# Patient Record
Sex: FEMALE | Race: WHITE | HISPANIC OR LATINO | Employment: OTHER | URBAN - METROPOLITAN AREA
[De-identification: names, ages, dates, MRNs, and addresses within clinical notes are randomized per-mention and may not be internally consistent; named-entity substitution may affect disease eponyms.]

---

## 2017-01-26 ENCOUNTER — GENERIC CONVERSION - ENCOUNTER (OUTPATIENT)
Dept: OTHER | Facility: OTHER | Age: 77
End: 2017-01-26

## 2017-02-07 ENCOUNTER — GENERIC CONVERSION - ENCOUNTER (OUTPATIENT)
Dept: OTHER | Facility: OTHER | Age: 77
End: 2017-02-07

## 2017-02-10 ENCOUNTER — ALLSCRIPTS OFFICE VISIT (OUTPATIENT)
Dept: OTHER | Facility: OTHER | Age: 77
End: 2017-02-10

## 2017-05-22 ENCOUNTER — APPOINTMENT (OUTPATIENT)
Dept: LAB | Facility: CLINIC | Age: 77
End: 2017-05-22
Payer: COMMERCIAL

## 2017-05-22 DIAGNOSIS — E78.5 HYPERLIPIDEMIA: ICD-10-CM

## 2017-05-22 DIAGNOSIS — E11.65 TYPE 2 DIABETES MELLITUS WITH HYPERGLYCEMIA (HCC): ICD-10-CM

## 2017-05-22 LAB
ALBUMIN SERPL BCP-MCNC: 4.3 G/DL (ref 3.5–5)
ALP SERPL-CCNC: 54 U/L (ref 46–116)
ALT SERPL W P-5'-P-CCNC: 45 U/L (ref 12–78)
ANION GAP SERPL CALCULATED.3IONS-SCNC: 7 MMOL/L (ref 4–13)
AST SERPL W P-5'-P-CCNC: 31 U/L (ref 5–45)
BILIRUB SERPL-MCNC: 0.55 MG/DL (ref 0.2–1)
BUN SERPL-MCNC: 12 MG/DL (ref 5–25)
CALCIUM SERPL-MCNC: 9.4 MG/DL (ref 8.3–10.1)
CHLORIDE SERPL-SCNC: 101 MMOL/L (ref 100–108)
CHOLEST SERPL-MCNC: 185 MG/DL (ref 50–200)
CO2 SERPL-SCNC: 28 MMOL/L (ref 21–32)
CREAT SERPL-MCNC: 0.82 MG/DL (ref 0.6–1.3)
CREAT UR-MCNC: 46.5 MG/DL
EST. AVERAGE GLUCOSE BLD GHB EST-MCNC: 189 MG/DL
GFR SERPL CREATININE-BSD FRML MDRD: >60 ML/MIN/1.73SQ M
GLUCOSE P FAST SERPL-MCNC: 121 MG/DL (ref 65–99)
HBA1C MFR BLD: 8.2 % (ref 4.2–6.3)
HDLC SERPL-MCNC: 43 MG/DL (ref 40–60)
LDLC SERPL CALC-MCNC: 74 MG/DL (ref 0–100)
MICROALBUMIN UR-MCNC: 9.4 MG/L (ref 0–20)
MICROALBUMIN/CREAT 24H UR: 20 MG/G CREATININE (ref 0–30)
POTASSIUM SERPL-SCNC: 3.9 MMOL/L (ref 3.5–5.3)
PROT SERPL-MCNC: 8.1 G/DL (ref 6.4–8.2)
SODIUM SERPL-SCNC: 136 MMOL/L (ref 136–145)
TRIGL SERPL-MCNC: 338 MG/DL

## 2017-05-22 PROCEDURE — 83036 HEMOGLOBIN GLYCOSYLATED A1C: CPT

## 2017-05-22 PROCEDURE — 36415 COLL VENOUS BLD VENIPUNCTURE: CPT

## 2017-05-22 PROCEDURE — 80061 LIPID PANEL: CPT

## 2017-05-22 PROCEDURE — 82570 ASSAY OF URINE CREATININE: CPT

## 2017-05-22 PROCEDURE — 82043 UR ALBUMIN QUANTITATIVE: CPT

## 2017-05-22 PROCEDURE — 80053 COMPREHEN METABOLIC PANEL: CPT

## 2017-06-05 ENCOUNTER — ALLSCRIPTS OFFICE VISIT (OUTPATIENT)
Dept: OTHER | Facility: OTHER | Age: 77
End: 2017-06-05

## 2017-06-08 ENCOUNTER — GENERIC CONVERSION - ENCOUNTER (OUTPATIENT)
Dept: OTHER | Facility: OTHER | Age: 77
End: 2017-06-08

## 2017-06-23 ENCOUNTER — ALLSCRIPTS OFFICE VISIT (OUTPATIENT)
Dept: OTHER | Facility: OTHER | Age: 77
End: 2017-06-23

## 2017-07-18 ENCOUNTER — GENERIC CONVERSION - ENCOUNTER (OUTPATIENT)
Dept: OTHER | Facility: OTHER | Age: 77
End: 2017-07-18

## 2017-07-18 ENCOUNTER — APPOINTMENT (OUTPATIENT)
Dept: FAMILY MEDICINE CLINIC | Facility: CLINIC | Age: 77
End: 2017-07-18
Payer: COMMERCIAL

## 2017-07-18 PROCEDURE — G0109 DIAB MANAGE TRN IND/GROUP: HCPCS | Performed by: FAMILY MEDICINE

## 2017-07-25 ENCOUNTER — APPOINTMENT (OUTPATIENT)
Dept: FAMILY MEDICINE CLINIC | Facility: CLINIC | Age: 77
End: 2017-07-25
Payer: COMMERCIAL

## 2017-07-25 PROCEDURE — G0109 DIAB MANAGE TRN IND/GROUP: HCPCS | Performed by: FAMILY MEDICINE

## 2017-07-27 ENCOUNTER — GENERIC CONVERSION - ENCOUNTER (OUTPATIENT)
Dept: OTHER | Facility: OTHER | Age: 77
End: 2017-07-27

## 2017-08-15 ENCOUNTER — APPOINTMENT (OUTPATIENT)
Dept: FAMILY MEDICINE CLINIC | Facility: CLINIC | Age: 77
End: 2017-08-15
Payer: COMMERCIAL

## 2017-08-15 PROCEDURE — G0109 DIAB MANAGE TRN IND/GROUP: HCPCS | Performed by: FAMILY MEDICINE

## 2017-08-22 ENCOUNTER — GENERIC CONVERSION - ENCOUNTER (OUTPATIENT)
Dept: OTHER | Facility: OTHER | Age: 77
End: 2017-08-22

## 2017-08-22 ENCOUNTER — ALLSCRIPTS OFFICE VISIT (OUTPATIENT)
Dept: FAMILY MEDICINE CLINIC | Facility: CLINIC | Age: 77
End: 2017-08-22
Payer: COMMERCIAL

## 2017-08-22 PROCEDURE — G0109 DIAB MANAGE TRN IND/GROUP: HCPCS | Performed by: FAMILY MEDICINE

## 2017-08-24 ENCOUNTER — GENERIC CONVERSION - ENCOUNTER (OUTPATIENT)
Dept: OTHER | Facility: OTHER | Age: 77
End: 2017-08-24

## 2017-08-29 ENCOUNTER — GENERIC CONVERSION - ENCOUNTER (OUTPATIENT)
Dept: OTHER | Facility: OTHER | Age: 77
End: 2017-08-29

## 2017-10-02 DIAGNOSIS — Z12.31 ENCOUNTER FOR SCREENING MAMMOGRAM FOR MALIGNANT NEOPLASM OF BREAST: ICD-10-CM

## 2017-10-03 ENCOUNTER — HOSPITAL ENCOUNTER (OUTPATIENT)
Dept: RADIOLOGY | Age: 77
Discharge: HOME | End: 2017-10-03
Payer: COMMERCIAL

## 2017-10-03 DIAGNOSIS — Z12.31 ENCOUNTER FOR SCREENING MAMMOGRAM FOR MALIGNANT NEOPLASM OF BREAST: ICD-10-CM

## 2017-10-03 PROCEDURE — G0202 SCR MAMMO BI INCL CAD: HCPCS

## 2017-10-09 ENCOUNTER — APPOINTMENT (OUTPATIENT)
Dept: LAB | Facility: CLINIC | Age: 77
End: 2017-10-09
Payer: COMMERCIAL

## 2017-10-09 ENCOUNTER — GENERIC CONVERSION - ENCOUNTER (OUTPATIENT)
Dept: OTHER | Facility: OTHER | Age: 77
End: 2017-10-09

## 2017-10-09 DIAGNOSIS — E11.8 UNCONTROLLED TYPE 2 DIABETES MELLITUS WITH COMPLICATION, UNSPECIFIED LONG TERM INSULIN USE STATUS: Primary | ICD-10-CM

## 2017-10-09 DIAGNOSIS — E11.65 UNCONTROLLED TYPE 2 DIABETES MELLITUS WITH COMPLICATION, UNSPECIFIED LONG TERM INSULIN USE STATUS: Primary | ICD-10-CM

## 2017-10-09 LAB
EST. AVERAGE GLUCOSE BLD GHB EST-MCNC: 171 MG/DL
HBA1C MFR BLD: 7.6 % (ref 4.2–6.3)

## 2017-10-09 PROCEDURE — 36415 COLL VENOUS BLD VENIPUNCTURE: CPT

## 2017-10-09 PROCEDURE — 83036 HEMOGLOBIN GLYCOSYLATED A1C: CPT

## 2017-11-17 ENCOUNTER — GENERIC CONVERSION - ENCOUNTER (OUTPATIENT)
Dept: OTHER | Facility: OTHER | Age: 77
End: 2017-11-17

## 2018-01-09 NOTE — PROGRESS NOTES
Chief Complaint  Chief Complaint Free Text Note Form: Patient attended Diabetes Class 3 at [facility]  Surgical History    1  History of  Section   2  History of Cholecystectomy Laparoscopic   3  History of Complete Colonoscopy   4  History of Surgical Excision Of Abdominal Pregnancy    Family History  Mother    1  Family history of Heart Disease (V17 49)  Father    2  Family history unknown (V4 89) (Z78 9)  Sister    3  Family history of Bleeding  Grandparent    4  Family history unknown (V4 89) (Z78 9)    Social History    · Caffeine Use   · Denied: History of Currently sexually active   · Denied: History of Exercise habits   · Former smoker (W78 58) (R88 964)   ·    · Never Drank Alcohol   · No drug use   · Occupation:    Current Meds   1  Alendronate Sodium 70 MG Oral Tablet; take 1 tablet by mouth every week; Therapy: 38Qcm4459 to (Evaluate:49Tcs7742)  Requested for: 33TDZ7424; Last   Rx:2016 Ordered   2  BD Insulin Syr Ultrafine II 31G X 5/16" 1 ML Miscellaneous; INJECT 32 UNITS LEVEMIR   SUBCUTANEOUSLY AT BEDTIME; Therapy: 68FRY0698 to (Aldair Killer)  Requested for: 94PZU8764; Last   Rx:2017 Ordered   3  Fluticasone Propionate 50 MCG/ACT Nasal Suspension; USE 1 SPRAY IN EACH   NOSTRIL TWICE DAILY; Therapy: 01EAW2769 to [phone])  Requested for: 26YPN6156; Last   Rx:90Odt8948 Ordered   4  FreeStyle Lancets Miscellaneous; may check sugar up to 3 times daily; Therapy: 70OKS5168 to (Evaluate:2017)  Requested for: 09EOY5751; Last   Rx:2017 Ordered   5  FreeStyle Lite Device; make check blood sugars uo to 3 times daily; Therapy: 04RHI3750 to (Evaluate:16Gst6123)  Requested for: 80HGO8043; Last   Rx:19Cqd7753 Ordered   6  FreeStyle Lite Test In Vitro Strip; may test blood sugar up to 3 times daily; Therapy: 46VHQ8688 to (Evaluate:2017)  Requested for: 11TUZ6274; Last   Rx:2017 Ordered   7   Levemir 100 UNIT/ML Subcutaneous Solution; INJECT 32 UNITS UNDER THE SKIN   DAILY; Therapy: 31KBR4309 to (Evaluate:60Iwv6775)  Requested for: 77XFN7172; Last   Rx:14Jun2017 Ordered   8  Lisinopril 40 MG Oral Tablet; Take 1 tablet daily; Therapy: 21Mar2013 to (Sanjeev Gentry)  Requested for: 02VSB1049; Last   Rx:05Jun2017 Ordered   9  Lovastatin 40 MG Oral Tablet; TAKE 1 TABLET AT BEDTIME; Therapy: 33LDR9561 to (Evaluate:60Gfe2410)  Requested for: 73JKB7612; Last   Rx:38Vup2104 Ordered   10  MetFORMIN HCl - 1000 MG Oral Tablet; TAKE 1 TABLET TWICE DAILY; Therapy: 89Jno6607 to (Evaluate:01Nov2017)  Requested for: 33NNO7038; Last    HU:37OOT8315 Ordered   11  Metoprolol Tartrate 25 MG Oral Tablet; TAKE 1 TABLET TWICE DAILY; Therapy: 74ENJ8263 to (Evaluate:72Xuq6902)  Requested for: 77MIE3280; Last    Rx:19Jun2017 Ordered   12  OneTouch Ultra Blue In [employer]; may test blood sugar up to 3 times daily; Therapy: 66WIJ8509 to (Evaluate:26Jun2017)  Requested for: 65Ncm9328; Last    Rx:06Wyd0399 Ordered   13  Vitamin D3 1000 UNIT Oral Capsule; take 1 capsule daily; Therapy: 76EDP5273 to (Evaluate:11Tug6704)  Requested for: 85QLF1217; Last    Rx:22Lyh4373 Ordered   14  Zoster (Zostavax); INJECT 0 65 ML Once; Therapy: 32EDP4426 to (Last Rx:23Jun2017) Ordered    Allergies    1  No Known Drug Allergies    2  Pollen    Results/Data  DSMT/MNT Time Record [id number] 12:00AM [facility]     Test Name Result Flag Reference   Date of Service 8/29/2017     Start - Stop Time 0657-4315     Total MInutes 120     Group Or Individual Instruction Group         Plan    1  DSMT/MNT Time Record; Status:Complete - Retrospective By Protocol Authorization;     Done: 07XHT3936 12:00AM    Discussion/Summary  Patient Education Record:   PATIENT EDUCATION RECORD   Indication for Services: type 2 Diabetes Mellitus  She is ready to learn  She has no barriers to learning     Living Well with Diabetes Class 4 Education Content: Types of cholesterol, Dietary sources of cholesterol, Types of fat, Types of fiber, Reading food labels- in depth, Healthy choices when dining out     Education Plan/Path:  She needs the Living Well Class  Educational handouts reviewed and given in Nepalese        Signatures   Electronically signed by : Arabella Pretty RN; Aug 29 2017  3:59PM EST                       (Author)

## 2018-01-11 NOTE — MISCELLANEOUS
Provider Comments  Provider Comments:   September 14, 2016             Dear Brandt Reynoso,    We understand that many situations arise that occasionally prevents patients from keeping scheduled appointments  It is the policy of [facility] that patients notify us 24 hours in advance if unable to keep a scheduled appointment  Missed appointments jeopardize strong physician-patient relationships  The appointment you missed could have easily been made available to another patient if you had contacted us to cancel  We like to accommodate all of our patients, but when patients miss an appointment it prevents us from being able to help everyone  In the future, we request at least 24 hours notice of cancellation so we can make your appointment available to someone else in need  Sincerely,    The Physicians and Staff of [facility]          Signatures   Electronically signed by :  Traci Sprague, ; Sep 14 2016  8:06AM EST                       (Author)

## 2018-01-11 NOTE — PROGRESS NOTES
Chief Complaint  Chief Complaint Free Text Note Form: Pt  attended LW Class 2 @ the [facility] today  Family History  Mother    1  Family history of Heart Disease (V17 49)  Father    2  Family history unknown (V49 89) (Z78 9)  Sister    3  Family history of Bleeding  Grandparent    4  Family history unknown (V49 89) (Z78 9)    Social History    · Caffeine Use   · Denied: History of Currently sexually active   · Denied: History of Exercise habits   · Former smoker (Y84 05) (J24 058)   ·    · Never Drank Alcohol   · No drug use   · Occupation:    Current Meds   1  Alendronate Sodium 70 MG Oral Tablet; take 1 tablet by mouth every week; Therapy: 90Rnz8462 to (Evaluate:87Ohq6223)  Requested for: 64CCC0891; Last   Rx:22Sep2016 Ordered   2  BD Insulin Syr Ultrafine II 31G X 5/16" 1 ML Miscellaneous; INJECT 32 UNITS LEVEMIR   SUBCUTANEOUSLY AT BEDTIME; Therapy: 09RZP1429 to (Hobert Carrier)  Requested for: 84MYP8002; Last   Rx:05Jun2017 Ordered   3  Fluticasone Propionate 50 MCG/ACT Nasal Suspension; USE 1 SPRAY IN EACH   NOSTRIL TWICE DAILY; Therapy: 83JPP5357 to (Mabel Humbles)  Requested for: 74YZR6911; Last   Rx:05Feb2016 Ordered   4  FreeStyle Lancets Miscellaneous; may check sugar up to 3 times daily; Therapy: 22YSQ4153 to (Evaluate:10Jun2017)  Requested for: 18AHS9243; Last   Rx:10Feb2017 Ordered   5  FreeStyle Lite Device; make check blood sugars uo to 3 times daily; Therapy: 02WNF6090 to (Evaluate:21Fml8484)  Requested for: 63ZNK3530; Last   Rx:05Feb2016 Ordered   6  FreeStyle Lite Test In Vitro Strip; may test blood sugar up to 3 times daily; Therapy: 89EDA3929 to (Evaluate:11Nov2017)  Requested for: 89JZJ4476; Last   Rx:14Jun2017 Ordered   7  Levemir 100 UNIT/ML Subcutaneous Solution; INJECT 32 UNITS UNDER THE SKIN   DAILY; Therapy: 67XOQ3501 to (Evaluate:89Wgt7181)  Requested for: 83XNN9754; Last   Rx:14Jun2017 Ordered   8  Lisinopril 40 MG Oral Tablet;  Take 1 tablet daily; Therapy: 21Mar2013 to (Praveen Ann)  Requested for: 39LHL3739; Last   Rx:05Jun2017 Ordered   9  Lovastatin 40 MG Oral Tablet; TAKE 1 TABLET AT BEDTIME; Therapy: 98ODH6522 to (Evaluate:41Smq9753)  Requested for: 34CLK6383; Last   Rx:32Mzl3568 Ordered   10  MetFORMIN HCl - 1000 MG Oral Tablet; TAKE 1 TABLET TWICE DAILY; Therapy: 71Dax9160 to (Evaluate:01Nov2017)  Requested for: 98PBJ5566; Last    XB:87TAY6695 Ordered   11  Metoprolol Tartrate 25 MG Oral Tablet; TAKE 1 TABLET TWICE DAILY; Therapy: 22MLF5120 to (Evaluate:12Tzl1595)  Requested for: 74KHU7586; Last    Rx:19Jun2017 Ordered   12  OneTouch Ultra Blue In [employer]; may test blood sugar up to 3 times daily; Therapy: 59XLW0599 to (Evaluate:26Jun2017)  Requested for: 15Oui6215; Last    Rx:39Pdg5910 Ordered   13  Vitamin D3 1000 UNIT Oral Capsule; take 1 capsule daily; Therapy: 45WUV5346 to (Evaluate:17Sep2017)  Requested for: 98WIX5425; Last    Rx:47Ibh0683 Ordered   14  Zoster (Zostavax); INJECT 0 65 ML Once; Therapy: 85QCB7061 to (Last Rx:23Jun2017) Ordered    Allergies    1  No Known Drug Allergies    2  Pollen    Results/Data  DSMT/MNT Time Record [id number] 12:00AM [facility]     Test Name Result Flag Reference   Date of Service 07/18/17     Start - Stop Time 13:00 - 15:00     Total MInutes 120     Group Or Individual Instruction Group         Plan    1  DSMT/MNT Time Record; Status:Complete;   Done: 14APF5120 12:00AM    Discussion/Summary  Patient Education Record:   PATIENT EDUCATION RECORD   Indication for Services: type 2 Diabetes Mellitus  She is ready to learn  She has no barriers to learning     Living Well with Diabetes Class 2 Education Content: Macronutrients, Carbohydrate sources, What one serving of carbohydrate equals, Effects of diet on blood glucose levels, effects of carbohydrates on blood glucose levels, Basics of meal planning: balance, portions, and meal times, Measurements, Reading food labels to determine carbohydrates  She was given the following educational materials: The 15/15 Rule for Treating Low Blood Sugar, Diabetes Guidelines, Support Group schedule, portion booklet and LW Class 2 booklet in [employer] [place]   Patient Education: Educational resources provided: As above        Signatures   Electronically signed by : IAN Gross,HENRRY,TANNER; Jul 18 2017  3:47PM EST                       (Author)    Electronically signed by : TODD Grande MSWM NINA ,MSW; Aug 19 2017  9:06PM EST                       (Administrative)

## 2018-01-12 VITALS
SYSTOLIC BLOOD PRESSURE: 120 MMHG | BODY MASS INDEX: 26.76 KG/M2 | HEIGHT: 59 IN | DIASTOLIC BLOOD PRESSURE: 62 MMHG | WEIGHT: 132.72 LBS | TEMPERATURE: 98.4 F | HEART RATE: 96 BPM

## 2018-01-12 VITALS
SYSTOLIC BLOOD PRESSURE: 158 MMHG | DIASTOLIC BLOOD PRESSURE: 76 MMHG | TEMPERATURE: 98.2 F | HEIGHT: 59 IN | HEART RATE: 74 BPM

## 2018-01-12 NOTE — PROGRESS NOTES
Assessment    1  Need for vaccination for zoster (V04 89) (Z23)   2  Medicare annual wellness visit, initial (V70 0) (Z00 00)    Plan  Health Maintenance    · Always use a seat belt and shoulder strap when riding or driving a motor vehicle ;  Status:Complete;   Done: 70QBI5835   · Brush your teeth {freq1} and floss at least once a day ; Status:Complete;   Done:  44JRB8032   · Diets that are low in carbohydrates and high in protein are very popular for weight loss ;  Status:Complete;   Done: 74BCW7620   · Drink plenty of fluids ; Status:Complete;   Done: 80VBV2753   · Eat a low fat and low cholesterol diet ; Status:Complete;   Done: 47FOK9680   · Keep a diary of when and what you eat ; Status:Complete;   Done: 24QWF9162   · Stretch and warm up your muscles during the first 10 minutes , then cool down your  muscles for the last 10 minutes of exercise ; Status:Complete;   Done: 29QRF0960   · The plan of care for urinary incontinence is detailed in the plan and/or discussion section  of today's note ; Status:Complete;   Done: 53FOW5626   · There are many exercise options for seniors ; Status:Complete;   Done: 30QLV7592   · There are ways to decrease your stress and improve your sense of well-being  We  encourage you to keep active and exercise regularly  Make time to take care of yourself  and participate in activities that you enjoy  Stay connected to friends and family that can  support and comfort you  If at any time you have thoughts of harming yourself or  someone else, contact us immediately ; Status:Active; Requested KNK:[id number];    · These are things you can do to prevent falls in and around the home ; Status:Complete;    Done: 58SJS5914   · Use a sun block product with an SPF of 15 or more ; Status:Complete;   Done:  14CUQ5488   · We encourage all of our patients to exercise regularly    30 minutes of exercise or physical  activity five or more days a week is recommended for children and adults ;  Status:Complete;   Done: 93BFA3815   · We encourage you to begin to make lifestyle changes to help control your blood  pressure  These may include losing weight, increasing your activity level, limiting salt in  your diet, decreasing alcohol intake, and eating a diet low in fat and rich in fruits  and vegetables ; Status:Complete;   Done: 05WFI2000   · We have prescribed Kegel exercises to be done in a set of 15 repetitions, 3 times a day ;  Status:Complete;   Done: 10TGV0266   · We recommend routine visits to a dentist ; Status:Complete;   Done: 34EGX8944   · We recommend that you create an advance directive ; Status:Complete;   Done:  23GBI6221   · We recommend that you follow these steps to lower your risk of osteoporosis  ;  Status:Complete;   Done: 85GMO7745   · We recommend you modify your diet to achieve and maintain a healthy weight  Being  overweight may increase your risk for developing health problems such as diabetes,  heart disease, and cancer  Avoid high fat foods and eat a balanced diet rich  in fruits and vegetables  The combination of a reduced-calorie diet and increased  physical activity is recommended  Please let us know if you would like to  learn more about your nutrition and calorie needs, and additional options including  weight loss programs that can help you achieve your goals ; Status:Complete;   Done:  57KNC2099   · *1 -  DENTAL CLINIC Co-Management  *  Status: Active  Requested for: 67OBA6137  Care Summary provided  : Yes  Need for vaccination for zoster    · Zoster (Zostavax) (Zoster (Zostavax)); INJECT 0 65 ML Once  Type 2 diabetes mellitus with hyperglycemia    · [facility] (OPTHAMOLOGY ) Physician Referral  Consult Only: the expectation is  that the referring provider will communicate back to the patient on treatment options  Evaluation and Treatment: the expectation is that the referred to provider will  communicate back to the patient on treatment options  Status: Canceled - Scheduling  Care Summary provided  : Yes   · *VB - Eye Exam ; every 1 year; Last 99HFH8011; Next 50AQC9298; Status:Active   · *VB - Foot Exam ; every 1 year; Last 40CWE5479; Next 31CTI6666; Status:Active    Discussion/Summary    You are up to date with all your screening tests  We discussed the importance of completing the forms for your wishes for care should you not be able to make the decisions for yourself  You indicate would like  and daughter but agree to review the POLST and 5 Wishes and call with questions and complete so we can put into your record  A script for the shingles vaccine provided again to take to pharmacy  schedule your DM follow up with me next month to review your readings and report are recording then as we had discussed  Impression: Initial Annual Wellness Visit, with preventive exam as well as age and risk appropriate counseling completed  Cardiovascular screening and counseling: the risks and benefits of screening were discussed, screening is current, counseling was given on maintaining a healthy diet, counseling was given on maintaining a healthy weight, counseling was given on ways to improve blood pressure, counseling was given on ways to improve exercise tolerance, labs up to date and Dx - V81 2 Screen for CV Disorder  All up to date for age  Immunizations: influenza vaccine is up to date this year, the lifetime pneumococcal vaccine has been completed, Zostavax vaccine needed today, Script provided and Tdap vaccination up to date  Tobacco Cessation: non smoker  Advance Directive Planning: not complete, paperwork and instructions were given to the patient, she was encouraged to follow-up with me to discuss her questions and/or decisions  Advice and education were given regarding fall risk reduction, increasing physical activity, nutrition (non-diabetic), seat belt use, skin self-exam and sunscreen use  She was referred to dental services   Medical Equipment/Suppliers: no equiptment  Patient Discussion: plan discussed with the patient, follow-up visit needed in one year  Self Referrals: No      History of Present Illness  states up to date with vaccines but never got for shingles, will get  mammo and colonoscopy up to date for age, no longer requires screening  Completed questions and interpretation provided by in office  Abhi Zuleta      The patient is being seen for the initial annual wellness visit  Medicare Screening and Risk Factors   Hospitalizations: no previous hospitalizations  Medicare Screening Tests Risk Questions   Abdominal aortic aneurysm risk assessment: none indicated  Osteoporosis risk assessment: female gender and over 48years of age  Drug and Alcohol Use: The patient has never smoked cigarettes  The patient reports never drinking alcohol  She has never used illicit drugs  Diet and Physical Activity: Current diet includes well balanced meals, low fat food choices, low carbohydrate food choices, low salt food choices, 5 servings of fruit per day, 5 servings of vegetables per day, 2 cups of coffee per day and 3 servings of water per day  The patient does not exercise  Mood Disorder and Cognitive Impairment Screening: PHQ-9 Depression Scale   Over the past 2 weeks, how often have you been bothered by the following problems? 1 ) Little interest or pleasure in doing things? Several days  2 ) Feeling down, depressed or hopeless? Not at all    3 ) Trouble falling asleep or sleeping too much? Not at all    4 ) Feeling tired or having little energy? Several days  5 ) Poor appetite or overeating? Not at all    6 ) Feeling bad about yourself, or that you are a failure, or have let yourself or your family down? Not at all    7 ) Trouble concentrating on things, such as reading a newspaper or watching television? Several days     8 ) Moving or speaking so slowly that other people could have noticed, or the opposite, moving or speaking faster than usual? Several days  TOTAL SCORE: 4    How difficult have these problems made it for you to do your work, take care of things at home, or get along with people? Not at all  Anxiety screening was done using GAD7, score was 8 and mild anxiety  Depression screening score was 4     no significant symptoms  She denies feeling down, depressed, or hopeless over the past two weeks  She reports feeling little interest or pleasure in doing things over the past two weeks  Cognitive impairment screening: denies difficulty learning/retaining new information, difficulty handling complex tasks, difficulty with reasoning, denies difficulty with spatial ability and orientation, denies difficulty with language and denies difficulty with behavior  Functional Ability/Level of Safety: Hearing is normal bilaterally and a hearing aid is not used  The patient is currently able to do activities of daily living without limitations  Activities of daily living details: needs help doing housework, but does not need help using the phone, no transportation help needed, does not need help shopping, no meal preparation help needed, does not need help doing laundry, does not need help managing medications and does not need help managing money  Fall risk factors:  polypharmacy and antihypertensive use, but The patient fell 0 times in the past 12 months , no alcohol use, no mobility impairment, no antidepressant use, no deconditioning, no postural hypotension, no sedative use, no visual impairment, no urinary incontinence, no cognitive impairment, up and go test was normal and no previous fall  Home safety risk factors:  no unfamiliar surroundings, no loose rugs, no poor household lighting, no uneven floors, no household clutter, grab bars in the bathroom and handrails on the stairs  Advance Directives: Advance directives: Patient is refusing information at this time  Will discuss with provider  , but no living will, no durable power of  for health care directives and no advance directives  Co-Managers and Medical Equipment/Suppliers: See Patient Care Team   Falls Risk: The patient fell none times in the past 12 months  The patient is currently asymptomatic Symptoms Include: The patient currently has no urinary incontinence symptoms  Date of last glaucoma screen was 6/6/2017      Patient Care Team    Care Team Member Role Specialty Office Number   Hannah Stairs MD  Gastroenterology Adult [phone]   Florencio Jacob MD  Gastroenterology Adult [phone]   [place] [address] [facility] [phone]   Juanita Tenorio MD  Colon and Rectal Surgery [phone]   [facility]   [phone]     Review of Systems    Constitutional: negative  Head and Face: negative  Eyes: negative  ENT: negative  Cardiovascular: negative  Respiratory: negative  Genitourinary: negative  Musculoskeletal: negative  Integumentary and Breasts: negative  Neurological: negative  Psychiatric: negative  Active Problems    1  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)   2  Benign essential hypertension (401 1) (I10)   3  Colon polyp (211 3) (K63 5)   4  Dyslipidemia (272 4) (E78 5)   5  Mild nonproliferative diabetic retinopathy of both eyes without macular edema associated   with type 2 diabetes mellitus (250 50,362 04) (Z57 8595)   6  Need for influenza vaccination (V04 81) (Z23)   7  Non-healing skin lesion (709 9) (L98 9)   8  Postmenopausal osteoporosis (733 01) (M81 0)   9  Screening for breast cancer (V76 10) (Z12 39)   10  Type 2 diabetes mellitus with hyperglycemia (250 00) (E11 65)    Past Medical History    1  History of Acute upper respiratory infection (465 9) (J06 9)   2  History of Bright red rectal bleeding (569 3) (K62 5)   3  History of Chronic idiopathic constipation (564 00) (K59 04)   4   History of Encounter for routine gynecological examination (V72 31) (Z01 419) 5  History of Encounter for screening for malignant neoplasm of colon (V76 51) (Z12 11)   6  History of Encounter for screening mammogram for malignant neoplasm of breast   (V76 12) (Z12 31)   7  History of Eustachian tube dysfunction, right (381 81) (H69 81)   8  History of anemia (V12 3) (Z86 2)   9  History of chest pain (V13 89) (Z87 898)   10  History of diverticulitis of colon (V12 79) (Z87 19)   11  History of esophageal reflux (V12 79) (Z87 19)   12  History of gastritis (V12 79) (Z87 19)   13  History of gastrointestinal hemorrhage (V12 79) (Z87 19)   14  History of hypertension (V12 59) (Z86 79)   15  History of Need for Tdap vaccination (V06 1) (Z23)   16  History of Noncompliance of patient with other medical treatment and regimen (V15 81)    (Z91 19)   17  History of Palpitations (785 1) (R00 2)   18  History of Rectal bleeding (569 3) (K62 5)   19  History of Screening for diabetic retinopathy (V80 2) (Z13 5)   20  History of Skin lesion of chest wall (709 9) (L98 9)   21  History of Type 2 diabetes mellitus (250 00) (E11 9)    The active problems and past medical history were reviewed and updated today  Surgical History    1  History of  Section   2  History of Cholecystectomy Laparoscopic   3  History of Complete Colonoscopy   4  History of Surgical Excision Of Abdominal Pregnancy    The surgical history was reviewed and updated today  Family History  Mother    1  Family history of Heart Disease (V17 49)  Father    2  Family history unknown (V49 89) (Z78 9)  Sister    3  Family history of Bleeding  Grandparent    4  Family history unknown (V49 89) (Z78 9)    The family history was reviewed and updated today         Social History    · Caffeine Use   · Denied: History of Currently sexually active   · Denied: History of Exercise habits   · Former smoker (V15 82) (M41 161)   ·    · Never Drank Alcohol   · No drug use   · Occupation:  The social history was reviewed and updated today  The social history was reviewed and is unchanged  Current Meds   1  Alendronate Sodium 70 MG Oral Tablet; take 1 tablet by mouth every week; Therapy: 14Sep2015 to (Evaluate:13Ivu6614)  Requested for: 18MAE0060; Last   Rx:22Sep2016 Ordered   2  BD Insulin Syr Ultrafine II 31G X 5/16" 1 ML Miscellaneous; INJECT 32 UNITS LEVEMIR   SUBCUTANEOUSLY AT BEDTIME; Therapy: 34DMV8655 to (Juhi Warren)  Requested for: 46YNW4159; Last   Rx:05Jun2017 Ordered   3  Fluticasone Propionate 50 MCG/ACT Nasal Suspension; USE 1 SPRAY IN EACH   NOSTRIL TWICE DAILY; Therapy: 77NLQ7620 to (Sully Gilliam)  Requested for: 15YHI2136; Last   Rx:05Feb2016 Ordered   4  FreeStyle Lancets Miscellaneous; may check sugar up to 3 times daily; Therapy: 37JHY2393 to (Evaluate:10Jun2017)  Requested for: 85NKI0969; Last   Rx:10Feb2017 Ordered   5  FreeStyle Lite Device; make check blood sugars uo to 3 times daily; Therapy: 68QDQ7860 to (Evaluate:04Feb2017)  Requested for: 94HVS5402; Last   Rx:05Feb2016 Ordered   6  FreeStyle Lite Test In Vitro Strip; may test blood sugar up to 3 times daily; Therapy: 90RCI1830 to (Evaluate:11Nov2017)  Requested for: 17ZYI9480; Last   Rx:14Jun2017 Ordered   7  Levemir 100 UNIT/ML Subcutaneous Solution; INJECT 32 UNITS UNDER THE SKIN   DAILY; Therapy: 08TGR0248 to (Evaluate:12Sep2017)  Requested for: 81LRK6815; Last   Rx:14Jun2017 Ordered   8  Lisinopril 40 MG Oral Tablet; Take 1 tablet daily; Therapy: 21Mar2013 to (Juhi Warren)  Requested for: 85EQY9993; Last   Rx:05Jun2017 Ordered   9  Lovastatin 40 MG Oral Tablet; TAKE 1 TABLET AT BEDTIME; Therapy: 22BJI3534 to (Evaluate:63Lrg4857)  Requested for: 27ELZ6026; Last   Rx:21Feb2017 Ordered   10  MetFORMIN HCl - 1000 MG Oral Tablet; TAKE 1 TABLET TWICE DAILY; Therapy: 65Fgq7871 to (Evaluate:01Nov2017)  Requested for: 36XFM1418; Last    CD:44OVJ1756 Ordered   11   Metoprolol Tartrate 25 MG Oral Tablet; TAKE 1 TABLET TWICE DAILY; Therapy: 45RMN3809 to (Evaluate:2017)  Requested for: 98HAN6212; Last    Rx:2017 Ordered   12  OneTouch Ultra Blue In [employer]; may test blood sugar up to 3 times daily; Therapy: 61YIM8569 to (Evaluate:2017)  Requested for: 38Crv1853; Last    Rx:68Lhr0522 Ordered   13  Vitamin D3 1000 UNIT Oral Capsule; take 1 capsule daily; Therapy: 21TSP5127 to (Evaluate:2017)  Requested for: 29ZLQ1934; Last    Rx:57Wwf7434 Ordered    Allergies    1  No Known Drug Allergies    2  Pollen    Immunizations  Influenza --- Coleman Backers: [id number] Lye: 17-Sep-2012; Adelita Gross: 23-Oct-2013; Davis Zaman:  2015; Series5: 22-Sep-2016   PPSV --- Series1: 13-Dec-2011   Tdap --- Series1: 09-May-2016     Vitals  Signs   Recorded: 48XBM0617 54:40MI   Systolic: 672  Diastolic: 78  Height: 4 ft 11 in  Weight: 133 lb 2 51 oz  BMI Calculated: 26 89  BSA Calculated: 1 55    Results/Data  Diabetes Flow Sheet 34LSA4373 10:01AM Mian Armendariz   diabetic non-proliferative retinopathy completed on 17     Test Name Result Flag Reference   Eye Exam      non proliferative retinopathy       Health Management  Colon polyp   COLONOSCOPY (GI, SURG); every 2 years; Last 2016; Next Due: 20TXK9156; Active  History of Chronic idiopathic constipation   COLONOSCOPY; every 2 years; Last 2016; Next Due: 69Emw3517; Overdue  Type 2 diabetes mellitus with hyperglycemia   *VB - Eye Exam; every 1 year; Last 42GOI0254; Next Due: 98NME5502; Active  *VB - Foot Exam; every 1 year; Last 42TVG7346; Next Due: 64JVD6219; Active    Attending Note  Collaborating Physician Note: Collaborating Note: I supervised the Advanced Practitioner and I agree with the Advanced Practitioner note        Signatures   Electronically signed by : Cindy Grimm, [facility]; 2017 10:04AM EST                       (Author)    Electronically signed by : Zakiya Palumbo DO; 2017 10:28AM EST                       (Review)

## 2018-01-12 NOTE — MISCELLANEOUS
Date: 03/30/2016   Dear Baltazar Duverney:     We have attempted to reach you regarding your upcoming appointment on 06/09/2016 and with Mohti Rush     Unfortunately, due to a change in the provider's schedule we need to change your appointment  Please call our office as soon as possible so we can reschedule your appointment  We apologize for any inconvenience this may cause  Thank you in advance for your cooperation and assistance       Sincerely,   [facility]      Signatures   Electronically signed by : Christine Bautista, ; Mar 30 2016  9:08AM EST                       (Author)

## 2018-01-12 NOTE — PROGRESS NOTES
Chief Complaint  Chief Complaint Free Text Note Form: Pt  attended LW Class 4 class today @ [facility]  Social History    · Caffeine Use   · Denied: History of Currently sexually active   · Denied: History of Exercise habits   · Former smoker (R38 63) (F36 315)   ·    · Never Drank Alcohol   · No drug use   · Occupation:    Current Meds   1  Alendronate Sodium 70 MG Oral Tablet; take 1 tablet by mouth every week; Therapy: 89Qqp0233 to (Evaluate:77Kdg8173)  Requested for: 64IZD6474; Last   Rx:22Sep2016 Ordered   2  BD Insulin Syr Ultrafine II 31G X 5/16" 1 ML Miscellaneous; INJECT 32 UNITS LEVEMIR   SUBCUTANEOUSLY AT BEDTIME; Therapy: 75IDX4347 to (Stephanie Whitakerd)  Requested for: 55KYU3264; Last   Rx:05Jun2017 Ordered   3  Fluticasone Propionate 50 MCG/ACT Nasal Suspension; USE 1 SPRAY IN EACH   NOSTRIL TWICE DAILY; Therapy: 02JCI2330 to [phone]  Requested for: 29TUI7242; Last   Rx:05Feb2016 Ordered   4  FreeStyle Lancets Miscellaneous; may check sugar up to 3 times daily; Therapy: 22UND7577 to (Evaluate:10Jun2017)  Requested for: 47NRV9075; Last   Rx:10Feb2017 Ordered   5  FreeStyle Lite Device; make check blood sugars uo to 3 times daily; Therapy: 17ZOD7629 to (Evaluate:78Cxz5084)  Requested for: 53BGC2924; Last   Rx:67Egh0435 Ordered   6  FreeStyle Lite Test In Vitro Strip; may test blood sugar up to 3 times daily; Therapy: 40SAO9616 to (Evaluate:11Nov2017)  Requested for: 13VYY1713; Last   Rx:14Jun2017 Ordered   7  Levemir 100 UNIT/ML Subcutaneous Solution; INJECT 32 UNITS UNDER THE SKIN   DAILY; Therapy: 34BUH1088 to (Evaluate:00Gml0486)  Requested for: 11WHA8586; Last   Rx:14Jun2017 Ordered   8  Lisinopril 40 MG Oral Tablet; Take 1 tablet daily; Therapy: 21Mar2013 to (Stephanie Monteiro)  Requested for: 47MXG9788; Last   Rx:05Jun2017 Ordered   9  Lovastatin 40 MG Oral Tablet; TAKE 1 TABLET AT BEDTIME;    Therapy: 39MRZ0681 to (Evaluate:34Szi2434)  Requested for: 73DQF0625; Last   Rx:10Zal3427 Ordered   10  MetFORMIN HCl - 1000 MG Oral Tablet; TAKE 1 TABLET TWICE DAILY; Therapy: 33Xqv0653 to (Evaluate:01Nov2017)  Requested for: 32LUW8549; Last    KW:06TXX1086 Ordered   11  Metoprolol Tartrate 25 MG Oral Tablet; TAKE 1 TABLET TWICE DAILY; Therapy: 64ZLN8719 to (Evaluate:17Sep2017)  Requested for: 21BXN7912; Last    Rx:19Jun2017 Ordered   12  OneTouch Ultra Blue In [employer]; may test blood sugar up to 3 times daily; Therapy: 30IJT1703 to (Evaluate:26Jun2017)  Requested for: 68Tdp7596; Last    Rx:52Gdz8750 Ordered   13  Vitamin D3 1000 UNIT Oral Capsule; take 1 capsule daily; Therapy: 68UEC6600 to (Evaluate:17Sep2017)  Requested for: 46NPT6947; Last    Rx:48Kkq0287 Ordered   14  Zoster (Zostavax); INJECT 0 65 ML Once; Therapy: 26DKT6317 to (Last Rx:23Jun2017) Ordered    Allergies    1  No Known Drug Allergies    2  Pollen    Results/Data  DSMT/MNT Time Record 91Lse9120 12:00AM Stephanie Flock     Test Name Result Flag Reference   Date of Service 08/22/17     Start - Stop Time 13:00 - 15:00     Total MInutes 120     Group Or Individual Instruction Group         Plan    1  DSMT/MNT Time Record; Status:Complete;   Done: 22Aug2017 12:00AM    Discussion/Summary  Patient Education Record:   PATIENT EDUCATION RECORD   Indication for Services: type 2 Diabetes Mellitus  She is ready to learn  Living Well with Diabetes Class 4 Education Content: Types of cholesterol, Dietary sources of cholesterol, Types of fat, Types of fiber, Reading food labels- in depth, Healthy choices when dining out   She was given the following educational materials: LW Class 4 packet, in Frisian   Patient Education: Educational resources provided: As above        Future Appointments    Date/Time Provider Specialty Site   10/09/2017 10:30 AM An Blakely Cera 6 PCP     Signatures   Electronically signed by : Domo Palmer RDCDELDHARJEET,MILAGRO,GABRIELLE;  Aug 22 2017  4:04PM EST                       (Author)    Electronically signed by : TODD TimmonsMSW; Oct  5 2017  2:22PM EST                       (Administrative)

## 2018-01-12 NOTE — MISCELLANEOUS
Provider Comments  Provider Comments:   PT NO SHOW FOR TODAY APPT  needs to resched      Signatures   Electronically signed by : Debby Celeste, PAC;  Apr 13 2016  5:12PM EST                       (Author)

## 2018-01-13 VITALS
HEIGHT: 59 IN | BODY MASS INDEX: 26.84 KG/M2 | SYSTOLIC BLOOD PRESSURE: 166 MMHG | DIASTOLIC BLOOD PRESSURE: 78 MMHG | WEIGHT: 133.16 LBS

## 2018-01-13 NOTE — PROGRESS NOTES
Assessment    1  Eustachian tube dysfunction, right (381 81) (H69 81)   2  Acute upper respiratory infection (465 9) (J06 9)    Plan  Acute upper respiratory infection    · Coricidin HBP Congestion/Cough  MG Oral Capsule; TAKE 1 CAPSULE  EVERY 4 HOURS AS NEEDED  Eustachian tube dysfunction, right    · Fluticasone Propionate 50 MCG/ACT Nasal Suspension; USE 1 SPRAY IN EACH  NOSTRIL TWICE DAILY  Type 2 diabetes mellitus with hyperglycemia    · FreeStyle Lancets Miscellaneous; may check sugar up to 3 times daily   · FreeStyle Lite Device; make check blood sugars uo to 3 times daily   · FreeStyle Lite Test In Vitro Strip; may test blood sugar up to 3 times daily    Discussion/Summary    Start using the nasal spray daily  take the congestion/ cold medication is safe with your blood pressure medication  plenty of liquids  follow up after labs in March as discussed  I sent a new glucometer based on your new insurance  The patient, patient's family was counseled regarding instructions for management, risk factor reductions, prognosis, impressions  Possible side effects of new medications were reviewed with the patient/guardian today  The treatment plan was reviewed with the patient/guardian  The patient/guardian understands and agrees with the treatment plan      History of Present Illness  HPI: Pt here for [facility] R ear pain  was seen in ER for same 2/3/16, diagnosed virus  Did admit to Q-tip use    still has a low grade fever today, still has congestion but no sore throat, no cough but is clearing throat  slight help with ibuprofen  has a cold with nasal congestion   Hospital Based Practices Required Assessment:   Pain Assessment   the patient states they have pain  The pain is located in the pt state she has right ear pain for tow weeks  The patient describes the pain as aching, throbbing and constant  (on a scale of 0 to 10, the patient rates the pain at 10 )    Depression And Suicide Screen   No, the patient has not had thoughts of hurting themself  No, the patient has not felt depressed in the past 7 days  Prefered Language is  Pakistani  Primary Language is  Pakistani  Review of Systems    Constitutional: no fever and no chills  ENT: as noted in HPI  Cardiovascular: no complaints of slow or fast heart rate, no chest pain, no palpitations, no leg claudication or lower extremity edema  Respiratory: no complaints of shortness of breath, no wheezing, no dyspnea on exertion, no orthopnea or PND  Musculoskeletal: no complaints of arthralgia, no myalgia, no joint swelling or stiffness, no limb pain or swelling  Integumentary: no complaints of skin rash or lesion, no itching or dry skin, no skin wounds  Active Problems    1  Anemia (285 9) (D64 9)   2  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)   3  Benign essential hypertension (401 1) (I10)   4  Colon polyp (211 3) (K63 5)   5  Dyslipidemia (272 4) (E78 5)   6  Encounter for screening mammogram for malignant neoplasm of breast (V76 12)   (Z12 31)   7  Gastritis (535 50) (K29 70)   8  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,   macular edema presence unspecified (250 50,362 04) (E11 329)   9  Need for influenza vaccination (V04 81) (Z23)   10  Need for Tdap vaccination (V06 1) (Z23)   11  Noncompliance of patient with other medical treatment and regimen (V15 81) (Z91 19)   12  Postmenopausal osteoporosis (733 01) (M81 0)   13  Screening for diabetic retinopathy (V80 2) (Z13 5)   14  Skin lesion of chest wall (709 9) (L98 9)   15  Type 2 diabetes mellitus with hyperglycemia (250 00) (E11 65)    Social History    · Caffeine Use   · Denied: History of Currently sexually active   · Denied: History of Exercise habits   · Former smoker (V15 82) (J58 890)   ·    · Never Drank Alcohol   · No drug use   · Occupation:   · packaging  The social history was reviewed and updated today   The social history was reviewed and is unchanged  Current Meds   1  Alendronate Sodium 70 MG Oral Tablet; take 1 tablet by mouth every week; Therapy: 53Laq0307 to (Evaluate:40Ovm4412)  Requested for: 38NGU4833; Last   Rx:82Obf7150 Ordered   2  BD Insulin Syr Ultrafine II 31G X 16" 1 ML Miscellaneous; INJECT 25 UNITS  Levemir   SUBCUTANEOUSLY AT BEDTIME; Therapy: 01XZG5135 to (Ramon Braxton)  Requested for: 66ZSQ2289; Last   Rx:2015 Ordered   3  Docusate Sodium 100 MG Oral Capsule; TAKE 1 CAPSULE TWICE DAILY AS NEEDED; Therapy: 97DPI2331 to (Sarkis Lala)  Requested for: 98DRG9697; Last   Rx:2016 Ordered   4  Ferrous Sulfate 325 (65 Fe) MG Oral Tablet; TAKE 1 TABLET TWICE DAILY AS   DIRECTED; Therapy: 83VXB9116 to (Sarkis Lala)  Requested for: 47DMY0156; Last   Rx:2016 Ordered   5  Levemir 100 UNIT/ML Subcutaneous Solution; INJECT 30 UNIT Bedtime; Therapy: 22FPX5168 to (Ramon Braxton)  Requested for: 12TBS7615; Last   Rx:2016 Ordered   6  Lisinopril 40 MG Oral Tablet; take 1 tablet by mouth twice a day; Therapy: 2013 to (Evaluate:45Rht4399)  Requested for: 81PDK5646; Last   Rx:11Hnc0273 Ordered   7  Lovastatin 10 MG Oral Tablet; take 1 tablet at bedtime; Therapy: 46MSZ9278 to (Evaluate:75Szm4136)  Requested for: 0676 543 19 15; Last   Rx:52Wer7839 Ordered   8  MetFORMIN HCl - 1000 MG Oral Tablet; TAKE 1 TABLET TWICE DAILY; Therapy: 00Sql0171 to (Evaluate:95Byg5336)  Requested for: 12ETU0276; Last   Rx:2016 Ordered   9  Metoprolol Tartrate 25 MG Oral Tablet; take 1 tablet every 12 hours; Therapy: 93UNK0828 to (Saniya Mass)  Requested for: 04BRL8658; Last   J00EAF9342 Ordered   10  Omeprazole 20 MG Oral Capsule Delayed Release; TAKE 1 CAPSULE DAILY; Therapy: 30GAS2269 to (Cherene Estrin)  Requested for: 39MLX4510; Last    Rx:2015 Ordered   11  OneTouch Ultra Blue In [employer]; may test blood sugar up to 3 times daily;     Therapy: 98MPL9496 to (Evaluate:78Cfu8705)  Requested for: 80DLY0791; Last    Rx:05Jan2016; Status: ACTIVE - Renewal Denied Ordered   12  TriLyte 420 GM Oral Solution Reconstituted; TAKE AS DIRECTED; Therapy: 35NJU8676 to (formerly [facility])  Requested for: 52WYL9721; Last    Rx:05Nov2015 Ordered   13  Vitamin D 1000 UNIT CAPS; take 1 capsule daily; Therapy: 21KTU7817 to (Zaldivar Pro)  Requested for: 38QXI6466; Last    Rx:06Nov2015 Ordered   14  Zoster (Zostavax); INJECT 0 5  ML Intramuscular; Therapy: 12QYB9763 to (Last Rx:08Oct2015) Ordered    Allergies    1  No Known Drug Allergies    2  Pollen    Vitals   Recorded: 46AAP0335 10:19AM   Temperature 99 3 F   Heart Rate 80   Systolic 965   Diastolic 60   Height 4 ft 10 in   Weight 136 lb 10 94 oz   BMI Calculated 28 57   BSA Calculated 1 55     Physical Exam    Constitutional   General appearance: No acute distress, well appearing and well nourished  Eyes   Conjunctiva and lids: No swelling, erythema or discharge  Ears, Nose, Mouth, and Throat   External inspection of ears and nose: Normal     Otoscopic examination: Abnormal   The right tympanic membrane was bulging, but had an intact light reflex and was not obscured  The left tympanic membrane was not bulging, had an intact light reflex and was not obscured  The right external canal was normal  The left external canal was normal  Exam of the right middle ear showed a middle ear effusion that was serous  Exam of the left middle ear showed no middle ear effusion  Oropharynx: Abnormal   post nasal drip  Pulmonary   Respiratory effort: No increased work of breathing or signs of respiratory distress  Auscultation of lungs: Clear to auscultation  Cardiovascular   Auscultation of heart: Normal rate and rhythm, normal S1 and S2, without murmurs  Carotid pulses: Normal     Lymphatic   Palpation of lymph nodes in neck: No lymphadenopathy      Skin   Skin and subcutaneous tissue: Normal without rashes or lesions  Psychiatric   Mood and affect: Normal          Attending Note  Collaborating Physician Note: Collaborating Physician: I supervised the Advanced Practitioner and I agree with the Advanced Practitioner note        Future Appointments    Date/Time Provider Specialty Site   04/25/2016 08:00 AM Clary Wagner MD Gastroenterology Adult [address] SPECIAL   06/09/2016 10:00 AM Jaelyn Connelly, [facility] Gastroenterology Adult [address] SPECIAL   08/12/2016 10:00 AM CLAUDIO Sorenson Obstetrics/Gynecology [facility]   12/23/2016 01:20 PM Specialty Clinic, Podiatry  [place] [address]     Signatures   Electronically signed by : Abhilash Reis, [facility]; Feb 5 2016 10:54AM EST                       (Author)    Electronically signed by : Soni Lopez DO; Feb 5 2016 12:46PM EST                       (Review)

## 2018-01-14 NOTE — RESULT NOTES
Message   please enter reminder  Verified Results  (1) TISSUE EXAM 25Apr2016 10:19AM Stan Huertas     Test Name Result Flag Reference   LAB AP CASE REPORT (Report)     Surgical Pathology Report             Case: [id number]                   Authorizing Provider: Brittney Foster MD       Collected:      04/25/2016 1019        Ordering Location:   [address] [place] [address]   Received:      04/25/2016   [facility]                               Pathologist:      Mack Gar MD                              Specimens:  A) - Large Intestine, Transverse Colon, Prior polypectomy site, cold bx                B) - Polyp, Colorectal, Transverse colon polyp, cold bx   LAB AP FINAL DIAGNOSIS (Report)     A  Transverse colon, prior polypectomy site biopsy:  - Benign colonic mucosa with nonspecific reactive changes   - No active or chronic colitis, no epithelial dysplasia and no evidence of   malignancy  B  Transverse colon polyp:  - Benign polypoid colonic mucosa without histopathologic abnormality   - No epithelial dysplasia and no evidence of malignancy  Interpretation performed at [facility]   LAB AP SURGICAL ADDITIONAL INFORMATION (Report)     These tests were developed and their performance characteristics   determined by [address] [place] [address] or [facility]  They may not be cleared or approved by the [place]  Food and   Drug Administration  The FDA has determined that such clearance or   approval is not necessary  These tests are used for clinical purposes  They should not be regarded as investigational or for research  This   laboratory has been approved by CLIA 88, designated as a high-complexity   laboratory and is qualified to perform these tests  LAB AP GROSS DESCRIPTION (Report)     A   The specimen is received in formalin, labeled with the patient's name   and hospital number, and is designated prior polypectomy site,   transverse colon biopsy  The specimen consists of several, rubbery,   tan-brown tissue fragments measuring 1 0 x 10 8 x 0 2 cm in aggregate   dimension  Entirely submitted  One cassette  B  The specimen is received in formalin, labeled with the patient's name   and hospital number, and is designated transverse colon polyp biopsy  The specimen consists of 2 rubbery and friable, tan-brown tissue fragments   measuring from 0 1 up to 0 2 cm in greatest dimension  Entirely submitted  One cassette  Note: The estimated total formalin fixation time based upon information   provided by the submitting clinician and the standard processing schedule   is 18 0 hours  SRS  Discussion/Summary   negative biopsies  Will repeat colonoscopy in 3 years

## 2018-01-15 NOTE — PROGRESS NOTES
Chief Complaint  Chief Complaint Free Text Note Form: Patient attended Diabetes Class 3 on 17 at [facility]  Surgical History    1  History of  Section   2  History of Cholecystectomy Laparoscopic   3  History of Complete Colonoscopy   4  History of Surgical Excision Of Abdominal Pregnancy    Family History  Mother    1  Family history of Heart Disease (V17 49)  Father    2  Family history unknown (V49 89) (Z78 9)  Sister    3  Family history of Bleeding  Grandparent    4  Family history unknown (V49 89) (Z78 9)    Social History    · Caffeine Use   · Denied: History of Currently sexually active   · Denied: History of Exercise habits   · Former smoker (X04 92) (Z12 626)   ·    · Never Drank Alcohol   · No drug use   · Occupation:    Current Meds   1  Alendronate Sodium 70 MG Oral Tablet; take 1 tablet by mouth every week; Therapy: 30Ict8440 to (Evaluate:62Jsd3433)  Requested for: 67LYT2420; Last   Rx:2016 Ordered   2  BD Insulin Syr Ultrafine II 31G X 5/16" 1 ML Miscellaneous; INJECT 32 UNITS LEVEMIR   SUBCUTANEOUSLY AT BEDTIME; Therapy: 04KSR0070 to (Lois Chance)  Requested for: 40ONX0463; Last   Rx:2017 Ordered   3  Fluticasone Propionate 50 MCG/ACT Nasal Suspension; USE 1 SPRAY IN EACH   NOSTRIL TWICE DAILY; Therapy: 25KRF6891 to ()  Requested for: 69ZBN8949; Last   Rx:84Ffa3232 Ordered   4  FreeStyle Lancets Miscellaneous; may check sugar up to 3 times daily; Therapy: 81SXC9511 to (Evaluate:2017)  Requested for: 90BBG3739; Last   Rx:2017 Ordered   5  FreeStyle Lite Device; make check blood sugars uo to 3 times daily; Therapy: 56JAU1558 to (Evaluate:56Bws5064)  Requested for: 20TEH5690; Last   Rx:15Hgt9604 Ordered   6  FreeStyle Lite Test In Vitro Strip; may test blood sugar up to 3 times daily; Therapy: 81KIV8569 to (Evaluate:2017)  Requested for: 18BZV5827; Last   Rx:2017 Ordered   7   Levemir 100 UNIT/ML Subcutaneous Solution; INJECT 32 UNITS UNDER THE SKIN   DAILY; Therapy: 23HLW7290 to (Evaluate:38Uqd8477)  Requested for: 18UBA9609; Last   Rx:14Jun2017 Ordered   8  Lisinopril 40 MG Oral Tablet; Take 1 tablet daily; Therapy: 80Gvc5362 to (Selsaria Sicks)  Requested for: 55QAK4594; Last   Rx:14Sxk0493 Ordered   9  Lovastatin 40 MG Oral Tablet; TAKE 1 TABLET AT BEDTIME; Therapy: 45PAO6866 to (Evaluate:80Ipl0401)  Requested for: 60RGE6022; Last   Rx:26Fqh6855 Ordered   10  MetFORMIN HCl - 1000 MG Oral Tablet; TAKE 1 TABLET TWICE DAILY; Therapy: 58Eop9017 to (Evaluate:01Nov2017)  Requested for: 38YUU9746; Last    YP:35NJY9161 Ordered   11  Metoprolol Tartrate 25 MG Oral Tablet; TAKE 1 TABLET TWICE DAILY; Therapy: 52CRT7255 to (Evaluate:94Eht5511)  Requested for: 19OFA5607; Last    Rx:26Ufp0025 Ordered   12  OneTouch Ultra Blue In [employer]; may test blood sugar up to 3 times daily; Therapy: 58XUB2251 to (Evaluate:26Jun2017)  Requested for: 23Vdj0596; Last    Rx:04Otq6292 Ordered   13  Vitamin D3 1000 UNIT Oral Capsule; take 1 capsule daily; Therapy: 41DTG3477 to (Evaluate:79Iwi6144)  Requested for: 87SYK2396; Last    Rx:15Rjv4587 Ordered   14  Zoster (Zostavax); INJECT 0 65 ML Once; Therapy: 07NQD4124 to (Last Rx:23Jun2017) Ordered    Allergies    1  No Known Drug Allergies    2  Pollen    Results/Data  DSMT/MNT Time Record 52FSI4351 12:00AM Tnoy Young     Test Name Result Flag Reference   Date of Service 7/25/17     Start - Stop Time 7054-9569     Total MInutes 120     Group Or Individual Instruction Group         Plan    1  DSMT/MNT Time Record; Status:Complete - Retrospective By Protocol Authorization;     Done: 65EIA8368 12:00AM    Discussion/Summary  Patient Education Record:   PATIENT EDUCATION RECORD   Indication for Services: type 2 Diabetes Mellitus  She is ready to learn  She has no barriers to learning     Living Well with Diabetes Class 4 Education Content: Types of cholesterol, Dietary sources of cholesterol, Types of fat, Types of fiber, Reading food labels- in depth, Healthy choices when dining out     Education Plan/Path:  She needs the Living Well Class   Educational handouts given in Uzbek      Signatures   Electronically signed by : Laura Sanabria RN; Jul 27 2017  4:40PM EST                       (Author)

## 2018-01-18 NOTE — PROGRESS NOTES
Chief Complaint  Chief Complaint Free Text Note Form: Patient attended Diabetes Class 1 at the [facility] on 8/15/2017      Surgical History    1  History of  Section   2  History of Cholecystectomy Laparoscopic   3  History of Complete Colonoscopy   4  History of Surgical Excision Of Abdominal Pregnancy    Family History  Mother    1  Family history of Heart Disease (V17 49)  Father    2  Family history unknown (V49 89) (Z78 9)  Sister    3  Family history of Bleeding  Grandparent    4  Family history unknown (V49 89) (Z78 9)    Social History    · Caffeine Use   · Denied: History of Currently sexually active   · Denied: History of Exercise habits   · Former smoker (K32 08) (X03 416)   ·    · Never Drank Alcohol   · No drug use   · Occupation:    Current Meds   1  Alendronate Sodium 70 MG Oral Tablet; take 1 tablet by mouth every week; Therapy: 62Nhv3130 to (Evaluate:00Nyu4709)  Requested for: 69VAG1664; Last   Rx:29Amx9722 Ordered   2  BD Insulin Syr Ultrafine II 31G X 5/16" 1 ML Miscellaneous; INJECT 32 UNITS LEVEMIR   SUBCUTANEOUSLY AT BEDTIME; Therapy: 13PRX5120 to (Damion Siemens)  Requested for: 41ZLF4113; Last   Rx:2017 Ordered   3  Fluticasone Propionate 50 MCG/ACT Nasal Suspension; USE 1 SPRAY IN EACH   NOSTRIL TWICE DAILY; Therapy: 82UUS2550 to (Adryan Osborn)  Requested for: 14WUP4427; Last   Rx:74Cwf5441 Ordered   4  FreeStyle Lancets Miscellaneous; may check sugar up to 3 times daily; Therapy: 46BKZ2430 to (Evaluate:2017)  Requested for: 73NUY5012; Last   Rx:2017 Ordered   5  FreeStyle Lite Device; make check blood sugars uo to 3 times daily; Therapy: 80TAS6369 to (Evaluate:2017)  Requested for: 43NLL4692; Last   Rx:30Cce8606 Ordered   6  FreeStyle Lite Test In Vitro Strip; may test blood sugar up to 3 times daily; Therapy: 97TUX9268 to (Evaluate:2017)  Requested for: 41UAC8661; Last   Rx:2017 Ordered   7   Levemir 100 UNIT/ML Subcutaneous Solution; INJECT 32 UNITS UNDER THE SKIN   DAILY; Therapy: 13PQK5303 to (Evaluate:59Vni9704)  Requested for: 83HCT9055; Last   Rx:14Jun2017 Ordered   8  Lisinopril 40 MG Oral Tablet; Take 1 tablet daily; Therapy: 21Mar2013 to (Fredy Christianomar)  Requested for: 25AGW9884; Last   Rx:05Jun2017 Ordered   9  Lovastatin 40 MG Oral Tablet; TAKE 1 TABLET AT BEDTIME; Therapy: 70YTA0672 to (Evaluate:87Dpi0225)  Requested for: 66UKE6490; Last   Rx:76Mwe4807 Ordered   10  MetFORMIN HCl - 1000 MG Oral Tablet; TAKE 1 TABLET TWICE DAILY; Therapy: 82Bpp1891 to (Evaluate:01Nov2017)  Requested for: 76CNW5534; Last    WK:13ENF2820 Ordered   11  Metoprolol Tartrate 25 MG Oral Tablet; TAKE 1 TABLET TWICE DAILY; Therapy: 94ZWP8263 to (Evaluate:37Wbd7246)  Requested for: 48UHO5300; Last    Rx:19Jun2017 Ordered   12  OneTouch Ultra Blue In [employer]; may test blood sugar up to 3 times daily; Therapy: 05EJG6710 to (Evaluate:26Jun2017)  Requested for: 84Jea1296; Last    Rx:54Szj9214 Ordered   13  Vitamin D3 1000 UNIT Oral Capsule; take 1 capsule daily; Therapy: 37RCH8775 to (Evaluate:89Lpx1446)  Requested for: 19OXQ5316; Last    Rx:52Acb6048 Ordered   14  Zoster (Zostavax); INJECT 0 65 ML Once; Therapy: 27ECT1962 to (Last Rx:23Jun2017) Ordered    Allergies    1  No Known Drug Allergies    2  Pollen    Results/Data  DSMT/MNT Time Record 91Ihb9934 12:00AM Frankey Marus     Test Name Result Flag Reference   Date of Service 8/15/2017     Start - Stop Time 2511-2522     Total MInutes 120     Group Or Individual Instruction Group         Plan    1  DSMT/MNT Time Record; Status:Complete - Retrospective By Protocol Authorization;     Done: 70BEV0151 12:00AM    Discussion/Summary  Patient Education Record:   PATIENT EDUCATION RECORD   Indication for Services: type 2 Diabetes Mellitus  She is ready to learn  She has no barriers to learning     Living Well with Diabetes Class 1 Education Content: What is diabetes, Types of diabetes, How is diabetes diagnosed, Management skills, Role of exercise, Glucose monitoring, Target range goals     Education Plan/Path:  She needs the Living Well Class  Educational handouts in Kyrgyz given        Signatures   Electronically signed by : Hubert Layne RN; Aug 24 2017 10:21AM EST                       (Author)

## 2018-01-22 VITALS
TEMPERATURE: 97.9 F | HEART RATE: 70 BPM | SYSTOLIC BLOOD PRESSURE: 150 MMHG | DIASTOLIC BLOOD PRESSURE: 80 MMHG | HEIGHT: 59 IN | WEIGHT: 134.04 LBS | BODY MASS INDEX: 27.02 KG/M2

## 2018-01-22 VITALS
WEIGHT: 134.92 LBS | BODY MASS INDEX: 27.2 KG/M2 | HEART RATE: 66 BPM | HEIGHT: 59 IN | TEMPERATURE: 97.7 F | DIASTOLIC BLOOD PRESSURE: 70 MMHG | SYSTOLIC BLOOD PRESSURE: 138 MMHG

## 2018-01-26 RX ORDER — BLOOD SUGAR DIAGNOSTIC
STRIP MISCELLANEOUS
COMMUNITY
Start: 2013-06-17 | End: 2019-03-07 | Stop reason: SDUPTHER

## 2018-01-26 RX ORDER — LANCETS 28 GAUGE
EACH MISCELLANEOUS
COMMUNITY
Start: 2016-02-05 | End: 2018-05-18 | Stop reason: SDUPTHER

## 2018-01-26 RX ORDER — BIOTIN 1 MG
1 TABLET ORAL DAILY
COMMUNITY
Start: 2015-11-06 | End: 2018-01-29

## 2018-01-26 RX ORDER — FLUTICASONE PROPIONATE 50 MCG
1 SPRAY, SUSPENSION (ML) NASAL 2 TIMES DAILY
COMMUNITY
Start: 2016-02-05 | End: 2019-09-30 | Stop reason: ALTCHOICE

## 2018-01-26 RX ORDER — MELATONIN
1000 DAILY
Refills: 3 | COMMUNITY
Start: 2017-11-20 | End: 2018-02-18 | Stop reason: SDUPTHER

## 2018-01-26 RX ORDER — ALENDRONATE SODIUM 70 MG/1
1 TABLET ORAL WEEKLY
COMMUNITY
Start: 2015-09-14 | End: 2018-01-29

## 2018-01-26 RX ORDER — LISINOPRIL 40 MG/1
1 TABLET ORAL DAILY
COMMUNITY
Start: 2013-03-21 | End: 2018-03-12 | Stop reason: SDUPTHER

## 2018-01-26 RX ORDER — LOVASTATIN 40 MG/1
TABLET ORAL
Refills: 1 | COMMUNITY
Start: 2017-11-08 | End: 2018-01-29 | Stop reason: SDUPTHER

## 2018-01-26 RX ORDER — LOVASTATIN 40 MG/1
1 TABLET ORAL
COMMUNITY
Start: 2012-01-27 | End: 2018-01-29

## 2018-01-26 RX ORDER — INSULIN DETEMIR 100 [IU]/ML
INJECTION, SOLUTION SUBCUTANEOUS
Refills: 0 | COMMUNITY
Start: 2017-11-17 | End: 2018-01-29 | Stop reason: SDUPTHER

## 2018-01-26 RX ORDER — AMLODIPINE BESYLATE 5 MG/1
1 TABLET ORAL DAILY
COMMUNITY
Start: 2017-10-09 | End: 2018-01-29 | Stop reason: ALTCHOICE

## 2018-01-29 ENCOUNTER — OFFICE VISIT (OUTPATIENT)
Dept: INTERNAL MEDICINE CLINIC | Facility: CLINIC | Age: 78
End: 2018-01-29
Payer: COMMERCIAL

## 2018-01-29 VITALS
WEIGHT: 132.28 LBS | BODY MASS INDEX: 26.67 KG/M2 | DIASTOLIC BLOOD PRESSURE: 84 MMHG | TEMPERATURE: 97.7 F | SYSTOLIC BLOOD PRESSURE: 148 MMHG | HEART RATE: 80 BPM | HEIGHT: 59 IN

## 2018-01-29 DIAGNOSIS — I10 BENIGN ESSENTIAL HYPERTENSION: ICD-10-CM

## 2018-01-29 DIAGNOSIS — E78.5 DYSLIPIDEMIA: ICD-10-CM

## 2018-01-29 DIAGNOSIS — E11.65 TYPE 2 DIABETES MELLITUS WITH HYPERGLYCEMIA, UNSPECIFIED LONG TERM INSULIN USE STATUS: Primary | ICD-10-CM

## 2018-01-29 LAB
LEFT EYE DIABETIC RETINOPATHY: NORMAL
LEFT EYE IMAGE QUALITY: NORMAL
RIGHT EYE DIABETIC RETINOPATHY: NORMAL
RIGHT EYE IMAGE QUALITY: NORMAL
SEVERITY (EYE EXAM): NORMAL

## 2018-01-29 PROCEDURE — 3725F SCREEN DEPRESSION PERFORMED: CPT | Performed by: PHYSICIAN ASSISTANT

## 2018-01-29 PROCEDURE — 92250 FUNDUS PHOTOGRAPHY W/I&R: CPT | Performed by: PHYSICIAN ASSISTANT

## 2018-01-29 PROCEDURE — 99213 OFFICE O/P EST LOW 20 MIN: CPT | Performed by: PHYSICIAN ASSISTANT

## 2018-01-29 RX ORDER — PEN NEEDLE, DIABETIC 31 GX5/16"
NEEDLE, DISPOSABLE MISCELLANEOUS
COMMUNITY
Start: 2017-11-08 | End: 2018-03-12 | Stop reason: SDUPTHER

## 2018-01-29 RX ORDER — LOVASTATIN 40 MG/1
TABLET ORAL
Qty: 180 TABLET | Refills: 1 | Status: SHIPPED | OUTPATIENT
Start: 2018-01-29 | End: 2018-07-20 | Stop reason: SDUPTHER

## 2018-01-29 RX ORDER — AMLODIPINE BESYLATE 5 MG/1
5 TABLET ORAL DAILY
Qty: 90 TABLET | Refills: 1 | Status: SHIPPED | OUTPATIENT
Start: 2018-01-29 | End: 2018-02-12 | Stop reason: SDUPTHER

## 2018-01-29 RX ORDER — INSULIN DETEMIR 100 [IU]/ML
34 INJECTION, SOLUTION SUBCUTANEOUS
Qty: 5 PEN | Refills: 1 | Status: SHIPPED | OUTPATIENT
Start: 2018-01-29 | End: 2018-03-07 | Stop reason: SDUPTHER

## 2018-01-29 NOTE — PROGRESS NOTES
Assessment/Plan:  I have refilled all your medications  I have ordered your labs  Your numbers are much better but please also check some readings 2 hours after dinner as well  Return after complete your labs  DM retina eye exam today, we will sched with Eye DR if any abnormalities on your eye exam  Mammogram due 10/2018  Colonoscopy due 2019    No problem-specific Assessment & Plan notes found for this encounter  Diagnoses and all orders for this visit:    Type 2 diabetes mellitus with hyperglycemia, unspecified long term insulin use status (HCC)  -     POCT diabetic eye exam  -     LEVEMIR FLEXTOUCH subcutaneous injection pen 100 units/mL; Inject 34 Units under the skin daily at bedtime for 30 days  -     metFORMIN (GLUCOPHAGE) 1000 MG tablet; Take 1 tablet (1,000 mg total) by mouth 2 (two) times a day  -     Cancel: Hemoglobin A1c  -     Cancel: Comprehensive metabolic panel; Future  -     Cancel: Microalbumin / creatinine urine ratio  -     Cancel: Comprehensive metabolic panel  -     Comprehensive metabolic panel  -     Hemoglobin A1c  -     Cancel: Microalbumin / creatinine urine ratio  -     Microalbumin / creatinine urine ratio    Dyslipidemia  -     lovastatin (MEVACOR) 40 MG tablet; Take two pills together once daily at bedtime  -     Lipid Panel with Direct LDL reflex    Benign essential hypertension  -     metoprolol tartrate (LOPRESSOR) 25 mg tablet; Take 1 tablet (25 mg total) by mouth every 12 (twelve) hours  -     amLODIPine (NORVASC) 5 mg tablet; Take 1 tablet (5 mg total) by mouth daily    Other orders  -     B-D UF III MINI PEN NEEDLES 31G X 5 MM MISC;           Subjective:      Patient ID: Roly Oh is a 69 y o  female  Patient here for interim DM check up   Forgot to bring her numbers but stats in am getting 89 to 138 range  Has not been checking evening numbers    On Levimir 34 units before bedtime  States feeling much better with improvement  Did not take BP med yet this am   Is due for complete labs, will order today  Mammo due 10/2018  Colonoscopy due 2019            The following portions of the patient's history were reviewed and updated as appropriate: allergies, current medications, past family history, past social history, past surgical history and problem list     Review of Systems   Constitutional: Negative  Respiratory: Negative  Cardiovascular: Negative  Gastrointestinal: Negative  Endocrine: Negative  Neurological: Negative  Psychiatric/Behavioral: Negative  Objective:  Vitals:    01/29/18 1037   BP: 148/84   BP Location: Left arm   Patient Position: Sitting   Cuff Size: Standard   Pulse: 80   Temp: 97 7 °F (36 5 °C)   TempSrc: Oral   Weight: 60 kg (132 lb 4 4 oz)   Height: 4' 11" (1 499 m)        Physical Exam   Constitutional: She is oriented to person, place, and time  She appears well-developed and well-nourished  HENT:   Head: Normocephalic  Eyes: Pupils are equal, round, and reactive to light  Cardiovascular: Normal rate, regular rhythm and normal heart sounds  Exam reveals no gallop and no friction rub  No murmur heard  Pulmonary/Chest: Effort normal and breath sounds normal  She has no wheezes  Abdominal: Soft  Bowel sounds are normal    Musculoskeletal: Normal range of motion  Neurological: She is alert and oriented to person, place, and time  Skin: Skin is warm  Psychiatric: She has a normal mood and affect   Her behavior is normal

## 2018-01-29 NOTE — PATIENT INSTRUCTIONS
10% - bad control"> 10% - bad control,Hemoglobin A1c (HbA1c) greater than 10% indicating poor diabetic control,Haemoglobin A1c greater than 10% indicating poor diabetic control">   Diabetes mellitus tipo 2 en adultos, cuidados ambulatorios   INFORMACIÓN GENERAL:   La diabetes mellitus tipo 2 en adultos  es salvatore enfermedad que afecta la forma en que el cuerpo utiliza la glucosa (azúcar)  La insulina ayuda a extraer el azúcar de la micaela para que pueda usarse en la producción de energía  Generalmente, cuando el nivel de azúcar [place], el páncreas produce más insulina  La diabetes tipo 2 se desarrolla ya sea porque el cuerpo no puede producir suficiente insulina, o no la puede usar correctamente  Después de [employer], asher páncreas podría dejar de producir insulina  Síntomas comunes incluyen los siguientes:   · Más hambre o sed de la usual    · Necesidad frecuente de orinar     · Pérdida de peso sin tratar     · Visión borrosa  Busque cuidados inmediatos para los siguientes síntomas:   · Dolor abdominal severo o dolor que se propaga hacia asher espalda  Es probable que usted también vomite  · Dificultad para permanecer despierto o concentrado    · Temblores o sudoración    · Visión borrosa o doble    · Aliento con olor a frutas o aleks    · Respiración profunda y dificultosa o rápida y superficial    · Ritmo cardíaco rápido y débil  El tratamiento para la diabetes mellitus tipo 2  incluye mantener asher nivel de azúcar en el micaela a un rango normal  Usted debe comer los alimentos correctos y ejercitarse con regularidad  Además es probable que necesite medicamentos si no puede controlar asher nivel de azúcar en la micaela con nutrición y ejercicio  Maneje la diabetes mellitus tipo 2:   · Revise asher nivel de azúcar en la micaela  A usted le enseñarán cómo revisar salvatore pequeña gota de [place] rico en un medidor de glucosa  Pregúntele a asher proveedor de lv cuándo y con cuánta frecuencia es necesario revisar paresh el día   Bertha Model pregúntele a asher proveedor de lv cuáles deberían ser lyubov niveles de azúcar en la micaela cuando usted se los revisa  · Mantenga un registro de los carbohidratos (azúcares y almidones)  Asher nivel de azúcar en la micaela puede elevarse demasiado si usted come demasiados carbohidratos  Asher dietista le ayudará a planear comidas y meriendas que tengan la cantidad correcta de carbohidratos  · Consuma alimentos bajos en grasas  Ran [place] son el kary sin piel y la leche descremada  · Consuma menos sodio (sal)  Algunos ejemplos de alimentos altos en sodio que hay que limitar son la salsa de soya, las payam tostadas y la sopa  No le agregue sal a la comida que usted cocina  Limite asher uso de sal de hubbard  · Coma alimentos altos en fibra  Alimentos que son buena karoline de fibra incluyen los vegetales, el pan integral y los frijoles  · Limite el alcohol  El alcohol afecta asher nivel de azúcar en la micaela y puede dificultar el [place] de asher diabetes  Las mujeres deben limitar el consumo de alcohol a 1 bebida al día  Los hombres deben limitarlo a 2 debidas al día  Salvatore bebida de alcohol equivale a 12 onzas de cerveza, 5 onzas de vino o 1½ onzas de licor  · Ejercítese regularmente  El ejercicio puede ayudar a mantener estable asher nivel de azúcar en la micaela, al mismo tiempo que disminuye asher riesgo de enfermedad cardíaca y le ayuda a perder peso  Ejercítese por al menos 30 minutos, 5 días a la semana  Lucendia Moles de fortalecimiento muscular 2 días a la semana  Colabore con asher proveedor de lv para crear un plan de ejercicios  · Revise lyubov pies a diario  para becka si tienen heridas o llagas abiertas  Pregúntele a asher proveedor de [employer] que usted puede hacer si tiene salvatore llaga abierta  · Deje de fumar  Si usted fuma, nunca es tarde para dejar de hacerlo  El fumar puede [employer] problemas que puedan ocurrir con la diabetes   Pregúntele a asher proveedor de [employer] información para aprender a dejar de fumar si usted tiene dificultad para hacerlo  · Pregunte sobre espino peso:  Pregúntele a lyubov proveedores de lv si usted necesita perder peso y cuánto debe perder  Pídales que le ayuden con un programa de perdida de [place]  Incluso perder unas 10 a 15 libras puede ayudarle a manejar espino nivel de azúcar en la micaela  · Tenga a mano espino identificación de [employer]  Use un brazalete de alerta médica o lleve consigo salvatore tarjeta que diga que usted tiene diabetes  Pregúntele a espino proveedor de lv dónde conseguir estos artículos  · Pregunte sobre vacunas  La diabetes lo pone a usted en riesgo de enfermedad seria si usted se resfría, tiene neumonía o hepatitis  Pregúntele a espino proveedor de lv si usted debe ponerse las vacunas contra la gripe, neumonía o hepatitis B, y cuándo ponérselas  Programe salvatore elder con espino proveedor de [employer] se le haya indicado: Anote lyubov preguntas para que se acuerde de hacerlas paresh lyubov visitas  ACUERDOS SOBRE ESPINO CUIDADO:   Usted tiene el derecho de participar en la planificación de espino cuidado  Aprenda todo lo que pueda sobre espino condición y maureen darle tratamiento  Discuta con lyubov médicos lyubov opciones de tratamiento para juntos decidir el cuidado que usted quiere recibir  Usted siempre tiene el derecho a rechazar espino tratamiento  Esta información es sólo para uso en educación  Espino intención no es darle un consejo médico sobre enfermedades o tratamientos  Colsulte con espino Classie Torrez farmacéutico antes de seguir cualquier régimen médico para saber si es seguro y efectivo para usted  © 2014 [address] is for End User's use only and may not be sold, redistributed or otherwise used for commercial purposes  All illustrations and images included in CareNotes® are the copyrighted property of A D A M , Inc  or Edwin Martinez

## 2018-01-30 ENCOUNTER — APPOINTMENT (OUTPATIENT)
Dept: LAB | Facility: CLINIC | Age: 78
End: 2018-01-30
Payer: COMMERCIAL

## 2018-01-30 LAB
ALBUMIN SERPL BCP-MCNC: 3.8 G/DL (ref 3.5–5)
ALP SERPL-CCNC: 64 U/L (ref 46–116)
ALT SERPL W P-5'-P-CCNC: 37 U/L (ref 12–78)
ANION GAP SERPL CALCULATED.3IONS-SCNC: 8 MMOL/L (ref 4–13)
AST SERPL W P-5'-P-CCNC: 26 U/L (ref 5–45)
BILIRUB SERPL-MCNC: 0.45 MG/DL (ref 0.2–1)
BUN SERPL-MCNC: 10 MG/DL (ref 5–25)
CALCIUM SERPL-MCNC: 9.2 MG/DL (ref 8.3–10.1)
CHLORIDE SERPL-SCNC: 100 MMOL/L (ref 100–108)
CHOLEST SERPL-MCNC: 157 MG/DL (ref 50–200)
CO2 SERPL-SCNC: 28 MMOL/L (ref 21–32)
CREAT SERPL-MCNC: 0.76 MG/DL (ref 0.6–1.3)
CREAT UR-MCNC: 79.6 MG/DL
EST. AVERAGE GLUCOSE BLD GHB EST-MCNC: 177 MG/DL
GFR SERPL CREATININE-BSD FRML MDRD: 76 ML/MIN/1.73SQ M
GLUCOSE P FAST SERPL-MCNC: 144 MG/DL (ref 65–99)
HBA1C MFR BLD: 7.8 % (ref 4.2–6.3)
HDLC SERPL-MCNC: 43 MG/DL (ref 40–60)
LDLC SERPL CALC-MCNC: 53 MG/DL (ref 0–100)
MICROALBUMIN UR-MCNC: 7.9 MG/L (ref 0–20)
MICROALBUMIN/CREAT 24H UR: 10 MG/G CREATININE (ref 0–30)
POTASSIUM SERPL-SCNC: 4 MMOL/L (ref 3.5–5.3)
PROT SERPL-MCNC: 8 G/DL (ref 6.4–8.2)
SODIUM SERPL-SCNC: 136 MMOL/L (ref 136–145)
TRIGL SERPL-MCNC: 305 MG/DL

## 2018-01-30 PROCEDURE — 82043 UR ALBUMIN QUANTITATIVE: CPT | Performed by: PHYSICIAN ASSISTANT

## 2018-01-30 PROCEDURE — 83036 HEMOGLOBIN GLYCOSYLATED A1C: CPT | Performed by: PHYSICIAN ASSISTANT

## 2018-01-30 PROCEDURE — 36415 COLL VENOUS BLD VENIPUNCTURE: CPT | Performed by: PHYSICIAN ASSISTANT

## 2018-01-30 PROCEDURE — 80053 COMPREHEN METABOLIC PANEL: CPT | Performed by: PHYSICIAN ASSISTANT

## 2018-01-30 PROCEDURE — 82570 ASSAY OF URINE CREATININE: CPT | Performed by: PHYSICIAN ASSISTANT

## 2018-01-30 PROCEDURE — 80061 LIPID PANEL: CPT | Performed by: PHYSICIAN ASSISTANT

## 2018-02-08 DIAGNOSIS — E11.65 TYPE 2 DIABETES MELLITUS WITH HYPERGLYCEMIA, UNSPECIFIED LONG TERM INSULIN USE STATUS: ICD-10-CM

## 2018-02-08 RX ORDER — PEN NEEDLE, DIABETIC 31 GX5/16"
NEEDLE, DISPOSABLE MISCELLANEOUS
Qty: 100 EACH | Refills: 1 | Status: SHIPPED | OUTPATIENT
Start: 2018-02-08 | End: 2018-06-19 | Stop reason: SDUPTHER

## 2018-02-11 NOTE — PROGRESS NOTES
Assessment/Plan:  Get the follow up labs as dated in August 2018  Appt here after labs completed  Increase some physical activity/ walking   You need to restart the amlodipine and blood pressure not controlled  Also need to get some of the evening sugar readings as well so I can properly adjust your insulin  AM number are good may need to change time of day for insulin to am from Pm  Sched appt here in 1 months to recheck BP on all meds and review the sugars but nned to bring the paper in for me to review    No problem-specific Assessment & Plan notes found for this encounter  Diagnoses and all orders for this visit:    Type 2 diabetes mellitus with hyperglycemia, with long-term current use of insulin ([address] )  -     Comprehensive metabolic panel; Future  -     Hemoglobin A1c; Future  -     Lipid Panel with Direct LDL reflex; Future  -     glucose blood (FREESTYLE LITE) test strip; 1 each by Other route 2 (two) times a day    Benign essential hypertension  -     amLODIPine (NORVASC) 5 mg tablet; Take 1 tablet (5 mg total) by mouth daily    Dyslipidemia  -     Lipid Panel with Direct LDL reflex; Future          Subjective:      Patient ID: Aleksandar Ellis is a 69 y o  female  Patient here for lab review  Labs show glucose increased from A1C of 7 6% to now 7 8%  Lipids improved and controlled but TG remain elevated now 305  At last visit 2 weeks ago was asked to also check some evening numbers as was only checking am readings  Patient reports continues with am readings but has not been checking any evening numbers  Forgot AM readings yesterday 117 this am 129  Taking insulin taking at 11pm  Confirmed meds but has not been taking the amlodipine and BP is elevated today  Will need to restart        Diabetes   She has type 2 diabetes mellitus  Her disease course has been fluctuating  There are no hypoglycemic associated symptoms   Pertinent negatives for diabetes include no blurred vision, no chest pain, no polydipsia, no polyphagia and no polyuria  Symptoms are stable  Diabetic complications include retinopathy  Pertinent negatives for diabetic complications include no nephropathy  Risk factors for coronary artery disease include diabetes mellitus, dyslipidemia, post-menopausal and sedentary lifestyle  Current diabetic treatment includes insulin injections and oral agent (monotherapy)  She is compliant with treatment all of the time  Her weight is stable  She is following a generally healthy diet  Meal planning includes avoidance of concentrated sweets  She rarely participates in exercise  Her home blood glucose trend is fluctuating minimally  Her breakfast blood glucose is taken between 7-8 am  Her breakfast blood glucose range is generally 110-130 mg/dl  An ACE inhibitor/angiotensin II receptor blocker is being taken  She sees a podiatrist Eye exam is current  Hypertension   This is a chronic problem  The current episode started more than 1 year ago  The problem has been gradually improving since onset  The problem is uncontrolled  Pertinent negatives include no blurred vision or chest pain  There are no associated agents to hypertension  Risk factors for coronary artery disease include diabetes mellitus, dyslipidemia, sedentary lifestyle and post-menopausal state  Past treatments include ACE inhibitors, beta blockers and calcium channel blockers  The current treatment provides mild improvement  Compliance problems: has not been taking the amlodipine  Hypertensive end-organ damage includes retinopathy  There is no history of renovascular disease  There is no history of chronic renal disease  The following portions of the patient's history were reviewed and updated as appropriate: allergies, current medications, past family history, past medical history, past social history, past surgical history and problem list     Review of Systems   Eyes: Negative for blurred vision     Cardiovascular: Negative for chest pain    Endocrine: Negative for polydipsia, polyphagia and polyuria  Objective:    Vitals:    02/12/18 1039   BP: 150/74   Pulse: 92   Temp: 97 9 °F (36 6 °C)        Physical Exam   Constitutional: She is oriented to person, place, and time  She appears well-developed and well-nourished  HENT:   Head: Normocephalic  Eyes: Pupils are equal, round, and reactive to light  Cardiovascular: Normal rate, regular rhythm and normal heart sounds  Exam reveals no gallop and no friction rub  No murmur heard  Pulmonary/Chest: Effort normal and breath sounds normal    Abdominal: Soft  Bowel sounds are normal    Musculoskeletal: Normal range of motion  Neurological: She is alert and oriented to person, place, and time  Skin: Skin is warm and dry  Psychiatric: She has a normal mood and affect   Her behavior is normal  Judgment and thought content normal

## 2018-02-12 ENCOUNTER — OFFICE VISIT (OUTPATIENT)
Dept: INTERNAL MEDICINE CLINIC | Facility: CLINIC | Age: 78
End: 2018-02-12
Payer: COMMERCIAL

## 2018-02-12 VITALS
TEMPERATURE: 97.9 F | HEART RATE: 92 BPM | SYSTOLIC BLOOD PRESSURE: 150 MMHG | WEIGHT: 133.6 LBS | HEIGHT: 59 IN | DIASTOLIC BLOOD PRESSURE: 74 MMHG | BODY MASS INDEX: 26.93 KG/M2

## 2018-02-12 DIAGNOSIS — E11.65 TYPE 2 DIABETES MELLITUS WITH HYPERGLYCEMIA, UNSPECIFIED LONG TERM INSULIN USE STATUS: ICD-10-CM

## 2018-02-12 DIAGNOSIS — I10 BENIGN ESSENTIAL HYPERTENSION: ICD-10-CM

## 2018-02-12 DIAGNOSIS — E78.5 DYSLIPIDEMIA: ICD-10-CM

## 2018-02-12 DIAGNOSIS — E11.65 TYPE 2 DIABETES MELLITUS WITH HYPERGLYCEMIA, WITH LONG-TERM CURRENT USE OF INSULIN (HCC): Primary | ICD-10-CM

## 2018-02-12 DIAGNOSIS — Z79.4 TYPE 2 DIABETES MELLITUS WITH HYPERGLYCEMIA, WITH LONG-TERM CURRENT USE OF INSULIN (HCC): Primary | ICD-10-CM

## 2018-02-12 PROCEDURE — 99213 OFFICE O/P EST LOW 20 MIN: CPT | Performed by: PHYSICIAN ASSISTANT

## 2018-02-12 RX ORDER — AMLODIPINE BESYLATE 5 MG/1
5 TABLET ORAL DAILY
Qty: 90 TABLET | Refills: 1 | Status: SHIPPED | OUTPATIENT
Start: 2018-02-12 | End: 2018-03-12 | Stop reason: SDUPTHER

## 2018-02-12 NOTE — PATIENT INSTRUCTIONS
Hipertensión crónica   CUIDADO AMBULATORIO:   Hipertensión  es la presión arterial kostas  La presión arterial es la fuerza que ejerce la micaela contra las verdugo de las arterias  La presión arterial normal debería estar a menos de 120/80  La pre-hipertensión estaría entre 120/80 y 139/ 80  La presión arterial kostas estaría a 140/90 o más kostas  La hipertensión causa que asher presión arterial se eleve tanto que asher corazón se ve forzado a trabajar [employer] de lo normal  [place] puede dañar asher corazón  La hipertensión crónica es salvatore condición de giacomo plazo que usted puede controlar con un estilo de kellen aashish o con medicamentos  La presión [place] a proteger lyubov órganos maureen asher corazón, pulmones, cerebro, y riñones  Los síntomas más comunes incluyen los siguientes:   · Dolor de nancie     · Visión borrosa    · Dolor de pecho     · Mareos o debilidad     · Dificultad para respirar     · Hemorragias nasales (sangrado de la Ankit Inches)  Llame al 911 en gustavo de presentar lo siguiente:   · Usted tiene malestar en el pecho que se siente maureen estrujamiento, presión, Tyrell Cole o dolor  · Usted se siente confundido o tiene dificultad para hablar  · Repentinamente se siente aturdido o con dificultad para respirar  · Usted tiene dolor o [employer] espalda, [place], [place], abdomen o [place]  Busque atención médica de inmediato si:   · Usted tiene un beverley dolor de nancie o pérdida de la visión  · Usted tiene debilidad en un brazo o en salvatore pierna  Pregúntele a asher Aleksandar Banker vitaminas y minerales son adecuados para usted  · Usted se siente mareado, confundido, somnoliento o maureen si se fuera a desmayar  · Usted se ha tomado asher medicamento para la presión arterial katie asher presión arterial todavía está más kostas de lo que le indicó asher médico     · Usted tiene preguntas o inquietudes acerca de asher condición o cuidado    El tratamiento para la hipertensión crónica  puede incluir medicamentos para bajar la presión arterial y reducir el nivel de colesterol  Un nivel bajo de colesterol ayuda a prevenir enfermedades cardíacas y facilita el control de la presión arterial  La enfermedad cardíaca puede dificultar el control de la presión arterial  Es probable que usted también necesite hacer algunos cambios en asher estilo de kellen  Tómese lyubov medicamentos exactamente maureen se lo indicaron  Controle la hipertensión crónica:  Hable con asher médico sobre las siguientes recomendaciones y otras formas de controlar la hipertensión:  · Tómese la presión arterial en asher casa  Siéntese y descanse por 5 minutos antes de tomarse la presión arterial  Extienda asher brazo y apóyelo en salvatore superficie plana  Asher brazo debe estar a la misma altura que asher corazón  Siga las instrucciones que vienen con el monitor para la presión arterial o tensiómetro  Si es posible tome por lo menos 2 lecturas de la presión cada vez  Tómese la presión arterial por lo [employer] al día a la misma hora todos los días, salvatore en la mañana y la otra en la noche  Mantenga un registro de las lecturas de asher presión arterial y llévelo consigo a lyubov consultas  Pregúntele a asher médico cuál debería ser asher presión arterial            · Limite el sodio (la sal) maureen se le haya indicado  Demasiado sodio puede afectar el equilibrio de líquidos  Revise las etiquetas para buscar alimentos bajos en sodio o sin sal agregada  Algunos alimentos bajos en sodio utilizan sales de potasio para añadir sabor  Demasiado potasio también puede causar problemas de [place]  Asher médico le dirá qué cantidad de sodio y potasio es sales para el consumo en un día  Él puede recomendarle que limite el sodio a 2,300 mg al día  · Siga el plan de comidas recomendado por asher médico   Un dietista o médico puede darle más información sobre planes de bajo contenido de sodio o el plan de alimentación DASH (enfoques dietéticos para detener la hipertensión)   El plan DASH es bajo en sodio, grasas saturadas y grasa total  Es alto en potasio, calcio y [place]  · Ejercítese para mantener un peso saludable  Realice actividad física por lo menos 30 minutos al día, la mayoría de los días de la [place]  [place] ayudará a bajar tapia presión arterial  Pida más información acerca de un plan de ejercicio adecuado para usted  · [address] [facility] estr[address]  [place] podría ayudarlo a bajar tapia presión arterial  Aprenda sobre formas de relajarse, maureen respiración profunda o escuchar música  · Limite el consumo de alcohol  Las mujeres deberían limitar el consumo de alcohol a 1 bebida por día  Los hombres deberían limitar el consumo de alcohol a 2 tragos al día  Un trago equivale a 12 onzas de cerveza, 5 onzas de vino o 1 onza y ½ de licor  · No fume  La nicotina y otros químicos en los cigarrillos y cigarros pueden aumentar tapia presión arterial y también pueden provocar daño al pulmón  Pida información a tapia médico si usted actualmente fuma y necesita ayuda para dejar de fumar  Los cigarrillos electrónicos o tabaco sin humo todavía contienen nicotina  Consulte con tapia médico antes de [employer]  Acuda a lyubov consultas de control con tapia médico según le indicaron  Usted tendrá que regresar para que le revisen la presión arterial y para que le gabriela otras pruebas de laboratorio  Anote lyubov preguntas para que se acuerde de hacerlas paresh lyubov visitas  © 2017 [address] is for End User's use only and may not be sold, redistributed or otherwise used for commercial purposes  All illustrations and images included in CareNotes® are the copyrighted property of A D A M , Inc  or Edwin Martinez  Esta información es sólo para uso en educación  Tapia intención no es darle un consejo médico sobre enfermedades o tratamientos  Colsulte con tapia Toño Cason farmacéutico antes de seguir cualquier régimen médico para saber si es seguro y efectivo para usted    10% - bad control"> 10% - bad control,Hemoglobin A1c (HbA1c) greater than 10% indicating poor diabetic control,Haemoglobin A1c greater than 10% indicating poor diabetic control">   Diabetes mellitus tipo 2 en adultos, cuidados ambulatorios   INFORMACIÓN GENERAL:   La diabetes mellitus tipo 2 en adultos  es salvatore enfermedad que afecta la forma en que el cuerpo utiliza la glucosa (azúcar)  La insulina ayuda a extraer el azúcar de la micaela para que pueda usarse en la producción de energía  Generalmente, cuando el nivel de azúcar [place], el páncreas produce más insulina  La diabetes tipo 2 se desarrolla ya sea porque el cuerpo no puede producir suficiente insulina, o no la puede usar correctamente  Después de [employer], asher páncreas podría dejar de producir insulina  Síntomas comunes incluyen los siguientes:   · Más hambre o sed de la usual    · Necesidad frecuente de orinar     · Pérdida de peso sin tratar     · Visión borrosa  Busque cuidados inmediatos para los siguientes síntomas:   · Dolor abdominal severo o dolor que se propaga hacia asher espalda  Es probable que usted también vomite  · Dificultad para permanecer despierto o concentrado    · Temblores o sudoración    · Visión borrosa o doble    · Aliento con olor a frutas o aleks    · Respiración profunda y dificultosa o rápida y superficial    · Ritmo cardíaco rápido y débil  El tratamiento para la diabetes mellitus tipo 2  incluye mantener asher nivel de azúcar en el micaela a un rango normal  Usted debe comer los alimentos correctos y ejercitarse con regularidad  Además es probable que necesite medicamentos si no puede controlar asher nivel de azúcar en la micaela con nutrición y ejercicio  Maneje la diabetes mellitus tipo 2:   · Revise asher nivel de azúcar en la micaela  A usted le enseñarán cómo revisar salvatore pequeña gota de [place] rico en un medidor de glucosa  Pregúntele a asher proveedor de lv cuándo y con cuánta frecuencia es necesario revisar paresh el día   También pregúntele a asher proveedor de lv cuáles deberían ser lyubov niveles de azúcar en la micaela cuando usted se los revisa  · Mantenga un registro de los carbohidratos (azúcares y almidones)  Asher nivel de azúcar en la micaela puede elevarse demasiado si usted come demasiados carbohidratos  Asher dietista le ayudará a planear comidas y meriendas que tengan la cantidad correcta de carbohidratos  · Consuma alimentos bajos en grasas  [place] son el kary sin piel y la leche descremada  · Consuma menos sodio (sal)  Algunos ejemplos de alimentos altos en sodio que hay que limitar son la salsa de soya, las payam tostadas y la sopa  No le agregue sal a la comida que usted cocina  Limite asher uso de sal de hubbard  · Coma alimentos altos en fibra  Alimentos que son buena karoline de fibra incluyen los vegetales, el pan integral y los frijoles  · Limite el alcohol  El alcohol afecta asher nivel de azúcar en la micaela y puede dificultar el [place] de asher diabetes  Las mujeres deben limitar el consumo de alcohol a 1 bebida al día  Los hombres deben limitarlo a 2 debidas al día  Salvatore bebida de alcohol equivale a 12 onzas de cerveza, 5 onzas de vino o 1½ onzas de licor  · Ejercítese regularmente  El ejercicio puede ayudar a mantener estable ahser nivel de azúcar en la micaela, al mismo tiempo que disminuye asher riesgo de enfermedad cardíaca y le ayuda a perder peso  Ejercítese por al menos 30 minutos, 5 días a la semana  [place] de fortalecimiento muscular 2 días a la semana  Colabore con asher proveedor de lv para crear un plan de ejercicios  · Revise lyubov pies a diario  para becka si tienen heridas o llagas abiertas  Pregúntele a asher proveedor de [employer] que usted puede hacer si tiene salvatore llaga abierta  · Deje de fumar  Si usted fuma, nunca es tarde para dejar de hacerlo  El fumar puede [employer] problemas que puedan ocurrir con la diabetes   Pregúntele a asher proveedor de [employer] para aprender a dejar de fumar si usted tiene dificultad para hacerlo  · Pregunte sobre espino peso:  Pregúntele a lyubov proveedores de lv si usted necesita perder peso y cuánto debe perder  Pídales que le ayuden con un programa de perdida de [place]  Incluso perder unas 10 a 15 libras puede ayudarle a manejar espino nivel de azúcar en la micaela  · Tenga a mano espino identificación de [employer]  Use un brazalete de alerta médica o lleve consigo salvatore tarjeta que diga que usted tiene diabetes  Pregúntele a espino proveedor de lv dónde conseguir estos artículos  · Pregunte sobre vacunas  La diabetes lo pone a usted en riesgo de enfermedad seria si usted se resfría, tiene neumonía o hepatitis  Pregúntele a espino proveedor de lv si usted debe ponerse las vacunas contra la gripe, neumonía o hepatitis B, y cuándo ponérselas  Programe salvatore elder con espino proveedor de [employer] se le haya indicado: Anote lyubov preguntas para que se acuerde de hacerlas paresh lyubov visitas  ACUERDOS SOBRE ESPINO CUIDADO:   Usted tiene el derecho de participar en la planificación de espino cuidado  Aprenda todo lo que pueda sobre espino condición y maureen darle tratamiento  Discuta con lyubov médicos lyubov opciones de tratamiento para juntos decidir el cuidado que usted quiere recibir  Usted siempre tiene el derecho a rechazar espino tratamiento  Esta información es sólo para uso en educación  Espino intención no es darle un consejo médico sobre enfermedades o tratamientos  Colsulte con espino Christal August farmacéutico antes de seguir cualquier régimen médico para saber si es seguro y efectivo para usted  © 2014 [address] is for End User's use only and may not be sold, redistributed or otherwise used for commercial purposes  All illustrations and images included in CareNotes® are the copyrighted property of A D A M , Inc  or Edwin Martinez

## 2018-02-18 DIAGNOSIS — E55.9 VITAMIN D DEFICIENCY: Primary | ICD-10-CM

## 2018-02-18 RX ORDER — MELATONIN
1000 DAILY
Qty: 90 TABLET | Refills: 1 | Status: SHIPPED | OUTPATIENT
Start: 2018-02-18 | End: 2018-08-13 | Stop reason: SDUPTHER

## 2018-03-07 DIAGNOSIS — E11.65 TYPE 2 DIABETES MELLITUS WITH HYPERGLYCEMIA, UNSPECIFIED LONG TERM INSULIN USE STATUS: ICD-10-CM

## 2018-03-07 RX ORDER — INSULIN DETEMIR 100 [IU]/ML
INJECTION, SOLUTION SUBCUTANEOUS
Qty: 5 PEN | Refills: 1 | Status: SHIPPED | OUTPATIENT
Start: 2018-03-07 | End: 2018-05-17 | Stop reason: SDUPTHER

## 2018-03-11 NOTE — PROGRESS NOTES
Assessment/Plan:  Your sugars are excellent no change to insulin  I have resent the Amlodipine as still not sure taking  Please remember to take all the BP meds every morning  I have resent your med refills  Will need a BP check with the nurse in 1 month  Please bring your pill bottles to that visit to confirm  Appt with me after new labs in August    Type 2 diabetes mellitus with hyperglycemia (HCC)  Readings improving, Continue 34 units Levemir every night    Benign essential hypertension  Continue   Lisinopril 40mg daily, Metoprolol tartrate 25 mg twice a day and the Amlodipine 5mg daily    Dyslipidemia  Well controlled, continue Lovastatin 40mg daily       Diagnoses and all orders for this visit:    Type 2 diabetes mellitus with hyperglycemia, with long-term current use of insulin (HCC)    Benign essential hypertension  -     amLODIPine (NORVASC) 5 mg tablet; Take 1 tablet (5 mg total) by mouth daily  -     lisinopril (ZESTRIL) 40 mg tablet; Take 1 tablet (40 mg total) by mouth daily for 180 days    Dyslipidemia    Type 2 diabetes mellitus with hyperglycemia, unspecified long term insulin use status (HCC)          Subjective:      Patient ID: Khoa Ross is a 69 y o  female  Patient here to review sugar readings as forgot to bring at last visit  Also recheck BP as was not taking the amlodipine at last visit  Self reports am readings we OK and taking insulin at night need the readings to properly adjust   Complete labs already ordered for August   Foot exam 11/2018  Eye exam due 6/2018  Review of sugar log shows am readings are  with most in the 120 range  Readings 2 hours after dinner are really good 125 to 145 range  Will not adjust any dosing today and patient will get her labs as dated in August  Patient admits did not take any BP meds this morning again, forgot to take  Also still does not think is taking the amlodipine  This si same discussion as last visit    Confirms taking her insulin 10 to 11 pm every night            The following portions of the patient's history were reviewed and updated as appropriate: allergies, current medications, past family history, past medical history, past social history, past surgical history and problem list     Review of Systems   Constitutional: Negative  Eyes: Thinks may need new glasses, will call to sched  With optomotrist   Respiratory: Negative  Cardiovascular: Negative  Endocrine: Negative for polydipsia, polyphagia and polyuria  Genitourinary: Negative  Musculoskeletal: Negative  Skin: Negative  Neurological: Negative  Negative for weakness, numbness and headaches  Psychiatric/Behavioral: Negative  Objective:      /72 (BP Location: Right arm, Patient Position: Sitting, Cuff Size: Adult)   Pulse 80   Temp (!) 97 4 °F (36 3 °C) (Oral)   Ht 4' 11" (1 499 m)   Wt 61 4 kg (135 lb 5 8 oz)   BMI 27 34 kg/m²          Physical Exam   Constitutional: She appears well-developed and well-nourished  HENT:   Head: Normocephalic  Neck: Normal range of motion  Cardiovascular: Normal rate, regular rhythm and normal heart sounds  Exam reveals no gallop and no friction rub  No murmur heard  Pulmonary/Chest: Effort normal and breath sounds normal    Abdominal: Soft  Bowel sounds are normal    Musculoskeletal: Normal range of motion  Neurological: She is alert  Skin: Skin is warm  Psychiatric: She has a normal mood and affect   Her behavior is normal  Judgment and thought content normal

## 2018-03-12 ENCOUNTER — OFFICE VISIT (OUTPATIENT)
Dept: INTERNAL MEDICINE CLINIC | Facility: CLINIC | Age: 78
End: 2018-03-12
Payer: COMMERCIAL

## 2018-03-12 ENCOUNTER — DOCUMENTATION (OUTPATIENT)
Dept: INTERNAL MEDICINE CLINIC | Facility: OTHER | Age: 78
End: 2018-03-12

## 2018-03-12 VITALS
HEART RATE: 80 BPM | BODY MASS INDEX: 27.29 KG/M2 | WEIGHT: 135.36 LBS | DIASTOLIC BLOOD PRESSURE: 72 MMHG | TEMPERATURE: 97.4 F | HEIGHT: 59 IN | SYSTOLIC BLOOD PRESSURE: 150 MMHG

## 2018-03-12 DIAGNOSIS — E78.5 DYSLIPIDEMIA: ICD-10-CM

## 2018-03-12 DIAGNOSIS — Z79.4 TYPE 2 DIABETES MELLITUS WITH HYPERGLYCEMIA, WITH LONG-TERM CURRENT USE OF INSULIN (HCC): Primary | ICD-10-CM

## 2018-03-12 DIAGNOSIS — I10 BENIGN ESSENTIAL HYPERTENSION: ICD-10-CM

## 2018-03-12 DIAGNOSIS — E11.65 TYPE 2 DIABETES MELLITUS WITH HYPERGLYCEMIA, WITH LONG-TERM CURRENT USE OF INSULIN (HCC): Primary | ICD-10-CM

## 2018-03-12 DIAGNOSIS — E11.65 TYPE 2 DIABETES MELLITUS WITH HYPERGLYCEMIA, UNSPECIFIED LONG TERM INSULIN USE STATUS: ICD-10-CM

## 2018-03-12 PROCEDURE — 99213 OFFICE O/P EST LOW 20 MIN: CPT | Performed by: PHYSICIAN ASSISTANT

## 2018-03-12 RX ORDER — AMLODIPINE BESYLATE 5 MG/1
5 TABLET ORAL DAILY
Qty: 90 TABLET | Refills: 0 | Status: SHIPPED | OUTPATIENT
Start: 2018-03-12 | End: 2018-08-13 | Stop reason: SDUPTHER

## 2018-03-12 RX ORDER — LISINOPRIL 40 MG/1
40 TABLET ORAL DAILY
Qty: 90 TABLET | Refills: 1 | Status: SHIPPED | OUTPATIENT
Start: 2018-03-12 | End: 2018-08-13 | Stop reason: SDUPTHER

## 2018-03-12 NOTE — PROGRESS NOTES
Seen pt today as a follow-up for her Diabetes  She has attended DSMT and she states it has helped her out very much  She's learned a lot regarding her Diabetes  States her readings are okay but her A1C has gone up she's working hard to maintain her Diabetes under control  Pt mentioned she needed help with completing a Foodstamps/Welfare application  She advised me that she previously was receiving $16 00 for foodstamps and $23 00 for cash assistance but it was only once she received it about 2 or more years ago and after that she stopped receiving but she does not know why  Her  has retired so she's hoping to reapply to help them out now with food  I let the pt know we could arrange for a home visit to get this application complete and she was okay with this   I will look over my schedule to fit her in for a homevisit

## 2018-03-12 NOTE — ASSESSMENT & PLAN NOTE
Continue   Lisinopril 40mg daily, Metoprolol tartrate 25 mg twice a day and the Amlodipine 5mg daily

## 2018-03-16 ENCOUNTER — TELEPHONE (OUTPATIENT)
Dept: FAMILY MEDICINE CLINIC | Facility: CLINIC | Age: 78
End: 2018-03-16

## 2018-03-16 NOTE — TELEPHONE ENCOUNTER
I called the pt today to schedule a day and time to do a home visit with her to complete an application for SNAP  Scheduled with pt on 03/21/18 at 1:00PM  Will call the morning of to remind her

## 2018-03-20 ENCOUNTER — TELEPHONE (OUTPATIENT)
Dept: INTERNAL MEDICINE CLINIC | Facility: OTHER | Age: 78
End: 2018-03-20

## 2018-03-20 NOTE — TELEPHONE ENCOUNTER
Called pt to reschedule the home visit we had scheduled for tomorrow 03/21 due to the weather   Pt is now scheduled for 03/28 at 1:00PM

## 2018-03-27 ENCOUNTER — TELEPHONE (OUTPATIENT)
Dept: INTERNAL MEDICINE CLINIC | Facility: CLINIC | Age: 78
End: 2018-03-27

## 2018-03-28 ENCOUNTER — DOCUMENTATION (OUTPATIENT)
Dept: INTERNAL MEDICINE CLINIC | Facility: OTHER | Age: 78
End: 2018-03-28

## 2018-03-28 NOTE — PROGRESS NOTES
Completed home-visit with pt today  Home-visit was to complete SNAP application  Application has been complete and will be sending today to the [facility] office  Pt needs to bring me verification of income so I can make copies and send that to the assistance office  She has an appt on 04/09 at [facility], at that point I will make copies to send  Will follow up with pt to see if she receives letter within 7-10 days

## 2018-04-03 ENCOUNTER — TELEPHONE (OUTPATIENT)
Dept: FAMILY MEDICINE CLINIC | Facility: CLINIC | Age: 78
End: 2018-04-03

## 2018-04-03 NOTE — TELEPHONE ENCOUNTER
Called pt to advise her that I have submitted her application for MA for her and her , however, I was not able to leave a voicemail  Will try again tomorrow

## 2018-04-09 ENCOUNTER — CLINICAL SUPPORT (OUTPATIENT)
Dept: INTERNAL MEDICINE CLINIC | Facility: CLINIC | Age: 78
End: 2018-04-09
Payer: COMMERCIAL

## 2018-04-09 ENCOUNTER — TELEPHONE (OUTPATIENT)
Dept: INTERNAL MEDICINE CLINIC | Facility: CLINIC | Age: 78
End: 2018-04-09

## 2018-04-09 VITALS — DIASTOLIC BLOOD PRESSURE: 70 MMHG | HEART RATE: 108 BPM | SYSTOLIC BLOOD PRESSURE: 140 MMHG

## 2018-04-09 DIAGNOSIS — I10 BENIGN ESSENTIAL HYPERTENSION: ICD-10-CM

## 2018-04-09 PROCEDURE — 99211 OFF/OP EST MAY X REQ PHY/QHP: CPT

## 2018-04-11 ENCOUNTER — TELEPHONE (OUTPATIENT)
Dept: FAMILY MEDICINE CLINIC | Facility: CLINIC | Age: 78
End: 2018-04-11

## 2018-04-11 NOTE — TELEPHONE ENCOUNTER
Called pt to advise her that I called [facility] to get an update for her application  They have received the application as was processed on 04/10  Mary Butler I need to make a copy of her and her 's Social Security Income statement letters/summary  John Hester would have to go to the [employer] [facility] office to get her's, however, she has her husbands  Would also need her Mortgage statements, and taxes they pay for the house as well  Pt advised me she will get all the information together and call me to advise me when she will be coming to see me so I can make copies and submit verification for her

## 2018-04-11 NOTE — TELEPHONE ENCOUNTER
[id number] to get an update on patient's application I helped to complete for SNAP for her and her   Employee asvised me that her application was processed today and that we just need to submit verification of mortgage, taxes for home, and SSI income for her and her   I will be relaying this message to Heidi Chavez tomorrow so she is aware to bring me these documents so I can make copies and submit them for her

## 2018-04-18 ENCOUNTER — TELEPHONE (OUTPATIENT)
Dept: INTERNAL MEDICINE CLINIC | Facility: OTHER | Age: 78
End: 2018-04-18

## 2018-04-18 NOTE — TELEPHONE ENCOUNTER
Pt called me to advise that she has received all of her information pertinent to her and her 's application for SNAP  Scheduled to see pt tomorrow at 12:00pm to make copies of her paperwork, that I can submit  Pt agreed

## 2018-05-17 DIAGNOSIS — E11.65 TYPE 2 DIABETES MELLITUS WITH HYPERGLYCEMIA (HCC): ICD-10-CM

## 2018-05-17 RX ORDER — INSULIN DETEMIR 100 [IU]/ML
INJECTION, SOLUTION SUBCUTANEOUS
Qty: 5 PEN | Refills: 1 | Status: SHIPPED | OUTPATIENT
Start: 2018-05-17 | End: 2018-07-20 | Stop reason: SDUPTHER

## 2018-05-18 DIAGNOSIS — E11.9 CONTROLLED TYPE 2 DIABETES MELLITUS WITHOUT COMPLICATION, WITH LONG-TERM CURRENT USE OF INSULIN (HCC): Primary | ICD-10-CM

## 2018-05-18 DIAGNOSIS — Z79.4 CONTROLLED TYPE 2 DIABETES MELLITUS WITHOUT COMPLICATION, WITH LONG-TERM CURRENT USE OF INSULIN (HCC): Primary | ICD-10-CM

## 2018-05-18 RX ORDER — LANCETS 28 GAUGE
EACH MISCELLANEOUS
Qty: 300 EACH | Refills: 1 | Status: SHIPPED | OUTPATIENT
Start: 2018-05-18 | End: 2019-03-07 | Stop reason: SDUPTHER

## 2018-06-19 DIAGNOSIS — E11.65 TYPE 2 DIABETES MELLITUS WITH HYPERGLYCEMIA (HCC): ICD-10-CM

## 2018-06-20 RX ORDER — PEN NEEDLE, DIABETIC 31 GX5/16"
NEEDLE, DISPOSABLE MISCELLANEOUS
Qty: 100 EACH | Refills: 1 | Status: SHIPPED | OUTPATIENT
Start: 2018-06-20 | End: 2019-03-07 | Stop reason: SDUPTHER

## 2018-07-20 DIAGNOSIS — E78.5 DYSLIPIDEMIA: ICD-10-CM

## 2018-07-20 DIAGNOSIS — E11.65 TYPE 2 DIABETES MELLITUS WITH HYPERGLYCEMIA (HCC): ICD-10-CM

## 2018-07-20 RX ORDER — LOVASTATIN 40 MG/1
TABLET ORAL
Qty: 180 TABLET | Refills: 1 | Status: SHIPPED | OUTPATIENT
Start: 2018-07-20 | End: 2019-03-07 | Stop reason: SDUPTHER

## 2018-07-20 RX ORDER — INSULIN DETEMIR 100 [IU]/ML
INJECTION, SOLUTION SUBCUTANEOUS
Qty: 15 ML | Refills: 1 | Status: SHIPPED | OUTPATIENT
Start: 2018-07-20 | End: 2018-08-13 | Stop reason: SDUPTHER

## 2018-07-22 DIAGNOSIS — E11.65 TYPE 2 DIABETES MELLITUS WITH HYPERGLYCEMIA (HCC): ICD-10-CM

## 2018-07-22 RX ORDER — PEN NEEDLE, DIABETIC 31 GX5/16"
NEEDLE, DISPOSABLE MISCELLANEOUS
Qty: 100 EACH | Refills: 5 | Status: SHIPPED | OUTPATIENT
Start: 2018-07-22 | End: 2019-03-07 | Stop reason: SDUPTHER

## 2018-08-09 ENCOUNTER — TELEPHONE (OUTPATIENT)
Dept: INTERNAL MEDICINE CLINIC | Facility: CLINIC | Age: 78
End: 2018-08-09

## 2018-08-10 ENCOUNTER — APPOINTMENT (OUTPATIENT)
Dept: LAB | Facility: CLINIC | Age: 78
End: 2018-08-10
Payer: COMMERCIAL

## 2018-08-10 DIAGNOSIS — Z79.4 TYPE 2 DIABETES MELLITUS WITH HYPERGLYCEMIA, WITH LONG-TERM CURRENT USE OF INSULIN (HCC): ICD-10-CM

## 2018-08-10 DIAGNOSIS — E11.65 TYPE 2 DIABETES MELLITUS WITH HYPERGLYCEMIA, WITH LONG-TERM CURRENT USE OF INSULIN (HCC): ICD-10-CM

## 2018-08-10 DIAGNOSIS — E78.5 DYSLIPIDEMIA: ICD-10-CM

## 2018-08-10 LAB
ALBUMIN SERPL BCP-MCNC: 4.1 G/DL (ref 3.5–5)
ALP SERPL-CCNC: 54 U/L (ref 46–116)
ALT SERPL W P-5'-P-CCNC: 53 U/L (ref 12–78)
ANION GAP SERPL CALCULATED.3IONS-SCNC: 9 MMOL/L (ref 4–13)
AST SERPL W P-5'-P-CCNC: 40 U/L (ref 5–45)
BILIRUB SERPL-MCNC: 0.55 MG/DL (ref 0.2–1)
BUN SERPL-MCNC: 15 MG/DL (ref 5–25)
CALCIUM SERPL-MCNC: 9.5 MG/DL (ref 8.3–10.1)
CHLORIDE SERPL-SCNC: 100 MMOL/L (ref 100–108)
CHOLEST SERPL-MCNC: 187 MG/DL (ref 50–200)
CO2 SERPL-SCNC: 26 MMOL/L (ref 21–32)
CREAT SERPL-MCNC: 0.94 MG/DL (ref 0.6–1.3)
EST. AVERAGE GLUCOSE BLD GHB EST-MCNC: 186 MG/DL
GFR SERPL CREATININE-BSD FRML MDRD: 59 ML/MIN/1.73SQ M
GLUCOSE P FAST SERPL-MCNC: 151 MG/DL (ref 65–99)
HBA1C MFR BLD: 8.1 % (ref 4.2–6.3)
HDLC SERPL-MCNC: 36 MG/DL (ref 40–60)
LDLC SERPL DIRECT ASSAY-MCNC: 101 MG/DL (ref 0–100)
POTASSIUM SERPL-SCNC: 4.2 MMOL/L (ref 3.5–5.3)
PROT SERPL-MCNC: 8.1 G/DL (ref 6.4–8.2)
SODIUM SERPL-SCNC: 135 MMOL/L (ref 136–145)
TRIGL SERPL-MCNC: 442 MG/DL

## 2018-08-10 PROCEDURE — 80053 COMPREHEN METABOLIC PANEL: CPT

## 2018-08-10 PROCEDURE — 83721 ASSAY OF BLOOD LIPOPROTEIN: CPT

## 2018-08-10 PROCEDURE — 36415 COLL VENOUS BLD VENIPUNCTURE: CPT

## 2018-08-10 PROCEDURE — 83036 HEMOGLOBIN GLYCOSYLATED A1C: CPT

## 2018-08-10 PROCEDURE — 80061 LIPID PANEL: CPT

## 2018-08-13 ENCOUNTER — OFFICE VISIT (OUTPATIENT)
Dept: INTERNAL MEDICINE CLINIC | Facility: CLINIC | Age: 78
End: 2018-08-13
Payer: COMMERCIAL

## 2018-08-13 VITALS
TEMPERATURE: 98.9 F | HEIGHT: 59 IN | HEART RATE: 80 BPM | DIASTOLIC BLOOD PRESSURE: 74 MMHG | SYSTOLIC BLOOD PRESSURE: 136 MMHG | BODY MASS INDEX: 27.47 KG/M2 | WEIGHT: 136.24 LBS

## 2018-08-13 DIAGNOSIS — I10 BENIGN ESSENTIAL HYPERTENSION: ICD-10-CM

## 2018-08-13 DIAGNOSIS — E78.5 DYSLIPIDEMIA: ICD-10-CM

## 2018-08-13 DIAGNOSIS — K04.7 DENTAL ABSCESS: ICD-10-CM

## 2018-08-13 DIAGNOSIS — E11.65 TYPE 2 DIABETES MELLITUS WITH HYPERGLYCEMIA, WITH LONG-TERM CURRENT USE OF INSULIN (HCC): Primary | ICD-10-CM

## 2018-08-13 DIAGNOSIS — E55.9 VITAMIN D DEFICIENCY: ICD-10-CM

## 2018-08-13 DIAGNOSIS — Z79.4 TYPE 2 DIABETES MELLITUS WITH HYPERGLYCEMIA, WITH LONG-TERM CURRENT USE OF INSULIN (HCC): Primary | ICD-10-CM

## 2018-08-13 DIAGNOSIS — E11.3293 MILD NONPROLIFERATIVE DIABETIC RETINOPATHY OF BOTH EYES WITHOUT MACULAR EDEMA ASSOCIATED WITH TYPE 2 DIABETES MELLITUS (HCC): ICD-10-CM

## 2018-08-13 PROCEDURE — 3075F SYST BP GE 130 - 139MM HG: CPT | Performed by: PHYSICIAN ASSISTANT

## 2018-08-13 PROCEDURE — 3008F BODY MASS INDEX DOCD: CPT | Performed by: PHYSICIAN ASSISTANT

## 2018-08-13 PROCEDURE — 1101F PT FALLS ASSESS-DOCD LE1/YR: CPT | Performed by: PHYSICIAN ASSISTANT

## 2018-08-13 PROCEDURE — 3078F DIAST BP <80 MM HG: CPT | Performed by: PHYSICIAN ASSISTANT

## 2018-08-13 PROCEDURE — 1036F TOBACCO NON-USER: CPT | Performed by: PHYSICIAN ASSISTANT

## 2018-08-13 PROCEDURE — 99214 OFFICE O/P EST MOD 30 MIN: CPT | Performed by: PHYSICIAN ASSISTANT

## 2018-08-13 RX ORDER — MELATONIN
1000 DAILY
Qty: 90 TABLET | Refills: 1 | Status: SHIPPED | OUTPATIENT
Start: 2018-08-13

## 2018-08-13 RX ORDER — AMLODIPINE BESYLATE 5 MG/1
5 TABLET ORAL DAILY
Qty: 90 TABLET | Refills: 1 | Status: SHIPPED | OUTPATIENT
Start: 2018-08-13 | End: 2019-01-28 | Stop reason: SDUPTHER

## 2018-08-13 RX ORDER — INSULIN DETEMIR 100 [IU]/ML
34 INJECTION, SOLUTION SUBCUTANEOUS
Qty: 10 PEN | Refills: 1 | Status: SHIPPED | OUTPATIENT
Start: 2018-08-13 | End: 2018-11-20 | Stop reason: SDUPTHER

## 2018-08-13 RX ORDER — AMOXICILLIN AND CLAVULANATE POTASSIUM 875; 125 MG/1; MG/1
1 TABLET, FILM COATED ORAL EVERY 12 HOURS SCHEDULED
Qty: 14 TABLET | Refills: 0 | Status: SHIPPED | OUTPATIENT
Start: 2018-08-13 | End: 2018-08-20

## 2018-08-13 RX ORDER — LISINOPRIL 40 MG/1
40 TABLET ORAL DAILY
Qty: 90 TABLET | Refills: 1 | Status: SHIPPED | OUTPATIENT
Start: 2018-08-13 | End: 2019-02-25 | Stop reason: SDUPTHER

## 2018-08-13 NOTE — ASSESSMENT & PLAN NOTE
Hypertension Assessment  See encounter diagnosis  Discussion: normal blood pressure  Cardiovascular risk factors: advanced age (older than 54 for men, 72 for women)    Hypertension Plan  Continue current treatment regimen  Patient Education: Reviewed risks of hypertension and principles of   treatment  Counseling time: not applicable

## 2018-08-13 NOTE — PROGRESS NOTES
Assessment/Plan:    Benign essential hypertension  Hypertension Assessment  See encounter diagnosis  Discussion: normal blood pressure  Cardiovascular risk factors: advanced age (older than 54 for men, 72 for women)    Hypertension Plan  Continue current treatment regimen  Patient Education: Reviewed risks of hypertension and principles of   treatment  Counseling time: not applicable  Type 2 diabetes mellitus with hyperglycemia (HCC)  Lab Results   Component Value Date    HGBA1C 8 1 (H) 08/10/2018       Matheus Morris is an 69 y o  female who presents for follow up of diabetes  Current symptoms include: hyperglycemia  Patient denies None  Evaluation to date has included: fasting blood sugar, fasting lipid panel and hemoglobin A1C  Home sugars: symptomatic hypoglycemia does not occur  Current treatments: more intensive attention to diet which has been somewhat effective           Diagnoses and all orders for this visit:    Type 2 diabetes mellitus with hyperglycemia, with long-term current use of insulin (HCC)  -     Hemoglobin A1C; Future  -     metFORMIN (GLUCOPHAGE) 1000 MG tablet; Take 1 tablet (1,000 mg total) by mouth 2 (two) times a day with meals for 180 days  -     LEVEMIR FLEXTOUCH 100 units/mL injection pen; Inject 34 Units under the skin daily at bedtime for 180 days    Dental abscess  -     amoxicillin-clavulanate (AUGMENTIN) 875-125 mg per tablet; Take 1 tablet by mouth every 12 (twelve) hours for 7 days    Benign essential hypertension  -     lisinopril (ZESTRIL) 40 mg tablet; Take 1 tablet (40 mg total) by mouth daily for 180 days  -     metoprolol tartrate (LOPRESSOR) 25 mg tablet; Take 1 tablet (25 mg total) by mouth every 12 (twelve) hours for 180 days  -     amLODIPine (NORVASC) 5 mg tablet;  Take 1 tablet (5 mg total) by mouth daily for 180 days    Dyslipidemia    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)    Vitamin D deficiency  -     cholecalciferol (VITAMIN D3) 1,000 units tablet; Take 1 tablet (1,000 Units total) by mouth daily for 180 days          Subjective:      Patient ID: Jose Enrique Lucero is a 69 y o  female  Pt here for 6 month review    Here with c/o pressure in both ears but is not pain  Mostly to L ear  Also feels like getting some pressure and swelling under L eye  States no change to vision, not blurry no pain or redness of her eye  Does feel like some dryness in her eyes  Has eye Dr zheng later this month  Pt followed by Dr Albina Salazar  Patient reports she knows that she has a bad molar on the left side  Patient states her current Medicare with supplemental does not have any dental   Patient states she checked was [facility] dental clinic up stairs and was advised that they do not accept her insurance  Pt reports does have pain with chewing on L side    Patient encouraged to call her number to see if she has dental coverage with her insurance and then get the name of providers that accepted  Reviewed labs and discussed cholesterol increased but still below 200 and TG increased as well  Also A1C now 8 1%    Admits has been eating more sweets and buffet  Was visiting son and grandchild in [place] and has not been following her diet      Patient does have history of colon polyps  Last colonoscopy was April of 2016  Patient reports she was told by Dr Cyrilla Schilder to repeat in 3 years  Diabetes   She presents for her follow-up diabetic visit  She has type 2 diabetes mellitus  Her disease course has been fluctuating  Hypoglycemia symptoms include dizziness and hunger  Pertinent negatives for hypoglycemia include no confusion, headaches, pallor, seizures, speech difficulty, sweats or tremors   (Pt states many am readings 100 to 120 but states feels better when around 140) Pertinent negatives for diabetes include no blurred vision, no chest pain, no fatigue, no foot paresthesias, no foot ulcerations, no polydipsia, no polyphagia, no polyuria and no weight loss  There are no hypoglycemic complications  Symptoms are stable  Diabetic complications include retinopathy  Risk factors for coronary artery disease include diabetes mellitus, dyslipidemia, family history, hypertension and post-menopausal  Current diabetic treatment includes insulin injections and oral agent (monotherapy)  She is compliant with treatment all of the time  Her weight is stable  Diabetic current diet: angela has gone off her DM diet  She participates in exercise intermittently  Her home blood glucose trend is fluctuating minimally  Her breakfast blood glucose is taken between 6-7 am  Her breakfast blood glucose range is generally 130-140 mg/dl  Her dinner blood glucose is taken after 8 pm  Her dinner blood glucose range is generally 180-200 mg/dl  An ACE inhibitor/angiotensin II receptor blocker is being taken  She does not see a podiatrist Eye exam is current  Hypertension   This is a chronic problem  The current episode started more than 1 year ago  The problem has been rapidly improving since onset  The problem is controlled  Pertinent negatives include no blurred vision, chest pain, headaches, palpitations, shortness of breath or sweats  There are no associated agents to hypertension  Risk factors for coronary artery disease include diabetes mellitus, dyslipidemia, family history and post-menopausal state  Past treatments include ACE inhibitors, calcium channel blockers and beta blockers  The current treatment provides significant improvement  There are no compliance problems  Hypertensive end-organ damage includes retinopathy  Hyperlipidemia   This is a chronic problem  The current episode started more than 1 year ago  The problem is controlled  Exacerbating diseases include diabetes  There are no known factors aggravating her hyperlipidemia   Pertinent negatives include no chest pain, myalgias or shortness of breath  Current antihyperlipidemic treatment includes statins  The current treatment provides moderate improvement of lipids  Compliance problems include adherence to diet  Risk factors for coronary artery disease include diabetes mellitus, dyslipidemia, family history, hypertension and post-menopausal        The following portions of the patient's history were reviewed and updated as appropriate: allergies, current medications, past family history, past medical history, past social history, past surgical history and problem list     Review of Systems   Constitutional: Negative  Negative for chills, fatigue, fever and weight loss  HENT: Positive for dental problem, ear pain and facial swelling  Negative for rhinorrhea, sore throat and trouble swallowing  Eyes: Negative for blurred vision and visual disturbance  Respiratory: Negative  Negative for chest tightness and shortness of breath  Cardiovascular: Negative  Negative for chest pain, palpitations and leg swelling  Gastrointestinal: Negative  Negative for abdominal pain, constipation and diarrhea  Endocrine: Negative for polydipsia, polyphagia and polyuria  Genitourinary: Negative  Negative for dysuria and frequency  Musculoskeletal: Negative  Negative for myalgias  Skin: Negative for pallor  Neurological: Positive for dizziness  Negative for tremors, seizures, speech difficulty and headaches  Psychiatric/Behavioral: Negative for confusion  Objective:      /74   Pulse 80   Temp 98 9 °F (37 2 °C) (Oral)   Ht 4' 11" (1 499 m)   Wt 61 8 kg (136 lb 3 9 oz)   BMI 27 52 kg/m²          Physical Exam   Constitutional: She is oriented to person, place, and time  She appears well-developed and well-nourished  HENT:   Head: Normocephalic and atraumatic  Right Ear: External ear normal  No tenderness  Tympanic membrane is not erythematous and not bulging  No middle ear effusion  No decreased hearing is noted     Left Ear: External ear normal  No tenderness  Tympanic membrane is not erythematous and not bulging  No middle ear effusion  No decreased hearing is noted  Nose: Mucosal edema present  Mouth/Throat: Mucous membranes are normal  Abnormal dentition  Dental abscesses and dental caries present  No oropharyngeal exudate, posterior oropharyngeal edema or tonsillar abscesses  Bilateral TM significant sclerosis but no erythema  Mild visible swelling to L face and level of upper mandible  Able to open and close jaw without pain   Eyes: Conjunctivae and EOM are normal  Pupils are equal, round, and reactive to light  Neck: Normal range of motion  Neck supple  Carotid bruit is not present  No thyromegaly present  Cardiovascular: Normal rate, regular rhythm and normal heart sounds  Pulses are no weak pulses  No murmur heard  Pulses:       Dorsalis pedis pulses are 2+ on the right side, and 2+ on the left side  Pulmonary/Chest: Effort normal    Abdominal: Soft  Bowel sounds are normal  There is no tenderness  Musculoskeletal: Normal range of motion  She exhibits no edema or tenderness  Feet:   Right Foot:   Skin Integrity: Negative for ulcer, skin breakdown, erythema, warmth, callus or dry skin  Left Foot:   Skin Integrity: Negative for ulcer, skin breakdown, erythema, warmth, callus or dry skin  Lymphadenopathy:        Head (right side): No submandibular, no preauricular, no posterior auricular and no occipital adenopathy present  Head (left side): Submandibular adenopathy present  No posterior auricular and no occipital adenopathy present  She has cervical adenopathy  Right cervical: No superficial cervical adenopathy present  Left cervical: Superficial cervical adenopathy present  Right: No supraclavicular adenopathy present  Left: No supraclavicular adenopathy present  Neurological: She is alert and oriented to person, place, and time  No cranial nerve deficit  Skin: Skin is warm and dry  No rash noted  No erythema  Psychiatric: She has a normal mood and affect  Her behavior is normal        Patient's shoes and socks removed  Right Foot/Ankle   Right Foot Inspection  Skin Exam: skin normal and skin intact no dry skin, no warmth, no callus, no erythema, no maceration, no abnormal color, no pre-ulcer, no ulcer and no callus                            Sensory   Vibration: intact  Proprioception: intact   Monofilament testing: intact  Vascular    The right DP pulse is 2+  Left Foot/Ankle  Left Foot Inspection  Skin Exam: skin normal and skin intactno dry skin, no warmth, no erythema, no maceration, normal color, no pre-ulcer, no ulcer and no callus                                         Sensory   Vibration: intact  Proprioception: intact  Monofilament: intact  Vascular    The left DP pulse is 2+  Assign Risk Category:  No deformity present; No loss of protective sensation;  No weak pulses       Risk: 0

## 2018-08-13 NOTE — ASSESSMENT & PLAN NOTE
Lab Results   Component Value Date    HGBA1C 8 1 (H) 08/10/2018       Matheus Tam is an 69 y o  female who presents for follow up of diabetes  Current symptoms include: hyperglycemia  Patient denies None  Evaluation to date has included: fasting blood sugar, fasting lipid panel and hemoglobin A1C  Home sugars: symptomatic hypoglycemia does not occur   Current treatments: more intensive attention to diet which has been somewhat effective

## 2018-08-13 NOTE — PATIENT INSTRUCTIONS
No change to your diabetic, blood pressure or cholesterol medications today  You are to continue your 34 units of insulin daily  As discussed you need to resume your healthier diabetic diet  This includes decreasing the sweets and not eating at [facility]  We are going to recheck your sugar in 3 months to make sure that it has improved once again  Please take the full course of the antibiotics  You will call your insurance to be able to find a dentist     Jesús Hernandez are aware that if the antibiotics do not help, if you get any additional pain, swelling or fever you are to return here or the emergency room  Your diabetic foot exam was completed today and it was normal     You report you have your appointment with your eye doctor later this month for your diabetic and glaucoma screenings  As per the gastroenterologist you will need your colonoscopy in 2019  This is due to history of polyps

## 2018-08-18 DIAGNOSIS — E11.65 TYPE 2 DIABETES MELLITUS WITH HYPERGLYCEMIA (HCC): ICD-10-CM

## 2018-08-18 DIAGNOSIS — E55.9 VITAMIN D DEFICIENCY: ICD-10-CM

## 2018-08-20 RX ORDER — PEN NEEDLE, DIABETIC 31 GX5/16"
NEEDLE, DISPOSABLE MISCELLANEOUS
Qty: 100 EACH | Refills: 1 | Status: SHIPPED | OUTPATIENT
Start: 2018-08-20 | End: 2019-03-07 | Stop reason: SDUPTHER

## 2018-08-20 RX ORDER — MELATONIN
Qty: 90 TABLET | Refills: 1 | Status: SHIPPED | OUTPATIENT
Start: 2018-08-20 | End: 2019-02-08 | Stop reason: HOSPADM

## 2018-09-18 DIAGNOSIS — E11.65 TYPE 2 DIABETES MELLITUS WITH HYPERGLYCEMIA (HCC): ICD-10-CM

## 2018-09-19 RX ORDER — INSULIN DETEMIR 100 [IU]/ML
INJECTION, SOLUTION SUBCUTANEOUS
Qty: 15 ML | OUTPATIENT
Start: 2018-09-19

## 2018-09-19 NOTE — TELEPHONE ENCOUNTER
Please call patient's pharmacy  I have denied these refill request as they were both refilled in the past month and she should not require refills until February of next year  Please verify with patient's pharmacy that they have the refills

## 2018-09-19 NOTE — TELEPHONE ENCOUNTER
SPOKE TO PHARMACIST AT [facility] AND HE SAID HER LAST REFILL IS THERE TO BE PICKED UP SO THEY PROACTIVELY SEND OUT REFILL REQUESTS FOR THE NEXT MONTH SO THEY HAVE IT ON HAND

## 2018-10-13 DIAGNOSIS — M81.8 AGE-RELATED OSTEOPOROSIS WITHOUT FRACTURE: Primary | ICD-10-CM

## 2018-10-15 RX ORDER — ALENDRONATE SODIUM 70 MG/1
TABLET ORAL
Qty: 12 TABLET | Refills: 1 | Status: SHIPPED | OUTPATIENT
Start: 2018-10-15 | End: 2019-03-07 | Stop reason: SINTOL

## 2018-10-21 DIAGNOSIS — E11.65 TYPE 2 DIABETES MELLITUS WITH HYPERGLYCEMIA (HCC): ICD-10-CM

## 2018-10-22 RX ORDER — PEN NEEDLE, DIABETIC 31 GX5/16"
NEEDLE, DISPOSABLE MISCELLANEOUS
Qty: 100 EACH | Refills: 1 | Status: SHIPPED | OUTPATIENT
Start: 2018-10-22 | End: 2019-03-07 | Stop reason: SDUPTHER

## 2018-11-16 DIAGNOSIS — E11.65 TYPE 2 DIABETES MELLITUS WITH HYPERGLYCEMIA, WITH LONG-TERM CURRENT USE OF INSULIN (HCC): ICD-10-CM

## 2018-11-16 DIAGNOSIS — Z79.4 TYPE 2 DIABETES MELLITUS WITH HYPERGLYCEMIA, WITH LONG-TERM CURRENT USE OF INSULIN (HCC): ICD-10-CM

## 2018-11-16 RX ORDER — BLOOD-GLUCOSE METER
KIT MISCELLANEOUS
Qty: 100 EACH | Refills: 1 | Status: SHIPPED | OUTPATIENT
Start: 2018-11-16 | End: 2019-03-07 | Stop reason: SDUPTHER

## 2018-11-19 ENCOUNTER — APPOINTMENT (OUTPATIENT)
Dept: LAB | Facility: CLINIC | Age: 78
End: 2018-11-19
Payer: COMMERCIAL

## 2018-11-19 DIAGNOSIS — Z79.4 TYPE 2 DIABETES MELLITUS WITH HYPERGLYCEMIA, WITH LONG-TERM CURRENT USE OF INSULIN (HCC): ICD-10-CM

## 2018-11-19 DIAGNOSIS — E11.65 TYPE 2 DIABETES MELLITUS WITH HYPERGLYCEMIA, WITH LONG-TERM CURRENT USE OF INSULIN (HCC): ICD-10-CM

## 2018-11-19 PROCEDURE — 36415 COLL VENOUS BLD VENIPUNCTURE: CPT

## 2018-11-19 PROCEDURE — 83036 HEMOGLOBIN GLYCOSYLATED A1C: CPT

## 2018-11-19 NOTE — PROGRESS NOTES
RCVD OVERDUE RESULTS REMINDER FOR HGB A1C  PTS  APPT  WITH PRERNA IS TOMORROW 11/20/18, ADVISED TO HAVE LAB DONE TODAY SO SHE CAN REVIEW AT APPT

## 2018-11-20 ENCOUNTER — OFFICE VISIT (OUTPATIENT)
Dept: INTERNAL MEDICINE CLINIC | Facility: CLINIC | Age: 78
End: 2018-11-20
Payer: COMMERCIAL

## 2018-11-20 VITALS
BODY MASS INDEX: 27.02 KG/M2 | DIASTOLIC BLOOD PRESSURE: 70 MMHG | SYSTOLIC BLOOD PRESSURE: 138 MMHG | HEART RATE: 72 BPM | WEIGHT: 134.04 LBS | TEMPERATURE: 98.9 F | HEIGHT: 59 IN

## 2018-11-20 DIAGNOSIS — I10 BENIGN ESSENTIAL HYPERTENSION: ICD-10-CM

## 2018-11-20 DIAGNOSIS — Z23 NEED FOR VACCINATION AGAINST STREPTOCOCCUS PNEUMONIAE USING PNEUMOCOCCAL CONJUGATE VACCINE 13: ICD-10-CM

## 2018-11-20 DIAGNOSIS — E11.65 TYPE 2 DIABETES MELLITUS WITH HYPERGLYCEMIA, WITH LONG-TERM CURRENT USE OF INSULIN (HCC): Primary | ICD-10-CM

## 2018-11-20 DIAGNOSIS — Z79.4 TYPE 2 DIABETES MELLITUS WITH HYPERGLYCEMIA, WITH LONG-TERM CURRENT USE OF INSULIN (HCC): Primary | ICD-10-CM

## 2018-11-20 DIAGNOSIS — Z23 NEED FOR INFLUENZA VACCINATION: ICD-10-CM

## 2018-11-20 LAB
EST. AVERAGE GLUCOSE BLD GHB EST-MCNC: 212 MG/DL
HBA1C MFR BLD: 9 % (ref 4.2–6.3)
LEFT EYE DIABETIC RETINOPATHY: NORMAL
LEFT EYE IMAGE QUALITY: NORMAL
RIGHT EYE DIABETIC RETINOPATHY: NORMAL
RIGHT EYE IMAGE QUALITY: NORMAL
SEVERITY (EYE EXAM): NORMAL

## 2018-11-20 PROCEDURE — 1160F RVW MEDS BY RX/DR IN RCRD: CPT | Performed by: PHYSICIAN ASSISTANT

## 2018-11-20 PROCEDURE — 90670 PCV13 VACCINE IM: CPT | Performed by: PHYSICIAN ASSISTANT

## 2018-11-20 PROCEDURE — G0009 ADMIN PNEUMOCOCCAL VACCINE: HCPCS | Performed by: PHYSICIAN ASSISTANT

## 2018-11-20 PROCEDURE — 3078F DIAST BP <80 MM HG: CPT | Performed by: PHYSICIAN ASSISTANT

## 2018-11-20 PROCEDURE — 3075F SYST BP GE 130 - 139MM HG: CPT | Performed by: PHYSICIAN ASSISTANT

## 2018-11-20 PROCEDURE — 92250 FUNDUS PHOTOGRAPHY W/I&R: CPT | Performed by: PHYSICIAN ASSISTANT

## 2018-11-20 PROCEDURE — 4040F PNEUMOC VAC/ADMIN/RCVD: CPT | Performed by: PHYSICIAN ASSISTANT

## 2018-11-20 PROCEDURE — G0008 ADMIN INFLUENZA VIRUS VAC: HCPCS | Performed by: PHYSICIAN ASSISTANT

## 2018-11-20 PROCEDURE — 90662 IIV NO PRSV INCREASED AG IM: CPT | Performed by: PHYSICIAN ASSISTANT

## 2018-11-20 PROCEDURE — 99213 OFFICE O/P EST LOW 20 MIN: CPT | Performed by: PHYSICIAN ASSISTANT

## 2018-11-20 PROCEDURE — 3008F BODY MASS INDEX DOCD: CPT | Performed by: PHYSICIAN ASSISTANT

## 2018-11-20 RX ORDER — INSULIN DETEMIR 100 [IU]/ML
40 INJECTION, SOLUTION SUBCUTANEOUS
Qty: 10 PEN | Refills: 0 | Status: SHIPPED | OUTPATIENT
Start: 2018-11-20 | End: 2019-02-25 | Stop reason: SDUPTHER

## 2018-11-20 NOTE — PATIENT INSTRUCTIONS
Please increase your insulin to 40 units daily  This is because your A1c significantly increased as discussed today to 9%  No changes to any other medications  As discussed you are aware the dietary changes in foods that you been eating that have caused this significant increase  Please continue to check your sugars every morning and at least 3 times for week please check your sugars 2 hours after your evening meal   Please drop-off your sugar log here in 1 month and new labs as dated in 3 months    DEXA scan will be due in 2019 and this will be ordered at your follow-up visit      Flu and pneumonia vaccines updated today

## 2018-11-20 NOTE — PROGRESS NOTES
Assessment/Plan:    No problem-specific Assessment & Plan notes found for this encounter  Diagnoses and all orders for this visit:    Type 2 diabetes mellitus with hyperglycemia, with long-term current use of insulin ([address] )  -     POCT diabetic eye exam  -     LEVEMIR FLEXTOUCH 100 units/mL injection pen; Inject 40 Units under the skin daily at bedtime for 180 days  -     Comprehensive metabolic panel; Future  -     Hemoglobin A1C; Future  -     Lipid Panel with Direct LDL reflex; Future  -     Microalbumin / creatinine urine ratio; Future    Benign essential hypertension    Need for influenza vaccination  -     influenza vaccine, [id number], high-dose, PF 0 5 mL, for patients 65 yr+ (FLUZONE HIGH-DOSE)    Need for vaccination against Streptococcus pneumoniae using pneumococcal conjugate vaccine 13  -     PNEUMOCOCCAL CONJUGATE VACCINE 13-VALENT GREATER THAN 6 MONTHS          Subjective:      Patient ID: Tammy Lala is a 69 y o  female  Pt here for follow up of labs, Hgb A1C has increased to 9 0  Confirms taking metformin and levemir 34 units at bedtime  Reports taking all BP meds as directed  Reports have been taking blood sugars in the AM before eating; lowest reading 89, denies readings in 60s or below; reports highest reading sometimes above 200s  Does not check check blood sugar post-prandially in evening  Denies episodes of shakiness, lightheadedness or dizziness  Reports eating sweets and eating out frequently  When patient was last seen in August and had an increase to her A1c she reports this was the same reason that her sugar had increased from the sevens to 8% was that she was visiting family and was eating out more  As per discussion today patient continues to be eating more foods out and has not been following her usual diet  We discussed as this is the 2nd time her A1c has increased and this was a significant increase that we will increase her insulin dose today      As per discussion with patient we will increase her Levemir to 40 units once a day  Patient will continue to check her a m  Fasting every morning and will check a 2 hr postprandial at least 3-4 times per week  Patient aware to bring in her sugar log in 1 month so I may review and we will contact her with any additional changes at that time  Patient did void air that she will get all of her labs as dated in 3 months and will schedule an appointment at that time  Patient once again encouraged to avoid eating out so frequently as this is the 2nd time in 6 months that her A1c has significantly increased  We discussed while I can continue to increase her insulin it is far more beneficial for her to improve her diet once again  Flu and PCV13 updated today  DXA scan due in 2019        The following portions of the patient's history were reviewed and updated as appropriate: allergies, current medications, past family history, past medical history, past social history and past surgical history  Review of Systems   Constitutional: Negative  Negative for fatigue and unexpected weight change  HENT: Negative for hearing loss  Eyes: Negative for visual disturbance  Respiratory: Negative  Negative for cough and shortness of breath  Cardiovascular: Negative  Negative for chest pain  Gastrointestinal: Negative  Negative for abdominal pain, diarrhea, nausea and vomiting  Endocrine: Negative  Negative for polydipsia, polyphagia (not eating a lot but too  much of starches and sweets) and polyuria  Genitourinary: Negative  Negative for dysuria and urgency  Musculoskeletal: Negative  Negative for arthralgias  Skin: Negative for rash  Neurological: Negative  Negative for dizziness, light-headedness and numbness  Psychiatric/Behavioral: Negative            Objective:      /70 (BP Location: Left arm, Patient Position: Sitting, Cuff Size: Standard)   Pulse 72   Temp 98 9 °F (37 2 °C) (Oral)   Ht 4' 11" (1 499 m)   Wt 60 8 kg (134 lb 0 6 oz)   BMI 27 07 kg/m²          Physical Exam   Constitutional: She is oriented to person, place, and time  She appears well-developed and well-nourished  No distress  HENT:   Head: Normocephalic  Cardiovascular: Normal rate, regular rhythm, normal heart sounds and intact distal pulses  Exam reveals no gallop and no friction rub  No murmur heard  Pulmonary/Chest: Effort normal and breath sounds normal  No respiratory distress  She has no wheezes  She has no rales  Abdominal: Soft  Bowel sounds are normal    Musculoskeletal: She exhibits no edema  Neurological: She is alert and oriented to person, place, and time  Skin: Skin is warm and dry  She is not diaphoretic  Psychiatric: She has a normal mood and affect   Her behavior is normal

## 2018-11-23 DIAGNOSIS — I10 BENIGN ESSENTIAL HYPERTENSION: ICD-10-CM

## 2018-11-25 RX ORDER — LISINOPRIL 40 MG/1
40 TABLET ORAL DAILY
Qty: 90 TABLET | OUTPATIENT
Start: 2018-11-25 | End: 2019-05-24

## 2019-01-08 ENCOUNTER — TELEPHONE (OUTPATIENT)
Dept: INTERNAL MEDICINE CLINIC | Facility: CLINIC | Age: 79
End: 2019-01-08

## 2019-01-08 NOTE — TELEPHONE ENCOUNTER
Patient dropped off Blood pressure logs for your review  She has a follow up appointment on 2/20/19  Placed logs in your bin and scanned them into her chart

## 2019-01-09 NOTE — TELEPHONE ENCOUNTER
Spoke with patient and made her aware of the information below  Patient verbalized understanding and is aware to continue the 40 units

## 2019-01-28 DIAGNOSIS — I10 BENIGN ESSENTIAL HYPERTENSION: ICD-10-CM

## 2019-01-30 RX ORDER — AMLODIPINE BESYLATE 5 MG/1
TABLET ORAL
Qty: 90 TABLET | Refills: 0 | Status: SHIPPED | OUTPATIENT
Start: 2019-01-30 | End: 2019-03-07 | Stop reason: SDUPTHER

## 2019-02-07 ENCOUNTER — HOSPITAL ENCOUNTER (OUTPATIENT)
Facility: HOSPITAL | Age: 79
Setting detail: OBSERVATION
Discharge: HOME | End: 2019-02-08
Attending: EMERGENCY MEDICINE | Admitting: INTERNAL MEDICINE
Payer: COMMERCIAL

## 2019-02-07 ENCOUNTER — APPOINTMENT (EMERGENCY)
Dept: RADIOLOGY | Facility: HOSPITAL | Age: 79
End: 2019-02-07
Payer: COMMERCIAL

## 2019-02-07 DIAGNOSIS — R07.89 CHEST PRESSURE: ICD-10-CM

## 2019-02-07 DIAGNOSIS — I47.1 AVNRT (AV NODAL RE-ENTRY TACHYCARDIA) (HCC): Primary | ICD-10-CM

## 2019-02-07 DIAGNOSIS — I47.1 SVT (SUPRAVENTRICULAR TACHYCARDIA) (HCC): ICD-10-CM

## 2019-02-07 DIAGNOSIS — I10 BENIGN ESSENTIAL HYPERTENSION: ICD-10-CM

## 2019-02-07 PROBLEM — I47.10 SVT (SUPRAVENTRICULAR TACHYCARDIA): Status: ACTIVE | Noted: 2019-02-07

## 2019-02-07 LAB
ALBUMIN SERPL BCP-MCNC: 4.2 G/DL (ref 3.5–5)
ALP SERPL-CCNC: 77 U/L (ref 46–116)
ALT SERPL W P-5'-P-CCNC: 48 U/L (ref 12–78)
ANION GAP SERPL CALCULATED.3IONS-SCNC: 12 MMOL/L (ref 4–13)
AST SERPL W P-5'-P-CCNC: 43 U/L (ref 5–45)
ATRIAL RATE: 250 BPM
ATRIAL RATE: 87 BPM
BASOPHILS # BLD AUTO: 0.1 THOUSANDS/ΜL (ref 0–0.1)
BASOPHILS NFR BLD AUTO: 1 % (ref 0–1)
BILIRUB SERPL-MCNC: 0.61 MG/DL (ref 0.2–1)
BUN SERPL-MCNC: 14 MG/DL (ref 5–25)
CALCIUM SERPL-MCNC: 8.9 MG/DL (ref 8.3–10.1)
CHLORIDE SERPL-SCNC: 100 MMOL/L (ref 100–108)
CO2 SERPL-SCNC: 23 MMOL/L (ref 21–32)
CREAT SERPL-MCNC: 0.95 MG/DL (ref 0.6–1.3)
EOSINOPHIL # BLD AUTO: 0.11 THOUSAND/ΜL (ref 0–0.61)
EOSINOPHIL NFR BLD AUTO: 1 % (ref 0–6)
ERYTHROCYTE [DISTWIDTH] IN BLOOD BY AUTOMATED COUNT: 13.4 % (ref 11.6–15.1)
GFR SERPL CREATININE-BSD FRML MDRD: 58 ML/MIN/1.73SQ M
GLUCOSE SERPL-MCNC: 127 MG/DL (ref 65–140)
GLUCOSE SERPL-MCNC: 133 MG/DL (ref 65–140)
GLUCOSE SERPL-MCNC: 276 MG/DL (ref 65–140)
HCT VFR BLD AUTO: 44.4 % (ref 34.8–46.1)
HGB BLD-MCNC: 14.2 G/DL (ref 11.5–15.4)
IMM GRANULOCYTES # BLD AUTO: 0.06 THOUSAND/UL (ref 0–0.2)
IMM GRANULOCYTES NFR BLD AUTO: 1 % (ref 0–2)
LYMPHOCYTES # BLD AUTO: 4.66 THOUSANDS/ΜL (ref 0.6–4.47)
LYMPHOCYTES NFR BLD AUTO: 43 % (ref 14–44)
MCH RBC QN AUTO: 27.8 PG (ref 26.8–34.3)
MCHC RBC AUTO-ENTMCNC: 32 G/DL (ref 31.4–37.4)
MCV RBC AUTO: 87 FL (ref 82–98)
MONOCYTES # BLD AUTO: 0.81 THOUSAND/ΜL (ref 0.17–1.22)
MONOCYTES NFR BLD AUTO: 8 % (ref 4–12)
NEUTROPHILS # BLD AUTO: 5.13 THOUSANDS/ΜL (ref 1.85–7.62)
NEUTS SEG NFR BLD AUTO: 46 % (ref 43–75)
NRBC BLD AUTO-RTO: 0 /100 WBCS
NT-PROBNP SERPL-MCNC: 62 PG/ML
P AXIS: 51 DEGREES
PLATELET # BLD AUTO: 283 THOUSANDS/UL (ref 149–390)
PMV BLD AUTO: 11.1 FL (ref 8.9–12.7)
POTASSIUM SERPL-SCNC: 5 MMOL/L (ref 3.5–5.3)
PR INTERVAL: 150 MS
PROT SERPL-MCNC: 8.5 G/DL (ref 6.4–8.2)
QRS AXIS: -29 DEGREES
QRS AXIS: -54 DEGREES
QRSD INTERVAL: 76 MS
QRSD INTERVAL: 84 MS
QT INTERVAL: 250 MS
QT INTERVAL: 346 MS
QTC INTERVAL: 416 MS
QTC INTERVAL: 426 MS
RBC # BLD AUTO: 5.1 MILLION/UL (ref 3.81–5.12)
SODIUM SERPL-SCNC: 135 MMOL/L (ref 136–145)
T WAVE AXIS: 54 DEGREES
T WAVE AXIS: 90 DEGREES
TROPONIN I SERPL-MCNC: <0.02 NG/ML
TSH SERPL DL<=0.05 MIU/L-ACNC: 1.51 UIU/ML (ref 0.36–3.74)
VENTRICULAR RATE: 175 BPM
VENTRICULAR RATE: 87 BPM
WBC # BLD AUTO: 10.87 THOUSAND/UL (ref 4.31–10.16)

## 2019-02-07 PROCEDURE — 71046 X-RAY EXAM CHEST 2 VIEWS: CPT

## 2019-02-07 PROCEDURE — 80053 COMPREHEN METABOLIC PANEL: CPT | Performed by: EMERGENCY MEDICINE

## 2019-02-07 PROCEDURE — 93005 ELECTROCARDIOGRAM TRACING: CPT

## 2019-02-07 PROCEDURE — 36415 COLL VENOUS BLD VENIPUNCTURE: CPT | Performed by: EMERGENCY MEDICINE

## 2019-02-07 PROCEDURE — 82948 REAGENT STRIP/BLOOD GLUCOSE: CPT

## 2019-02-07 PROCEDURE — 85025 COMPLETE CBC W/AUTO DIFF WBC: CPT | Performed by: EMERGENCY MEDICINE

## 2019-02-07 PROCEDURE — 84484 ASSAY OF TROPONIN QUANT: CPT | Performed by: INTERNAL MEDICINE

## 2019-02-07 PROCEDURE — 84484 ASSAY OF TROPONIN QUANT: CPT | Performed by: EMERGENCY MEDICINE

## 2019-02-07 PROCEDURE — 84443 ASSAY THYROID STIM HORMONE: CPT | Performed by: INTERNAL MEDICINE

## 2019-02-07 PROCEDURE — 83880 ASSAY OF NATRIURETIC PEPTIDE: CPT | Performed by: INTERNAL MEDICINE

## 2019-02-07 PROCEDURE — 99285 EMERGENCY DEPT VISIT HI MDM: CPT

## 2019-02-07 PROCEDURE — 93010 ELECTROCARDIOGRAM REPORT: CPT | Performed by: INTERNAL MEDICINE

## 2019-02-07 RX ORDER — ACETAMINOPHEN 325 MG/1
650 TABLET ORAL EVERY 4 HOURS PRN
Status: DISCONTINUED | OUTPATIENT
Start: 2019-02-07 | End: 2019-02-08 | Stop reason: HOSPADM

## 2019-02-07 RX ORDER — FLUTICASONE PROPIONATE 50 MCG
1 SPRAY, SUSPENSION (ML) NASAL 2 TIMES DAILY
Status: DISCONTINUED | OUTPATIENT
Start: 2019-02-08 | End: 2019-02-08 | Stop reason: HOSPADM

## 2019-02-07 RX ORDER — NITROGLYCERIN 0.4 MG/1
0.4 TABLET SUBLINGUAL
Status: DISCONTINUED | OUTPATIENT
Start: 2019-02-07 | End: 2019-02-08 | Stop reason: HOSPADM

## 2019-02-07 RX ORDER — MELATONIN
1000 DAILY
Status: DISCONTINUED | OUTPATIENT
Start: 2019-02-08 | End: 2019-02-08 | Stop reason: HOSPADM

## 2019-02-07 RX ORDER — ADENOSINE 3 MG/ML
INJECTION, SOLUTION INTRAVENOUS
Status: COMPLETED
Start: 2019-02-07 | End: 2019-02-07

## 2019-02-07 RX ORDER — ONDANSETRON 2 MG/ML
4 INJECTION INTRAMUSCULAR; INTRAVENOUS EVERY 6 HOURS PRN
Status: DISCONTINUED | OUTPATIENT
Start: 2019-02-07 | End: 2019-02-08 | Stop reason: HOSPADM

## 2019-02-07 RX ORDER — AMLODIPINE BESYLATE 5 MG/1
5 TABLET ORAL DAILY
Status: DISCONTINUED | OUTPATIENT
Start: 2019-02-08 | End: 2019-02-08 | Stop reason: HOSPADM

## 2019-02-07 RX ORDER — ADENOSINE 3 MG/ML
INJECTION, SOLUTION INTRAVENOUS
Status: DISPENSED
Start: 2019-02-07 | End: 2019-02-07

## 2019-02-07 RX ORDER — LISINOPRIL 20 MG/1
40 TABLET ORAL DAILY
Status: DISCONTINUED | OUTPATIENT
Start: 2019-02-08 | End: 2019-02-08 | Stop reason: HOSPADM

## 2019-02-07 RX ORDER — PRAVASTATIN SODIUM 40 MG
40 TABLET ORAL
Status: DISCONTINUED | OUTPATIENT
Start: 2019-02-07 | End: 2019-02-08 | Stop reason: HOSPADM

## 2019-02-07 RX ADMIN — INSULIN DETEMIR 12 UNITS: 100 INJECTION, SOLUTION SUBCUTANEOUS at 23:11

## 2019-02-07 RX ADMIN — METOPROLOL TARTRATE 50 MG: 25 TABLET, FILM COATED ORAL at 20:23

## 2019-02-07 RX ADMIN — ADENOSINE 6 MG: 3 INJECTION, SOLUTION INTRAVENOUS at 11:49

## 2019-02-07 RX ADMIN — PRAVASTATIN SODIUM 40 MG: 40 TABLET ORAL at 20:23

## 2019-02-07 RX ADMIN — ENOXAPARIN SODIUM 40 MG: 40 INJECTION SUBCUTANEOUS at 20:23

## 2019-02-07 NOTE — ED PROVIDER NOTES
History  Chief Complaint   Patient presents with    Chest Pain     Pt reports mid-sternal chest pain  Pt reports feeling "shakes"  Pt also reports L sided jaw pain and nausea  HPI this is a 80-year-old woman who presents to the emergency department with chest pressure, palpitations, and dyspnea  The patient states that about 1 hr ago, she began having palpitations  The patient states that she takes a beta-blocker when this happens, and took that, but did not resolve  She states that her last episode was about 1 month ago, and resolved after taking her medications  The patient states that this has happened in the past, and she has been seen in the emergency department for it, but there are no records in our system or [facility] as of this event in the past   The patient does not see a cardiologist   She denies fevers  She denies weight loss  She denies lateralizing limb swelling  She does not have pleuritic pain, but does complain of tightness in her chest since her symptoms started  The patient denies nausea, vomiting, fevers, or chills  Prior to Admission Medications   Prescriptions Last Dose Informant Patient Reported? Taking?    B-D UF III MINI PEN NEEDLES 31G X 5 MM MISC 2/7/2019  No Yes   Sig: INJECT 32 UNITS OF LEVEMIR SUBCUTANEOUSLY ONCE DAILY AT BEDTIME   B-D UF III MINI PEN NEEDLES 31G X 5 MM MISC 2/7/2019  No Yes   Sig: INJECT 32 UNITS OF LEVEMIR SUBCUTANEOUSLY ONCE DAILY AT BEDTIME   B-D UF III MINI PEN NEEDLES 31G X 5 MM MISC 2/7/2019  No Yes   Sig: INJECT 32 UNITS OF LEVEMIR SUBCUTANEOUSLY ONCE DAILY AT BEDTIME   B-D UF III MINI PEN NEEDLES 31G X 5 MM MISC 2/7/2019  No Yes   Sig: INJECT 32 UNITS OF LEVEMIR SUBCUTANEOUSLY ONCE DAILY AT BEDTIME   FREESTYLE LITE test strip 2/7/2019  No Yes   Sig: USE TWICE DAILY   Insulin Pen Needle (B-D UF III MINI PEN NEEDLES) 31G X 5 MM MISC 2/7/2019 Self No Yes   Sig: Inject 34 units SC daily in evening   Insulin Syringe-Needle U-100 (B-D INS SYR ULTRAFINE  3CC/31G) 31G X 5/16" 0 3 ML MISC 2019 Self Yes Yes   Sig: by Does not apply route   LEVEMIR FLEXTOUCH 100 units/mL injection pen 2019  No Yes   Sig: Inject 40 Units under the skin daily at bedtime for 180 days   Lancets (FREESTYLE) lancets 2019  No Yes   Sig: may check sugar up to 3 times daily   alendronate (FOSAMAX) 70 mg tablet Not Taking at Unknown time  No No   Sig: TAKE ONE TABLET BY MOUTH ONCE A WEEK   Patient not taking: Reported on 2019   amLODIPine (NORVASC) 5 mg tablet 2019  No Yes   Sig: TOME DIANE TABLETA POR LA BOCA CADA BRENDAN   cholecalciferol (VITAMIN D3) 1,000 units tablet 2019  No Yes   Sig: Take 1 tablet (1,000 Units total) by mouth daily for 180 days   cholecalciferol (VITAMIN D3) 1,000 units tablet 2019  No Yes   Sig: Take 1 tablet (1,000 Units total) by mouth daily   fluticasone (FLONASE) 50 mcg/act nasal spray Not Taking at Unknown time Self Yes No   Si spray into each nostril 2 (two) times a day   lisinopril (ZESTRIL) 40 mg tablet 2019  No Yes   Sig: Take 1 tablet (40 mg total) by mouth daily for 180 days   lovastatin (MEVACOR) 40 MG tablet   No No   Sig: TAKE TWO TABLETS BY MOUTH TOGETHER ONCE DAILY AT BEDTIME   metFORMIN (GLUCOPHAGE) 1000 MG tablet 2019  No Yes   Sig: Take 1 tablet (1,000 mg total) by mouth 2 (two) times a day with meals for 180 days   metoprolol tartrate (LOPRESSOR) 25 mg tablet 2019  No Yes   Sig: Take 1 tablet (25 mg total) by mouth every 12 (twelve) hours for 180 days      Facility-Administered Medications: None       Past Medical History:   Diagnosis Date    Anemia     Chronic idiopathic constipation     Diabetes mellitus (HCC)     Diverticulitis, colon     Esophageal reflux     Eustachian tube dysfunction     Gastrointestinal hemorrhage     Hypertension     Non-healing skin lesion     Palpitations     Rectal bleeding     Skin lesion of chest wall        Past Surgical History:   Procedure Laterality Date   SECTION      CHOLECYSTECTOMY      ECTOPIC PREGNANCY SURGERY      MA COLONOSCOPY FLX DX W/COLLJ SPEC WHEN PFRMD N/A 2016    Procedure: COLONOSCOPY;  Surgeon: Domo Wei MD;  Location: [facility]; Service: Gastroenterology       Family History   Problem Relation Age of Onset    Heart disease Mother     Bleeding Disorder Sister      I have reviewed and agree with the history as documented  Social History     Tobacco Use    Smoking status: Never Smoker    Smokeless tobacco: Never Used   Substance Use Topics    Alcohol use: No    Drug use: No        Review of Systems  The patient's review of systems otherwise negative in 12 systems were reviewed  Physical Exam  Physical Exam    Vital Signs  ED Triage Vitals   Temperature Pulse Respirations Blood Pressure SpO2   19 1130 19 1130 19 1130 19 1130 19 1130   98 2 °F (36 8 °C) (!) 185 20 150/91 99 %      Temp Source Heart Rate Source Patient Position - Orthostatic VS BP Location FiO2 (%)   19 1130 19 1130 19 1130 19 1130 --   Tympanic Monitor Sitting Right arm       Pain Score       19 1152       No Pain           Vitals:    19 2305 19 0300 19 0649 19 0718   BP: 149/72 142/68 (!) 173/78 (!) 173/78   Pulse: 70 75 68 68   Patient Position - Orthostatic VS: Lying Lying  Lying     On exam the patient is awake, alert, and interactive  She has a heart rate of 185  The patient does not appear to be in distress  She has adequate blood pressure  She is afebrile  The patient's pupils are round reactive to light  Her extraocular movements are intact  She has JVD  Her heart is tachycardic with a rate of 185  Her lungs are clear bilaterally with good air movement  Her abdomen is soft and nontender with no masses, rebound, guarding  She is neurologically intact  Her limbs are warm and well perfused with good pulses and no lateralizing edema    She has normal back in skin exam     Visual Acuity      ED Medications  Medications   adenosine (ADENOCARD) 6 mg/2 mL injection **ADS Override Pull** (  Not Given 2/7/19 1155)   adenosine (ADENOCARD) 6 mg/2 mL injection **ADS Override Pull** (6 mg  Given 2/7/19 1149)   insulin detemir (LEVEMIR) subcutaneous injection 12 Units (12 Units Subcutaneous Given 2/7/19 2311)       Diagnostic Studies  Results Reviewed     Procedure Component Value Units Date/Time    Magnesium [[id number]]  (Normal) Collected:  02/08/19 0441    Lab Status:  Final result Specimen:  Blood from Arm, Left Updated:  02/08/19 0541     Magnesium 1 8 mg/dL     Comprehensive metabolic panel [[id number]] Collected:  02/08/19 0441    Lab Status:  Final result Specimen:  Blood from Arm, Left Updated:  02/08/19 0541     Sodium 136 mmol/L      Potassium 3 6 mmol/L      Chloride 105 mmol/L      CO2 23 mmol/L      ANION GAP 8 mmol/L      BUN 14 mg/dL      Creatinine 0 74 mg/dL      Glucose 138 mg/dL      Calcium 8 8 mg/dL      AST 35 U/L      ALT 41 U/L      Alkaline Phosphatase 48 U/L      Total Protein 7 3 g/dL      Albumin 3 6 g/dL      Total Bilirubin 0 63 mg/dL      eGFR 78 ml/min/1 73sq m     Narrative:         National Kidney Disease Education Program recommendations are as follows:  GFR calculation is accurate only with a steady state creatinine  Chronic Kidney disease less than 60 ml/min/1 73 sq  meters  Kidney failure less than 15 ml/min/1 73 sq  meters      Phosphorus [[id number]]  (Normal) Collected:  02/08/19 0441    Lab Status:  Final result Specimen:  Blood from Arm, Left Updated:  02/08/19 0541     Phosphorus 3 4 mg/dL     CBC and differential [[id number]] Collected:  02/08/19 0441    Lab Status:  Final result Specimen:  Blood from Arm, Left Updated:  02/08/19 0530     WBC 6 64 Thousand/uL      RBC 4 85 Million/uL      Hemoglobin 13 5 g/dL      Hematocrit 42 3 %      MCV 87 fL      MCH 27 8 pg      MCHC 31 9 g/dL      RDW 13 7 %      MPV 11 5 fL      Platelets 842 Thousands/uL      nRBC 0 /100 WBCs      Neutrophils Relative 49 %      Immat GRANS % 0 %      Lymphocytes Relative 41 %      Monocytes Relative 8 %      Eosinophils Relative 1 %      Basophils Relative 1 %      Neutrophils Absolute 3 18 Thousands/µL      Immature Grans Absolute 0 01 Thousand/uL      Lymphocytes Absolute 2 71 Thousands/µL      Monocytes Absolute 0 56 Thousand/µL      Eosinophils Absolute 0 09 Thousand/µL      Basophils Absolute 0 09 Thousands/µL     Troponin I [[id number]]  (Normal) Collected:  02/08/19 0024    Lab Status:  Final result Specimen:  Blood from Hand, Left Updated:  02/08/19 0103     Troponin I <0 02 ng/mL     Protime-INR [[id number]]  (Normal) Collected:  02/08/19 0024    Lab Status:  Final result Specimen:  Blood from Hand, Left Updated:  02/08/19 0047     Protime 13 0 seconds      INR 0 97    Troponin I [[id number]]  (Normal) Collected:  02/07/19 2049    Lab Status:  Final result Specimen:  Blood Updated:  02/07/19 2113     Troponin I <0 02 ng/mL     TSH, 3rd generation with Free T4 reflex [[id number]]  (Normal) Collected:  02/07/19 1503    Lab Status:  Final result Specimen:  Blood from Arm, Left Updated:  02/07/19 1751     TSH 3RD GENERATON 1 510 uIU/mL     Narrative:         Patients undergoing fluorescein dye angiography may retain small amounts of fluorescein in the body for 48-72 hours post procedure  Samples containing fluorescein can produce falsely depressed TSH values  If the patient had this procedure,a specimen should be resubmitted post fluorescein clearance            The recommended reference ranges for TSH during pregnancy are as follows:  First trimester 0 1 to 2 5 uIU/mL  Second trimester  0 2 to 3 0 uIU/mL  Third trimester 0 3 to 3 0 uIU/m      NT-BNP PRO [[id number]]  (Normal) Collected:  02/07/19 1503    Lab Status:  Final result Specimen:  Blood from Arm, Left Updated:  02/07/19 1544     NT-proBNP 62 pg/mL     Troponin I [[id number]]  (Normal) Collected:  02/07/19 1503 Lab Status:  Final result Specimen:  Blood from Arm, Left Updated:  02/07/19 1537     Troponin I <0 02 ng/mL     Fingerstick Glucose (POCT) [[id number]]  (Normal) Collected:  02/07/19 1506    Lab Status:  Final result Updated:  02/07/19 1507     POC Glucose 127 mg/dl     Comprehensive metabolic panel [[id number]]  (Abnormal) Collected:  02/07/19 1151    Lab Status:  Final result Specimen:  Blood from Arm, Left Updated:  02/07/19 1250     Sodium 135 mmol/L      Potassium 5 0 mmol/L      Chloride 100 mmol/L      CO2 23 mmol/L      ANION GAP 12 mmol/L      BUN 14 mg/dL      Creatinine 0 95 mg/dL      Glucose 276 mg/dL      Calcium 8 9 mg/dL      AST 43 U/L      ALT 48 U/L      Alkaline Phosphatase 77 U/L      Total Protein 8 5 g/dL      Albumin 4 2 g/dL      Total Bilirubin 0 61 mg/dL      eGFR 58 ml/min/1 73sq m     Narrative:         National Kidney Disease Education Program recommendations are as follows:  GFR calculation is accurate only with a steady state creatinine  Chronic Kidney disease less than 60 ml/min/1 73 sq  meters  Kidney failure less than 15 ml/min/1 73 sq  meters      Troponin I [[id number]]  (Normal) Collected:  02/07/19 1151    Lab Status:  Final result Specimen:  Blood from Arm, Left Updated:  02/07/19 1218     Troponin I <0 02 ng/mL     CBC and differential [[id number]]  (Abnormal) Collected:  02/07/19 1151    Lab Status:  Final result Specimen:  Blood from Arm, Left Updated:  02/07/19 1200     WBC 10 87 Thousand/uL      RBC 5 10 Million/uL      Hemoglobin 14 2 g/dL      Hematocrit 44 4 %      MCV 87 fL      MCH 27 8 pg      MCHC 32 0 g/dL      RDW 13 4 %      MPV 11 1 fL      Platelets 605 Thousands/uL      nRBC 0 /100 WBCs      Neutrophils Relative 46 %      Immat GRANS % 1 %      Lymphocytes Relative 43 %      Monocytes Relative 8 %      Eosinophils Relative 1 %      Basophils Relative 1 %      Neutrophils Absolute 5 13 Thousands/µL      Immature Grans Absolute 0 06 Thousand/uL      Lymphocytes Absolute 4 66 Thousands/µL      Monocytes Absolute 0 81 Thousand/µL      Eosinophils Absolute 0 11 Thousand/µL      Basophils Absolute 0 10 Thousands/µL                  X-ray chest 2 views   ED Interpretation by Nasima Underwood MD (02/07 4171)   No acute disease      Final Result by María Elena John MD (02/07 8540)      No acute cardiopulmonary disease  Workstation performed: [id number]                    Procedures  ECG 12 Lead Documentation  Date/Time: 2/7/2019 1:03 PM  Performed by: Lillie Warren  Authorized by: Lillie Warren     Indications / Diagnosis:  Tachy arrhythmia  ECG reviewed by me, the ED Provider: yes    Patient location:  ED  Previous ECG:     Previous ECG:  Compared to current    Similarity:  Changes noted  Comments:      Patient in SVT, narrow complex,, T-wave inversion in aVL, flat and and 1 compared to prior EKG from 2016, likely rate related    Rate of 180    CriticalCare Time  Performed by: Lillie Warren  Authorized by: Lillie Warren     Critical care provider statement:     Critical care time (minutes):  35    Critical care time was exclusive of:  Separately billable procedures and treating other patients    Critical care was necessary to treat or prevent imminent or life-threatening deterioration of the following conditions:  Circulatory failure    Critical care was time spent personally by me on the following activities:  Blood draw for specimens, obtaining history from patient or surrogate, development of treatment plan with patient or surrogate, evaluation of patient's response to treatment, examination of patient, ordering and performing treatments and interventions, ordering and review of laboratory studies, ordering and review of radiographic studies, re-evaluation of patient's condition and review of old charts           Phone Contacts  ED Phone Contact    ED Course  ED Course as of Feb 11 0736   Thu Feb 07, 2019   0591 Discussed with DRISS VAUGHN Medicine, will admit observation            HEART Risk Score      Most Recent Value   History  0 Filed at: 02/07/2019 1407   ECG  1 Filed at: 02/07/2019 1407   Age  2 Filed at: 02/07/2019 1407   Risk Factors  2 Filed at: 02/07/2019 1407   Troponin  0 Filed at: 02/07/2019 1407   Heart Score Risk Calculator   History  0 Filed at: 02/07/2019 1407   ECG  1 Filed at: 02/07/2019 1407   Age  2 Filed at: 02/07/2019 1407   Risk Factors  2 Filed at: 02/07/2019 1407   Troponin  0 Filed at: 02/07/2019 1407   HEART Score  5 Filed at: 02/07/2019 1407   HEART Score  5 Filed at: 02/07/2019 1407                            MDM ED course:  IV access established, vagal maneuvers attempted with no success  Patient given 6 of adenosine with conversion to sinus rhythm on the monitor  Patient states that her symptoms are somewhat better, and her chest discomfort has resolved  Given chest pressure, diabetes, hypertension, hyperlipidemia, no cardiology follow-up, and last stress test 2014 will plan to admit for cardiac upset  Disposition  Final diagnoses:   SVT (supraventricular tachycardia) ([facility] [address] )   Chest pressure     Time reflects when diagnosis was documented in both MDM as applicable and the Disposition within this note     Time User Action Codes Description Comment    2/7/2019  2:06 PM Melly BLOCK Add [I47 1] SVT (supraventricular tachycardia) ([facility] [address] )     2/7/2019  2:07 PM Malaika Feldman Add [R07 89] Chest pressure     2/8/2019  1:55 PM Effie Branch [I47 1] SVT (supraventricular tachycardia) ([facility] [address] )     2/8/2019  1:55 PM Jewell Bryan Add [I47 1] AVNRT (AV dao re-entry tachycardia) [facility])       ED Disposition     ED Disposition Condition Date/Time Comment    Admit  Thu Feb 7, 2019  2:07 PM Case was discussed with sod and the patient's admission status was agreed to be Admission Status: observation status to the service of Dr Sam Ramirez             Follow-up Information     Follow up With Specialties Details Why Daniel Gutiérrez MD Cardiology Follow up on 3/21/2019 3:20pm appointment scheduled Josiah [address]   [phone]            Discharge Medication List as of 2/8/2019  2:23 PM      CONTINUE these medications which have CHANGED    Details   metoprolol tartrate (LOPRESSOR) 50 mg tablet Take 1 tablet (50 mg total) by mouth every 12 (twelve) hours for 60 days, Starting Fri 2/8/2019, Until Tue 4/9/2019, Print         CONTINUE these medications which have NOT CHANGED    Details   amLODIPine (NORVASC) 5 mg tablet TOME DIANE TABLETA POR LA BOCA CADA BRENDAN, Normal      !! B-D UF III MINI PEN NEEDLES 31G X 5 MM MISC INJECT 32 UNITS OF LEVEMIR SUBCUTANEOUSLY ONCE DAILY AT BEDTIME, Normal      !! B-D UF III MINI PEN NEEDLES 31G X 5 MM MISC INJECT 32 UNITS OF LEVEMIR SUBCUTANEOUSLY ONCE DAILY AT BEDTIME, Normal      !! B-D UF III MINI PEN NEEDLES 31G X 5 MM MISC INJECT 32 UNITS OF LEVEMIR SUBCUTANEOUSLY ONCE DAILY AT BEDTIME, Normal      !! B-D UF III MINI PEN NEEDLES 31G X 5 MM MISC INJECT 32 UNITS OF LEVEMIR SUBCUTANEOUSLY ONCE DAILY AT BEDTIME, Normal      cholecalciferol (VITAMIN D3) 1,000 units tablet Take 1 tablet (1,000 Units total) by mouth daily for 180 days, Starting Mon 8/13/2018, Until Sat 2/9/2019, Normal      FREESTYLE LITE test strip USE TWICE DAILY, Normal      !!  Insulin Pen Needle (B-D UF III MINI PEN NEEDLES) 31G X 5 MM MISC Inject 34 units SC daily in evening, Normal      Insulin Syringe-Needle U-100 (B-D INS SYR ULTRAFINE  3CC/31G) 31G X 5/16" 0 3 ML MISC by Does not apply route, Starting Mon 6/17/2013, Historical Med      Lancets (FREESTYLE) lancets may check sugar up to 3 times daily, Normal      LEVEMIR FLEXTOUCH 100 units/mL injection pen Inject 40 Units under the skin daily at bedtime for 180 days, Starting Tue 11/20/2018, Until Sun 5/19/2019, Normal      lisinopril (ZESTRIL) 40 mg tablet Take 1 tablet (40 mg total) by mouth daily for 180 days, Starting Mon 8/13/2018, Until Sat 2/9/2019, Normal      metFORMIN (GLUCOPHAGE) 1000 MG tablet Take 1 tablet (1,000 mg total) by mouth 2 (two) times a day with meals for 180 days, Starting Mon 8/13/2018, Until Sat 2/9/2019, Normal      alendronate (FOSAMAX) 70 mg tablet TAKE ONE TABLET BY MOUTH ONCE A WEEK, Normal      fluticasone (FLONASE) 50 mcg/act nasal spray 1 spray into each nostril 2 (two) times a day, Starting Fri 2/5/2016, Historical Med      lovastatin (MEVACOR) 40 MG tablet TAKE TWO TABLETS BY MOUTH TOGETHER ONCE DAILY AT BEDTIME, Normal       !! - Potential duplicate medications found  Please discuss with provider  Outpatient Discharge Orders   Ambulatory referral to Cardiology   Standing Status: Future Standing Exp   Date: 08/08/19      Activity as tolerated       ED Provider  Electronically Signed by           Daxa Jansen MD  02/07/19 676 Rehan Bhat MD  02/11/19 6057

## 2019-02-07 NOTE — H&P
History & Physical - SOD      PATIENT INFORMATION      Leonel Schlatter 67 y o  female MRN: [id number]  Unit/Bed#: ED 29 Encounter: [id number]  Admitting Physician: Duayne Sandifer, MD  PCP: Yuliya Horn PA-C  Date of Admission:  02/07/19      ASSESSMENTS & PLAN   Leonel Schlatter is a 67 y o  female with a past medical history of diabetes mellitus type 2, hypertension, hyperlipidemia, questionable history of arrhythmia who presents with chest pain  1   Chest pain likely secondary to SVT  Largely suspect chest pressure/pain was related to SVT, the patient does endorse very typical signs of ACS including substernal chest pain, worsened with exertion, relieved with rest, occurring at rest, radiating to left jaw  Currently patient is asymptomatic after that is administered  Patient is being admitted for observation at this moment  Patient states she had abnormal heart rhythm at least 2-3 years ago but cannot recall the diagnosis  She does not fall cardiologist   Last stress test done in 2014 showed no signs of ischemia and showed EF of 62%, though test appeared to be possibly terminated early due to the fatigue  · S/p IV Adenosine 6mg with conversion to NSR  · ACS rule out:  Serial EKG/troponin  · Monitor telemetry  · Increase home metoprolol tartrate to 50 mg b i d   · Follow-up echocardiogram  · Nitroglycerin p r n   · F/u TSH  · Recommend giving additional IV adenosine 6 mg p r n  If Patient goes back into SVT  · Cardiology consulted    2  Diabetes mellitus type 2  Last A1c 9 0% on 11/19/2018  Patient on Lantus 40 units q h s  And metformin 1000 mg b i d   · Continue Lantus 40 q h s  However will only give 12 units today as patient states she took an extra 26 units this morning because she thought it improved her symptoms  · Sliding scale insulin    3  Hypertension  Currently normotensive  · Continue home amlodipine, lisinopril  · Metoprolol as above    4    Hyperlipidemia  · Continue statin          VTE Pharmacologic Prophylaxis: Enoxaparin (Lovenox)   VTE Mechanical Prophylaxis: SCD's      CHIEF COMPLAINT      Chest pain      HISTORY OF PRESENT ILLNESS      Lavern Foy is a 67 y o  female with a past medical history of diabetes mellitus type 2, hypertension, hyperlipidemia, questionable history of arrhythmia who presents with chest pain  Patient states at approximately 9:00 a m  This morning she started experiencing substernal chest pain, 8/10 intensity, pressure-like in quality, constant nature, radiates to the left jaw, worse with exertion, improved with rest, occurring at rest   Associated symptoms include dizziness, diaphoresis, chills, nausea  Denies all other symptoms  She arrived to the ED, her heart rate was 180 5 lb weight SVT  She was given 1 dose of IV adenosine 6 mg which converted back to normal sinus rhythm  All her symptoms resolved very shortly after  Currently she is asymptomatic  EKG showed SVT with possible T-wave inversion/T-wave flattening in 1 and aVL  Troponin initially was negative  CBC showed WBC of 10 8  CMP relatively unremarkable shows sodium 135  REVIEW OF SYSTEMS      ROS: A complete 12 point ROS is negative other than that noted in the HPI      PAST MEDICAL & SURGICAL HISTORY      Past Medical History:   Diagnosis Date    Anemia     Chronic idiopathic constipation     [facility]     Diverticulitis, colon     Esophageal reflux     Eustachian tube dysfunction     Gastrointestinal hemorrhage     Hypertension     Non-healing skin lesion     Palpitations     Rectal bleeding     Skin lesion of chest wall        Past Surgical History:   Procedure Laterality Date     SECTION      CHOLECYSTECTOMY      ECTOPIC PREGNANCY SURGERY      MN COLONOSCOPY FLX DX W/COLLJ SPEC WHEN PFRMD N/A 2016    Procedure: COLONOSCOPY;  Surgeon: Genevieve Maldonado MD;  Location: [facility];   Service: Gastroenterology         MEDICATIONS & ALLERGIES     Prior to Admission medications    Medication Sig Start Date End Date Taking?  Authorizing Provider   alendronate (FOSAMAX) 70 mg tablet TAKE ONE TABLET BY MOUTH ONCE A WEEK 10/15/18 4/13/19 Yes Maximo Yeh PA-C   amLODIPine (NORVASC) 5 mg tablet TOME DIANE TABLETA POR LA BOCA CADA BRENDAN 1/30/19  Yes MD ADELINE Robert-KELLE UF III MINI PEN NEEDLES 31G X 5 MM MISC INJECT 32 UNITS OF LEVEMIR SUBCUTANEOUSLY ONCE DAILY AT BEDTIME 6/20/18  Yes Maximo Yeh PA-C   B-D UF III MINI PEN NEEDLES 31G X 5 MM MISC INJECT 32 UNITS OF LEVEMIR SUBCUTANEOUSLY ONCE DAILY AT BEDTIME 7/22/18  Yes DEB Mitchell-D UF III MINI PEN NEEDLES 31G X 5 MM MISC INJECT 32 UNITS OF LEVEMIR SUBCUTANEOUSLY ONCE DAILY AT BEDTIME 8/20/18  Yes CLAUDIO Sam-D UF III MINI PEN NEEDLES 31G X 5 MM MISC INJECT 32 UNITS OF LEVEMIR SUBCUTANEOUSLY ONCE DAILY AT BEDTIME 10/22/18  Yes Maximo Yeh PA-C   cholecalciferol (VITAMIN D3) 1,000 units tablet Take 1 tablet (1,000 Units total) by mouth daily for 180 days 8/13/18 2/9/19 Yes Maximo Yeh PA-C   cholecalciferol (VITAMIN D3) 1,000 units tablet Take 1 tablet (1,000 Units total) by mouth daily 8/20/18  Yes CLAUDIO Sam   fluticasone (FLONASE) 50 mcg/act nasal spray 1 spray into each nostril 2 (two) times a day 2/5/16  Yes Historical Provider, MD   FREESTYLE LITE test strip USE TWICE DAILY 11/16/18  Yes Maximo Yeh PA-C   Insulin Pen Needle (B-D UF III MINI PEN NEEDLES) 31G X 5 MM MISC Inject 34 units SC daily in evening 2/13/18  Yes Maximo Yeh PA-C   Insulin Syringe-Needle U-100 (B-D INS SYR ULTRAFINE  3CC/31G) 31G X 5/16" 0 3 ML MISC by Does not apply route 6/17/13  Yes Historical Provider, MD   Lancets (FREESTYLE) lancets may check sugar up to 3 times daily 5/18/18  Yes Maximo Yeh PA-C   LEVEMIR FLEXTOUCH 100 units/mL injection pen Inject 40 Units under the skin daily at bedtime for 180 days 11/20/18 5/19/19 Yes Maximo Yeh PA-C   lisinopril (ZESTRIL) 40 mg tablet Take 1 tablet (40 mg total) by mouth daily for 180 days 8/13/18 2/9/19 Yes Charlotta Lips, PA-C   metFORMIN (GLUCOPHAGE) 1000 MG tablet Take 1 tablet (1,000 mg total) by mouth 2 (two) times a day with meals for 180 days 8/13/18 2/9/19 Yes Charlotta Lips, PA-C   metoprolol tartrate (LOPRESSOR) 25 mg tablet Take 1 tablet (25 mg total) by mouth every 12 (twelve) hours for 180 days 8/13/18 2/9/19 Yes Charlotta Lips, PA-C   lovastatin (MEVACOR) 40 MG tablet TAKE TWO TABLETS BY MOUTH TOGETHER ONCE DAILY AT BEDTIME 7/20/18 1/16/19  Charlotta Lips, PA-C         Allergies: No Known Allergies      SOCIAL HISTORY      Substance Use History:   History   Alcohol Use No     History   Smoking Status    Never Smoker   Smokeless Tobacco    Never Used     History   Drug Use No         FAMILY HISTORY      Family History   Problem Relation Age of Onset    Heart disease Mother     Bleeding Disorder Sister        PHYSICAL EXAM      Vitals:   Blood Pressure: 141/73 (02/07/19 1230)  Pulse: 86 (02/07/19 1230)  Temperature: 98 2 °F (36 8 °C) (02/07/19 1130)  Temp Source: Tympanic (02/07/19 1130)  Respirations: (!) 28 (02/07/19 1230)  Weight - Scale: 61 2 kg (135 lb) (02/07/19 1141)  SpO2: 100 % (02/07/19 1230)      GENERAL: NAD  HEENT:  NC/AT  CARDIAC:  RRR, +S1/S2, no m/g/r, no S3/S4 heard  PULMONARY:  CTA B/L, no wheezing/rales/rhonci, non-labored breathing  ABDOMEN:  Soft, NT/ND, +BS, no rebound/guarding/rigidity  Extremities:  2+ Pulses in DP/PT  No edema, cyanosis, or clubbing  NEUROLOGIC:  Alert/oriented x3      ADDITIONAL DATA     Lab Results:       Results from last 7 days  Lab Units 02/07/19  1151   WBC Thousand/uL 10 87*   HEMOGLOBIN g/dL 14 2   HEMATOCRIT % 44 4   PLATELETS Thousands/uL 283   NEUTROS PCT % 46   LYMPHS PCT % 43   MONOS PCT % 8   EOS PCT % 1       Results from last 7 days  Lab Units 02/07/19  1151   POTASSIUM mmol/L 5 0   CHLORIDE mmol/L 100   CO2 mmol/L 23   BUN mg/dL 14   CREATININE mg/dL 0 95   CALCIUM mg/dL 8 9   ALK PHOS U/L 77   ALT U/L 48   AST U/L 43           Imaging:     X-ray Chest 2 Views    Result Date: 2/7/2019  Narrative: CHEST INDICATION:   chest pain  Palpitations  COMPARISON:  10/26/2016 EXAM PERFORMED/VIEWS:  XR CHEST PA & LATERAL FINDINGS: Cardiomediastinal silhouette appears unremarkable  The lungs are clear  No pneumothorax or pleural effusion  Stable slight elevation of the right hemidiaphragm  Osseous structures appear within normal limits for patient age  Impression: No acute cardiopulmonary disease  Workstation performed: [id number]       EKG, Pathology, and Other Studies Reviewed on Admission:   · EKG: Reviewed      Code Status: Level 1 - Full Code  Admission Status: TODD Mcguire  Internal Medicine PGY-2  2/7/2019 2:33 PM      Total Time for Visit, including Counseling / Coordination of Care: 1 hour total time spent admitting patient  Greater than 50% of this total time spent on direct patient counseling and coordination of care     ** Please Note: This note is constructed using a voice recognition dictation system   **

## 2019-02-08 ENCOUNTER — APPOINTMENT (OUTPATIENT)
Dept: NON INVASIVE DIAGNOSTICS | Facility: HOSPITAL | Age: 79
End: 2019-02-08
Payer: COMMERCIAL

## 2019-02-08 VITALS
BODY MASS INDEX: 27.42 KG/M2 | SYSTOLIC BLOOD PRESSURE: 173 MMHG | RESPIRATION RATE: 18 BRPM | HEIGHT: 59 IN | DIASTOLIC BLOOD PRESSURE: 78 MMHG | OXYGEN SATURATION: 96 % | WEIGHT: 136 LBS | HEART RATE: 68 BPM | TEMPERATURE: 97.8 F

## 2019-02-08 PROBLEM — I47.10 SVT (SUPRAVENTRICULAR TACHYCARDIA): Status: RESOLVED | Noted: 2019-02-07 | Resolved: 2019-02-08

## 2019-02-08 PROBLEM — I47.1 SVT (SUPRAVENTRICULAR TACHYCARDIA) (HCC): Status: RESOLVED | Noted: 2019-02-07 | Resolved: 2019-02-08

## 2019-02-08 LAB
ALBUMIN SERPL BCP-MCNC: 3.6 G/DL (ref 3.5–5)
ALP SERPL-CCNC: 48 U/L (ref 46–116)
ALT SERPL W P-5'-P-CCNC: 41 U/L (ref 12–78)
ANION GAP SERPL CALCULATED.3IONS-SCNC: 8 MMOL/L (ref 4–13)
AST SERPL W P-5'-P-CCNC: 35 U/L (ref 5–45)
ATRIAL RATE: 77 BPM
BASOPHILS # BLD AUTO: 0.09 THOUSANDS/ΜL (ref 0–0.1)
BASOPHILS NFR BLD AUTO: 1 % (ref 0–1)
BILIRUB SERPL-MCNC: 0.63 MG/DL (ref 0.2–1)
BUN SERPL-MCNC: 14 MG/DL (ref 5–25)
CALCIUM SERPL-MCNC: 8.8 MG/DL (ref 8.3–10.1)
CHLORIDE SERPL-SCNC: 105 MMOL/L (ref 100–108)
CO2 SERPL-SCNC: 23 MMOL/L (ref 21–32)
CREAT SERPL-MCNC: 0.74 MG/DL (ref 0.6–1.3)
EOSINOPHIL # BLD AUTO: 0.09 THOUSAND/ΜL (ref 0–0.61)
EOSINOPHIL NFR BLD AUTO: 1 % (ref 0–6)
ERYTHROCYTE [DISTWIDTH] IN BLOOD BY AUTOMATED COUNT: 13.7 % (ref 11.6–15.1)
GFR SERPL CREATININE-BSD FRML MDRD: 78 ML/MIN/1.73SQ M
GLUCOSE SERPL-MCNC: 138 MG/DL (ref 65–140)
GLUCOSE SERPL-MCNC: 143 MG/DL (ref 65–140)
GLUCOSE SERPL-MCNC: 278 MG/DL (ref 65–140)
HCT VFR BLD AUTO: 42.3 % (ref 34.8–46.1)
HGB BLD-MCNC: 13.5 G/DL (ref 11.5–15.4)
IMM GRANULOCYTES # BLD AUTO: 0.01 THOUSAND/UL (ref 0–0.2)
IMM GRANULOCYTES NFR BLD AUTO: 0 % (ref 0–2)
INR PPP: 0.97 (ref 0.86–1.17)
LYMPHOCYTES # BLD AUTO: 2.71 THOUSANDS/ΜL (ref 0.6–4.47)
LYMPHOCYTES NFR BLD AUTO: 41 % (ref 14–44)
MAGNESIUM SERPL-MCNC: 1.8 MG/DL (ref 1.6–2.6)
MCH RBC QN AUTO: 27.8 PG (ref 26.8–34.3)
MCHC RBC AUTO-ENTMCNC: 31.9 G/DL (ref 31.4–37.4)
MCV RBC AUTO: 87 FL (ref 82–98)
MONOCYTES # BLD AUTO: 0.56 THOUSAND/ΜL (ref 0.17–1.22)
MONOCYTES NFR BLD AUTO: 8 % (ref 4–12)
NEUTROPHILS # BLD AUTO: 3.18 THOUSANDS/ΜL (ref 1.85–7.62)
NEUTS SEG NFR BLD AUTO: 49 % (ref 43–75)
NRBC BLD AUTO-RTO: 0 /100 WBCS
P AXIS: 28 DEGREES
PHOSPHATE SERPL-MCNC: 3.4 MG/DL (ref 2.3–4.1)
PLATELET # BLD AUTO: 264 THOUSANDS/UL (ref 149–390)
PMV BLD AUTO: 11.5 FL (ref 8.9–12.7)
POTASSIUM SERPL-SCNC: 3.6 MMOL/L (ref 3.5–5.3)
PR INTERVAL: 140 MS
PROT SERPL-MCNC: 7.3 G/DL (ref 6.4–8.2)
PROTHROMBIN TIME: 13 SECONDS (ref 11.8–14.2)
QRS AXIS: -36 DEGREES
QRSD INTERVAL: 76 MS
QT INTERVAL: 398 MS
QTC INTERVAL: 450 MS
RBC # BLD AUTO: 4.85 MILLION/UL (ref 3.81–5.12)
SODIUM SERPL-SCNC: 136 MMOL/L (ref 136–145)
T WAVE AXIS: 12 DEGREES
TROPONIN I SERPL-MCNC: <0.02 NG/ML
VENTRICULAR RATE: 77 BPM
WBC # BLD AUTO: 6.64 THOUSAND/UL (ref 4.31–10.16)

## 2019-02-08 PROCEDURE — 99219 PR INITIAL OBSERVATION CARE/DAY 50 MINUTES: CPT | Performed by: INTERNAL MEDICINE

## 2019-02-08 PROCEDURE — 84484 ASSAY OF TROPONIN QUANT: CPT | Performed by: INTERNAL MEDICINE

## 2019-02-08 PROCEDURE — G8988 SELF CARE GOAL STATUS: HCPCS

## 2019-02-08 PROCEDURE — 84100 ASSAY OF PHOSPHORUS: CPT | Performed by: INTERNAL MEDICINE

## 2019-02-08 PROCEDURE — 80053 COMPREHEN METABOLIC PANEL: CPT | Performed by: INTERNAL MEDICINE

## 2019-02-08 PROCEDURE — 93306 TTE W/DOPPLER COMPLETE: CPT

## 2019-02-08 PROCEDURE — 97163 PT EVAL HIGH COMPLEX 45 MIN: CPT

## 2019-02-08 PROCEDURE — G8978 MOBILITY CURRENT STATUS: HCPCS

## 2019-02-08 PROCEDURE — 85025 COMPLETE CBC W/AUTO DIFF WBC: CPT | Performed by: INTERNAL MEDICINE

## 2019-02-08 PROCEDURE — 93306 TTE W/DOPPLER COMPLETE: CPT | Performed by: INTERNAL MEDICINE

## 2019-02-08 PROCEDURE — 83735 ASSAY OF MAGNESIUM: CPT | Performed by: INTERNAL MEDICINE

## 2019-02-08 PROCEDURE — 99204 OFFICE O/P NEW MOD 45 MIN: CPT | Performed by: INTERNAL MEDICINE

## 2019-02-08 PROCEDURE — 93010 ELECTROCARDIOGRAM REPORT: CPT | Performed by: INTERNAL MEDICINE

## 2019-02-08 PROCEDURE — G8980 MOBILITY D/C STATUS: HCPCS

## 2019-02-08 PROCEDURE — 97116 GAIT TRAINING THERAPY: CPT

## 2019-02-08 PROCEDURE — G8979 MOBILITY GOAL STATUS: HCPCS

## 2019-02-08 PROCEDURE — G8989 SELF CARE D/C STATUS: HCPCS

## 2019-02-08 PROCEDURE — 82948 REAGENT STRIP/BLOOD GLUCOSE: CPT

## 2019-02-08 PROCEDURE — 97165 OT EVAL LOW COMPLEX 30 MIN: CPT

## 2019-02-08 PROCEDURE — 85610 PROTHROMBIN TIME: CPT | Performed by: INTERNAL MEDICINE

## 2019-02-08 PROCEDURE — G8987 SELF CARE CURRENT STATUS: HCPCS

## 2019-02-08 RX ORDER — METOPROLOL TARTRATE 50 MG/1
50 TABLET, FILM COATED ORAL EVERY 12 HOURS
Qty: 120 TABLET | Refills: 0 | Status: SHIPPED | OUTPATIENT
Start: 2019-02-08 | End: 2019-04-09 | Stop reason: SDUPTHER

## 2019-02-08 RX ORDER — LABETALOL 20 MG/4 ML (5 MG/ML) INTRAVENOUS SYRINGE
10 EVERY 6 HOURS PRN
Status: DISCONTINUED | OUTPATIENT
Start: 2019-02-08 | End: 2019-02-08 | Stop reason: HOSPADM

## 2019-02-08 RX ADMIN — VITAMIN D, TAB 1000IU (100/BT) 1000 UNITS: 25 TAB at 08:56

## 2019-02-08 RX ADMIN — AMLODIPINE BESYLATE 5 MG: 5 TABLET ORAL at 08:56

## 2019-02-08 RX ADMIN — METOPROLOL TARTRATE 50 MG: 25 TABLET, FILM COATED ORAL at 06:49

## 2019-02-08 RX ADMIN — INSULIN LISPRO 4 UNITS: 100 INJECTION, SOLUTION INTRAVENOUS; SUBCUTANEOUS at 09:03

## 2019-02-08 RX ADMIN — LISINOPRIL 40 MG: 20 TABLET ORAL at 08:56

## 2019-02-08 NOTE — OCCUPATIONAL THERAPY NOTE
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    Postmenopausal osteoporosis    Type 2 diabetes mellitus with hyperglycemia ([facility] )    Dental abscess    Need for vaccination against Streptococcus pneumoniae using pneumococcal conjugate vaccine 13    SVT (supraventricular tachycardia) ([facility] )     Past Medical History  Past Medical History:   Diagnosis Date    Anemia     Chronic idiopathic constipation     Diabetes mellitus ([facility] )     Diverticulitis, colon     Esophageal reflux     Eustachian tube dysfunction     Gastrointestinal hemorrhage     Hypertension     Non-healing skin lesion     Palpitations     Rectal bleeding     Skin lesion of chest wall      Past Surgical History  Past Surgical History:   Procedure Laterality Date     SECTION      CHOLECYSTECTOMY      ECTOPIC PREGNANCY SURGERY      MA COLONOSCOPY FLX DX W/COLLJ SPEC WHEN PFRMD N/A 2016    Procedure: COLONOSCOPY;  Surgeon: Koki Chauhan MD;  Location: [facility]; Service: Gastroenterology           19 1126   Note Type   Note type Eval only   Restrictions/Precautions   Weight Bearing Precautions Per Order No   Other Precautions Telemetry;Pain   Pain Assessment   Pain Assessment 0-10   Pain Score 4   Pain Type Chronic pain   Pain Location Knee   Pain Orientation Left   Patient's Stated Pain Goal No pain   Hospital Pain Intervention(s) Repositioned; Emotional support   Response to Interventions tolerated    Home Living   Type of [address] [place] [address] Two level;Bed/bath upstairs   Bathroom Shower/Tub Tub/shower unit   Bathroom Toilet Standard   Bathroom Accessibility Accessible Additional Comments Pt denies use of DME/AD PTA   Prior Function   Level of [place] Independent with ADLs and functional mobility   Lives With Spouse  (& granddaughter)   Receives Help From Family   ADL Assistance Independent   IADLs Independent   Falls in the last 6 months 0   Vocational Retired   Comments Pt's  is home during day to assist as needed  Lifestyle   Autonomy Pt IND all ADLs and IADLs; ambulates IND w/o an AD at baseline   (-)drive, retired   Reciprocal Relationships supportive family   Service to Others retired   Intrinsic Gratification pt enjoys walking in the park   Psychosocial   Psychosocial (WDL) [address] "I don't have any concerns"   ADL   Eating Assistance 7  Independent   Grooming Assistance 7  Independent   UB Bathing Assistance 6  Modified Independent   LB Bathing Assistance 6  [facility] 6  Modified independent   [address] 6  Modified independent   LB Dressing Deficit Increased time to complete  (Pt able to zuleika/doff socks w/ Good sitting balance)   Toileting Assistance  6  Modified independent   Toileting Deficit Increased time to complete  (Standard toilet)   Bed Mobility   Additional Comments Pt OOB in chair upon arrival   Transfers   Sit to Stand 6  Modified independent   Additional items Armrests   Stand to Sit 6  Modified independent   Additional items Armrests   Functional Mobility   Functional Mobility 6  Modified independent   Additional Comments To bathroom & w/in room   Additional items (No AD)   Balance   Static Sitting Good   Dynamic Sitting Good   Static Standing Fair +   Dynamic Standing Fair   Activity Tolerance   Activity Tolerance Patient limited by pain   Nurse Made Aware Spoke to RN - pt appropriate to be seen   RUE Assessment   RUE Assessment WNL   LUE Assessment   LUE Assessment WNL   Hand Function   Gross Motor Coordination Functional   Fine Motor Coordination Functional   Sensation Light Touch No apparent deficits   Vision-Basic Assessment   Current Vision Wears glasses all the time   Cognition   Overall Cognitive Status [facility]   Arousal/Participation Alert; Cooperative   Attention Within functional limits   Orientation Level Oriented X4   Memory Within functional limits   Following Commands Follows one step commands without difficulty   Assessment   Prognosis Good   Assessment Pt is a 67 y o  female who was [facility] with SVT (supraventricular tachycardia) (HCC) & chest pain  Pt's problem list also includes PMH of HTN, DM type II, colon polyp, dyslipidemia, diabetic retinopathy, osteoporosis  At baseline pt was completing all ADLs and IADLs IND, (-) driving  Pt lives with her  and granddaughter in a 2 SH with no BASHIR  Currently pt requires MOD IND for overall ADLS and for functional mobility/transfers  Pt demo ability to perform LB dressing, toileting, and toilet transfers safely at MOD IND level  Pt reports her  is retired and able to assist with IADLs as needed  From OT standpoint, recommend HOME WITH FAMILY SUPPORT upon D/C  No further acute OT services recommended at this time     Goals   Patient Goals to walk in the park   Recommendation   OT Discharge Recommendation Home with family support   OT - OK to Discharge Yes  (when medically appropriate)   Barthel Index   Feeding 10   Bathing 5   Grooming Score 5   Dressing Score 10   Bladder Score 10   Bowels Score 10   Toilet Use Score 10   Transfers (Bed/Chair) Score 15   Mobility (Level Surface) Score 10   Stairs Score 0   Barthel Index Score 85       Annalee Fylnn, OT

## 2019-02-08 NOTE — PHYSICAL THERAPY NOTE
Physical Therapy Tx Session:       02/08/19 0955   Pain Assessment   Pain Assessment 0-10   Pain Score 4   Pain Type Chronic pain   Pain Location Knee   Pain Orientation Left   Hospital Pain Intervention(s) Repositioned; Emotional support; Rest   Restrictions/Precautions   Other Precautions Impulsive;Telemetry;Pain   General   Chart Reviewed Yes   Family/Caregiver Present No   Cognition   Overall Cognitive Status WFL   Arousal/Participation Cooperative; Alert   Attention Within functional limits   Orientation Level Oriented X4   Following Commands Follows one step commands without difficulty   Subjective   Subjective pt willing and agreeable to ambulate further distance and performance of stair training;"I want to go home, so let's go"   Transfers   Sit to Stand 6  Modified independent   Additional items Impulsive; Bedrails   Stand to Sit 6  Modified independent   Additional items Bedrails; Impulsive   Ambulation/Elevation   Gait pattern Antalgic;Narrow DANIELLE; Decreased L stance   Gait Assistance 5  Supervision   Additional items Assist x 1;Verbal cues   Assistive Device None  (no LOB noted and/or observed during mobility)   Distance additional 150 feet without use of DME on tile surface;no LOB noted and/or observed during mobility   Stair Management Assistance 5  Supervision   Additional items Assist x 1;Verbal cues   Stair Management Technique One rail L;Step to pattern; Foreward;Nonreciprocal   Number of Stairs 13   Balance   Static Sitting Good   Dynamic Sitting Fair +   Static Standing Fair   Dynamic Standing [id number]   Activity Tolerance   Activity Tolerance Patient limited by fatigue;Patient limited by pain  (good)   Nurse Made Aware yes Maxwell Sparks)   Assessment   Prognosis Good   Assessment pt able to ambulate additional distance of 150 feet without use of DME on tile surface needing S level of A, no LOB noted and/or observed during upright mobility  Pt able to perform sit to stand transfers mod (I)   Pt able to go up and down 13 steps with use of 1 rail needing S level of A nonreciprical gait sequence  Pt able to return home with cont A and support of family and   No further skilled inpt PT services needed at this time, D/C from PT  Recommend daily mobility and gait via non PT staff and restorative therapy aide, pt agreed     Goals   Patient Goals to go home   Treatment Day 1   Plan   Progress Discontinue PT   Recommendation   Recommendation D/C PT;Home with family support

## 2019-02-08 NOTE — PROGRESS NOTES
IM Residency Progress Note   Unit/Bed#: CW2 211-01 Encounter: [id number]  SOD Team A      Luke Hartley 67 y o  female [id number]    Hospital Stay Days: 0      Assessment/Plan:    Principal Problem:    SVT (supraventricular tachycardia) ([facility] )  Active Problems:    AVNRT (AV dao re-entry tachycardia) ([facility] )    Benign essential hypertension    Type 2 diabetes mellitus with hyperglycemia (HCC)    Supraventricular tachycardia  -patient had 5 min of chest pain on  at 9:00 a m   -troponins have been negative  -continue telemetry monitoring however no further episodes of SVT on telemetry  up until now  -cardiology consult  -echo ordered  -TSH normal  -patient is status post 6 mg of IV adenosine  -previous stress test  was normal  -previous echo in  showed an ejection fraction of 43% with no diastolic dysfunction    Type 2 diabetes mellitus   -complicated by mild proliferative diabetic retinopathy  -last A1c was 9%  -continue Lantus 40 units at bedtime  -sliding scale insulin    Hypertension  -continue amlodipine, lisinopril, metoprolol    Hyperlipidemia  -continue statin    Disposition: Patient will get ECHO today and cardiology consult pending      Subjective:   Patient is comfortable and eating breakfast   No further episodes of chest pain  No shortness of breath, nausea, vomiting       Vitals: Temp (24hrs), Av 9 °F (36 6 °C), Min:97 5 °F (36 4 °C), Max:98 4 °F (36 9 °C)  Current: Temperature: 97 8 °F (36 6 °C)  Vitals:    19 2305 19 0300 19 0649 19 0718   BP: 149/72 142/68 (!) 173/78 (!) 173/78   BP Location: Right arm Right arm  Right arm   Pulse: 70 75 68 68   Resp: 18 15  18   Temp: 98 4 °F (36 9 °C) 98 4 °F (36 9 °C)  97 8 °F (36 6 °C)   TempSrc: Oral Oral  Oral   SpO2: 95% 96%  96%   Weight:       Height:        Body mass index is 27 47 kg/m²  No intake/output data recorded        Physical Exam: BP (!) 173/78 (BP Location: Right arm)   Pulse 68   Temp 97 8 °F (36 6 °C) (Oral)   Resp 18   Ht 4' 11" (1 499 m)   Wt 61 7 kg (136 lb)   SpO2 96%   BMI 27 47 kg/m²     General Appearance:    Alert, cooperative, no distress, appears stated age   Head:    Normocephalic, without obvious abnormality, atraumatic   Eyes:    PERRL, conjunctiva/corneas clear, EOM's intact, fundi     benign, both eyes   Ears:    Normal TM's and external ear canals, both ears   Nose:   Nares normal, septum midline, mucosa normal, no drainage    or sinus tenderness   Throat:   Lips, mucosa, and tongue normal; teeth and gums normal   Neck:   Supple, symmetrical, trachea midline, no adenopathy;     thyroid:  no enlargement/tenderness/nodules; no carotid    bruit or JVD   Back:     Symmetric, no curvature, ROM normal, no CVA tenderness   Lungs:     Clear to auscultation bilaterally, respirations unlabored   Chest Wall:    No tenderness or deformity    Heart:    Regular rate and rhythm, S1 and S2 normal, no murmur, rub   or gallop   Breast Exam:    No tenderness, masses, or nipple abnormality   Abdomen:     Soft, non-tender, bowel sounds active all four quadrants,     no masses, no organomegaly   Genitalia:    Normal female without lesion, discharge or tenderness   Rectal:    Normal tone, no masses or tenderness; guaiac negative stool   Extremities:   Extremities normal, atraumatic, no cyanosis or edema   Pulses:   2+ and symmetric all extremities   Skin:   Skin color, texture, turgor normal, no rashes or lesions   Lymph nodes:   Cervical, supraclavicular, and axillary nodes normal   Neurologic:   CNII-XII intact, normal strength, sensation and reflexes     throughout        Invasive Devices     Peripheral Intravenous Line            Peripheral IV 02/07/19 Left Antecubital less than 1 day                          Labs:   Recent Results (from the past 24 hour(s))   ECG 12 lead    Collection Time: 02/07/19 11:40 AM   Result Value Ref Range    Ventricular Rate 175 BPM    Atrial Rate 250 BPM    AL Interval  ms QRSD Interval 84 ms    QT Interval 250 ms    QTC Interval 426 ms    P Axis  degrees    QRS Axis -54 degrees    T Wave Axis 90 degrees   Comprehensive metabolic panel    Collection Time: 02/07/19 11:51 AM   Result Value Ref Range    Sodium 135 (L) 136 - 145 mmol/L    Potassium 5 0 3 5 - 5 3 mmol/L    Chloride 100 100 - 108 mmol/L    CO2 23 21 - 32 mmol/L    ANION GAP 12 4 - 13 mmol/L    BUN 14 5 - 25 mg/dL    Creatinine 0 95 0 60 - 1 30 mg/dL    Glucose 276 (H) 65 - 140 mg/dL    Calcium 8 9 8 3 - 10 1 mg/dL    AST 43 5 - 45 U/L    ALT 48 12 - 78 U/L    Alkaline Phosphatase 77 46 - 116 U/L    Total Protein 8 5 (H) 6 4 - 8 2 g/dL    Albumin 4 2 3 5 - 5 0 g/dL    Total Bilirubin 0 61 0 20 - 1 00 mg/dL    eGFR 58 ml/min/1 73sq m   CBC and differential    Collection Time: 02/07/19 11:51 AM   Result Value Ref Range    WBC 10 87 (H) 4 31 - 10 16 Thousand/uL    RBC 5 10 3 81 - 5 12 Million/uL    Hemoglobin 14 2 11 5 - 15 4 g/dL    Hematocrit 44 4 34 8 - 46 1 %    MCV 87 82 - 98 fL    MCH 27 8 26 8 - 34 3 pg    MCHC 32 0 31 4 - 37 4 g/dL    RDW 13 4 11 6 - 15 1 %    MPV 11 1 8 9 - 12 7 fL    Platelets 932 523 - 406 Thousands/uL    nRBC 0 /100 WBCs    Neutrophils Relative 46 43 - 75 %    Immat GRANS % 1 0 - 2 %    Lymphocytes Relative 43 14 - 44 %    Monocytes Relative 8 4 - 12 %    Eosinophils Relative 1 0 - 6 %    Basophils Relative 1 0 - 1 %    Neutrophils Absolute 5 13 1 85 - 7 62 Thousands/µL    Immature Grans Absolute 0 06 0 00 - 0 20 Thousand/uL    Lymphocytes Absolute 4 66 (H) 0 60 - 4 47 Thousands/µL    Monocytes Absolute 0 81 0 17 - 1 22 Thousand/µL    Eosinophils Absolute 0 11 0 00 - 0 61 Thousand/µL    Basophils Absolute 0 10 0 00 - 0 10 Thousands/µL   Troponin I    Collection Time: 02/07/19 11:51 AM   Result Value Ref Range    Troponin I <0 02 <=0 04 ng/mL   ECG 12 lead    Collection Time: 02/07/19  1:09 PM   Result Value Ref Range    Ventricular Rate 87 BPM    Atrial Rate 87 BPM    MN Interval 150 ms    QRSD Interval 76 ms    QT Interval 346 ms    QTC Interval 416 ms    P [place] 51 degrees    QRS Axis -29 degrees    T Wave Axis 54 degrees   Troponin I    Collection Time: 02/07/19  3:03 PM   Result Value Ref Range    Troponin I <0 02 <=0 04 ng/mL   NT-BNP PRO    Collection Time: 02/07/19  3:03 PM   Result Value Ref Range    NT-proBNP 62 <450 pg/mL   TSH, 3rd generation with Free T4 reflex    Collection Time: 02/07/19  3:03 PM   Result Value Ref Range    TSH 3RD GENERATON 1 510 0 358 - 3 740 uIU/mL   Fingerstick Glucose (POCT)    Collection Time: 02/07/19  3:06 PM   Result Value Ref Range    POC Glucose 127 65 - 140 mg/dl   Fingerstick Glucose (POCT)    Collection Time: 02/07/19  8:37 PM   Result Value Ref Range    POC Glucose 133 65 - 140 mg/dl   Troponin I    Collection Time: 02/07/19  8:49 PM   Result Value Ref Range    Troponin I <0 02 <=0 04 ng/mL   Troponin I    Collection Time: 02/08/19 12:24 AM   Result Value Ref Range    Troponin I <0 02 <=0 04 ng/mL   Protime-INR    Collection Time: 02/08/19 12:24 AM   Result Value Ref Range    Protime 13 0 11 8 - 14 2 seconds    INR 0 97 0 86 - 1 17   Magnesium    Collection Time: 02/08/19  4:41 AM   Result Value Ref Range    Magnesium 1 8 1 6 - 2 6 mg/dL   Comprehensive metabolic panel    Collection Time: 02/08/19  4:41 AM   Result Value Ref Range    Sodium 136 136 - 145 mmol/L    Potassium 3 6 3 5 - 5 3 mmol/L    Chloride 105 100 - 108 mmol/L    CO2 23 21 - 32 mmol/L    ANION GAP 8 4 - 13 mmol/L    BUN 14 5 - 25 mg/dL    Creatinine 0 74 0 60 - 1 30 mg/dL    Glucose 138 65 - 140 mg/dL    Calcium 8 8 8 3 - 10 1 mg/dL    AST 35 5 - 45 U/L    ALT 41 12 - 78 U/L    Alkaline Phosphatase 48 46 - 116 U/L    Total Protein 7 3 6 4 - 8 2 g/dL    Albumin 3 6 3 5 - 5 0 g/dL    Total Bilirubin 0 63 0 20 - 1 00 mg/dL    eGFR 78 ml/min/1 73sq m   Phosphorus    Collection Time: 02/08/19  4:41 AM   Result Value Ref Range    Phosphorus 3 4 2 3 - 4 1 mg/dL   CBC and differential    Collection Time: 02/08/19  4:41 AM   Result Value Ref Range    WBC 6 64 4 31 - 10 16 Thousand/uL    RBC 4 85 3 81 - 5 12 Million/uL    Hemoglobin 13 5 11 5 - 15 4 g/dL    Hematocrit 42 3 34 8 - 46 1 %    MCV 87 82 - 98 fL    MCH 27 8 26 8 - 34 3 pg    MCHC 31 9 31 4 - 37 4 g/dL    RDW 13 7 11 6 - 15 1 %    MPV 11 5 8 9 - 12 7 fL    Platelets 948 911 - 047 Thousands/uL    nRBC 0 /100 WBCs    Neutrophils Relative 49 43 - 75 %    Immat GRANS % 0 0 - 2 %    Lymphocytes Relative 41 14 - 44 %    Monocytes Relative 8 4 - 12 %    Eosinophils Relative 1 0 - 6 %    Basophils Relative 1 0 - 1 %    Neutrophils Absolute 3 18 1 85 - 7 62 Thousands/µL    Immature Grans Absolute 0 01 0 00 - 0 20 Thousand/uL    Lymphocytes Absolute 2 71 0 60 - 4 47 Thousands/µL    Monocytes Absolute 0 56 0 17 - 1 22 Thousand/µL    Eosinophils Absolute 0 09 0 00 - 0 61 Thousand/µL    Basophils Absolute 0 09 0 00 - 0 10 Thousands/µL   Fingerstick Glucose (POCT)    Collection Time: 02/08/19  6:23 AM   Result Value Ref Range    POC Glucose 143 (H) 65 - 140 mg/dl       Radiology Results: I have personally reviewed pertinent films in PACS      Other Diagnostic Testing:   I have personally reviewed pertinent reports          Active Meds:   Current Facility-Administered Medications   Medication Dose Route Frequency    acetaminophen (TYLENOL) tablet 650 mg  650 mg Oral Q4H PRN    amLODIPine (NORVASC) tablet 5 mg  5 mg Oral Daily    cholecalciferol (VITAMIN D3) tablet 1,000 Units  1,000 Units Oral Daily    enoxaparin (LOVENOX) subcutaneous injection 40 mg  40 mg Subcutaneous Daily    fluticasone (FLONASE) 50 mcg/act nasal spray 1 spray  1 spray Nasal BID    insulin detemir (LEVEMIR) subcutaneous injection 40 Units  40 Units Subcutaneous HS    insulin lispro (HumaLOG) 100 units/mL subcutaneous injection 1-5 Units  1-5 Units Subcutaneous HS    insulin lispro (HumaLOG) 100 units/mL subcutaneous injection 1-6 Units  1-6 Units Subcutaneous 4 times day    Labetalol HCl (NORMODYNE) injection 10 mg  10 mg Intravenous Q6H PRN    lisinopril (ZESTRIL) tablet 40 mg  40 mg Oral Daily    metoprolol tartrate (LOPRESSOR) tablet 50 mg  50 mg Oral Q12H    nitroglycerin (NITROSTAT) SL tablet 0 4 mg  0 4 mg Sublingual Q5 Min PRN    ondansetron (ZOFRAN) injection 4 mg  4 mg Intravenous Q6H PRN    pravastatin (PRAVACHOL) tablet 40 mg  40 mg Oral Daily With Dinner         VTE Pharmacologic Prophylaxis: Enoxaparin (Lovenox)  VTE Mechanical Prophylaxis: sequential compression device    Carlos Giron MD

## 2019-02-08 NOTE — PHYSICAL THERAPY NOTE
Physical Therapy Evaluation:    2 forms of pt ID verified:name,birthdate and pt ID aaron    Patient's Name: Mignon Michel    Admitting Diagnosis  SVT (supraventricular tachycardia) ([place] [address] ) [I47 1]  Chest pain [R07 9]  Chest pressure [R07 89]    Problem List  Patient Active Problem List   Diagnosis    AVNRT (AV dao re-entry tachycardia) ([facility] )    Benign essential hypertension    Colon polyp    Dyslipidemia    Mild nonproliferative diabetic retinopathy of both eyes without macular edema associated with type 2 diabetes mellitus ([facility] )    Postmenopausal osteoporosis    Type 2 diabetes mellitus with hyperglycemia ([place] [address] )    Dental abscess    Need for vaccination against Streptococcus pneumoniae using pneumococcal conjugate vaccine 13    SVT (supraventricular tachycardia) ([place] [address] )       Past Medical History  Past Medical History:   Diagnosis Date    Anemia     Chronic idiopathic constipation     Diabetes mellitus ([place] [address] )     Diverticulitis, colon     Esophageal reflux     Eustachian tube dysfunction     Gastrointestinal hemorrhage     Hypertension     Non-healing skin lesion     Palpitations     Rectal bleeding     Skin lesion of chest wall        Past Surgical History  Past Surgical History:   Procedure Laterality Date     SECTION      CHOLECYSTECTOMY      ECTOPIC PREGNANCY SURGERY      MD COLONOSCOPY FLX DX W/COLLJ SPEC WHEN PFRMD N/A 2016    Procedure: COLONOSCOPY;  Surgeon: Estee York MD;  Location: [facility];   Service: Gastroenterology      19 0914   Note Type   Note type Eval/Treat   Pain Assessment   Pain Assessment 0-10   Pain Score 4   Pain Type Chronic pain   Pain Location Knee   Pain Orientation Left  (during stair training,mobility and WB->pt reports chronic )   Home Living   Type of Home House   Home Layout Two level  (no BASHIR,10 steps to [address] [place] [address])   Home Equipment Other (Comment)  (pt reports no use of DME PTA)   Additional Comments pt reports being completely (I) PTA,lives with granddaughter and ,no use of personal DME PTA and reports no recent falls   Prior Function   Level of [place] Independent with ADLs and functional mobility  (per pt PTA)   Lives With Spouse; Family   Receives Help From Family  (as needed per pt PTA)   ADL Assistance Independent   IADLs Independent   Falls in the last 6 months 0   Restrictions/Precautions   Other Precautions Impulsive;Telemetry;Pain; Fall Risk   General   Additional Pertinent History SVT,reports of chest pain and chest pressure   Family/Caregiver Present No   Cognition   Overall Cognitive Status WFL   Arousal/Participation Cooperative   Orientation Level Oriented X4   Following Commands Follows one step commands with increased time or repetition  (2* inc chronic pain L knee during mobility,impulsive at time)   RLE Assessment   RLE Assessment WFL   LLE Assessment   LLE Assessment (4+/5 grossly throughout)   Coordination   Sensation WFL   Light Touch   RLE Light Touch Grossly intact   LLE Light Touch Grossly intact   Bed Mobility   Supine to Sit 6  Modified independent   Additional items Bedrails; Impulsive   Sit to Supine 7  Independent   Additional items Impulsive   Transfers   Sit to Stand 6  Modified independent   Additional items Bedrails; Impulsive   Stand to Sit 6  Modified independent   Additional items Bedrails; Impulsive   Ambulation/Elevation   Gait pattern Antalgic;Narrow DANIELLE; Decreased R stance; Inconsistent raghavendra; Short stride   Gait Assistance 5  Supervision   Additional items Assist x 1;Verbal cues   Assistive Device None   Distance 100 feet without use of DME on tile surface;no LOB noted and/or observed during all modes of mobility   Balance   Static Sitting Good  (at EOB pre mobility)   Dynamic Sitting Fair +   Static Standing Fair +   Dynamic Standing Fair   Ambulatory Fair   Activity Tolerance   Activity Tolerance Patient limited by fatigue  (good)   Nurse Made Aware yes Casey Mc)   Assessment   Prognosis Good Problem List Decreased strength;Decreased mobility; Decreased skin integrity;Pain  (dec strength RLE)   Assessment pt is a 68 y/o female admitted to [facility] 2* SVT and reports of chest pressure and CP  Pt lives with  and granddaughter in [facility] without BASHIR,no use of DME PTA and reports no recent falls  Pt reports being completely (I) PTA  Pt currently is not at functional mobility baseline,needs Ax1 for mobility,reports inc chronic L knee pain during mobility and WB,cont telemetry monitoring and IV medicine management  Pt demonstrates BM with mod (I), transfers mod (I),able to ambulate 100 feet without use of DME on tile surface S level of A without LOB,dec strength LLE,dec endurance and dec balance  Pt would cont to benefit from skilled inpt PT services to maximize functional independence and to perform stair training prior to D/C  Barriers to Discharge Inaccessible home environment  (BASHIR)   Goals   Patient Goals to go home soon   STG Expiration Date 02/13/19   Short Term Goal #1 in 3-5 days:pt will be able to ambulate >200 feet without  use of DME on various surfaces without LOB and no rest breaks S->mod (I) level of A,activity tolerance:45mins/45mins,up and down 10-12 steps with use of rail S level of A to navigate 2  upon D/C   Treatment Day 0  (additional PT tx session following PT eval (1))   Plan   Treatment/Interventions Elevations; Endurance training;Patient/family training;Gait training;Spoke to nursing   PT Frequency Other (Comment)  (3-5x/week;restorative therapy aide)   Recommendation   Recommendation Home with family support   Barthel Index   Feeding 10   Bathing 5   Grooming Score 5   Dressing Score 10   Bladder Score 10   Bowels Score 10   Toilet Use Score 10   Transfers (Bed/Chair) Score 15   Mobility (Level Surface) Score 10   Stairs Score 0   Barthel Index Score 85           @Vicki Farias, PT, DPT@

## 2019-02-08 NOTE — DISCHARGE SUMMARY
[facility] Discharge Summary - Medical Marzena Jmail 67 y o  female MRN: [id number]    [address] [facility] Room / Bed: [place] [address] 211-01 Encounter: [id number]    BRIEF OVERVIEW    Admitting Provider: Oli Huff MD  Discharge Provider: Oli Huff MD  Primary Care Physician at Discharge:    Discharge To: Home  Facility / Family Member Name:   Phone Number:      Admission Date: 2/7/2019     Discharge Date: No discharge date for patient encounter  Primary Discharge Diagnosis  Principal Problem (Resolved):    AVNRT (AV dao re-entry tachycardia) ([facility] )  Active Problems:    Benign essential hypertension    Type 2 diabetes mellitus with hyperglycemia (HCC)  Resolved Problems:    SVT (supraventricular tachycardia) ([facility])      Other Problems Addressed: None    Consulting Providers   Cardiology         Therapeutic Operative Procedures Performed      Diagnostic Procedures Performed  ECHO    Discharge Disposition: Home/Self Care  Discharged With Lines: no    Test Results Pending at Discharge: None    Outpatient Follow-Up  yes  Cardiology  Follow up with consulting providers  yes  Cardiology   Active Issues Requiring Follow-up   yes  SVT    Code Status: Level 1 - Full Code  Advance Directive and Living Will: <no information>  Power of :    POLST:      Medications   See after visit summary for reconciled discharge medications provided to patient and family  Allergies  No Known Allergies  Discharge Diet: regular diet  Activity restrictions: none    [address] is a 67 y o  female with a past medical history of diabetes mellitus type 2, hypertension, hyperlipidemia, questionable history of arrhythmia who presents with chest pain  Patient states at approximately 9:00 a m   This morning she started experiencing substernal chest pain, 8/10 intensity, pressure-like in quality, constant nature, radiates to the left jaw, worse with exertion, improved with rest, occurring at rest   Associated symptoms include dizziness, diaphoresis, chills, nausea  Denies all other symptoms  She arrived to the ED, her heart rate was 180 5 lb weight SVT  She was given 1 dose of IV adenosine 6 mg which converted back to normal sinus rhythm  All her symptoms resolved very shortly after  Currently she is asymptomatic  EKG showed SVT with possible T-wave inversion/T-wave flattening in 1 and aVL  Troponins were negative  CBC showed WBC of 10 8  Cardiology saw the patient and recommended outpatient follow-up with electrophysiology  She received an echo which showed an ejection fraction of 55% with grade 1 diastolic dysfunction  The patient will now be discharged with outpatient follow up         Presenting Problem/History of Present Illness  Principal Problem (Resolved):    AVNRT (AV dao re-entry tachycardia) ([facility] )  Active Problems:    Benign essential hypertension    Type 2 diabetes mellitus with hyperglycemia ([facility] )  Resolved Problems:    SVT (supraventricular tachycardia) ([facility] )        Other Pertinent Test Results       Discharge Condition: fair      Discharge  Statement   I spent 20 minutes minutes discharging the patient  This time was spent on the day of discharge  I had direct contact with the patient on the day of discharge  Additional documentation is required if more than 30 minutes were spent on discharge

## 2019-02-08 NOTE — PLAN OF CARE
Problem: PHYSICAL THERAPY ADULT  Goal: Performs mobility at highest level of function for planned discharge setting  See evaluation for individualized goals  Treatment/Interventions: Elevations, Endurance training, Patient/family training, Gait training, Spoke to nursing          See flowsheet documentation for full assessment, interventions and recommendations  Prognosis: Good  Problem List: Decreased strength, Decreased mobility, Decreased skin integrity, Pain (dec strength RLE)  Assessment: pt is a 68 y/o female admitted to [facility] 2* SVT and reports of chest pressure and CP  Pt lives with  and granddaughter in [address] [place] [address] without BASHIR,no use of DME PTA and reports no recent falls  Pt reports being completely (I) PTA  Pt currently is not at functional mobility baseline,needs Ax1 for mobility,reports inc chronic L knee pain during mobility and WB,cont telemetry monitoring and IV medicine management  Pt demonstrates BM with mod (I), transfers mod (I),able to ambulate 100 feet without use of DME on tile surface S level of A without LOB,dec strength LLE,dec endurance and dec balance  Pt would cont to benefit from skilled inpt PT services to maximize functional independence and to perform stair training prior to D/C  Barriers to Discharge: Inaccessible home environment (BASHIR)     Recommendation: Home with family support          See flowsheet documentation for full assessment

## 2019-02-08 NOTE — UTILIZATION REVIEW
Network Utilization Review Department  Phone: [phone]; Fax [phone]  [email or web address]  ATTENTION: Please call with any questions or concerns to [phone]  and carefully listen to the prompts so that you are directed to the right person  Send all requests for admission clinical reviews, approved or denied determinations and any other requests to fax [phone]  All voicemails are confidential   ======================================================================  Initial Clinical Review  Admission: Date/Time/Statement: 02/07/2019 @ 1408  Orders Placed This Encounter   Procedures    Place in Observation (expected length of stay for this patient is less than two midnights)     Standing Status:   Standing     Number of Occurrences:   1     Order Specific Question:   Admitting Physician     Answer:   Derrick Foster [[id number]]     Order Specific Question:   Level of Care     Answer:   Med Surg [16]     ED: Date/Time/Mode of Arrival:   ED Arrival Information     Expected Arrival Acuity Means of Arrival Escorted By Service Admission Type    - 2/7/2019 11:28 Emergent Walk-In Self General Medicine Emergency    Arrival Complaint    chest pain        Chief Complaint:   Chief Complaint   Patient presents with    Chest Pain     Pt reports mid-sternal chest pain  Pt reports feeling "shakes"  Pt also reports L sided jaw pain and nausea  History of Illness: Aleksandar Ellis is a 67 y o  female with a past medical history of diabetes mellitus type 2, hypertension, hyperlipidemia, questionable history of arrhythmia who presents with chest pain  Patient states at approximately 9:00 a m  This morning she started experiencing substernal chest pain, 8/10 intensity, pressure-like in quality, constant nature, radiates to the left jaw, worse with exertion, improved with rest, occurring at rest   Associated symptoms include dizziness, diaphoresis, chills, nausea     ED Vital Signs:   ED Triage Vitals   Temperature Pulse Respirations Blood Pressure SpO2   19 1130 19 1130 19 1130 19 1130 19 1130   98 2 °F (36 8 °C) (!) 185 20 150/91 99 %      Temp Source Heart Rate Source Patient Position - Orthostatic VS BP Location FiO2 (%)   19 1130 19 1130 19 1130 19 1130 --   Tympanic Monitor Sitting Right arm       Pain Score       19 1152       No Pain        Wt Readings from Last 1 Encounters:   19 61 7 kg (136 lb)   Pertinent Labs/Diagnostic Test Results:   WBC Thousand/uL 10 87*   HEMOGLOBIN g/dL 14 2   HEMATOCRIT % 44 4   PLATELETS Thousands/uL 283     POTASSIUM mmol/L 5 0   CHLORIDE mmol/L 100   CO2 mmol/L 23   BUN mg/dL 14   CREATININE mg/dL 0 95   CALCIUM mg/dL 8 9   ALK PHOS U/L 77   ALT U/L 48   AST U/L 43   Chest X:  No acute cardiopulmonary disease  EC, SVT, narrow complex,, T-wave inversion in aVL, flat and and 1   TROP <0 02  Post Adenosine EC, NSR  NT-proBNP 62 <450 pg/mL     ED Treatment:   Medication Administration from 2019 1127 to 2019 8966       Date/Time Order Dose Route Action Action by Comments     2019 1155 adenosine (ADENOCARD) 6 mg/2 mL injection **ADS Override Pull**    Not Given Matthew Lo RN      2019 1149 adenosine (ADENOCARD) 6 mg/2 mL injection **ADS Override Pull** 6 mg  Given Matthew Lo RN      2019 1500 insulin lispro (HumaLOG) 100 units/mL subcutaneous injection 1-6 Units 1 Units Subcutaneous Not Given Jacque Graves RN in ED        Past Medical/Surgical History:    Active Ambulatory Problems     Diagnosis Date Noted    AVNRT (AV dao re-entry tachycardia) ([facility] ) 2015    Benign essential hypertension 2012    Colon polyp 2015    Dyslipidemia 2012    Mild nonproliferative diabetic retinopathy of both eyes without macular edema associated with type 2 diabetes mellitus ([facility] ) 10/20/2015    Postmenopausal osteoporosis 2013  Type 2 diabetes mellitus with hyperglycemia ([facility] ) 09/17/2012    Dental abscess 08/13/2018    Need for vaccination against Streptococcus pneumoniae using pneumococcal conjugate vaccine 13 11/20/2018     Past Medical History:   Diagnosis Date    Anemia     Chronic idiopathic constipation     Diabetes mellitus ([facility] )     Diverticulitis, colon     Esophageal reflux     Eustachian tube dysfunction     Gastrointestinal hemorrhage     Hypertension     Non-healing skin lesion     Palpitations     Rectal bleeding     Skin lesion of chest wall      Admitting Diagnosis: SVT (supraventricular tachycardia) (HCC) [I47 1]  Chest pain [R07 9]  Chest pressure [R07 89]  Age/Sex: 67 y o  female  Assessment/Plan:   1  Chest pain likely secondary to SVT  Largely suspect chest pressure/pain was related to SVT, the patient does endorse very typical signs of ACS including substernal chest pain, worsened with exertion, relieved with rest, occurring at rest, radiating to left jaw  Currently patient is asymptomatic after that is administered  Patient is being admitted for observation at this moment  Patient states she had abnormal heart rhythm at least 2-3 years ago but cannot recall the diagnosis  She does not fall cardiologist   Last stress test done in 2014 showed no signs of ischemia and showed EF of 62%, though test appeared to be possibly terminated early due to the fatigue  · S/p IV Adenosine 6mg with conversion to NSR  · ACS rule out:  Serial EKG/troponin  · Monitor telemetry  · Increase home metoprolol tartrate to 50 mg b i d   · Follow-up echocardiogram  · Nitroglycerin p r n   · F/u TSH  · Recommend giving additional IV adenosine 6 mg p r n  If Patient goes back into SVT  · Cardiology consulted     2  Diabetes mellitus type 2  Last A1c 9 0% on 11/19/2018  Patient on Lantus 40 units q h s  And metformin 1000 mg b i d   · Continue Lantus 40 q h s   However will only give 12 units today as patient states she took an extra 26 units this morning because she thought it improved her symptoms  · Sliding scale insulin     3  Hypertension  Currently normotensive  · Continue home amlodipine, lisinopril  · Metoprolol as above     4    Hyperlipidemia  · Continue statin      VTE Pharmacologic Prophylaxis: Enoxaparin (Lovenox)   VTE Mechanical Prophylaxis: SCD's  Admission Orders:  Scheduled Meds:   Current Facility-Administered Medications:  acetaminophen 650 mg Oral Q4H PRN Hi Moeller MD   amLODIPine 5 mg Oral Daily Hi Moeller MD   cholecalciferol 1,000 Units Oral Daily Hi Moeller MD   enoxaparin 40 mg Subcutaneous Daily Hi Moeller MD   fluticasone 1 spray Nasal BID Hi Moeller MD   insulin detemir 40 Units Subcutaneous HS Hi Moeller MD   insulin lispro 1-5 Units Subcutaneous HS Hi Moeller MD   insulin lispro 1-6 Units Subcutaneous 4 times day Hi Moeller MD   Labetalol HCl 10 mg Intravenous Q6H PRN Hi Moeller MD   lisinopril 40 mg Oral Daily Hi Moeller MD   metoprolol tartrate 50 mg Oral Q12H Hi Moeller MD   nitroglycerin 0 4 mg Sublingual Q5 Min PRN Hi Moeller MD   ondansetron 4 mg Intravenous Q6H PRN Hi Moeller MD   pravastatin 40 mg Oral Daily With Jaylin Rg MD     CV diet level 3  ECG with trop #2,3  ECHO: pending  Consult PT/OT  Consult Cardio  TELM  Olaf SCDs

## 2019-02-08 NOTE — CONSULTS
Cardiology Consult  Kelvin Corral 67 y o  female MRN: [id number]  Unit/Bed#: 2 211- Encounter: [id number]      Reason for Consult / Principal Problem: SVT    Physician Requesting Consult: Cher Mcgregor MD    OP Cardiologist: n/a    Assessment:  1  SVT, likely AVNRT  -19 TTE: EF 55%, grade 1 DD, nl RV, no significant valvulopathy  2  Atypical CP 2/2 above, resolved  -normal silverio MPI   3  Hx of MAT  4  HTN  5  DM  6  HLD    Plan:  1  Agree with increasing metoprolol to 50mg for further SVT suppression  2  Patient will follow up with EP as OP  Appointment made with Dr Dee Plan 3/21/19  3  Patient is stable to discharge home from a cardiac standpoint  HPI: Kelvin Corral 67y o  year old female with a history of SVT, MAT, HTN, DM, HLD who presented 2/ with chest pain, palpitations, dyspnea, diaphoresis, nausea  Chest pain was substernal, 8-9/10, felt like pressure/sharp, radiated to neck/L jaw, occurred at rest  Patient was also lightheaded/dizzy  On arrival to ED her HR was 170  EKG showed SVT and she was given adenosine 6mg with conversion to NSR  Her symptoms resolved  Lab work including Hgb, TSH, lytes were normal      She denies any recent illness  Denies significant EtOH, caffeine use  Denies drug use  She does have a hx of SVT, MAT several years ago  Per EMR she was supposed to have an ablation but did not get it, does not recall why  She does not follow up with cardiology        Family History: reviewed  Historical Information   Past Medical History:   Diagnosis Date    Anemia     Chronic idiopathic constipation     [facility] )     Diverticulitis, colon     Esophageal reflux     Eustachian tube dysfunction     Gastrointestinal hemorrhage     Hypertension     Non-healing skin lesion     Palpitations     Rectal bleeding     Skin lesion of chest wall      Past Surgical History:   Procedure Laterality Date     SECTION      CHOLECYSTECTOMY      ECTOPIC PREGNANCY SURGERY      NE COLONOSCOPY FLX DX W/COLLJ SPEC WHEN PFRMD N/A 4/25/2016    Procedure: COLONOSCOPY;  Surgeon: Nasrin Dickerson MD;  Location: [facility]; Service: Gastroenterology     Social History   History   Alcohol Use No     History   Drug Use No     History   Smoking Status    Never Smoker   Smokeless Tobacco    Never Used     Family History:   Family History   Problem Relation Age of Onset    Heart disease Mother     Bleeding Disorder Sister        Review of Systems:  Review of Systems  Except as noted in the HPI and above, a comprehensive 14 point review of systems was negative  Scheduled Meds:  Current Facility-Administered Medications:  acetaminophen 650 mg Oral Q4H PRN Hi Moeller MD   amLODIPine 5 mg Oral Daily Hi Moeller MD   cholecalciferol 1,000 Units Oral Daily Hi Moeller MD   enoxaparin 40 mg Subcutaneous Daily Hi Moeller MD   fluticasone 1 spray Nasal BID Hi Moeller MD   insulin detemir 40 Units Subcutaneous HS Hi Moeller MD   insulin lispro 1-5 Units Subcutaneous HS Hi Moeller MD   insulin lispro 1-6 Units Subcutaneous 4 times day Hi Moeller MD   Labetalol HCl 10 mg Intravenous Q6H PRN Hi Moeller MD   lisinopril 40 mg Oral Daily Hi Moeller MD   metoprolol tartrate 50 mg Oral Q12H Hi Moeller MD   nitroglycerin 0 4 mg Sublingual Q5 Min PRN Hi Moeller MD   ondansetron 4 mg Intravenous Q6H PRN Hi Moeller MD   pravastatin 40 mg Oral Daily With Soumya Amaya MD     Continuous Infusions:   PRN Meds:   acetaminophen    Labetalol HCl    nitroglycerin    ondansetron  all current active meds have been reviewed    No Known Allergies    Objective   Vitals: Blood pressure (!) 173/78, pulse 68, temperature 97 8 °F (36 6 °C), temperature source Oral, resp  rate 18, height 4' 11" (1 499 m), weight 61 7 kg (136 lb), SpO2 96 %  , Body mass index is 27 47 kg/m² , Orthostatic Blood Pressures      Most Recent Value   Blood Pressure   173/78 filed at 02/08/2019 4298   Patient Position - Orthostatic VS  Lying filed at 02/08/2019 8269            Intake/Output Summary (Last 24 hours) at 02/08/19 1307  Last data filed at 02/08/19 0846   Gross per 24 hour   Intake              180 ml   Output                0 ml   Net              180 ml       Invasive Devices     Peripheral Intravenous Line            Peripheral IV 02/07/19 Left Antecubital 1 day                Physical Exam:  Physical Exam   Constitutional: She is oriented to person, place, and time  She appears well-developed and well-nourished  No distress  HENT:   Head: Normocephalic and atraumatic  Eyes: Pupils are equal, round, and reactive to light  Conjunctivae and EOM are normal    Neck: Normal range of motion  No JVD present  No tracheal deviation present  Cardiovascular: Normal rate, regular rhythm, normal heart sounds and intact distal pulses  Exam reveals no gallop and no friction rub  No murmur heard  Pulmonary/Chest: Effort normal  No respiratory distress  She has no wheezes  She has no rales  She exhibits no tenderness  Abdominal: Soft  She exhibits no distension  There is no tenderness  There is no rebound  Musculoskeletal: Normal range of motion  She exhibits no edema  Neurological: She is alert and oriented to person, place, and time  Skin: Skin is warm and dry  She is not diaphoretic  Psychiatric: She has a normal mood and affect  Lab Results: I have personally reviewed pertinent lab results  Imaging: I have personally reviewed pertinent reports  EKG: Personally reviewed    ECHO: reviewed  Previous Cath/PCI: n/a  Code Status: Level 1 - Full Code  Advance Directive and Living Will:      Power of :    POLST:        Turner Ramos MD  Cardiology Fellow

## 2019-02-08 NOTE — MEDICAL STUDENT
Consultation - Cardiology   Kelvin Corral 67 y o  female MRN: [id number]  Unit/Bed#: CW2 211-01 Encounter: [id number]    Assessment/Plan     Assessment:  Ms Mohan Lyles is a 67 y o  Female with history of diabetes mellitus, hypertension, hyperlipidemia and previous history of arrythmia who presented to ED yesterday for sharp substernal chest pain that was non-radiating, present at rest, and associated with diaphoresis, palpitations and dyspnea  Negative serial troponin were noted with an EKG confirming SVT  Plan:  Supraventricular tachycardia:   EKG confirmed SVT with heart rate in 180s and T wave flattening/inversion in leads I and AVL, which likely caused her chest pain  Returned to NSR after IV adenosine 6 mg with no new episodes of SVT since arrival  With negative serial troponin and chest xray showing no acute cardiopulmonary disease, ACS is unlikely  No pathological Q waves present on EKG suggesting no prior silent ACS  1  Echocardiogram ordered to determine if structural heart abnormality  2  Likely discharged post-echo results  3  Follow-up with cardiologist      Hypertension:  1  Continue home regimen of amlodipine, lisinopril, metorpolol  Hyperlipidemia:   1  Continue lovastatin  History of Present Illness   Physician Requesting Consult: Cher Mcgregor MD  Reason for Consult / Principal Problem: chest pain    HPI: Kelvin Corral is a 67y o  year old female who presented to the ED yesterday morning with sharp chest pain that was non-radiating, not relieved with rest and associated with diaphoresis, feeling cold, dyspnea, palpitations and nausea and without numbness or tingling  She associated the symptoms with blood sugar elevation and took an extra metformin  She states this has happened 2-3 years ago and came to the ED  In the ED, EKG confirmed SVT and she was given IV adenosine 6 mg and repeat EKG demonstrated NSR   Serial troponin were negative and chest xray showed no acute cardiopulmonary process  She was admitted for ACS rule out and monitored on telemetry  EKG from 2016 showed prior inferior and anterior infarct, but not seen on EKG here  Echo in 2015 demonstrated EF of 60% with mildly increased LV wall thickness and mild concentric hypertrophy  Consults    Review of Systems   Constitutional: Positive for diaphoresis  Negative for chills and fever  Respiratory: Positive for chest tightness and shortness of breath  Negative for cough  Cardiovascular: Positive for chest pain and palpitations  Gastrointestinal: Positive for nausea  Negative for abdominal pain and vomiting  Genitourinary: Negative for difficulty urinating, dysuria and urgency  Musculoskeletal: Negative for arthralgias and myalgias  Skin: Negative for color change and rash  Neurological: Negative for syncope, facial asymmetry and numbness  Historical Information   Past Medical History:   Diagnosis Date    Anemia     Chronic idiopathic constipation     Diabetes mellitus (HCC)     Diverticulitis, colon     Esophageal reflux     Eustachian tube dysfunction     Gastrointestinal hemorrhage     Hypertension     Non-healing skin lesion     Palpitations     Rectal bleeding     Skin lesion of chest wall      Past Surgical History:   Procedure Laterality Date     SECTION      CHOLECYSTECTOMY      ECTOPIC PREGNANCY SURGERY      [place] COLONOSCOPY FLX DX W/COLLJ SPEC WHEN PFRMD N/A 2016    Procedure: COLONOSCOPY;  Surgeon: Carolyn Portillo MD;  Location: [facility];   Service: Gastroenterology     History   Alcohol Use No     History   Drug Use No     History   Smoking Status    Never Smoker   Smokeless Tobacco    Never Used     Family History:   Family History   Problem Relation Age of Onset    Heart disease Mother     Bleeding Disorder Sister        Meds/Allergies   current meds:   Current Facility-Administered Medications   Medication Dose Route Frequency    acetaminophen (TYLENOL) tablet 650 mg  650 mg Oral Q4H PRN    amLODIPine (NORVASC) tablet 5 mg  5 mg Oral Daily    cholecalciferol (VITAMIN D3) tablet 1,000 Units  1,000 Units Oral Daily    enoxaparin (LOVENOX) subcutaneous injection 40 mg  40 mg Subcutaneous Daily    fluticasone (FLONASE) 50 mcg/act nasal spray 1 spray  1 spray Nasal BID    insulin detemir (LEVEMIR) subcutaneous injection 40 Units  40 Units Subcutaneous HS    insulin lispro (HumaLOG) 100 units/mL subcutaneous injection 1-5 Units  1-5 Units Subcutaneous HS    insulin lispro (HumaLOG) 100 units/mL subcutaneous injection 1-6 Units  1-6 Units Subcutaneous 4 times day    Labetalol HCl (NORMODYNE) injection 10 mg  10 mg Intravenous Q6H PRN    lisinopril (ZESTRIL) tablet 40 mg  40 mg Oral Daily    metoprolol tartrate (LOPRESSOR) tablet 50 mg  50 mg Oral Q12H    nitroglycerin (NITROSTAT) SL tablet 0 4 mg  0 4 mg Sublingual Q5 Min PRN    ondansetron (ZOFRAN) injection 4 mg  4 mg Intravenous Q6H PRN    pravastatin (PRAVACHOL) tablet 40 mg  40 mg Oral Daily With Dinner     No Known Allergies    Objective   Vitals: Blood pressure (!) 173/78, pulse 68, temperature 97 8 °F (36 6 °C), temperature source Oral, resp  rate 18, height 4' 11" (1 499 m), weight 61 7 kg (136 lb), SpO2 96 %  Orthostatic Blood Pressures      Most Recent Value   Blood Pressure   173/78 filed at 02/08/2019 3131   Patient Position - Orthostatic VS  Lying filed at 02/08/2019 4239          Intake/Output Summary (Last 24 hours) at 02/08/19 0854  Last data filed at 02/08/19 0846   Gross per 24 hour   Intake              180 ml   Output                0 ml   Net              180 ml       Invasive Devices     Peripheral Intravenous Line            Peripheral IV 02/07/19 Left Antecubital less than 1 day                Physical Exam   Constitutional: She is oriented to person, place, and time  She appears well-developed and well-nourished  HENT:   Head: Normocephalic and atraumatic     Eyes: EOM are normal  Left conjunctiva is not injected  No scleral icterus  Neck: Neck supple  No thyromegaly present  Cardiovascular: Normal rate and regular rhythm  Exam reveals no gallop and no friction rub  No murmur heard  Pulmonary/Chest: Effort normal and breath sounds normal  No respiratory distress  She has no wheezes  She exhibits no tenderness  Musculoskeletal: She exhibits no edema  Lymphadenopathy:     She has no cervical adenopathy  Neurological: She is alert and oriented to person, place, and time  Skin: No rash noted  She is not diaphoretic  No pallor  Psychiatric: Judgment normal        Lab Results:   CBC with diff:   Results from last 7 days  Lab Units 02/08/19  0441   WBC Thousand/uL 6 64   RBC Million/uL 4 85   HEMOGLOBIN g/dL 13 5   HEMATOCRIT % 42 3   MCV fL 87   MCH pg 27 8   MCHC g/dL 31 9   RDW % 13 7   MPV fL 11 5   PLATELETS Thousands/uL 264     Troponin:   0  Lab Value Date/Time   TROPONINI <0 02 02/08/2019 0024   TROPONINI <0 02 02/07/2019 2049   TROPONINI <0 02 02/07/2019 1503   TROPONINI <0 02 02/07/2019 1151   TROPONINI 0 04 02/24/2015 0838   TROPONINI 0 10 (H) 02/24/2015 0224   TROPONINI 0 13 (H) 02/23/2015 1745   TROPONINI <0 04 01/30/2015 1514   TROPONINI 0 07 (H) 03/13/2014 0515   TROPONINI 0 12 (H) 03/12/2014 2347     TSH:   Results from last 7 days  Lab Units 02/07/19  1503   TSH 3RD GENERATON uIU/mL 1 510     Imaging:   CXR- no acute cardiopulmonary process     EKG 2/7: SVT with rate of 180 and t wave flattening/inversion in AVL and I and LAD  EKG 2/7: NSR   VTE Prophylaxis: Enoxaparin (Lovenox)

## 2019-02-08 NOTE — DISCHARGE INSTRUCTIONS
Follow up with cardiology on 03/21/19  Follow up with PCP    Taquicardia supraventricular   LO QUE NECESITA SABER:   La taquicardia supraventricular (TSV) es salvatore condición que provoca que asher corazón palpite con Barnie Bolls de lo normal  La TSV es un tipo de ritmo cardíaco anormal, que se conoce maureen arritmia y que empieza en la parte superior de asher corazón  Puede durar [employer] unos segundos, horas o hasta varios días  INSTRUCCIONES SOBRE EL ESTELITA HOSPITALARIA:   Llame al 911 en gustavo de presentar lo siguiente:   · Usted tiene alguno de los siguientes signos de un ataque cardíaco:      ¨ Estornudos, presión, o dolor en asher pecho que dura mas de 5 minutos o regresa  ¨ Malestar o dolor en asher espalda, abdirahman, mandíbula, abdomen, o brazo     ¨ Dificultad para respirar    ¨ Náuseas o vómito    ¨ Siente un desvanecimiento o tiene sudores fríos especialmente en el pecho o dificultad para respirar  Regrese a la deborah de emergencias si:   · Usted [facility], aturdido o siente que se va a desmayar  · Usted siente adormecimiento o debilidad repentinos en los brazos o piernas  Pregúntele a asher Juanita Nation vitaminas y minerales son adecuados para usted  · Lyubov síntomas empeoran o tiene nuevos síntomas  · Usted tiene inflamación en lyubov piernas o pies  · Usted tiene preguntas o inquietudes acerca de asher condición o cuidado  Medicamentos:   · Medicamentos,  ayudan a controlar el ritmo y frecuencia de asher corazón  Es posible que necesite más de un medicamento para tratar lyubov síntomas  · Elderon lyubov medicamentos maureen se le haya indicado  Consulte con asher médico si usted lakshmi que asher medicamento no le está ayudando o si presenta efectos secundarios  Infórmele si es alérgico a cualquier medicamento  Mantenga salvatore lista actualizada de los [employer], las vitaminas y los productos herbales que opal  Incluya los siguientes datos de los medicamentos: cantidad, frecuencia y motivo de administración   Gilberto Shady con usted la lista o los envases de la píldoras a lyubov citas de seguimiento  Lleve la lista de los medicamentos con usted en gustavo de salvatore emergencia  Cómo controlar o evitar salvatore taquicardia supraventricular:   · [facility] vagales según indicaciones cuando tenga síntomas de taquicardia supraventricular  Acuéstese recto y doble asher cuerpo (puje) maureen si estuviera teniendo salvatore evacuación intestinal  Satish esto paresh 10 a 30 segundos  · No tome bebidas con cafeína o alcohol   Estos pueden aumentar asher riesgo de taquicardia supraventricular  · Lleve un registro de lyubov síntomas  Escriba lo que usted comió o lo que estaba haciendo antes de sufrir el episodio de taquicardia supraventricular  También escriba cualquier cosa que pudo servirle de ayuda para aliviar lyubov síntomas  Lleve consuelo registro con usted cuando Creasie Frank a asher consulta de control con asher médico      · Consuma alimentos saludables para el corazón  Las frutas, verduras, panes integrales, productos lácteos bajos en grasa, frijoles, perez magras y pescado son [place] saludables para el corazón  Reemplace la mantequilla y la margarina con aceites saludables para el corazón maureen el aceite de wilkins y el aceite de canola  · Ejercítese regularmente y [place] un peso saludable  Pida más información acerca de un plan de ejercicio adecuado para usted  Consulte con asher médico cuánto debería pesar  Pida que le ayude a crear un plan para bajar de peso si usted tiene sobrepeso  · No fume  La nicotina y otros químicos en los cigarrillos y cigarros pueden provocar daño al corazón y al pulmón  Pida información a asher médico si usted actualmente fuma y necesita ayuda para dejar de fumar  Los cigarrillos electrónicos o tabaco sin humo todavía contienen nicotina  Consulte con asher médico antes de [employer]  Acuda a lyubov consultas de control con asher médico según le indicaron  Anote lyubov preguntas para que se acuerde de hacerlas paresh lyubov visitas  © 2017 [facility] Information is for End User's use only and may not be sold, redistributed or otherwise used for commercial purposes  All illustrations and images included in CareNotes® are the copyrighted property of A D A M , Inc  or Edwin Martinez  Esta información es sólo para uso en educación  Asher intención no es darle un consejo médico sobre enfermedades o tratamientos  Colsulte con asher Vijaya Mall farmacéutico antes de seguir cualquier régimen médico para saber si es seguro y efectivo para usted

## 2019-02-08 NOTE — UTILIZATION REVIEW
Network Utilization Review Department  Phone: [phone]; Fax [phone]  [email or web address]  ATTENTION: Please call with any questions or concerns to [phone]  and carefully listen to the prompts so that you are directed to the right person  Send all requests for admission clinical reviews, approved or denied determinations and any other requests to fax [phone]   All voicemails are confidential   ===================================================================  Continued Stay Review-Observation  Date: 02/08/2019  Vital Signs: BP (!) 173/78 (BP Location: Right arm)   Pulse 68   Temp 97 8 °F (36 6 °C) (Oral)   Resp 18   Ht 4' 11" (1 499 m)   Wt 61 7 kg (136 lb)   SpO2 96%   BMI 27 47 kg/m²   Assessment/Plan:   Supraventricular tachycardia  -patient had 5 min of chest pain on 02/07 at 9:00 a m   -troponins have been negative  -continue telemetry monitoring however no further episodes of SVT on telemetry  up until now  -cardiology consult  -echo ordered  -TSH normal  -patient is status post 6 mg of IV adenosine  -previous stress test 2014 was normal  -previous echo in 2015 showed an ejection fraction of 41% with no diastolic dysfunction     Type 2 diabetes mellitus   -complicated by mild proliferative diabetic retinopathy  -last A1c was 9%  -continue Lantus 40 units at bedtime  -sliding scale insulin     Hypertension  -continue amlodipine, lisinopril, metoprolol     Hyperlipidemia  -continue statin  Medications:   Scheduled Meds:   Current Facility-Administered Medications:  acetaminophen 650 mg Oral Q4H PRN Hi Moeller MD   amLODIPine 5 mg Oral Daily Hi Moeller MD   cholecalciferol 1,000 Units Oral Daily Hi Moeller MD   enoxaparin 40 mg Subcutaneous Daily Hi Moeller MD   fluticasone 1 spray Nasal BID Hi Moeller MD   insulin detemir 40 Units Subcutaneous HS Hi Moeller MD   insulin lispro 1-5 Units Subcutaneous HS Hi Moeller MD   insulin lispro 1-6 Units Subcutaneous 4 times day Hi Moeller MD   Labetalol HCl 10 mg Intravenous Q6H PRN Hi Moeller MD   lisinopril 40 mg Oral Daily Hi Moeller MD   metoprolol tartrate 50 mg Oral Q12H Hi Moeller MD   nitroglycerin 0 4 mg Sublingual Q5 Min PRN Hi Moeller MD   ondansetron 4 mg Intravenous Q6H PRN Hi Moeller MD   pravastatin 40 mg Oral Daily With Marlyn Dotson MD   Pertinent Labs/Diagnostic Results:   Age/Sex: 67 y o  female   Discharge Plan:   02/08/19 1355  Discharge patient Once     Discharge Disposition: Home/Self Care    Expected Discharge Date: 02/08/19

## 2019-02-11 ENCOUNTER — TRANSITIONAL CARE MANAGEMENT (OUTPATIENT)
Dept: INTERNAL MEDICINE CLINIC | Facility: CLINIC | Age: 79
End: 2019-02-11

## 2019-02-21 DIAGNOSIS — E11.65 TYPE 2 DIABETES MELLITUS WITH HYPERGLYCEMIA, WITH LONG-TERM CURRENT USE OF INSULIN (HCC): ICD-10-CM

## 2019-02-21 DIAGNOSIS — Z79.4 TYPE 2 DIABETES MELLITUS WITH HYPERGLYCEMIA, WITH LONG-TERM CURRENT USE OF INSULIN (HCC): ICD-10-CM

## 2019-02-25 DIAGNOSIS — E11.65 TYPE 2 DIABETES MELLITUS WITH HYPERGLYCEMIA, WITH LONG-TERM CURRENT USE OF INSULIN (HCC): ICD-10-CM

## 2019-02-25 DIAGNOSIS — I10 BENIGN ESSENTIAL HYPERTENSION: ICD-10-CM

## 2019-02-25 DIAGNOSIS — Z79.4 TYPE 2 DIABETES MELLITUS WITH HYPERGLYCEMIA, WITH LONG-TERM CURRENT USE OF INSULIN (HCC): ICD-10-CM

## 2019-02-25 RX ORDER — INSULIN DETEMIR 100 [IU]/ML
40 INJECTION, SOLUTION SUBCUTANEOUS
Qty: 10 PEN | Refills: 0 | Status: SHIPPED | OUTPATIENT
Start: 2019-02-25 | End: 2019-03-07

## 2019-02-25 RX ORDER — LISINOPRIL 40 MG/1
40 TABLET ORAL DAILY
Qty: 90 TABLET | Refills: 1 | Status: SHIPPED | OUTPATIENT
Start: 2019-02-25 | End: 2019-08-29 | Stop reason: SDUPTHER

## 2019-03-01 ENCOUNTER — APPOINTMENT (OUTPATIENT)
Dept: LAB | Facility: CLINIC | Age: 79
End: 2019-03-01
Payer: COMMERCIAL

## 2019-03-01 DIAGNOSIS — Z79.4 TYPE 2 DIABETES MELLITUS WITH HYPERGLYCEMIA, WITH LONG-TERM CURRENT USE OF INSULIN (HCC): ICD-10-CM

## 2019-03-01 DIAGNOSIS — E11.65 TYPE 2 DIABETES MELLITUS WITH HYPERGLYCEMIA, WITH LONG-TERM CURRENT USE OF INSULIN (HCC): ICD-10-CM

## 2019-03-01 LAB
ALBUMIN SERPL BCP-MCNC: 4 G/DL (ref 3.5–5)
ALP SERPL-CCNC: 62 U/L (ref 46–116)
ALT SERPL W P-5'-P-CCNC: 44 U/L (ref 12–78)
ANION GAP SERPL CALCULATED.3IONS-SCNC: 8 MMOL/L (ref 4–13)
AST SERPL W P-5'-P-CCNC: 27 U/L (ref 5–45)
BILIRUB SERPL-MCNC: 0.51 MG/DL (ref 0.2–1)
BUN SERPL-MCNC: 18 MG/DL (ref 5–25)
CALCIUM SERPL-MCNC: 9 MG/DL (ref 8.3–10.1)
CHLORIDE SERPL-SCNC: 100 MMOL/L (ref 100–108)
CHOLEST SERPL-MCNC: 153 MG/DL (ref 50–200)
CO2 SERPL-SCNC: 27 MMOL/L (ref 21–32)
CREAT SERPL-MCNC: 0.9 MG/DL (ref 0.6–1.3)
CREAT UR-MCNC: 131 MG/DL
EST. AVERAGE GLUCOSE BLD GHB EST-MCNC: 197 MG/DL
GFR SERPL CREATININE-BSD FRML MDRD: 61 ML/MIN/1.73SQ M
GLUCOSE P FAST SERPL-MCNC: 157 MG/DL (ref 65–99)
HBA1C MFR BLD: 8.5 % (ref 4.2–6.3)
HDLC SERPL-MCNC: 39 MG/DL (ref 40–60)
LDLC SERPL CALC-MCNC: 50 MG/DL (ref 0–100)
MICROALBUMIN UR-MCNC: 19.3 MG/L (ref 0–20)
MICROALBUMIN/CREAT 24H UR: 15 MG/G CREATININE (ref 0–30)
POTASSIUM SERPL-SCNC: 4 MMOL/L (ref 3.5–5.3)
PROT SERPL-MCNC: 7.9 G/DL (ref 6.4–8.2)
SODIUM SERPL-SCNC: 135 MMOL/L (ref 136–145)
TRIGL SERPL-MCNC: 322 MG/DL

## 2019-03-01 PROCEDURE — 36415 COLL VENOUS BLD VENIPUNCTURE: CPT

## 2019-03-01 PROCEDURE — 80053 COMPREHEN METABOLIC PANEL: CPT

## 2019-03-01 PROCEDURE — 80061 LIPID PANEL: CPT

## 2019-03-01 PROCEDURE — 82043 UR ALBUMIN QUANTITATIVE: CPT

## 2019-03-01 PROCEDURE — 82570 ASSAY OF URINE CREATININE: CPT

## 2019-03-01 PROCEDURE — 83036 HEMOGLOBIN GLYCOSYLATED A1C: CPT

## 2019-03-07 ENCOUNTER — OFFICE VISIT (OUTPATIENT)
Dept: INTERNAL MEDICINE CLINIC | Facility: CLINIC | Age: 79
End: 2019-03-07

## 2019-03-07 VITALS
SYSTOLIC BLOOD PRESSURE: 146 MMHG | BODY MASS INDEX: 27.11 KG/M2 | DIASTOLIC BLOOD PRESSURE: 68 MMHG | WEIGHT: 134.48 LBS | TEMPERATURE: 98.2 F | HEART RATE: 72 BPM | HEIGHT: 59 IN

## 2019-03-07 DIAGNOSIS — E78.5 DYSLIPIDEMIA: ICD-10-CM

## 2019-03-07 DIAGNOSIS — I10 BENIGN ESSENTIAL HYPERTENSION: ICD-10-CM

## 2019-03-07 DIAGNOSIS — K63.5 POLYP OF COLON, UNSPECIFIED PART OF COLON, UNSPECIFIED TYPE: ICD-10-CM

## 2019-03-07 DIAGNOSIS — I47.1 SVT (SUPRAVENTRICULAR TACHYCARDIA) (HCC): ICD-10-CM

## 2019-03-07 DIAGNOSIS — Z79.4 TYPE 2 DIABETES MELLITUS TREATED WITH INSULIN (HCC): ICD-10-CM

## 2019-03-07 DIAGNOSIS — Z12.31 VISIT FOR SCREENING MAMMOGRAM: ICD-10-CM

## 2019-03-07 DIAGNOSIS — M81.0 POSTMENOPAUSAL OSTEOPOROSIS: ICD-10-CM

## 2019-03-07 DIAGNOSIS — E11.65 TYPE 2 DIABETES MELLITUS WITH HYPERGLYCEMIA, WITH LONG-TERM CURRENT USE OF INSULIN (HCC): Primary | ICD-10-CM

## 2019-03-07 DIAGNOSIS — Z79.4 TYPE 2 DIABETES MELLITUS WITH HYPERGLYCEMIA, WITH LONG-TERM CURRENT USE OF INSULIN (HCC): Primary | ICD-10-CM

## 2019-03-07 DIAGNOSIS — E11.9 TYPE 2 DIABETES MELLITUS TREATED WITH INSULIN (HCC): ICD-10-CM

## 2019-03-07 PROBLEM — Z23 NEED FOR VACCINATION AGAINST STREPTOCOCCUS PNEUMONIAE USING PNEUMOCOCCAL CONJUGATE VACCINE 13: Status: RESOLVED | Noted: 2018-11-20 | Resolved: 2019-03-07

## 2019-03-07 PROBLEM — Z00.00 HEALTHCARE MAINTENANCE: Status: ACTIVE | Noted: 2019-03-07

## 2019-03-07 PROBLEM — K04.7 DENTAL ABSCESS: Status: RESOLVED | Noted: 2018-08-13 | Resolved: 2019-03-07

## 2019-03-07 PROCEDURE — 1111F DSCHRG MED/CURRENT MED MERGE: CPT | Performed by: HOSPITALIST

## 2019-03-07 PROCEDURE — 99214 OFFICE O/P EST MOD 30 MIN: CPT | Performed by: HOSPITALIST

## 2019-03-07 RX ORDER — LOVASTATIN 40 MG/1
TABLET ORAL
Qty: 180 TABLET | Refills: 1 | Status: SHIPPED | OUTPATIENT
Start: 2019-03-07 | End: 2019-10-04 | Stop reason: SDUPTHER

## 2019-03-07 RX ORDER — LANCETS 28 GAUGE
EACH MISCELLANEOUS
Qty: 300 EACH | Refills: 1 | Status: SHIPPED | OUTPATIENT
Start: 2019-03-07 | End: 2019-03-07

## 2019-03-07 RX ORDER — AMLODIPINE BESYLATE 5 MG/1
5 TABLET ORAL DAILY
Qty: 90 TABLET | Refills: 1 | Status: SHIPPED | OUTPATIENT
Start: 2019-03-07 | End: 2019-10-04 | Stop reason: SDUPTHER

## 2019-03-07 RX ORDER — LANCETS 28 GAUGE
EACH MISCELLANEOUS
Qty: 300 EACH | Refills: 1 | Status: SHIPPED | OUTPATIENT
Start: 2019-03-07

## 2019-03-07 RX ORDER — INSULIN DETEMIR 100 [IU]/ML
46 INJECTION, SOLUTION SUBCUTANEOUS
Qty: 10 PEN | Refills: 2 | Status: SHIPPED | OUTPATIENT
Start: 2019-03-07 | End: 2019-06-14

## 2019-03-07 NOTE — ASSESSMENT & PLAN NOTE
You confirm that you are taking the increased dose of metoprolol 50 mg twice a day  You confirm you have had no additional chest tightness, palpitations or racing heart since discharge in February    You are aware you have an appointment scheduled on March 21st for follow-up with the cardiologist

## 2019-03-07 NOTE — ASSESSMENT & PLAN NOTE
As reviewed your blood pressure is not at goal today  We also discussed that you may not have been taking the amlodipine  I sent a new prescription to your pharmacy and you will verify that you will be taking your lisinopril 40, metoprolol 50 mg twice a day and amlodipine 5 mg daily  You are already scheduled with the cardiologist on March 21st and blood pressure will be re-evaluated with all 3 medications

## 2019-03-07 NOTE — ASSESSMENT & PLAN NOTE
Lab Results   Component Value Date    HGBA1C 8 5 (H) 03/01/2019     As we reviewed today your sugar has improved  Her A1c has decreased from 9% down to 8 5%  We discussed goal is to be less than 8 and closer to 7  We will continue you on your Levemir however we are increasing this to 46 units once a day  Please continue your metformin 1000 mg twice daily  Please also continue to work to follow your diabetic diet  We will recheck your sugars in 3 months and also provided you with a sugar log to bring to your follow-up appointments

## 2019-03-07 NOTE — PROGRESS NOTES
Assessment/Plan:    Type 2 diabetes mellitus with hyperglycemia (HCC)  Lab Results   Component Value Date    HGBA1C 8 5 (H) 03/01/2019     As we reviewed today your sugar has improved  Her A1c has decreased from 9% down to 8 5%  We discussed goal is to be less than 8 and closer to 7  We will continue you on your Levemir however we are increasing this to 46 units once a day  Please continue your metformin 1000 mg twice daily  Please also continue to work to follow your diabetic diet  We will recheck your sugars in 3 months and also provided you with a sugar log to bring to your follow-up appointments  Benign essential hypertension  As reviewed your blood pressure is not at goal today  We also discussed that you may not have been taking the amlodipine  I sent a new prescription to your pharmacy and you will verify that you will be taking your lisinopril 40, metoprolol 50 mg twice a day and amlodipine 5 mg daily  You are already scheduled with the cardiologist on March 21st and blood pressure will be re-evaluated with all 3 medications  Dyslipidemia  Please continue your lovastatin to 40 mg tablets together at bedtime  Continue to follow your heart healthy diet  SVT (supraventricular tachycardia) ([facility])  You confirm that you are taking the increased dose of metoprolol 50 mg twice a day  You confirm you have had no additional chest tightness, palpitations or racing heart since discharge in February  You are aware you have an appointment scheduled on March 21st for follow-up with the cardiologist     Healthcare maintenance  As discussed today you are aware of your osteoporosis and you have been taking her calcium and vitamin D bit are unable to tolerate the bisphosphonates  As her last DEXA scan was 2015 please schedule this for a new evaluation  Based on these findings we can then discuss alternative treatment options  In the meantime continue your supplement and walking on a daily basis    Script for mammogram was provided today  You are aware that should this be normal you will likely not require any additional mammograms in the future  Referral for GI was also placed as you did have a colonoscopy in 2016 with polyps and needed to schedule a follow-up in 3 years  This would be due April of 2019 and you may call to schedule an appointment with GI  Diagnoses and all orders for this visit:    Type 2 diabetes mellitus with hyperglycemia, with long-term current use of insulin (HCC)  -     LEVEMIR FLEXTOUCH 100 units/mL injection pen; Inject 46 Units under the skin daily at bedtime for 180 days  -     glucose blood (FREESTYLE LITE) test strip; Use as instructed to check sugar up to 3 times daily  -     Lancets (FREESTYLE) lancets; Use as instructed to check sugar up to 3 times daily    Benign essential hypertension  -     amLODIPine (NORVASC) 5 mg tablet; Take 1 tablet (5 mg total) by mouth daily for 180 days    Dyslipidemia  -     lovastatin (MEVACOR) 40 MG tablet; Take 2 tablets together every night    SVT (supraventricular tachycardia) (HCC)    Postmenopausal osteoporosis  -     DXA bone density spine hip and pelvis; Future    Polyp of colon, unspecified part of colon, unspecified type  -     Ambulatory referral to Gastroenterology; Future    Type 2 diabetes mellitus treated with insulin (HCC)  -     Insulin Pen Needle (B-D UF III MINI PEN NEEDLES) 31G X 5 MM MISC; Inject 34 units SC daily in evening  -     Comprehensive metabolic panel; Future  -     Hemoglobin A1C; Future    Visit for screening mammogram  -     Mammo screening bilateral w cad; Future    Other orders  -     Discontinue: Lancets (FREESTYLE) lancets; Use as instructed to check sugar up to 3 times daily          Subjective:      Patient ID: Mignon Michel is a 67 y o  female  Pt here for follow up  Was also IP and diagnosed with SVT and cardiology changed her medications  Patient was admitted from February 7th to February 8  Patient had presented to the emergency room with a chief complaint of heart was racing and was feeling pressure and tightness in her chest   Patient had reported she had had a previous episode of this and discussion had been had about ablation however this was not completed and patient did not follow up with Cardiology after that  Patient reported she had been asymptomatic until the day of presentation  As noted Cardiology was consulted and patient was found to have SVT  Patient was advised to increase her metoprolol to 50 mg twice daily as well as follow up outpatient for electrophysiology  Echo completed inpatient demonstrated grade 1 diastolic dysfunction, ejection fraction was 55%  Patient was euvolemic  Has appt with Cardiology 3/21  States no palpitations since IP, no CP, no SOB    BP is elevated today  Did not bring any pills  States did take script to pharmacy for metoprolol 50 mg so believes taking 50 bid and not the 25 mg but will check at home  Unsure about amlodipine does not think has that one  Patient reports there has been some confusion with her pharmacies as some of her meds have gone to [facility] and others have gone to [email or web address]  Advised patient I will resubmit the prescription for amlodipine and she should check her pill bottles at home to see which medications she has  Reviewed labs  Reviewed with patient that her A1c has slightly improved it was 9 0% and currently 8 5%  Patient reports that while she tries she admits she is not always 100% compliant with her dietary changes for diabetes  Based on patient's age recommendations are for A1c to be less than 8 and closer to 7  Patient denies any hypoglycemic events  Patient is known to have osteoporosis  Last DEXA scan was 2015  Patient admits she does not take her alendronate anymore secondary to GI upset  Patient does take her calcium and vitamin-D    Advised patient we would get a new DEXA scan and reviewed likely will have same possibly worsening osteoporosis and can discuss alternative treatment options as intolerant of bisphosphonate tablets  Also reviewed patient had a colonoscopy in 2016  It was noted to have precancerous polyps and patient was advised she would need a repeat in 3 years which would be April of 2019  Patient was advised this would be ordered today so she would be able to call and get scheduled for this in April  Diabetes   She presents for her follow-up diabetic visit  She has type 2 diabetes mellitus  Her disease course has been improving  There are no hypoglycemic associated symptoms  Pertinent negatives for hypoglycemia include no dizziness, headaches or speech difficulty  Associated symptoms include foot paresthesias  Pertinent negatives for diabetes include no chest pain, no fatigue, no polydipsia, no polyphagia, no polyuria, no weakness and no weight loss  Symptoms are stable  Current diabetic treatment includes insulin injections and oral agent (monotherapy)  She is compliant with treatment all of the time  She rarely participates in exercise  An ACE inhibitor/angiotensin II receptor blocker is being taken  She sees a podiatrist Eye exam is current  The following portions of the patient's history were reviewed and updated as appropriate: allergies, current medications, past family history, past medical history, past social history, past surgical history and problem list     Review of Systems   Constitutional: Negative  Negative for chills, fatigue, fever, unexpected weight change and weight loss  HENT: Negative  Eyes: Negative  Negative for visual disturbance  Respiratory: Negative  Negative for cough, chest tightness and shortness of breath  Cardiovascular: Negative for chest pain, palpitations and leg swelling  Gastrointestinal: Negative  Negative for abdominal pain and blood in stool  Endocrine: Negative for polydipsia, polyphagia and polyuria  Genitourinary: Negative  Negative for frequency and urgency  Musculoskeletal: Positive for arthralgias (Patient does admit to some generalized arthralgias secondary to osteoarthritis  She reports these do not limit her activities of daily living)  Skin: Negative  Negative for rash  Neurological: Negative for dizziness, syncope, speech difficulty, weakness, light-headedness and headaches  Psychiatric/Behavioral: Negative  Objective:      /68   Pulse 72   Temp 98 2 °F (36 8 °C) (Oral)   Ht 4' 11 25" (1 505 m)   Wt 61 kg (134 lb 7 7 oz)   BMI 26 93 kg/m²          Physical Exam   Constitutional: She is oriented to person, place, and time  She appears well-developed and well-nourished  HENT:   Head: Normocephalic and atraumatic  Eyes: Pupils are equal, round, and reactive to light  EOM are normal    Neck: Neck supple  No JVD present  No thyromegaly present  Cardiovascular: Normal rate, regular rhythm and normal heart sounds  No murmur heard  Pulmonary/Chest: Effort normal and breath sounds normal    Abdominal: Soft  Bowel sounds are normal  She exhibits no distension  There is no tenderness  Musculoskeletal: Normal range of motion  She exhibits no edema  Neurological: She is alert and oriented to person, place, and time  Skin: Skin is warm and dry  No rash noted  Psychiatric: She has a normal mood and affect   Her behavior is normal

## 2019-03-07 NOTE — ASSESSMENT & PLAN NOTE
As discussed today you are aware of your osteoporosis and you have been taking her calcium and vitamin D bit are unable to tolerate the bisphosphonates  As her last DEXA scan was 2015 please schedule this for a new evaluation  Based on these findings we can then discuss alternative treatment options  In the meantime continue your supplement and walking on a daily basis  Script for mammogram was provided today  You are aware that should this be normal you will likely not require any additional mammograms in the future  Referral for GI was also placed as you did have a colonoscopy in 2016 with polyps and needed to schedule a follow-up in 3 years    This would be due April of 2019 and you may call to schedule an appointment with GI

## 2019-03-07 NOTE — PATIENT INSTRUCTIONS
Increase your insulin to 46 units daily  Continue to check sugars at least twice a day /record on log  Drop off in 4 weeks  Labs as dated in 3 months    F/u with cardio as scheduled    Sched with GI for colonoscopy due to previous polyps    Mammogram  Sched DEXA scan for osteoporosis

## 2019-03-07 NOTE — ASSESSMENT & PLAN NOTE
Please continue your lovastatin to 40 mg tablets together at bedtime  Continue to follow your heart healthy diet

## 2019-03-21 ENCOUNTER — CONSULT (OUTPATIENT)
Dept: CARDIOLOGY CLINIC | Facility: CLINIC | Age: 79
End: 2019-03-21
Payer: COMMERCIAL

## 2019-03-21 VITALS
WEIGHT: 138.1 LBS | DIASTOLIC BLOOD PRESSURE: 70 MMHG | BODY MASS INDEX: 27.84 KG/M2 | SYSTOLIC BLOOD PRESSURE: 158 MMHG | HEART RATE: 77 BPM | HEIGHT: 59 IN

## 2019-03-21 DIAGNOSIS — E78.5 DYSLIPIDEMIA: ICD-10-CM

## 2019-03-21 DIAGNOSIS — I10 BENIGN ESSENTIAL HYPERTENSION: Primary | ICD-10-CM

## 2019-03-21 DIAGNOSIS — I47.1 AVNRT (AV NODAL RE-ENTRY TACHYCARDIA) (HCC): ICD-10-CM

## 2019-03-21 DIAGNOSIS — I47.1 SVT (SUPRAVENTRICULAR TACHYCARDIA) (HCC): ICD-10-CM

## 2019-03-21 PROCEDURE — 93000 ELECTROCARDIOGRAM COMPLETE: CPT | Performed by: INTERNAL MEDICINE

## 2019-03-21 PROCEDURE — 99215 OFFICE O/P EST HI 40 MIN: CPT | Performed by: INTERNAL MEDICINE

## 2019-03-21 RX ORDER — ALENDRONATE SODIUM 70 MG/1
70 TABLET ORAL
COMMUNITY
End: 2020-06-02 | Stop reason: SDUPTHER

## 2019-03-21 NOTE — PROGRESS NOTES
Cardiology Consultation     Cb Coto  [id number]  1940  HEART & VASCULAR [place]  [address] [facility] [place] [address]    1  Benign essential hypertension     2  AVNRT (AV dao re-entry tachycardia) ([facility] )  Ambulatory referral to Cardiology    POCT ECG   3  SVT (supraventricular tachycardia) ([facility] )     4   Dyslipidemia         History of present illness    The patient is having recurrent episodes of palpitation, for which has been referred to me by her PCP  I had seen her previously in the hospital and an ablation was planned  However at that time 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 and procedure was postponed      It has been present for quite some time now  Initially to present as episodes of palpitation  This has progressively increased in frequency and duration    The patient is not complaining of anginal like chest pain or chest pressure  There is no worsening orthopnea, paroxysmal nocturnal dyspnea  There is no leg swelling    Patient does get palpitation when it happens  There is no history of presyncope or syncope  There is rare history of transient  lightheadedness  When patient has these palpitations, it is associated with exertional intolerance      There is history of snoring at night  There is history of morning fatigability  There is occasional history of daytime sleepiness      Patient Active Problem List   Diagnosis    Benign essential hypertension    Colon polyp    Dyslipidemia    Mild nonproliferative diabetic retinopathy of both eyes without macular edema associated with type 2 diabetes mellitus ([facility] )    Postmenopausal osteoporosis    Type 2 diabetes mellitus with hyperglycemia ([facility] )    SVT (supraventricular tachycardia) ([facility] )    Visit for screening mammogram    Healthcare maintenance    Diabetes mellitus ([facility] )     Past Medical History:   Diagnosis Date    Anemia     Chronic idiopathic constipation     [facility] )     Diverticulitis, colon     Esophageal reflux     Eustachian tube dysfunction     Gastrointestinal hemorrhage     Hypertension     Non-healing skin lesion     Palpitations     Rectal bleeding     Skin lesion of chest wall      Social History     Socioeconomic History    Marital status: /Civil Union     Spouse name: Not on file    Number of children: Not on file    Years of education: Not on file    Highest education level: Not on file   Occupational History    Occupation: packaging   Social Needs    Financial resource strain: Not on file    Food insecurity:     Worry: Not on file     Inability: Not on file    Transportation needs:     Medical: Not on file     Non-medical: Not on file   Tobacco Use    Smoking status: Never Smoker    Smokeless tobacco: Never Used   Substance and Sexual Activity    Alcohol use: No    Drug use: No    Sexual activity: Not Currently   Lifestyle    Physical activity:     Days per week: Not on file     Minutes per session: Not on file    Stress: Not on file   Relationships    Social connections:     Talks on phone: Not on file     Gets together: Not on file     Attends Zoroastrian service: Not on file     Active member of club or organization: Not on file     Attends meetings of clubs or organizations: Not on file     Relationship status: Not on file    Intimate partner violence:     Fear of current or ex partner: Not on file     Emotionally abused: Not on file     Physically abused: Not on file     Forced sexual activity: Not on file   Other Topics Concern    Not on file   Social History Narrative    Caffeine use    Denied exercising      Family History   Problem Relation Age of Onset    Heart disease Mother     Bleeding Disorder Sister      Past Surgical History:   Procedure Laterality Date     SECTION      CHOLECYSTECTOMY      ECTOPIC PREGNANCY SURGERY      [place] COLONOSCOPY FLX DX W/COLLJ SPEC WHEN PFRMD N/A 4/25/2016    Procedure: COLONOSCOPY;  Surgeon: Mike Strange MD;  [facility];   Service: Gastroenterology       Current Outpatient Medications:     alendronate (FOSAMAX) 70 mg tablet, Take 70 mg by mouth every 7 days, Disp: , Rfl:     amLODIPine (NORVASC) 5 mg tablet, Take 1 tablet (5 mg total) by mouth daily for 180 days, Disp: 90 tablet, Rfl: 1    cholecalciferol (VITAMIN D3) 1,000 units tablet, Take 1 tablet (1,000 Units total) by mouth daily for 180 days, Disp: 90 tablet, Rfl: 1    glucose blood (FREESTYLE LITE) test strip, Use as instructed to check sugar up to 3 times daily, Disp: 100 each, Rfl: 1    Insulin Pen Needle (B-D UF III MINI PEN NEEDLES) 31G X 5 MM MISC, Inject 34 units SC daily in evening, Disp: 100 each, Rfl: 1    Lancets (FREESTYLE) lancets, Use as instructed to check sugar up to 3 times daily, Disp: 300 each, Rfl: 1    LEVEMIR FLEXTOUCH 100 units/mL injection pen, Inject 46 Units under the skin daily at bedtime for 180 days, Disp: 10 pen, Rfl: 2    lisinopril (ZESTRIL) 40 mg tablet, Take 1 tablet (40 mg total) by mouth daily for 180 days, Disp: 90 tablet, Rfl: 1    lovastatin (MEVACOR) 40 MG tablet, Take 2 tablets together every night, Disp: 180 tablet, Rfl: 1    metFORMIN (GLUCOPHAGE) 1000 MG tablet, TOME DIANE TABLETA POR LA BOCA DOS VECES AL BRENDAN, Disp: 180 tablet, Rfl: 0    metoprolol tartrate (LOPRESSOR) 50 mg tablet, Take 1 tablet (50 mg total) by mouth every 12 (twelve) hours for 60 days, Disp: 120 tablet, Rfl: 0    Multiple Vitamins-Minerals (MULTIVITAMIN ADULT PO), Take by mouth daily, Disp: , Rfl:     fluticasone (FLONASE) 50 mcg/act nasal spray, 1 spray into each nostril 2 (two) times a day, Disp: , Rfl:   No Known Allergies  Vitals:    03/21/19 1448   BP: 158/70   Pulse: 77   Weight: 62 6 kg (138 lb 1 6 oz)   Height: 4' 11 25" (1 505 m)       Labs:  Lab Results   Component Value Date     09/08/2015    K 4 0 03/01/2019    K 4 8 09/08/2015     03/01/2019     09/08/2015    CO2 27 03/01/2019    CO2 28 09/08/2015    BUN 18 03/01/2019    BUN 15 09/08/2015    CREATININE 0 90 03/01/2019    CREATININE 0 93 09/08/2015    GLUCOSE 178 (H) 09/08/2015    CALCIUM 9 0 03/01/2019    CALCIUM 9 3 09/08/2015     Lab Results   Component Value Date    CKTOTAL 46 03/13/2014    TROPONINI <0 02 02/08/2019    TROPONINI 0 04 02/24/2015     Lab Results   Component Value Date    WBC 6 64 02/08/2019    WBC 7 05 12/22/2015    HGB 13 5 02/08/2019    HGB 11 4 (L) 12/22/2015    HCT 42 3 02/08/2019    HCT 37 8 12/22/2015    MCV 87 02/08/2019    MCV 75 (L) 12/22/2015     02/08/2019     12/22/2015     Lab Results   Component Value Date    CHOL 174 09/08/2015    TRIG 322 (H) 03/01/2019    TRIG 230 09/08/2015    HDL 39 (L) 03/01/2019    HDL 36 09/08/2015    LDLDIRECT 101 (H) 08/10/2018     Imaging: No results found  EKG  Intrinsic rhythm          EKG of tachycardia  Pseudo r' in V1   Notching in aVL  This is very suggestive of AVNRT                Review of Systems:  Review of Systems   All other systems reviewed and are negative  as described in my history of present illness        Physical Exam:  Physical Exam   Constitutional: She is oriented to person, place, and time  She appears well-developed and well-nourished  No distress  Not in any distress at the current time   HENT:   Head: Normocephalic and atraumatic  Right Ear: External ear normal    Left Ear: External ear normal    Nose: Nose normal    Mouth/Throat: Uvula is midline and mucous membranes are normal    Posterior pharynx is crowded   Eyes: Pupils are equal, round, and reactive to light  Conjunctivae, EOM and lids are normal  No scleral icterus  No pallor  No cyanosis  No icterus   Neck: Trachea normal and normal range of motion  Neck supple  No JVD present  Carotid bruit is not present  No thyromegaly present     No jugular lymphadenopathy   Cardiovascular: Normal rate, regular rhythm, S1 normal, S2 normal, normal heart sounds, intact distal pulses and normal pulses  PMI is not displaced  Exam reveals no gallop, no S3, no S4 and no friction rub  No murmur heard  Pulmonary/Chest: Effort normal and breath sounds normal  No accessory muscle usage  No respiratory distress  She has no decreased breath sounds  She has no wheezes  She has no rhonchi  She has no rales  She exhibits no tenderness  Abdominal: Soft  Normal appearance and bowel sounds are normal  She exhibits no distension and no mass  There is no splenomegaly or hepatomegaly  There is no tenderness  Musculoskeletal: Normal range of motion  She exhibits no edema, tenderness or deformity  Lymphadenopathy:     She has no cervical adenopathy  Neurological: She is alert and oriented to person, place, and time  Facial symmetry is retained  Extraocular movements are retained  Head neck tongue and palate movement are retained and symmetric   Skin: Skin is intact  No abrasion, no lesion and no rash noted  No erythema  Nails show no clubbing  Psychiatric: She has a normal mood and affect  Her speech is normal and behavior is normal  Thought content normal    Nursing note and vitals reviewed  Discussion/Summary:    1  Recurrent episode of supraventricular tachycardia    Detailed evaluation done  EKG is very suggestive of AVNRT  My recommendation is for ablation which is class 1 recommendation    There is 96% chance of success  There is 3-0% chance of complication  There is 2-6% chance of heart block needing a pacemaker    There is also a rare chance that this can be another atrial arrhythmia  This can only be identified after all catheters placed in the heart    NAVX  Advised to hold metoprolol for 2 days before the ablation        2  Hypertension  Today blood pressure is elevated  If it continues to remain elevated on multiple occasions, increase antihypertensive      3  Hyperlipidemia  Continue with lovastatin      4   Diabetes mellitus  Increased control of the same

## 2019-03-21 NOTE — LETTER
March 31, 2019     Ministerio Dueñas MD  [address] [address]    Patient: Eller Krabbe   YOB: 1940   Date of Visit: 3/21/2019       Dear Dr Harle Osgood:    Thank you for referring Indra Nichols to me for evaluation  Below are my notes for this consultation  If you have questions, please do not hesitate to call me  I look forward to following your patient along with you  Sincerely,        Rolfe Angelucci, MD        CC: Clarance App, PA-C Twyla Dimmer Patrecia Loach Dartouzos Rolfe Angelucci, MD  3/31/2019  9:12 AM  Sign at close encounter                                             Cardiology Consultation     Eller Krabbe  [id number]  1940  HEART & VASCULAR [facility] Bethany Luciajus  [address] [place] [address] [address]    1  Benign essential hypertension     2  AVNRT (AV dao re-entry tachycardia) ([facility] )  Ambulatory referral to Cardiology    POCT ECG   3  SVT (supraventricular tachycardia) ([facility] )     4   Dyslipidemia         History of present illness    The patient is having recurrent episodes of palpitation, for which has been referred to me by her PCP  I had seen her previously in the hospital and an ablation was planned  However at that time 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 and procedure was postponed      It has been present for quite some time now  Initially to present as episodes of palpitation  This has progressively increased in frequency and duration    The patient is not complaining of anginal like chest pain or chest pressure  There is no worsening orthopnea, paroxysmal nocturnal dyspnea  There is no leg swelling    Patient does get palpitation when it happens  There is no history of presyncope or syncope  There is rare history of transient  lightheadedness  When patient has these palpitations, it is associated with exertional intolerance      There is history of snoring at night  There is history of morning fatigability  There is occasional history of daytime sleepiness      Patient Active Problem List   Diagnosis    Benign essential hypertension    Colon polyp    Dyslipidemia    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)    Postmenopausal osteoporosis    Type 2 diabetes mellitus with hyperglycemia (HCC)    SVT (supraventricular tachycardia) ([place] [address] )    Visit for screening mammogram    Healthcare maintenance    Diabetes mellitus ([place] [address] )     Past Medical History:   Diagnosis Date    Anemia     Chronic idiopathic constipation     [facility]     Diverticulitis, colon     Esophageal reflux     Eustachian tube dysfunction     Gastrointestinal hemorrhage     Hypertension     Non-healing skin lesion     Palpitations     Rectal bleeding     Skin lesion of chest wall      Social History     Socioeconomic History    Marital status: /Civil Union     Spouse name: Not on file    Number of children: Not on file    Years of education: Not on file    Highest education level: Not on file   Occupational History    Occupation: packaging   Social Needs    Financial resource strain: Not on file    Food insecurity:     Worry: Not on file     Inability: Not on file    Transportation needs:     Medical: Not on file     Non-medical: Not on file   Tobacco Use    Smoking status: Never Smoker    Smokeless tobacco: Never Used   Substance and Sexual Activity    Alcohol use: No    Drug use: No    Sexual activity: Not Currently   Lifestyle    Physical activity:     Days per week: Not on file     Minutes per session: Not on file    Stress: Not on file   Relationships    Social connections:     Talks on phone: Not on file     Gets together: Not on file     Attends Buddhism service: Not on file     Active member of club or organization: Not on file     Attends meetings of clubs or organizations: Not on file Relationship status: Not on file    Intimate partner violence:     Fear of current or ex partner: Not on file     Emotionally abused: Not on file     Physically abused: Not on file     Forced sexual activity: Not on file   Other Topics Concern    Not on file   Social History Narrative    Caffeine use    Denied exercising      Family History   Problem Relation Age of Onset    Heart disease Mother     Bleeding Disorder Sister      Past Surgical History:   Procedure Laterality Date     SECTION      CHOLECYSTECTOMY      ECTOPIC PREGNANCY SURGERY      [place] COLONOSCOPY FLX DX W/COLLJ SPEC WHEN PFRMD N/A 2016    Procedure: COLONOSCOPY;  Surgeon: Koki Chauhan MD;  [facility];   Service: Gastroenterology       Current Outpatient Medications:     alendronate (FOSAMAX) 70 mg tablet, Take 70 mg by mouth every 7 days, Disp: , Rfl:     amLODIPine (NORVASC) 5 mg tablet, Take 1 tablet (5 mg total) by mouth daily for 180 days, Disp: 90 tablet, Rfl: 1    cholecalciferol (VITAMIN D3) 1,000 units tablet, Take 1 tablet (1,000 Units total) by mouth daily for 180 days, Disp: 90 tablet, Rfl: 1    glucose blood (FREESTYLE LITE) test strip, Use as instructed to check sugar up to 3 times daily, Disp: 100 each, Rfl: 1    Insulin Pen Needle (B-D UF III MINI PEN NEEDLES) 31G X 5 MM MISC, Inject 34 units SC daily in evening, Disp: 100 each, Rfl: 1    Lancets (FREESTYLE) lancets, Use as instructed to check sugar up to 3 times daily, Disp: 300 each, Rfl: 1    LEVEMIR FLEXTOUCH 100 units/mL injection pen, Inject 46 Units under the skin daily at bedtime for 180 days, Disp: 10 pen, Rfl: 2    lisinopril (ZESTRIL) 40 mg tablet, Take 1 tablet (40 mg total) by mouth daily for 180 days, Disp: 90 tablet, Rfl: 1    lovastatin (MEVACOR) 40 MG tablet, Take 2 tablets together every night, Disp: 180 tablet, Rfl: 1    metFORMIN (GLUCOPHAGE) 1000 MG tablet, TOME DIANE TABLETA POR LA BOCA DOS VECES AL BRENDAN, Disp: 180 tablet, Rfl: 0    metoprolol tartrate (LOPRESSOR) 50 mg tablet, Take 1 tablet (50 mg total) by mouth every 12 (twelve) hours for 60 days, Disp: 120 tablet, Rfl: 0    Multiple Vitamins-Minerals (MULTIVITAMIN ADULT PO), Take by mouth daily, Disp: , Rfl:     fluticasone (FLONASE) 50 mcg/act nasal spray, 1 spray into each nostril 2 (two) times a day, Disp: , Rfl:   No Known Allergies  Vitals:    03/21/19 1448   BP: 158/70   Pulse: 77   Weight: 62 6 kg (138 lb 1 6 oz)   Height: 4' 11 25" (1 505 m)       Labs:  Lab Results   Component Value Date     09/08/2015    K 4 0 03/01/2019    K 4 8 09/08/2015     03/01/2019     09/08/2015    CO2 27 03/01/2019    CO2 28 09/08/2015    BUN 18 03/01/2019    BUN 15 09/08/2015    CREATININE 0 90 03/01/2019    CREATININE 0 93 09/08/2015    GLUCOSE 178 (H) 09/08/2015    CALCIUM 9 0 03/01/2019    CALCIUM 9 3 09/08/2015     Lab Results   Component Value Date    CKTOTAL 46 03/13/2014    TROPONINI <0 02 02/08/2019    TROPONINI 0 04 02/24/2015     Lab Results   Component Value Date    WBC 6 64 02/08/2019    WBC 7 05 12/22/2015    HGB 13 5 02/08/2019    HGB 11 4 (L) 12/22/2015    HCT 42 3 02/08/2019    HCT 37 8 12/22/2015    MCV 87 02/08/2019    MCV 75 (L) 12/22/2015     02/08/2019     12/22/2015     Lab Results   Component Value Date    CHOL 174 09/08/2015    TRIG 322 (H) 03/01/2019    TRIG 230 09/08/2015    HDL 39 (L) 03/01/2019    HDL 36 09/08/2015    LDLDIRECT 101 (H) 08/10/2018     Imaging: No results found  EKG  Intrinsic rhythm          EKG of tachycardia  Pseudo r' in V1   Notching in aVL  This is very suggestive of AVNRT                Review of Systems:  Review of Systems   All other systems reviewed and are negative  as described in my history of present illness        Physical Exam:  Physical Exam   Constitutional: She is oriented to person, place, and time  She appears well-developed and well-nourished  No distress     Not in any distress at the current time   HENT:   Head: Normocephalic and atraumatic  Right Ear: External ear normal    Left Ear: External ear normal    Nose: Nose normal    Mouth/Throat: Uvula is midline and mucous membranes are normal    Posterior pharynx is crowded   Eyes: Pupils are equal, round, and reactive to light  Conjunctivae, EOM and lids are normal  No scleral icterus  No pallor  No cyanosis  No icterus   Neck: Trachea normal and normal range of motion  Neck supple  No JVD present  Carotid bruit is not present  No thyromegaly present  No jugular lymphadenopathy   Cardiovascular: Normal rate, regular rhythm, S1 normal, S2 normal, normal heart sounds, intact distal pulses and normal pulses  PMI is not displaced  Exam reveals no gallop, no S3, no S4 and no friction rub  No murmur heard  Pulmonary/Chest: Effort normal and breath sounds normal  No accessory muscle usage  No respiratory distress  She has no decreased breath sounds  She has no wheezes  She has no rhonchi  She has no rales  She exhibits no tenderness  Abdominal: Soft  Normal appearance and bowel sounds are normal  She exhibits no distension and no mass  There is no splenomegaly or hepatomegaly  There is no tenderness  Musculoskeletal: Normal range of motion  She exhibits no edema, tenderness or deformity  Lymphadenopathy:     She has no cervical adenopathy  Neurological: She is alert and oriented to person, place, and time  Facial symmetry is retained  Extraocular movements are retained  Head neck tongue and palate movement are retained and symmetric   Skin: Skin is intact  No abrasion, no lesion and no rash noted  No erythema  Nails show no clubbing  Psychiatric: She has a normal mood and affect  Her speech is normal and behavior is normal  Thought content normal    Nursing note and vitals reviewed  Discussion/Summary:    1   Recurrent episode of supraventricular tachycardia    Detailed evaluation done  EKG is very suggestive of AVNRT  My recommendation is for ablation which is class 1 recommendation    There is 96% chance of success  There is 3-2% chance of complication  There is 5-3% chance of heart block needing a pacemaker    There is also a rare chance that this can be another atrial arrhythmia  This can only be identified after all catheters placed in the heart    NAVX  Advised to hold metoprolol for 2 days before the ablation        2  Hypertension  Today blood pressure is elevated  If it continues to remain elevated on multiple occasions, increase antihypertensive      3  Hyperlipidemia  Continue with lovastatin      4   Diabetes mellitus  Increased control of the same

## 2019-03-31 PROBLEM — E11.9 DIABETES MELLITUS (HCC): Status: ACTIVE | Noted: 2019-03-31

## 2019-04-09 DIAGNOSIS — I47.1 AVNRT (AV NODAL RE-ENTRY TACHYCARDIA) (HCC): ICD-10-CM

## 2019-04-10 ENCOUNTER — HOSPITAL ENCOUNTER (OUTPATIENT)
Dept: RADIOLOGY | Age: 79
Discharge: HOME | End: 2019-04-10
Payer: COMMERCIAL

## 2019-04-10 VITALS — WEIGHT: 135 LBS | HEIGHT: 59 IN | BODY MASS INDEX: 27.21 KG/M2

## 2019-04-10 DIAGNOSIS — Z12.31 VISIT FOR SCREENING MAMMOGRAM: ICD-10-CM

## 2019-04-10 DIAGNOSIS — M81.0 POSTMENOPAUSAL OSTEOPOROSIS: ICD-10-CM

## 2019-04-10 PROCEDURE — 77067 SCR MAMMO BI INCL CAD: CPT

## 2019-04-10 PROCEDURE — 77080 DXA BONE DENSITY AXIAL: CPT

## 2019-04-10 RX ORDER — METOPROLOL TARTRATE 50 MG/1
TABLET, FILM COATED ORAL
Qty: 120 TABLET | Refills: 0 | Status: ON HOLD | OUTPATIENT
Start: 2019-04-10 | End: 2019-05-04 | Stop reason: SDUPTHER

## 2019-04-12 ENCOUNTER — TELEPHONE (OUTPATIENT)
Dept: INTERNAL MEDICINE CLINIC | Facility: CLINIC | Age: 79
End: 2019-04-12

## 2019-04-12 DIAGNOSIS — I47.1 SVT (SUPRAVENTRICULAR TACHYCARDIA) (HCC): Primary | ICD-10-CM

## 2019-04-16 DIAGNOSIS — I47.1 SVT (SUPRAVENTRICULAR TACHYCARDIA) (HCC): Primary | ICD-10-CM

## 2019-04-16 DIAGNOSIS — I47.1 AVNRT (AV NODAL RE-ENTRY TACHYCARDIA) (HCC): ICD-10-CM

## 2019-04-27 ENCOUNTER — APPOINTMENT (OUTPATIENT)
Dept: LAB | Facility: HOSPITAL | Age: 79
End: 2019-04-27
Attending: INTERNAL MEDICINE
Payer: COMMERCIAL

## 2019-04-27 DIAGNOSIS — I47.1 SVT (SUPRAVENTRICULAR TACHYCARDIA) (HCC): ICD-10-CM

## 2019-04-27 LAB
ALBUMIN SERPL BCP-MCNC: 3.8 G/DL (ref 3.5–5)
ALP SERPL-CCNC: 57 U/L (ref 46–116)
ALT SERPL W P-5'-P-CCNC: 63 U/L (ref 12–78)
ANION GAP SERPL CALCULATED.3IONS-SCNC: 5 MMOL/L (ref 4–13)
AST SERPL W P-5'-P-CCNC: 59 U/L (ref 5–45)
BASOPHILS # BLD AUTO: 0.07 THOUSANDS/ΜL (ref 0–0.1)
BASOPHILS NFR BLD AUTO: 1 % (ref 0–1)
BILIRUB SERPL-MCNC: 0.61 MG/DL (ref 0.2–1)
BUN SERPL-MCNC: 13 MG/DL (ref 5–25)
CALCIUM SERPL-MCNC: 9.1 MG/DL (ref 8.3–10.1)
CHLORIDE SERPL-SCNC: 101 MMOL/L (ref 100–108)
CO2 SERPL-SCNC: 28 MMOL/L (ref 21–32)
CREAT SERPL-MCNC: 0.85 MG/DL (ref 0.6–1.3)
EOSINOPHIL # BLD AUTO: 0.13 THOUSAND/ΜL (ref 0–0.61)
EOSINOPHIL NFR BLD AUTO: 2 % (ref 0–6)
ERYTHROCYTE [DISTWIDTH] IN BLOOD BY AUTOMATED COUNT: 13.4 % (ref 11.6–15.1)
GFR SERPL CREATININE-BSD FRML MDRD: 66 ML/MIN/1.73SQ M
GLUCOSE P FAST SERPL-MCNC: 146 MG/DL (ref 65–99)
HCT VFR BLD AUTO: 43.9 % (ref 34.8–46.1)
HGB BLD-MCNC: 13.9 G/DL (ref 11.5–15.4)
IMM GRANULOCYTES # BLD AUTO: 0.09 THOUSAND/UL (ref 0–0.2)
IMM GRANULOCYTES NFR BLD AUTO: 1 % (ref 0–2)
LYMPHOCYTES # BLD AUTO: 2.54 THOUSANDS/ΜL (ref 0.6–4.47)
LYMPHOCYTES NFR BLD AUTO: 30 % (ref 14–44)
MCH RBC QN AUTO: 28 PG (ref 26.8–34.3)
MCHC RBC AUTO-ENTMCNC: 31.7 G/DL (ref 31.4–37.4)
MCV RBC AUTO: 89 FL (ref 82–98)
MONOCYTES # BLD AUTO: 0.7 THOUSAND/ΜL (ref 0.17–1.22)
MONOCYTES NFR BLD AUTO: 8 % (ref 4–12)
NEUTROPHILS # BLD AUTO: 5.09 THOUSANDS/ΜL (ref 1.85–7.62)
NEUTS SEG NFR BLD AUTO: 58 % (ref 43–75)
NRBC BLD AUTO-RTO: 0 /100 WBCS
PLATELET # BLD AUTO: 306 THOUSANDS/UL (ref 149–390)
PMV BLD AUTO: 10.9 FL (ref 8.9–12.7)
POTASSIUM SERPL-SCNC: 4.4 MMOL/L (ref 3.5–5.3)
PROT SERPL-MCNC: 8.1 G/DL (ref 6.4–8.2)
RBC # BLD AUTO: 4.96 MILLION/UL (ref 3.81–5.12)
SODIUM SERPL-SCNC: 134 MMOL/L (ref 136–145)
WBC # BLD AUTO: 8.62 THOUSAND/UL (ref 4.31–10.16)

## 2019-04-27 PROCEDURE — 80053 COMPREHEN METABOLIC PANEL: CPT

## 2019-04-27 PROCEDURE — 36415 COLL VENOUS BLD VENIPUNCTURE: CPT

## 2019-04-27 PROCEDURE — 85025 COMPLETE CBC W/AUTO DIFF WBC: CPT

## 2019-05-03 ENCOUNTER — ANESTHESIA (OUTPATIENT)
Dept: NON INVASIVE DIAGNOSTICS | Facility: HOSPITAL | Age: 79
End: 2019-05-03
Payer: COMMERCIAL

## 2019-05-03 ENCOUNTER — HOSPITAL ENCOUNTER (OUTPATIENT)
Dept: NON INVASIVE DIAGNOSTICS | Facility: HOSPITAL | Age: 79
Discharge: HOME | End: 2019-05-04
Attending: INTERNAL MEDICINE | Admitting: INTERNAL MEDICINE
Payer: COMMERCIAL

## 2019-05-03 ENCOUNTER — ANESTHESIA EVENT (OUTPATIENT)
Dept: NON INVASIVE DIAGNOSTICS | Facility: HOSPITAL | Age: 79
End: 2019-05-03
Payer: COMMERCIAL

## 2019-05-03 DIAGNOSIS — Z79.4 TYPE 2 DIABETES MELLITUS WITH HYPERGLYCEMIA, WITH LONG-TERM CURRENT USE OF INSULIN (HCC): ICD-10-CM

## 2019-05-03 DIAGNOSIS — E11.65 TYPE 2 DIABETES MELLITUS WITH HYPERGLYCEMIA, WITH LONG-TERM CURRENT USE OF INSULIN (HCC): ICD-10-CM

## 2019-05-03 DIAGNOSIS — I47.1 SVT (SUPRAVENTRICULAR TACHYCARDIA) (HCC): ICD-10-CM

## 2019-05-03 DIAGNOSIS — I47.1 AVNRT (AV NODAL RE-ENTRY TACHYCARDIA) (HCC): ICD-10-CM

## 2019-05-03 LAB
ANION GAP SERPL CALCULATED.3IONS-SCNC: 7 MMOL/L (ref 4–13)
ATRIAL RATE: 78 BPM
ATRIAL RATE: 80 BPM
BASOPHILS # BLD AUTO: 0.08 THOUSANDS/ΜL (ref 0–0.1)
BASOPHILS NFR BLD AUTO: 1 % (ref 0–1)
BUN SERPL-MCNC: 13 MG/DL (ref 5–25)
CALCIUM SERPL-MCNC: 9.4 MG/DL (ref 8.3–10.1)
CHLORIDE SERPL-SCNC: 100 MMOL/L (ref 100–108)
CO2 SERPL-SCNC: 28 MMOL/L (ref 21–32)
CREAT SERPL-MCNC: 0.99 MG/DL (ref 0.6–1.3)
EOSINOPHIL # BLD AUTO: 0.14 THOUSAND/ΜL (ref 0–0.61)
EOSINOPHIL NFR BLD AUTO: 2 % (ref 0–6)
ERYTHROCYTE [DISTWIDTH] IN BLOOD BY AUTOMATED COUNT: 13.7 % (ref 11.6–15.1)
GFR SERPL CREATININE-BSD FRML MDRD: 55 ML/MIN/1.73SQ M
GLUCOSE P FAST SERPL-MCNC: 222 MG/DL (ref 65–99)
GLUCOSE SERPL-MCNC: 222 MG/DL (ref 65–140)
GLUCOSE SERPL-MCNC: 257 MG/DL (ref 65–140)
GLUCOSE SERPL-MCNC: 315 MG/DL (ref 65–140)
GLUCOSE SERPL-MCNC: 406 MG/DL (ref 65–140)
HCT VFR BLD AUTO: 43.1 % (ref 34.8–46.1)
HGB BLD-MCNC: 13.7 G/DL (ref 11.5–15.4)
IMM GRANULOCYTES # BLD AUTO: 0.03 THOUSAND/UL (ref 0–0.2)
IMM GRANULOCYTES NFR BLD AUTO: 0 % (ref 0–2)
LYMPHOCYTES # BLD AUTO: 3.31 THOUSANDS/ΜL (ref 0.6–4.47)
LYMPHOCYTES NFR BLD AUTO: 45 % (ref 14–44)
MAGNESIUM SERPL-MCNC: 1.8 MG/DL (ref 1.6–2.6)
MCH RBC QN AUTO: 27.4 PG (ref 26.8–34.3)
MCHC RBC AUTO-ENTMCNC: 31.8 G/DL (ref 31.4–37.4)
MCV RBC AUTO: 86 FL (ref 82–98)
MONOCYTES # BLD AUTO: 0.68 THOUSAND/ΜL (ref 0.17–1.22)
MONOCYTES NFR BLD AUTO: 9 % (ref 4–12)
NEUTROPHILS # BLD AUTO: 3.15 THOUSANDS/ΜL (ref 1.85–7.62)
NEUTS SEG NFR BLD AUTO: 43 % (ref 43–75)
NRBC BLD AUTO-RTO: 0 /100 WBCS
P AXIS: 46 DEGREES
P AXIS: 51 DEGREES
PLATELET # BLD AUTO: 297 THOUSANDS/UL (ref 149–390)
PMV BLD AUTO: 10.7 FL (ref 8.9–12.7)
POTASSIUM SERPL-SCNC: 3.7 MMOL/L (ref 3.5–5.3)
PR INTERVAL: 146 MS
PR INTERVAL: 154 MS
QRS AXIS: -38 DEGREES
QRS AXIS: -39 DEGREES
QRSD INTERVAL: 76 MS
QRSD INTERVAL: 79 MS
QT INTERVAL: 354 MS
QT INTERVAL: 386 MS
QTC INTERVAL: 409 MS
QTC INTERVAL: 440 MS
RBC # BLD AUTO: 5 MILLION/UL (ref 3.81–5.12)
SODIUM SERPL-SCNC: 135 MMOL/L (ref 136–145)
T WAVE AXIS: -9 DEGREES
T WAVE AXIS: 35 DEGREES
VENTRICULAR RATE: 78 BPM
VENTRICULAR RATE: 80 BPM
WBC # BLD AUTO: 7.39 THOUSAND/UL (ref 4.31–10.16)

## 2019-05-03 PROCEDURE — 93613 INTRACARDIAC EPHYS 3D MAPG: CPT | Performed by: INTERNAL MEDICINE

## 2019-05-03 PROCEDURE — C1893 INTRO/SHEATH, FIXED,NON-PEEL: HCPCS | Performed by: INTERNAL MEDICINE

## 2019-05-03 PROCEDURE — 83735 ASSAY OF MAGNESIUM: CPT | Performed by: PHYSICIAN ASSISTANT

## 2019-05-03 PROCEDURE — 80048 BASIC METABOLIC PNL TOTAL CA: CPT | Performed by: PHYSICIAN ASSISTANT

## 2019-05-03 PROCEDURE — 93005 ELECTROCARDIOGRAM TRACING: CPT

## 2019-05-03 PROCEDURE — 93623 PRGRMD STIMJ&PACG IV RX NFS: CPT | Performed by: INTERNAL MEDICINE

## 2019-05-03 PROCEDURE — C1730 CATH, EP, 19 OR FEW ELECT: HCPCS | Performed by: INTERNAL MEDICINE

## 2019-05-03 PROCEDURE — C1894 INTRO/SHEATH, NON-LASER: HCPCS | Performed by: INTERNAL MEDICINE

## 2019-05-03 PROCEDURE — 82948 REAGENT STRIP/BLOOD GLUCOSE: CPT

## 2019-05-03 PROCEDURE — C1733 CATH, EP, OTHR THAN COOL-TIP: HCPCS | Performed by: INTERNAL MEDICINE

## 2019-05-03 PROCEDURE — 85025 COMPLETE CBC W/AUTO DIFF WBC: CPT | Performed by: PHYSICIAN ASSISTANT

## 2019-05-03 PROCEDURE — NC001 PR NO CHARGE: Performed by: INTERNAL MEDICINE

## 2019-05-03 PROCEDURE — 93621 COMP EP EVL L PAC&REC C SINS: CPT | Performed by: INTERNAL MEDICINE

## 2019-05-03 PROCEDURE — 93653 COMPRE EP EVAL TX SVT: CPT | Performed by: INTERNAL MEDICINE

## 2019-05-03 PROCEDURE — 93010 ELECTROCARDIOGRAM REPORT: CPT | Performed by: INTERNAL MEDICINE

## 2019-05-03 RX ORDER — SODIUM CHLORIDE 9 MG/ML
INJECTION, SOLUTION INTRAVENOUS CONTINUOUS PRN
Status: DISCONTINUED | OUTPATIENT
Start: 2019-05-03 | End: 2019-05-03 | Stop reason: SURG

## 2019-05-03 RX ORDER — PROPOFOL 10 MG/ML
INJECTION, EMULSION INTRAVENOUS CONTINUOUS PRN
Status: DISCONTINUED | OUTPATIENT
Start: 2019-05-03 | End: 2019-05-03 | Stop reason: SURG

## 2019-05-03 RX ORDER — FENTANYL CITRATE/PF 50 MCG/ML
25 SYRINGE (ML) INJECTION
Status: DISCONTINUED | OUTPATIENT
Start: 2019-05-03 | End: 2019-05-03 | Stop reason: HOSPADM

## 2019-05-03 RX ORDER — PRAVASTATIN SODIUM 80 MG/1
80 TABLET ORAL
Status: DISCONTINUED | OUTPATIENT
Start: 2019-05-03 | End: 2019-05-04 | Stop reason: HOSPADM

## 2019-05-03 RX ORDER — LISINOPRIL 20 MG/1
40 TABLET ORAL DAILY
Status: DISCONTINUED | OUTPATIENT
Start: 2019-05-03 | End: 2019-05-04 | Stop reason: HOSPADM

## 2019-05-03 RX ORDER — ACETAMINOPHEN 325 MG/1
650 TABLET ORAL EVERY 4 HOURS PRN
Status: DISCONTINUED | OUTPATIENT
Start: 2019-05-03 | End: 2019-05-04 | Stop reason: HOSPADM

## 2019-05-03 RX ORDER — SODIUM CHLORIDE, SODIUM LACTATE, POTASSIUM CHLORIDE, CALCIUM CHLORIDE 600; 310; 30; 20 MG/100ML; MG/100ML; MG/100ML; MG/100ML
100 INJECTION, SOLUTION INTRAVENOUS CONTINUOUS
Status: DISCONTINUED | OUTPATIENT
Start: 2019-05-03 | End: 2019-05-03

## 2019-05-03 RX ORDER — SODIUM CHLORIDE 9 MG/ML
75 INJECTION, SOLUTION INTRAVENOUS CONTINUOUS
Status: DISCONTINUED | OUTPATIENT
Start: 2019-05-03 | End: 2019-05-03

## 2019-05-03 RX ORDER — PROMETHAZINE HYDROCHLORIDE 25 MG/ML
12.5 INJECTION, SOLUTION INTRAMUSCULAR; INTRAVENOUS ONCE AS NEEDED
Status: DISCONTINUED | OUTPATIENT
Start: 2019-05-03 | End: 2019-05-04 | Stop reason: HOSPADM

## 2019-05-03 RX ORDER — ONDANSETRON 2 MG/ML
4 INJECTION INTRAMUSCULAR; INTRAVENOUS ONCE AS NEEDED
Status: DISCONTINUED | OUTPATIENT
Start: 2019-05-03 | End: 2019-05-04 | Stop reason: HOSPADM

## 2019-05-03 RX ORDER — AMLODIPINE BESYLATE 5 MG/1
5 TABLET ORAL DAILY
Status: DISCONTINUED | OUTPATIENT
Start: 2019-05-03 | End: 2019-05-04 | Stop reason: HOSPADM

## 2019-05-03 RX ORDER — METOCLOPRAMIDE HYDROCHLORIDE 5 MG/ML
10 INJECTION INTRAMUSCULAR; INTRAVENOUS ONCE AS NEEDED
Status: DISCONTINUED | OUTPATIENT
Start: 2019-05-03 | End: 2019-05-04 | Stop reason: HOSPADM

## 2019-05-03 RX ORDER — PROPOFOL 10 MG/ML
INJECTION, EMULSION INTRAVENOUS AS NEEDED
Status: DISCONTINUED | OUTPATIENT
Start: 2019-05-03 | End: 2019-05-03 | Stop reason: SURG

## 2019-05-03 RX ORDER — FLUTICASONE PROPIONATE 50 MCG
1 SPRAY, SUSPENSION (ML) NASAL 2 TIMES DAILY
Status: DISCONTINUED | OUTPATIENT
Start: 2019-05-03 | End: 2019-05-04 | Stop reason: HOSPADM

## 2019-05-03 RX ORDER — LIDOCAINE HYDROCHLORIDE 10 MG/ML
INJECTION, SOLUTION INFILTRATION; PERINEURAL CODE/TRAUMA/SEDATION MEDICATION
Status: COMPLETED | OUTPATIENT
Start: 2019-05-03 | End: 2019-05-03

## 2019-05-03 RX ORDER — ONDANSETRON 2 MG/ML
INJECTION INTRAMUSCULAR; INTRAVENOUS AS NEEDED
Status: DISCONTINUED | OUTPATIENT
Start: 2019-05-03 | End: 2019-05-03 | Stop reason: SURG

## 2019-05-03 RX ORDER — DEXAMETHASONE SODIUM PHOSPHATE 10 MG/ML
INJECTION, SOLUTION INTRAMUSCULAR; INTRAVENOUS AS NEEDED
Status: DISCONTINUED | OUTPATIENT
Start: 2019-05-03 | End: 2019-05-03 | Stop reason: SURG

## 2019-05-03 RX ADMIN — PHENYLEPHRINE HYDROCHLORIDE 50 MCG: 10 INJECTION INTRAVENOUS at 10:08

## 2019-05-03 RX ADMIN — INSULIN LISPRO 3 UNITS: 100 INJECTION, SOLUTION INTRAVENOUS; SUBCUTANEOUS at 16:19

## 2019-05-03 RX ADMIN — PHENYLEPHRINE HYDROCHLORIDE 50 MCG: 10 INJECTION INTRAVENOUS at 10:05

## 2019-05-03 RX ADMIN — PROPOFOL 50 MG: 10 INJECTION, EMULSION INTRAVENOUS at 09:42

## 2019-05-03 RX ADMIN — INSULIN HUMAN 10 UNITS: 100 INJECTION, SOLUTION PARENTERAL at 11:27

## 2019-05-03 RX ADMIN — LISINOPRIL 40 MG: 20 TABLET ORAL at 14:57

## 2019-05-03 RX ADMIN — SODIUM CHLORIDE 75 ML/HR: 0.9 INJECTION, SOLUTION INTRAVENOUS at 12:00

## 2019-05-03 RX ADMIN — PHENYLEPHRINE HYDROCHLORIDE 100 MCG: 10 INJECTION INTRAVENOUS at 09:00

## 2019-05-03 RX ADMIN — PRAVASTATIN SODIUM 80 MG: 80 TABLET ORAL at 18:04

## 2019-05-03 RX ADMIN — LIDOCAINE HYDROCHLORIDE 8 ML: 10 INJECTION, SOLUTION INFILTRATION; PERINEURAL at 08:52

## 2019-05-03 RX ADMIN — SODIUM CHLORIDE: 0.9 INJECTION, SOLUTION INTRAVENOUS at 08:08

## 2019-05-03 RX ADMIN — INSULIN DETEMIR 40 UNITS: 100 INJECTION, SOLUTION SUBCUTANEOUS at 21:04

## 2019-05-03 RX ADMIN — METOPROLOL TARTRATE 25 MG: 25 TABLET ORAL at 23:54

## 2019-05-03 RX ADMIN — PHENYLEPHRINE HYDROCHLORIDE 50 MCG: 10 INJECTION INTRAVENOUS at 10:20

## 2019-05-03 RX ADMIN — PHENYLEPHRINE HYDROCHLORIDE 100 MCG: 10 INJECTION INTRAVENOUS at 09:12

## 2019-05-03 RX ADMIN — PROPOFOL 100 MG: 10 INJECTION, EMULSION INTRAVENOUS at 09:52

## 2019-05-03 RX ADMIN — PROPOFOL 30 MG: 10 INJECTION, EMULSION INTRAVENOUS at 08:20

## 2019-05-03 RX ADMIN — DEXAMETHASONE SODIUM PHOSPHATE 5 MG: 10 INJECTION, SOLUTION INTRAMUSCULAR; INTRAVENOUS at 10:04

## 2019-05-03 RX ADMIN — ONDANSETRON 4 MG: 2 INJECTION INTRAMUSCULAR; INTRAVENOUS at 10:32

## 2019-05-03 RX ADMIN — AMLODIPINE BESYLATE 5 MG: 5 TABLET ORAL at 14:58

## 2019-05-03 RX ADMIN — PROPOFOL 110 MCG/KG/MIN: 10 INJECTION, EMULSION INTRAVENOUS at 08:20

## 2019-05-03 RX ADMIN — ISOPROTERENOL HYDROCHLORIDE 1 MCG/MIN: 0.2 INJECTION, SOLUTION INTRAMUSCULAR; INTRAVENOUS at 10:11

## 2019-05-03 RX ADMIN — INSULIN LISPRO 6 UNITS: 100 INJECTION, SOLUTION INTRAVENOUS; SUBCUTANEOUS at 21:07

## 2019-05-04 VITALS
HEIGHT: 59 IN | RESPIRATION RATE: 18 BRPM | HEART RATE: 82 BPM | WEIGHT: 134.92 LBS | SYSTOLIC BLOOD PRESSURE: 153 MMHG | TEMPERATURE: 98.2 F | DIASTOLIC BLOOD PRESSURE: 69 MMHG | OXYGEN SATURATION: 96 % | BODY MASS INDEX: 27.2 KG/M2

## 2019-05-04 LAB
ANION GAP SERPL CALCULATED.3IONS-SCNC: 7 MMOL/L (ref 4–13)
BUN SERPL-MCNC: 16 MG/DL (ref 5–25)
CALCIUM SERPL-MCNC: 8.2 MG/DL (ref 8.3–10.1)
CHLORIDE SERPL-SCNC: 106 MMOL/L (ref 100–108)
CO2 SERPL-SCNC: 22 MMOL/L (ref 21–32)
CREAT SERPL-MCNC: 0.87 MG/DL (ref 0.6–1.3)
ERYTHROCYTE [DISTWIDTH] IN BLOOD BY AUTOMATED COUNT: 13.9 % (ref 11.6–15.1)
GFR SERPL CREATININE-BSD FRML MDRD: 64 ML/MIN/1.73SQ M
GLUCOSE SERPL-MCNC: 269 MG/DL (ref 65–140)
GLUCOSE SERPL-MCNC: 292 MG/DL (ref 65–140)
GLUCOSE SERPL-MCNC: 292 MG/DL (ref 65–140)
HCT VFR BLD AUTO: 38.5 % (ref 34.8–46.1)
HGB BLD-MCNC: 12.2 G/DL (ref 11.5–15.4)
MCH RBC QN AUTO: 27.5 PG (ref 26.8–34.3)
MCHC RBC AUTO-ENTMCNC: 31.7 G/DL (ref 31.4–37.4)
MCV RBC AUTO: 87 FL (ref 82–98)
PLATELET # BLD AUTO: 285 THOUSANDS/UL (ref 149–390)
PMV BLD AUTO: 10.7 FL (ref 8.9–12.7)
POTASSIUM SERPL-SCNC: 4.4 MMOL/L (ref 3.5–5.3)
RBC # BLD AUTO: 4.44 MILLION/UL (ref 3.81–5.12)
SODIUM SERPL-SCNC: 135 MMOL/L (ref 136–145)
WBC # BLD AUTO: 13.3 THOUSAND/UL (ref 4.31–10.16)

## 2019-05-04 PROCEDURE — NC001 PR NO CHARGE: Performed by: INTERNAL MEDICINE

## 2019-05-04 PROCEDURE — 85027 COMPLETE CBC AUTOMATED: CPT | Performed by: PHYSICIAN ASSISTANT

## 2019-05-04 PROCEDURE — 82948 REAGENT STRIP/BLOOD GLUCOSE: CPT

## 2019-05-04 PROCEDURE — 80048 BASIC METABOLIC PNL TOTAL CA: CPT | Performed by: PHYSICIAN ASSISTANT

## 2019-05-04 RX ORDER — METOPROLOL TARTRATE 50 MG/1
25 TABLET, FILM COATED ORAL EVERY 12 HOURS
Qty: 120 TABLET | Refills: 0
Start: 2019-05-04 | End: 2019-09-22 | Stop reason: SDUPTHER

## 2019-05-04 RX ADMIN — INSULIN LISPRO 5 UNITS: 100 INJECTION, SOLUTION INTRAVENOUS; SUBCUTANEOUS at 07:55

## 2019-05-04 RX ADMIN — INSULIN LISPRO 3 UNITS: 100 INJECTION, SOLUTION INTRAVENOUS; SUBCUTANEOUS at 11:01

## 2019-05-04 RX ADMIN — INSULIN LISPRO 3 UNITS: 100 INJECTION, SOLUTION INTRAVENOUS; SUBCUTANEOUS at 06:48

## 2019-05-04 RX ADMIN — AMLODIPINE BESYLATE 5 MG: 5 TABLET ORAL at 08:03

## 2019-05-04 RX ADMIN — LISINOPRIL 40 MG: 20 TABLET ORAL at 08:03

## 2019-05-09 DIAGNOSIS — E11.65 TYPE 2 DIABETES MELLITUS WITH HYPERGLYCEMIA, WITH LONG-TERM CURRENT USE OF INSULIN (HCC): ICD-10-CM

## 2019-05-09 DIAGNOSIS — Z79.4 TYPE 2 DIABETES MELLITUS WITH HYPERGLYCEMIA, WITH LONG-TERM CURRENT USE OF INSULIN (HCC): ICD-10-CM

## 2019-05-09 RX ORDER — INSULIN DETEMIR 100 [IU]/ML
40 INJECTION, SOLUTION SUBCUTANEOUS
Refills: 0 | OUTPATIENT
Start: 2019-05-09 | End: 2019-11-05

## 2019-05-15 DIAGNOSIS — Z79.4 TYPE 2 DIABETES MELLITUS WITH HYPERGLYCEMIA, WITH LONG-TERM CURRENT USE OF INSULIN (HCC): ICD-10-CM

## 2019-05-15 DIAGNOSIS — E11.65 TYPE 2 DIABETES MELLITUS WITH HYPERGLYCEMIA, WITH LONG-TERM CURRENT USE OF INSULIN (HCC): ICD-10-CM

## 2019-06-06 DIAGNOSIS — I47.1 AVNRT (AV NODAL RE-ENTRY TACHYCARDIA) (HCC): ICD-10-CM

## 2019-06-07 RX ORDER — METOPROLOL TARTRATE 50 MG/1
TABLET, FILM COATED ORAL
Qty: 120 TABLET | Refills: 0 | Status: SHIPPED | OUTPATIENT
Start: 2019-06-07 | End: 2019-09-11 | Stop reason: SDUPTHER

## 2019-06-11 ENCOUNTER — TELEPHONE (OUTPATIENT)
Dept: INTERNAL MEDICINE CLINIC | Facility: CLINIC | Age: 79
End: 2019-06-11

## 2019-06-12 ENCOUNTER — APPOINTMENT (OUTPATIENT)
Dept: LAB | Facility: CLINIC | Age: 79
End: 2019-06-12
Payer: COMMERCIAL

## 2019-06-12 DIAGNOSIS — Z79.4 TYPE 2 DIABETES MELLITUS TREATED WITH INSULIN (HCC): ICD-10-CM

## 2019-06-12 DIAGNOSIS — E11.9 TYPE 2 DIABETES MELLITUS TREATED WITH INSULIN (HCC): ICD-10-CM

## 2019-06-12 LAB
ALBUMIN SERPL BCP-MCNC: 3.9 G/DL (ref 3.5–5)
ALP SERPL-CCNC: 69 U/L (ref 46–116)
ALT SERPL W P-5'-P-CCNC: 47 U/L (ref 12–78)
ANION GAP SERPL CALCULATED.3IONS-SCNC: 7 MMOL/L (ref 4–13)
AST SERPL W P-5'-P-CCNC: 31 U/L (ref 5–45)
BILIRUB SERPL-MCNC: 0.54 MG/DL (ref 0.2–1)
BUN SERPL-MCNC: 11 MG/DL (ref 5–25)
CALCIUM SERPL-MCNC: 8.7 MG/DL (ref 8.3–10.1)
CHLORIDE SERPL-SCNC: 105 MMOL/L (ref 100–108)
CO2 SERPL-SCNC: 27 MMOL/L (ref 21–32)
CREAT SERPL-MCNC: 0.83 MG/DL (ref 0.6–1.3)
EST. AVERAGE GLUCOSE BLD GHB EST-MCNC: 212 MG/DL
GFR SERPL CREATININE-BSD FRML MDRD: 68 ML/MIN/1.73SQ M
GLUCOSE P FAST SERPL-MCNC: 142 MG/DL (ref 65–99)
HBA1C MFR BLD: 9 % (ref 4.2–6.3)
POTASSIUM SERPL-SCNC: 4 MMOL/L (ref 3.5–5.3)
PROT SERPL-MCNC: 7.5 G/DL (ref 6.4–8.2)
SODIUM SERPL-SCNC: 139 MMOL/L (ref 136–145)

## 2019-06-12 PROCEDURE — 80053 COMPREHEN METABOLIC PANEL: CPT

## 2019-06-12 PROCEDURE — 83036 HEMOGLOBIN GLYCOSYLATED A1C: CPT

## 2019-06-12 PROCEDURE — 36415 COLL VENOUS BLD VENIPUNCTURE: CPT

## 2019-06-13 PROBLEM — Z79.4 DIABETES MELLITUS TYPE 2, INSULIN DEPENDENT (HCC): Chronic | Status: ACTIVE | Noted: 2019-03-31

## 2019-06-13 PROBLEM — E11.9 DIABETES MELLITUS TYPE 2, INSULIN DEPENDENT (HCC): Chronic | Status: ACTIVE | Noted: 2019-03-31

## 2019-06-14 ENCOUNTER — OFFICE VISIT (OUTPATIENT)
Dept: INTERNAL MEDICINE CLINIC | Facility: CLINIC | Age: 79
End: 2019-06-14

## 2019-06-14 VITALS
TEMPERATURE: 97.7 F | HEIGHT: 59 IN | WEIGHT: 136.69 LBS | DIASTOLIC BLOOD PRESSURE: 80 MMHG | SYSTOLIC BLOOD PRESSURE: 136 MMHG | HEART RATE: 72 BPM | BODY MASS INDEX: 27.56 KG/M2

## 2019-06-14 DIAGNOSIS — E78.5 DYSLIPIDEMIA: ICD-10-CM

## 2019-06-14 DIAGNOSIS — E11.65 TYPE 2 DIABETES MELLITUS WITH HYPERGLYCEMIA, WITH LONG-TERM CURRENT USE OF INSULIN (HCC): ICD-10-CM

## 2019-06-14 DIAGNOSIS — Z00.00 HEALTHCARE MAINTENANCE: ICD-10-CM

## 2019-06-14 DIAGNOSIS — E66.3 OVERWEIGHT (BMI 25.0-29.9): Chronic | ICD-10-CM

## 2019-06-14 DIAGNOSIS — E11.9 DIABETES MELLITUS TYPE 2, INSULIN DEPENDENT (HCC): Primary | Chronic | ICD-10-CM

## 2019-06-14 DIAGNOSIS — Z79.4 DIABETES MELLITUS TYPE 2, INSULIN DEPENDENT (HCC): Primary | Chronic | ICD-10-CM

## 2019-06-14 DIAGNOSIS — Z79.4 TYPE 2 DIABETES MELLITUS WITH HYPERGLYCEMIA, WITH LONG-TERM CURRENT USE OF INSULIN (HCC): ICD-10-CM

## 2019-06-14 DIAGNOSIS — I10 BENIGN ESSENTIAL HYPERTENSION: ICD-10-CM

## 2019-06-14 DIAGNOSIS — M81.0 POSTMENOPAUSAL OSTEOPOROSIS: ICD-10-CM

## 2019-06-14 PROCEDURE — 99213 OFFICE O/P EST LOW 20 MIN: CPT | Performed by: PHYSICIAN ASSISTANT

## 2019-06-14 RX ORDER — INSULIN DETEMIR 100 [IU]/ML
48 INJECTION, SOLUTION SUBCUTANEOUS EVERY MORNING
Qty: 10 PEN | Refills: 2 | Status: SHIPPED | OUTPATIENT
Start: 2019-06-14 | End: 2019-09-23 | Stop reason: SDUPTHER

## 2019-08-29 DIAGNOSIS — I10 BENIGN ESSENTIAL HYPERTENSION: ICD-10-CM

## 2019-08-29 RX ORDER — LISINOPRIL 40 MG/1
40 TABLET ORAL DAILY
Qty: 90 TABLET | Refills: 1 | Status: SHIPPED | OUTPATIENT
Start: 2019-08-29 | End: 2020-03-02

## 2019-09-11 DIAGNOSIS — I47.1 AVNRT (AV NODAL RE-ENTRY TACHYCARDIA) (HCC): ICD-10-CM

## 2019-09-11 RX ORDER — METOPROLOL TARTRATE 50 MG/1
TABLET, FILM COATED ORAL
Qty: 120 TABLET | Refills: 0 | Status: SHIPPED | OUTPATIENT
Start: 2019-09-11 | End: 2019-11-09 | Stop reason: SDUPTHER

## 2019-09-20 ENCOUNTER — TELEPHONE (OUTPATIENT)
Dept: INTERNAL MEDICINE CLINIC | Facility: CLINIC | Age: 79
End: 2019-09-20

## 2019-09-20 NOTE — TELEPHONE ENCOUNTER
Called patient and has been reminded that she have an appt on 9/23/2019 at 10:am with Merlinda Igo and there's blood work to be completed prior her appt  Per patient she would go tomorrow morning to have it done

## 2019-09-21 ENCOUNTER — APPOINTMENT (OUTPATIENT)
Dept: LAB | Facility: HOSPITAL | Age: 79
End: 2019-09-21
Payer: COMMERCIAL

## 2019-09-21 DIAGNOSIS — Z79.4 DIABETES MELLITUS TYPE 2, INSULIN DEPENDENT (HCC): Chronic | ICD-10-CM

## 2019-09-21 DIAGNOSIS — E11.9 DIABETES MELLITUS TYPE 2, INSULIN DEPENDENT (HCC): Chronic | ICD-10-CM

## 2019-09-21 LAB
ALBUMIN SERPL BCP-MCNC: 3.7 G/DL (ref 3.5–5)
ALP SERPL-CCNC: 63 U/L (ref 46–116)
ALT SERPL W P-5'-P-CCNC: 56 U/L (ref 12–78)
ANION GAP SERPL CALCULATED.3IONS-SCNC: 5 MMOL/L (ref 4–13)
AST SERPL W P-5'-P-CCNC: 44 U/L (ref 5–45)
BILIRUB SERPL-MCNC: 0.44 MG/DL (ref 0.2–1)
BUN SERPL-MCNC: 11 MG/DL (ref 5–25)
CALCIUM SERPL-MCNC: 8.9 MG/DL (ref 8.3–10.1)
CHLORIDE SERPL-SCNC: 103 MMOL/L (ref 100–108)
CO2 SERPL-SCNC: 28 MMOL/L (ref 21–32)
CREAT SERPL-MCNC: 0.84 MG/DL (ref 0.6–1.3)
EST. AVERAGE GLUCOSE BLD GHB EST-MCNC: 217 MG/DL
GFR SERPL CREATININE-BSD FRML MDRD: 67 ML/MIN/1.73SQ M
GLUCOSE P FAST SERPL-MCNC: 109 MG/DL (ref 65–99)
HBA1C MFR BLD: 9.2 % (ref 4.2–6.3)
POTASSIUM SERPL-SCNC: 4.1 MMOL/L (ref 3.5–5.3)
PROT SERPL-MCNC: 8 G/DL (ref 6.4–8.2)
SODIUM SERPL-SCNC: 136 MMOL/L (ref 136–145)

## 2019-09-21 PROCEDURE — 83036 HEMOGLOBIN GLYCOSYLATED A1C: CPT

## 2019-09-21 PROCEDURE — 80053 COMPREHEN METABOLIC PANEL: CPT

## 2019-09-21 PROCEDURE — 36415 COLL VENOUS BLD VENIPUNCTURE: CPT

## 2019-09-23 ENCOUNTER — OFFICE VISIT (OUTPATIENT)
Dept: INTERNAL MEDICINE CLINIC | Facility: CLINIC | Age: 79
End: 2019-09-23

## 2019-09-23 VITALS
BODY MASS INDEX: 27.29 KG/M2 | TEMPERATURE: 97.5 F | SYSTOLIC BLOOD PRESSURE: 140 MMHG | DIASTOLIC BLOOD PRESSURE: 70 MMHG | HEIGHT: 59 IN | WEIGHT: 135.36 LBS | HEART RATE: 72 BPM

## 2019-09-23 DIAGNOSIS — Z23 NEED FOR INFLUENZA VACCINATION: ICD-10-CM

## 2019-09-23 DIAGNOSIS — E78.5 DYSLIPIDEMIA: ICD-10-CM

## 2019-09-23 DIAGNOSIS — K63.5 POLYP OF COLON, UNSPECIFIED PART OF COLON, UNSPECIFIED TYPE: ICD-10-CM

## 2019-09-23 DIAGNOSIS — I10 BENIGN ESSENTIAL HYPERTENSION: ICD-10-CM

## 2019-09-23 DIAGNOSIS — M81.0 POSTMENOPAUSAL OSTEOPOROSIS: ICD-10-CM

## 2019-09-23 DIAGNOSIS — E11.65 TYPE 2 DIABETES MELLITUS WITH HYPERGLYCEMIA, WITH LONG-TERM CURRENT USE OF INSULIN (HCC): ICD-10-CM

## 2019-09-23 DIAGNOSIS — E11.9 DIABETES MELLITUS TYPE 2, INSULIN DEPENDENT (HCC): Primary | Chronic | ICD-10-CM

## 2019-09-23 DIAGNOSIS — I47.1 SVT (SUPRAVENTRICULAR TACHYCARDIA) (HCC): ICD-10-CM

## 2019-09-23 DIAGNOSIS — Z79.4 DIABETES MELLITUS TYPE 2, INSULIN DEPENDENT (HCC): Primary | Chronic | ICD-10-CM

## 2019-09-23 DIAGNOSIS — Z79.4 TYPE 2 DIABETES MELLITUS WITH HYPERGLYCEMIA, WITH LONG-TERM CURRENT USE OF INSULIN (HCC): ICD-10-CM

## 2019-09-23 PROCEDURE — 90662 IIV NO PRSV INCREASED AG IM: CPT | Performed by: PHYSICIAN ASSISTANT

## 2019-09-23 PROCEDURE — 99214 OFFICE O/P EST MOD 30 MIN: CPT | Performed by: PHYSICIAN ASSISTANT

## 2019-09-23 PROCEDURE — G0008 ADMIN INFLUENZA VIRUS VAC: HCPCS | Performed by: PHYSICIAN ASSISTANT

## 2019-09-23 PROCEDURE — 1101F PT FALLS ASSESS-DOCD LE1/YR: CPT | Performed by: PHYSICIAN ASSISTANT

## 2019-09-23 RX ORDER — INSULIN DETEMIR 100 [IU]/ML
48 INJECTION, SOLUTION SUBCUTANEOUS EVERY MORNING
Qty: 10 PEN | Refills: 2 | Status: SHIPPED | OUTPATIENT
Start: 2019-09-23 | End: 2020-03-30

## 2019-09-23 NOTE — PATIENT INSTRUCTIONS
As per our discussion today you have only been checking her sugar once daily in the morning and this is not enough information to figure out why your a m  Fastings are normal despite the fact that your A1c continues to increase and currently is at 9 2%  As we reviewed we are providing you with a new log to monitor your sugars with new instructions to check it at least 3 times daily  You will bring this to the office in 2 weeks I will review and we will get in touch with adjustments to your medications  For now you will continue your Levemir 48 units daily in the morning and your metformin twice daily  You will also continue your lovastatin, lisinopril, amlodipine and metoprolol  Appointment is scheduled next month with your cardiologist     Gladis zarco today so you may call and schedule your follow-up colonoscopy for known polyps  Diabetic foot exam completed today  Diabetic eye exam is due in November an order has been placed today  Mammogram due April of 2020  You already have a script for labs that are due in December as a three-month monitoring of her sugar  Script also provided for your routine 6 month labs as dated in March  Flu vaccine today, your pneumonia vaccines are up-to-date  Please schedule your Medicare wellness visit

## 2019-09-23 NOTE — PROGRESS NOTES
Assessment/Plan:  As per our discussion today you have only been checking her sugar once daily in the morning and this is not enough information to figure out why your a m  Fastings are normal despite the fact that your A1c continues to increase and currently is at 9 2%  As we reviewed we are providing you with a new log to monitor your sugars with new instructions to check it at least 3 times daily  You will bring this to the office in 2 weeks I will review and we will get in touch with adjustments to your medications  For now you will continue your Levemir 48 units daily in the morning and your metformin twice daily  You will also continue your lovastatin, lisinopril, amlodipine and metoprolol  Appointment is scheduled next month with your cardiologist     Hayder zarco today so you may call and schedule your follow-up colonoscopy for known polyps  Diabetic foot exam completed today  Diabetic eye exam is due in November an order has been placed today  Mammogram due April of 2020  You already have a script for labs that are due in December as a three-month monitoring of her sugar  Script also provided for your routine 6 month labs as dated in March  Flu vaccine today, your pneumonia vaccines are up-to-date  Please schedule your Medicare wellness visit  No problem-specific Assessment & Plan notes found for this encounter  Diagnoses and all orders for this visit:    Diabetes mellitus type 2, insulin dependent ([facility] [address] )  -     Comprehensive metabolic panel; Future  -     Hemoglobin A1C; Future  -     Lipid Panel with Direct LDL reflex; Future  -     Microalbumin / creatinine urine ratio; Future    Benign essential hypertension    Dyslipidemia    Postmenopausal osteoporosis    Polyp of colon, unspecified part of colon, unspecified type  -     Ambulatory referral to Gastroenterology;  Future    SVT (supraventricular tachycardia) (HCC)    Need for influenza vaccination  -     influenza vaccine, [id number], high-dose, PF 0 5 mL (FLUZONE HIGH-DOSE)    Type 2 diabetes mellitus with hyperglycemia, with long-term current use of insulin (HCC)  -     LEVEMIR FLEXTOUCH 100 units/mL injection pen; Inject 48 Units under the skin every morning  -     Ambulatory referral to Ophthalmology; Future          Subjective:      Patient ID: Deidre Rayo is a 67 y o  female  Patient here for follow-up lab review  As noted patient is an insulin-dependent diabetic  At previous visit patient's A1c was noted to have increased from 8 5% to 9 0% and unfortunately on her follow-up labs it has increased once again to 9 2%  This is despite an increase in her basal insulin  Patient was instructed to keep a sugar log and bring to today's visit  Patient currently on Levemir 48 units in the morning and metformin twice daily  Patient is also on lovastatin  Patient did bring a sugar log with her however she has only been checking her sugar 1st thing in the morning despite written instructions in Hebrew to check it more than once daily  Patient's a m  Readings are actually really good ranging from 100 to 120 patient had 1 reading at 140  This however does not explain why patient's A1c continues to increase  Really need to have her check other times during the day to find out where the issue is  New log will be provided today  Advised patient to continue to check her sugar 1st thing in the morning as always  I also want her to check it before dinner every day as well  And so she does not have to check it 4 times a day will have her alternate between checking it before lunch 1 day and 2 hours after dinner the next day  Patient has been instructed she needs to complete this for 2 weeks then bring the log into the office for review  Patient also underwent treatment for SVT by Cardiology in May of 2019 and has remained in normal sinus rhythm    Patient is scheduled for follow-up with cardiologist in October 2019   Cardiologist just refilled her metoprolol 50 mg twice daily  Patient reports with medication and status post procedure she has had no chest pain and no palpitations or increased heart rate  Patient is also on lisinopril 40 and amlodipine 5 mg for her blood pressure  Patient also completed her DEXA scan in April of 2019  This noted a statistically significant improvement in her bone density however remained with osteoporosis  Patient did not tolerate bisphosphonates and declined infusion or injectables  Patient has been maintained on her vitamin-D  Mammogram is scheduled for April of 2020  As noted at previous visit patient does have personal history of polyps and was due for repeat colonoscopy earlier this year  Referral was placed however no appointment has been scheduled  Patient is aware that we are reprinted this order so she may call and schedule the appointment  Patient does report she had a mild cold 2 weeks ago however that has resolved without any complications  Patient offers no additional new concerns today  Patient denies any symptoms of elevated sugar including poly fossa polydipsia polyuria  Patient also denies any numbness or tingling in her fingers and toes  Suspect because patient's sugar has been significantly elevated for significant amount of time she does not really feel any of the symptoms  Patient reports she needs a refill of her liver insulin but has the rest of her medications  The following portions of the patient's history were reviewed and updated as appropriate: allergies, current medications, past family history, past medical history, past social history, past surgical history and problem list     Review of Systems   Constitutional: Negative  Negative for chills, diaphoresis, fever and unexpected weight change  HENT: Negative  Negative for congestion and trouble swallowing  Eyes: Negative for visual disturbance     Respiratory: Negative  Negative for cough and shortness of breath  Cardiovascular: Negative  Negative for chest pain, palpitations and leg swelling  Gastrointestinal: Negative  Negative for abdominal pain  Endocrine: Negative  Negative for cold intolerance, heat intolerance, polydipsia, polyphagia and polyuria  Genitourinary: Negative for frequency and urgency  Musculoskeletal: Negative  Skin: Negative  Negative for rash  Neurological: Negative for dizziness, light-headedness, numbness and headaches  Psychiatric/Behavioral: Negative  Negative for sleep disturbance  Objective:      /70 (BP Location: Right arm, Patient Position: Sitting, Cuff Size: Standard)   Pulse 72   Temp 97 5 °F (36 4 °C) (Oral)   Ht 4' 10 75" (1 492 m)   Wt 61 4 kg (135 lb 5 8 oz)   BMI 27 57 kg/m²          Physical Exam   Constitutional: She is oriented to person, place, and time  She appears well-developed and well-nourished  HENT:   Head: Normocephalic and atraumatic  Eyes: Pupils are equal, round, and reactive to light  Conjunctivae are normal    Neck: Normal range of motion  No thyromegaly present  Cardiovascular: Normal rate, regular rhythm and normal heart sounds  Pulses are no weak pulses  No murmur heard  Pulses:       Dorsalis pedis pulses are 2+ on the right side, and 2+ on the left side  Pulmonary/Chest: Effort normal and breath sounds normal  She has no wheezes  She has no rales  Abdominal: Soft  Bowel sounds are normal  There is no tenderness  Musculoskeletal: Normal range of motion  She exhibits no edema  Feet:   Right Foot:   Skin Integrity: Negative for ulcer, skin breakdown, erythema, warmth, callus or dry skin  Left Foot:   Skin Integrity: Negative for ulcer, skin breakdown, erythema, warmth, callus or dry skin  Neurological: She is alert and oriented to person, place, and time  No sensory deficit  Skin: No rash noted  Psychiatric: She has a normal mood and affect   Her behavior is normal  Judgment and thought content normal        Patient's shoes and socks removed  Right Foot/Ankle   Right Foot Inspection  Skin Exam: skin normal and skin intact no dry skin, no warmth, no callus, no erythema, no maceration, no abnormal color, no pre-ulcer, no ulcer and no callus                            Sensory   Vibration: intact    Monofilament testing: intact  Vascular    The right DP pulse is 2+  Left Foot/Ankle  Left Foot Inspection  Skin Exam: skin normal and skin intactno dry skin, no warmth, no erythema, no maceration, normal color, no pre-ulcer, no ulcer and no callus                                         Sensory   Vibration: intact    Monofilament: intact  Vascular    The left DP pulse is 2+  Assign Risk Category:  No deformity present; No loss of protective sensation;  No weak pulses       Risk: 0

## 2019-09-25 ENCOUNTER — APPOINTMENT (EMERGENCY)
Dept: RADIOLOGY | Facility: HOSPITAL | Age: 79
End: 2019-09-25
Payer: COMMERCIAL

## 2019-09-25 ENCOUNTER — HOSPITAL ENCOUNTER (EMERGENCY)
Facility: HOSPITAL | Age: 79
Discharge: HOME | End: 2019-09-25
Attending: EMERGENCY MEDICINE | Admitting: EMERGENCY MEDICINE
Payer: COMMERCIAL

## 2019-09-25 VITALS
WEIGHT: 135 LBS | RESPIRATION RATE: 18 BRPM | TEMPERATURE: 97.6 F | SYSTOLIC BLOOD PRESSURE: 133 MMHG | HEART RATE: 92 BPM | DIASTOLIC BLOOD PRESSURE: 64 MMHG | OXYGEN SATURATION: 99 % | HEIGHT: 55 IN | BODY MASS INDEX: 31.24 KG/M2

## 2019-09-25 DIAGNOSIS — S52.509A DISTAL RADIAL FRACTURE: ICD-10-CM
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
## 2019-09-25 PROCEDURE — 73030 X-RAY EXAM OF SHOULDER: CPT

## 2019-09-25 PROCEDURE — 25605 CLTX DST RDL FX/EPHYS SEP W/: CPT | Performed by: EMERGENCY MEDICINE

## 2019-09-25 PROCEDURE — 99284 EMERGENCY DEPT VISIT MOD MDM: CPT

## 2019-09-25 PROCEDURE — 70450 CT HEAD/BRAIN W/O DYE: CPT

## 2019-09-25 PROCEDURE — 72125 CT NECK SPINE W/O DYE: CPT

## 2019-09-25 PROCEDURE — 71250 CT THORAX DX C-: CPT

## 2019-09-25 PROCEDURE — 73110 X-RAY EXAM OF WRIST: CPT

## 2019-09-25 PROCEDURE — 99284 EMERGENCY DEPT VISIT MOD MDM: CPT | Performed by: EMERGENCY MEDICINE

## 2019-09-25 PROCEDURE — 73080 X-RAY EXAM OF ELBOW: CPT

## 2019-09-25 PROCEDURE — 96374 THER/PROPH/DIAG INJ IV PUSH: CPT

## 2019-09-25 RX ORDER — BUPIVACAINE HYDROCHLORIDE 5 MG/ML
30 INJECTION, SOLUTION EPIDURAL; INTRACAUDAL ONCE
Status: COMPLETED | OUTPATIENT
Start: 2019-09-25 | End: 2019-09-25

## 2019-09-25 RX ORDER — MORPHINE SULFATE 4 MG/ML
4 INJECTION, SOLUTION INTRAMUSCULAR; INTRAVENOUS ONCE
Status: COMPLETED | OUTPATIENT
Start: 2019-09-25 | End: 2019-09-25

## 2019-09-25 RX ADMIN — MORPHINE SULFATE 4 MG: 4 INJECTION INTRAVENOUS at 12:09

## 2019-09-25 RX ADMIN — BUPIVACAINE HYDROCHLORIDE 30 ML: 5 INJECTION, SOLUTION EPIDURAL; INTRACAUDAL at 14:31

## 2019-09-25 NOTE — ED ATTENDING ATTESTATION
9/25/2019  I, Evelyne Hagan MD, saw and evaluated the patient  I have discussed the patient with the resident/non-physician practitioner and agree with the resident's/non-physician practitioner's findings, Plan of Care, and MDM as documented in the resident's/non-physician practitioner's note, except where noted  All available labs and Radiology studies were reviewed  I was present for key portions of any procedure(s) performed by the resident/non-physician practitioner and I was immediately available to provide assistance  At this point I agree with the current assessment done in the Emergency Department  I have conducted an independent evaluation of this patient a history and physical is as follows:    ED Course         Critical Care Time  Procedures     66 yo female slipped on stairs and hit head, left arm, left ribs  Pt with no loc  Pt with left shoulder and ribs pain with numbness tingling in hand  No loc, no n/v   pmh iddm, htn  No blood thinners  Vss, afebrile, lungs cta, rrr, abdomen sot nontender, left wrist tenderness, shoulder tenderness, nvi, left ribs tenderness    Pain meds, ct head, cspine, chest, xray shoulder, wrist

## 2019-09-25 NOTE — ED PROVIDER NOTES
History  Chief Complaint   Patient presents with    Fall     pt fell down multiple hardwood steps today, -LOC, -thinners/asa     79F presenting due to mechanical fall down stairs  States she fell down around 8 stairs and hit the front of her head, left arm and left sided ribs  States she has some pain with deep inspiration  Some numbness to left pinky and ring finger and pain with movement of her left elbow, wrist or fingers  Denies LOC or any blood thinning medication  Prior to Admission Medications   Prescriptions Last Dose Informant Patient Reported? Taking?    Insulin Pen Needle (B-D UF III MINI PEN NEEDLES) 31G X 5 MM MISC  Self No No   Sig: Inject 34 units SC daily in evening   LEVEMIR FLEXTOUCH 100 units/mL injection pen   No No   Sig: Inject 48 Units under the skin every morning   Lancets (FREESTYLE) lancets  Self No No   Sig: Use as instructed to check sugar up to 3 times daily   Multiple Vitamins-Minerals (MULTIVITAMIN ADULT PO)  Self Yes No   Sig: Take by mouth daily   alendronate (FOSAMAX) 70 mg tablet  Self Yes No   Sig: Take 70 mg by mouth every 7 days   amLODIPine (NORVASC) 5 mg tablet  Self No No   Sig: Take 1 tablet (5 mg total) by mouth daily for 180 days   cholecalciferol (VITAMIN D3) 1,000 units tablet  Self No No   Sig: Take 1 tablet (1,000 Units total) by mouth daily for 180 days   fluticasone (FLONASE) 50 mcg/act nasal spray  Self Yes No   Si spray into each nostril 2 (two) times a day   glucose blood (FREESTYLE LITE) test strip   No No   Sig: USE AS INSTRUCTED TO CHECK SUGAR UP TO 3 TIMES DAILY   lisinopril (ZESTRIL) 40 mg tablet   No No   Sig: TAKE 1 TABLET (40 MG TOTAL) BY MOUTH DAILY  DAYS   lovastatin (MEVACOR) 40 MG tablet  Self No No   Sig: Take 2 tablets together every night   metFORMIN (GLUCOPHAGE) 1000 MG tablet   No No   Sig: Take 1 tablet (1,000 mg total) by mouth 2 (two) times a day for 180 days   metoprolol tartrate (LOPRESSOR) 50 mg tablet   No No   Sig: TAKE 1 TABLET BY MOUTH EVERY 12 HOURS      Facility-Administered Medications: None       Past Medical History:   Diagnosis Date    Anemia     Chronic idiopathic constipation     Diabetes mellitus (HCC)     Diverticulitis, colon     Esophageal reflux     Eustachian tube dysfunction     Gastrointestinal hemorrhage     Hypertension     Non-healing skin lesion     Palpitations     Rectal bleeding     Skin lesion of chest wall        Past Surgical History:   Procedure Laterality Date     SECTION      CHOLECYSTECTOMY      ECTOPIC PREGNANCY SURGERY      WA COLONOSCOPY FLX DX W/COLLJ SPEC WHEN PFRMD N/A 2016    Procedure: COLONOSCOPY;  Surgeon: Walter Pierson MD;  Location: [facility]; Service: Gastroenterology       Family History   Problem Relation Age of Onset    Heart disease Mother     Bleeding Disorder Sister      I have reviewed and agree with the history as documented  Social History     Tobacco Use    Smoking status: Never Smoker    Smokeless tobacco: Never Used   Substance Use Topics    Alcohol use: Never     Frequency: Never     Binge frequency: Never    Drug use: No        Review of Systems   Constitutional: Negative for activity change, appetite change, chills, fatigue and fever  HENT: Negative for ear pain, rhinorrhea and tinnitus  Eyes: Negative for pain and visual disturbance  Respiratory: Negative for apnea, cough, chest tightness, shortness of breath and wheezing  Cardiovascular: Negative for chest pain, palpitations and leg swelling  Gastrointestinal: Negative for abdominal pain, diarrhea, nausea and vomiting  Genitourinary: Negative for dysuria, flank pain and pelvic pain  Musculoskeletal: Positive for joint swelling and myalgias  Negative for arthralgias  Skin: Positive for wound  Negative for rash  Neurological: Positive for headaches  Negative for dizziness, syncope, weakness and numbness  All other systems reviewed and are negative        Physical Exam  ED Triage Vitals   Temperature Pulse Respirations Blood Pressure SpO2   09/25/19 1031 09/25/19 1031 09/25/19 1031 09/25/19 1031 09/25/19 1031   97 6 °F (36 4 °C) 82 20 (!) 209/95 100 %      Temp Source Heart Rate Source Patient Position - Orthostatic VS BP Location FiO2 (%)   09/25/19 1031 09/25/19 1643 09/25/19 1643 09/25/19 1031 --   Oral Monitor Sitting Right arm       Pain Score       09/25/19 1031       Worst Possible Pain             Orthostatic Vital Signs  Vitals:    09/25/19 1031 09/25/19 1121 09/25/19 1643   BP: (!) 209/95 (!) 196/72 133/64   Pulse: 82  92   Patient Position - Orthostatic VS:   Sitting       Physical Exam   Constitutional: She is oriented to person, place, and time  She appears well-developed and well-nourished  No distress  HENT:   Head: Normocephalic and atraumatic  Eyes: Conjunctivae and EOM are normal  Right eye exhibits no discharge  Left eye exhibits no discharge  No scleral icterus  Neck: Normal range of motion  Neck supple  No JVD present  No tracheal deviation present  Cardiovascular: Normal rate, regular rhythm, normal heart sounds and intact distal pulses  Exam reveals no gallop and no friction rub  No murmur heard  Pulmonary/Chest: Effort normal and breath sounds normal  No stridor  No respiratory distress  She has no wheezes  She has no rales  She exhibits no tenderness  Abdominal: Soft  There is no tenderness  Musculoskeletal: She exhibits tenderness and deformity  She exhibits no edema  Neurological: She is alert and oriented to person, place, and time  No sensory deficit  She exhibits normal muscle tone  Skin: Skin is warm  Capillary refill takes less than 2 seconds  No rash noted  She is not diaphoretic  No erythema  Psychiatric: She has a normal mood and affect  Her behavior is normal  Judgment and thought content normal    Nursing note and vitals reviewed        ED Medications  Medications   morphine (PF) 4 mg/mL injection 4 mg (4 mg Intravenous Given 9/25/19 1209)   bupivacaine (PF) (MARCAINE) 0 5 % injection 30 mL (30 mL Infiltration Given 9/25/19 1431)       Diagnostic Studies  Results Reviewed     None                 XR wrist 3+ views LEFT   Final Result by Tushar Jamil MD (09/25 1827)      Status post casting and reduction of the distal radius fracture  Workstation performed: [id number]         XR shoulder 2+ views LEFT   Final Result by Nicanor Ortiz MD (09/25 1404)      1  Impacted, angulated intra-articular left distal radius fracture  2   No evidence of a fracture in the left shoulder or left elbow  Workstation performed: [id number]         XR elbow 3+ views LEFT   Final Result by Nicanor Ortiz MD (09/25 1404)      1  Impacted, angulated intra-articular left distal radius fracture  2   No evidence of a fracture in the left shoulder or left elbow  Workstation performed: [id number]         XR wrist 3+ views LEFT   Final Result by Nicanor Ortiz MD (09/25 1404)      1  Impacted, angulated intra-articular left distal radius fracture  2   No evidence of a fracture in the left shoulder or left elbow  Workstation performed: [id number]         CT head without contrast   Final Result by Doug Barillas MD (09/25 1311)      No acute intracranial abnormality  Workstation performed: [id number]         CT spine cervical without contrast   Final Result by Doug Barillas MD (09/25 1314)      No cervical spine fracture or traumatic malalignment  Workstation performed: [id number]         CT chest without contrast   Final Result by Doug Barillas MD (09/25 1318)      No acute fracture  No pneumothorax, pleural hematoma, or pulmonary contusion  Hepatomegaly and hepatic steatosis        Workstation performed: [id number]               Procedures  Orthopedic injury treatment  Date/Time: 9/25/2019 4:08 PM  Performed by: Stella Dasilva DO  Authorized by: Stella Dasilva DO Patient Location:  ED  Other Assisting Provider: Yes (comment)    Verbal consent obtained?: Yes    Risks and benefits: Risks, benefits and alternatives were discussed    Consent given by:  Patient  Patient states understanding of procedure being performed: Yes    Patient's understanding of procedure matches consent: Yes    Procedure consent matches procedure scheduled: Yes    Patient identity confirmed:  Verbally with patient  Injury location:  Wrist  Location details:  Left wrist  Injury type:  Fracture  Fracture type: distal radius    Fracture type: distal radius    Neurovascular status: Neurovascularly intact    Distal perfusion: normal    Neurological function: normal    Local anesthesia used?: Yes    Anesthesia:  Hematoma block  Local anesthetic:  Bupivacaine 0 5% without epinephrine  Manipulation performed?: Yes    Reduction successful?: Yes    Confirmation: Reduction confirmed by x-ray    Immobilization:  Splint, sling and ace wrap  Splint type:  Sugar tong  Supplies used:  Fiberglass  Neurovascular status: Neurovascularly intact    Distal perfusion: normal    Neurological function: normal    Range of motion: normal    Patient tolerance:  Patient tolerated the procedure well with no immediate complications            ED Course  ED Course as of Sep 25 2307   Wed Sep 25, 2019   1259 Negative FAST exam      1508 Hematoma block      1533 Reduction performed                                  MDM  Number of Diagnoses or Management Options  Distal radial fracture:   Fall, initial encounter:   Diagnosis management comments: Left distal radius fracture noted  No other fractures noted on imaging  Hematoma block performed and reduction on fracture performed, followed by splint  Follow up with orthopedics within the week  Return precautions advised      Disposition  Final diagnoses:   Fall, initial encounter   Distal radial fracture     Time reflects when diagnosis was documented in both MDM as applicable and the Disposition within this note     Time User Action Codes Description Comment    9/25/2019  5:01 PM Chano Bowman Add [W19  UQNO] Fall, initial encounter     9/25/2019  5:02 PM Chano Bowman Add [C60 727G] Distal radial fracture       ED Disposition     ED Disposition Condition Date/Time Comment    Discharge Stable Wed Sep 25, 2019  5:02 PM Nicole Reese discharge to home/self care              Follow-up Information     Follow up With Specialties Details Why Contact Info Additional [address] Emergency Department Emergency Medicine Go to  If symptoms worsen [address]  [phone]  ED, [address], 43 [facility] Orthopedic Surgery Schedule an appointment as soon as possible for a visit   [address] [facility] [facility], [address], [phone]          Discharge Medication List as of 9/25/2019  5:02 PM      CONTINUE these medications which have NOT CHANGED    Details   alendronate (FOSAMAX) 70 mg tablet Take 70 mg by mouth every 7 days, Historical Med      amLODIPine (NORVASC) 5 mg tablet Take 1 tablet (5 mg total) by mouth daily for 180 days, Starting Thu 3/7/2019, Until Tue 9/3/2019, Normal      cholecalciferol (VITAMIN D3) 1,000 units tablet Take 1 tablet (1,000 Units total) by mouth daily for 180 days, Starting Mon 8/13/2018, Normal      fluticasone (FLONASE) 50 mcg/act nasal spray 1 spray into each nostril 2 (two) times a day, Starting Fri 2/5/2016, Historical Med      glucose blood (FREESTYLE LITE) test strip USE AS INSTRUCTED TO CHECK SUGAR UP TO 3 TIMES DAILY, Normal      Insulin Pen Needle (B-D UF III MINI PEN NEEDLES) 31G X 5 MM MISC Inject 34 units SC daily in evening, Normal      Lancets (FREESTYLE) lancets Use as instructed to check sugar up to 3 times daily, Normal      LEVEMIR FLEXTOUCH 100 units/mL injection pen Inject 48 Units under the skin every morning, Starting Mon 9/23/2019, Until Sat 3/21/2020, Normal      lisinopril (ZESTRIL) 40 mg tablet TAKE 1 TABLET (40 MG TOTAL) BY MOUTH DAILY  DAYS, Starting Thu 8/29/2019, Until Tue 2/25/2020, Normal      lovastatin (MEVACOR) 40 MG tablet Take 2 tablets together every night, Normal      metFORMIN (GLUCOPHAGE) 1000 MG tablet Take 1 tablet (1,000 mg total) by mouth 2 (two) times a day for 180 days, Starting Wed 5/15/2019, Until Mon 11/11/2019, Normal      metoprolol tartrate (LOPRESSOR) 50 mg tablet TAKE 1 TABLET BY MOUTH EVERY 12 HOURS, Normal      Multiple Vitamins-Minerals (MULTIVITAMIN ADULT PO) Take by mouth daily, Historical Med           No discharge procedures on file  ED Provider  Attending physically available and evaluated Vidya Berry I managed the patient along with the ED Attending      Electronically Signed by         Jermaine Ceja DO  09/25/19 5938

## 2019-09-27 ENCOUNTER — OFFICE VISIT (OUTPATIENT)
Dept: OBGYN CLINIC | Facility: HOSPITAL | Age: 79
End: 2019-09-27
Payer: COMMERCIAL

## 2019-09-27 ENCOUNTER — OFFICE VISIT (OUTPATIENT)
Dept: LAB | Facility: HOSPITAL | Age: 79
End: 2019-09-27
Attending: SURGERY
Payer: COMMERCIAL

## 2019-09-27 ENCOUNTER — APPOINTMENT (OUTPATIENT)
Dept: LAB | Facility: HOSPITAL | Age: 79
End: 2019-09-27
Attending: SURGERY
Payer: COMMERCIAL

## 2019-09-27 VITALS
HEIGHT: 59 IN | BODY MASS INDEX: 27.21 KG/M2 | DIASTOLIC BLOOD PRESSURE: 70 MMHG | HEART RATE: 81 BPM | WEIGHT: 135 LBS | SYSTOLIC BLOOD PRESSURE: 135 MMHG

## 2019-09-27 DIAGNOSIS — Z01.812 PRE-PROCEDURAL LABORATORY EXAMINATION: ICD-10-CM

## 2019-09-27 DIAGNOSIS — S52.572A OTHER CLOSED INTRA-ARTICULAR FRACTURE OF DISTAL END OF LEFT RADIUS, INITIAL ENCOUNTER: Primary | ICD-10-CM

## 2019-09-27 DIAGNOSIS — S52.572A OTHER CLOSED INTRA-ARTICULAR FRACTURE OF DISTAL END OF LEFT RADIUS, INITIAL ENCOUNTER: ICD-10-CM

## 2019-09-27 PROBLEM — S52.502A CLOSED FRACTURE OF LEFT DISTAL RADIUS: Status: ACTIVE | Noted: 2019-09-27

## 2019-09-27 LAB
ANION GAP SERPL CALCULATED.3IONS-SCNC: 11 MMOL/L (ref 4–13)
ATRIAL RATE: 76 BPM
BUN SERPL-MCNC: 17 MG/DL (ref 5–25)
CALCIUM SERPL-MCNC: 9 MG/DL (ref 8.3–10.1)
CHLORIDE SERPL-SCNC: 103 MMOL/L (ref 100–108)
CO2 SERPL-SCNC: 21 MMOL/L (ref 21–32)
CREAT SERPL-MCNC: 1.06 MG/DL (ref 0.6–1.3)
EST. AVERAGE GLUCOSE BLD GHB EST-MCNC: 212 MG/DL
GFR SERPL CREATININE-BSD FRML MDRD: 50 ML/MIN/1.73SQ M
GLUCOSE P FAST SERPL-MCNC: 144 MG/DL (ref 65–99)
HBA1C MFR BLD: 9 % (ref 4.2–6.3)
P AXIS: 15 DEGREES
POTASSIUM SERPL-SCNC: 4.1 MMOL/L (ref 3.5–5.3)
PR INTERVAL: 138 MS
QRS AXIS: -38 DEGREES
QRSD INTERVAL: 76 MS
QT INTERVAL: 388 MS
QTC INTERVAL: 436 MS
SODIUM SERPL-SCNC: 135 MMOL/L (ref 136–145)
T WAVE AXIS: 20 DEGREES
VENTRICULAR RATE: 76 BPM

## 2019-09-27 PROCEDURE — 36415 COLL VENOUS BLD VENIPUNCTURE: CPT

## 2019-09-27 PROCEDURE — 80048 BASIC METABOLIC PNL TOTAL CA: CPT

## 2019-09-27 PROCEDURE — 99203 OFFICE O/P NEW LOW 30 MIN: CPT | Performed by: SURGERY

## 2019-09-27 PROCEDURE — 93010 ELECTROCARDIOGRAM REPORT: CPT | Performed by: INTERNAL MEDICINE

## 2019-09-27 PROCEDURE — 93005 ELECTROCARDIOGRAM TRACING: CPT

## 2019-09-27 PROCEDURE — 83036 HEMOGLOBIN GLYCOSYLATED A1C: CPT

## 2019-09-27 RX ORDER — CEFAZOLIN SODIUM 2 G/50ML
2000 SOLUTION INTRAVENOUS ONCE
Status: CANCELLED | OUTPATIENT
Start: 2019-09-27 | End: 2019-09-27

## 2019-09-27 NOTE — PROGRESS NOTES
Rosezella Curling M D  Attending, Orthopaedic Surgery  Hand, Wrist, and Elbow Surgery  [facility]      ORTHOPAEDIC HAND, WRIST, AND ELBOW OFFICE  VISIT       ASSESSMENT/PLAN:      67 y o  female with a left intra-articular distal radius fracture that occurred on 09/25/19  Surgical vs non surgical intervention was discussed with Pauline Gutierrez today  Chinese interpretation was provided  Conchis's last Hemoglobin A1 C which was obtained on 09/21/19 was 9 2  It was discussed that due to her Hemoglobin A1 C being above 8, she has a higher chance of infection rate and wound healing complications  Pauline Gutierrez understands the risks and wishes to proceed with surgical intervention  Left distal radius ORIF with carpal tunnel release surgical consent was signed in the office today  A carpal tunnel release is being performed so she does not develop carpal tunnel syndrome post surgery due to increased swelling  She will remain in her current splint until the day of surgery  She was instructed to work on digit ROM  I will see her back in the office 10-14 days after surgery for suture removal  She will require PCP clearance prior to surgical intervention as well as some lab work and a EKG  The patient verbalized understanding of exam findings and treatment plan  We engaged in the shared decision-making process and treatment options were discussed at length with the patient  Surgical and conservative management discussed today along with risks and benefits  Diagnoses and all orders for this visit:    Other closed intra-articular fracture of distal end of left radius, initial encounter      Follow Up:  Return 10-14 days after sx  To Do Next Visit:  Re-evaluation of current issue, X-rays of the  left  wrist and Cast/splint off prior to x-ray      General Discussions:  Fracture - Nonoperative Care: The physiology of a fractured bone was discussed with the patient today    With non-displaced or minimally displaced fractures, conservative treatment such as casting or splinting often results in a functional recovery  Typically, these fractures are immobilized in either a cast or splint depending on the pattern  Radiographs are typically taken at intervals throughout the fracture healing to ensure that reduction or alignment is not lost   If the fracture loses its alignment, surgical intervention may be required to stabilize it  Medical conditions such as diabetes, osteoporosis, vitamin D deficiency, and a history of or exposure to smoking may delay or prevent fracture healing  Options between cast/splint immobilization and surgical treatment were offered and the risks and benefits of both were discussed  Operative Discussions:  Fracture Operative Treatment: The physiology of a fractured bone was discussed with the patient today  With displaced fractures, operative treatment often results in a functional recovery  Typically, these fractures undergo reduction either through percutaneous or open methods depending on the location and fracture pattern  Radiographs are typically taken at intervals throughout the fracture healing ensure maintenance of reduction and alignment  If the fracture loses its alignment, revision surgery may be required  Medical conditions such as diabetes, osteoporosis, vitamin D deficiency, and a history of or exposure to smoking may delay or prevent fracture healing  The risks and benefits of the procedure were explained to the patient, which include, but are not limited to: Bleeding, infection, recurrence, pain, scar, malunion, nonunion, damage to tendons, damage to nerves, and damage to blood vessels, and complications related to anesthesia, failure to give desire result, need for more surgery  These risks, along with alternative conservative treatment options, and postoperative protocols were voiced back and understood by the patient    All questions were answered to the patient's satisfaction  The patient agrees to comply with a standard postoperative protocol, and is willing to proceed  Education was provided via written and auditory forms  There were no barriers to learning  Written handouts regarding wound care, incision and scar care, and general preoperative information was provided to the patient  Prior to surgery, the patient may be requested to stop all anti-inflammatory medications  Prophylactic aspirin, Plavix, and Coumadin may be allowed to be continued  Medications including vitamin E , ginkgo, and fish oil are requested to be stopped approximately one week prior to surgery  Hypertensive medications and beta blockers, if taken, should be continued  ____________________________________________________________________________________________________________________________________________      CHIEF COMPLAINT:  Chief Complaint   Patient presents with    Left Wrist - Pain       SUBJECTIVE:  Denton Deng is a 67y o  year old RHD female who presents to the office today for a left wrist fracture that occurred on 09/25/19  Jade Stephenson states on 09/25/19 she fell, injuring her left wrist  Jade Stephenson presented to the ED following the injury where a reduction was performed, x-ray's were taken and she was placed into a splint  Jade Stephenson states her pain is improved  She is taking Tylenol and ibuprofen as needed for pain control  She denies associated numbness and tingling        Pain/symptom timing:  Worse during the day when active  Pain/symptom context:  Worse with activites and work  Pain/symptom modifying factors:  Rest makes better, activities make worse  Pain/symptom associated signs/symptoms: none    Prior treatment   · NSAIDsYes   · Injections No   · Bracing/Orthotics Yes    Physical Therapy No     I have personally reviewed all the relevant PMH, PSH, SH, FH, Medications and allergies      PAST MEDICAL HISTORY:  Past Medical History:   Diagnosis Date    Anemia     Chronic idiopathic constipation     Diabetes mellitus ([address] )     Diverticulitis, colon     Esophageal reflux     Eustachian tube dysfunction     Gastrointestinal hemorrhage     Hypertension     Non-healing skin lesion     Palpitations     Rectal bleeding     Skin lesion of chest wall        PAST SURGICAL HISTORY:  Past Surgical History:   Procedure Laterality Date     SECTION      CHOLECYSTECTOMY      ECTOPIC PREGNANCY SURGERY      IN COLONOSCOPY FLX DX W/COLLJ SPEC WHEN PFRMD N/A 2016    Procedure: COLONOSCOPY;  Surgeon: Lexi Herring MD;  Location: [facility];   Service: Gastroenterology       FAMILY HISTORY:  Family History   Problem Relation Age of Onset    Heart disease Mother     Bleeding Disorder Sister        SOCIAL HISTORY:  Social History     Tobacco Use    Smoking status: Never Smoker    Smokeless tobacco: Never Used   Substance Use Topics    Alcohol use: Never     Frequency: Never     Binge frequency: Never    Drug use: No       MEDICATIONS:    Current Outpatient Medications:     alendronate (FOSAMAX) 70 mg tablet, Take 70 mg by mouth every 7 days, Disp: , Rfl:     cholecalciferol (VITAMIN D3) 1,000 units tablet, Take 1 tablet (1,000 Units total) by mouth daily for 180 days, Disp: 90 tablet, Rfl: 1    fluticasone (FLONASE) 50 mcg/act nasal spray, 1 spray into each nostril 2 (two) times a day, Disp: , Rfl:     glucose blood (FREESTYLE LITE) test strip, USE AS INSTRUCTED TO CHECK SUGAR UP TO 3 TIMES DAILY, Disp: 300 each, Rfl: 5    Insulin Pen Needle (B-D UF III MINI PEN NEEDLES) 31G X 5 MM MISC, Inject 34 units SC daily in evening, Disp: 100 each, Rfl: 1    Lancets (FREESTYLE) lancets, Use as instructed to check sugar up to 3 times daily, Disp: 300 each, Rfl: 1    LEVEMIR FLEXTOUCH 100 units/mL injection pen, Inject 48 Units under the skin every morning, Disp: 10 pen, Rfl: 2    lisinopril (ZESTRIL) 40 mg tablet, TAKE 1 TABLET (40 MG TOTAL) BY MOUTH DAILY  DAYS, Disp: 90 tablet, Rfl: 1    lovastatin (MEVACOR) 40 MG tablet, Take 2 tablets together every night, Disp: 180 tablet, Rfl: 1    metFORMIN (GLUCOPHAGE) 1000 MG tablet, Take 1 tablet (1,000 mg total) by mouth 2 (two) times a day for 180 days, Disp: 180 tablet, Rfl: 1    metoprolol tartrate (LOPRESSOR) 50 mg tablet, TAKE 1 TABLET BY MOUTH EVERY 12 HOURS, Disp: 120 tablet, Rfl: 0    Multiple Vitamins-Minerals (MULTIVITAMIN ADULT PO), Take by mouth daily, Disp: , Rfl:     amLODIPine (NORVASC) 5 mg tablet, Take 1 tablet (5 mg total) by mouth daily for 180 days, Disp: 90 tablet, Rfl: 1    ALLERGIES:  No Known Allergies        REVIEW OF SYSTEMS:  Review of Systems   Constitutional: Negative for chills, fever and unexpected weight change  HENT: Negative for hearing loss, nosebleeds and sore throat  Eyes: Negative for pain, redness and visual disturbance  Respiratory: Negative for cough, shortness of breath and wheezing  Cardiovascular: Negative for chest pain, palpitations and leg swelling  Gastrointestinal: Negative for abdominal pain, nausea and vomiting  Endocrine: Negative for polydipsia and polyuria  Genitourinary: Negative for difficulty urinating and hematuria  Musculoskeletal: Positive for arthralgias and joint swelling  Negative for myalgias  Skin: Negative for rash and wound  Neurological: Negative for dizziness, numbness and headaches  Psychiatric/Behavioral: Negative for decreased concentration, dysphoric mood and suicidal ideas  The patient is not nervous/anxious          VITALS:  Vitals:    09/27/19 1409   BP: 135/70   Pulse: 81       LABS:  HgA1c:   Lab Results   Component Value Date    HGBA1C 9 2 (H) 09/21/2019     BMP:   Lab Results   Component Value Date    GLUCOSE 178 (H) 09/08/2015    CALCIUM 8 9 09/21/2019     09/08/2015    K 4 1 09/21/2019    CO2 28 09/21/2019     09/21/2019    BUN 11 09/21/2019    CREATININE 0 84 09/21/2019 _____________________________________________________  PHYSICAL EXAMINATION:  General: well developed and well nourished, alert, oriented times 3 and appears comfortable  Psychiatric: Normal  HEENT: Normocephalic, Atraumatic Trachea Midline, No torticollis  Pulmonary: No audible wheezing or respiratory distress   Cardiovascular: No pitting edema, 2+ radial pulse   Skin: No masses, erythema, lacerations, fluctation, ulcerations  Neurovascular: Sensation Intact to the Median, Ulnar, Radial Nerve, Motor Intact to the Median, Ulnar, Radial Nerve and Pulses Intact  Musculoskeletal: Normal, except as noted in detailed exam and in HPI        MUSCULOSKELETAL EXAMINATION:    Left wrist:     Sugar tong splint intact   Able to wiggle fingers   Sensation intact to light touch     ___________________________________________________  STUDIES REVIEWED:  I have personally reviewed AP lateral and oblique radiographs of left wrist demonstrates a distal radius intra-articular fracture with good maintenance of radial height loss of volar tilt with reversal into dorsal          PROCEDURES PERFORMED:  Procedures  No Procedures performed today    _____________________________________________________      [facility]    I,:   Xiao Simental am acting as a scribe while in the presence of the attending physician :        I,:   Rosezella Curling, MD personally performed the services described in this documentation    as scribed in my presence :

## 2019-09-27 NOTE — H&P
Nivia NINA  Attending, Orthopaedic Surgery  Hand, Wrist, and Elbow Surgery  [facility]      ORTHOPAEDIC HAND, WRIST, AND ELBOW OFFICE  VISIT       ASSESSMENT/PLAN:      67 y o  female with a left intra-articular distal radius fracture that occurred on 09/25/19  Surgical vs non surgical intervention was discussed with Stuart Arias today  Filipino interpretation was provided  Conchis's last Hemoglobin A1 C which was obtained on 09/21/19 was 9 2  It was discussed that due to her Hemoglobin A1 C being above 8, she has a higher chance of infection rate and wound healing complications  Davidson Juana understands the risks and wishes to proceed with surgical intervention  Left distal radius ORIF with carpal tunnel release surgical consent was signed in the office today  A carpal tunnel release is being performed so she does not develop carpal tunnel syndrome post surgery due to increased swelling  She will remain in her current splint until the day of surgery  She was instructed to work on digit ROM  I will see her back in the office 10-14 days after surgery for suture removal  She will require PCP clearance prior to surgical intervention as well as some lab work and a EKG  The patient verbalized understanding of exam findings and treatment plan  We engaged in the shared decision-making process and treatment options were discussed at length with the patient  Surgical and conservative management discussed today along with risks and benefits  Diagnoses and all orders for this visit:    Other closed intra-articular fracture of distal end of left radius, initial encounter      Follow Up:  Return 10-14 days after sx  To Do Next Visit:  Re-evaluation of current issue, X-rays of the  left  wrist and Cast/splint off prior to x-ray      General Discussions:  Fracture - Nonoperative Care: The physiology of a fractured bone was discussed with the patient today    With non-displaced or minimally displaced fractures, conservative treatment such as casting or splinting often results in a functional recovery  Typically, these fractures are immobilized in either a cast or splint depending on the pattern  Radiographs are typically taken at intervals throughout the fracture healing to ensure that reduction or alignment is not lost   If the fracture loses its alignment, surgical intervention may be required to stabilize it  Medical conditions such as diabetes, osteoporosis, vitamin D deficiency, and a history of or exposure to smoking may delay or prevent fracture healing  Options between cast/splint immobilization and surgical treatment were offered and the risks and benefits of both were discussed  Operative Discussions:  Fracture Operative Treatment: The physiology of a fractured bone was discussed with the patient today  With displaced fractures, operative treatment often results in a functional recovery  Typically, these fractures undergo reduction either through percutaneous or open methods depending on the location and fracture pattern  Radiographs are typically taken at intervals throughout the fracture healing ensure maintenance of reduction and alignment  If the fracture loses its alignment, revision surgery may be required  Medical conditions such as diabetes, osteoporosis, vitamin D deficiency, and a history of or exposure to smoking may delay or prevent fracture healing  The risks and benefits of the procedure were explained to the patient, which include, but are not limited to: Bleeding, infection, recurrence, pain, scar, malunion, nonunion, damage to tendons, damage to nerves, and damage to blood vessels, and complications related to anesthesia, failure to give desire result, need for more surgery  These risks, along with alternative conservative treatment options, and postoperative protocols were voiced back and understood by the patient    All questions were answered to the patient's satisfaction  The patient agrees to comply with a standard postoperative protocol, and is willing to proceed  Education was provided via written and auditory forms  There were no barriers to learning  Written handouts regarding wound care, incision and scar care, and general preoperative information was provided to the patient  Prior to surgery, the patient may be requested to stop all anti-inflammatory medications  Prophylactic aspirin, Plavix, and Coumadin may be allowed to be continued  Medications including vitamin E , ginkgo, and fish oil are requested to be stopped approximately one week prior to surgery  Hypertensive medications and beta blockers, if taken, should be continued  ____________________________________________________________________________________________________________________________________________      CHIEF COMPLAINT:  Chief Complaint   Patient presents with    Left Wrist - Pain       SUBJECTIVE:  Talia Dillon is a 67y o  year old RHD female who presents to the office today for a left wrist fracture that occurred on 09/25/19  Walter Vargas states on 09/25/19 she fell, injuring her left wrist  Walter Vargas presented to the ED following the injury where a reduction was performed, x-ray's were taken and she was placed into a splint  Walter Vargas states her pain is improved  She is taking Tylenol and ibuprofen as needed for pain control  She denies associated numbness and tingling        Pain/symptom timing:  Worse during the day when active  Pain/symptom context:  Worse with activites and work  Pain/symptom modifying factors:  Rest makes better, activities make worse  Pain/symptom associated signs/symptoms: none    Prior treatment   · NSAIDsYes   · Injections No   · Bracing/Orthotics Yes    Physical Therapy No     I have personally reviewed all the relevant PMH, PSH, SH, FH, Medications and allergies      PAST MEDICAL HISTORY:  Past Medical History:   Diagnosis Date    Anemia     Chronic idiopathic constipation     Diabetes mellitus ([facility] [address] )     Diverticulitis, colon     Esophageal reflux     Eustachian tube dysfunction     Gastrointestinal hemorrhage     Hypertension     Non-healing skin lesion     Palpitations     Rectal bleeding     Skin lesion of chest wall        PAST SURGICAL HISTORY:  Past Surgical History:   Procedure Laterality Date     SECTION      CHOLECYSTECTOMY      ECTOPIC PREGNANCY SURGERY      [place] COLONOSCOPY FLX DX W/COLLJ SPEC WHEN PFRMD N/A 2016    Procedure: COLONOSCOPY;  Surgeon: Claus Burkett MD;  Location: [facility];   Service: Gastroenterology       FAMILY HISTORY:  Family History   Problem Relation Age of Onset    Heart disease Mother     Bleeding Disorder Sister        SOCIAL HISTORY:  Social History     Tobacco Use    Smoking status: Never Smoker    Smokeless tobacco: Never Used   Substance Use Topics    Alcohol use: Never     Frequency: Never     Binge frequency: Never    Drug use: No       MEDICATIONS:    Current Outpatient Medications:     alendronate (FOSAMAX) 70 mg tablet, Take 70 mg by mouth every 7 days, Disp: , Rfl:     cholecalciferol (VITAMIN D3) 1,000 units tablet, Take 1 tablet (1,000 Units total) by mouth daily for 180 days, Disp: 90 tablet, Rfl: 1    fluticasone (FLONASE) 50 mcg/act nasal spray, 1 spray into each nostril 2 (two) times a day, Disp: , Rfl:     glucose blood (FREESTYLE LITE) test strip, USE AS INSTRUCTED TO CHECK SUGAR UP TO 3 TIMES DAILY, Disp: 300 each, Rfl: 5    Insulin Pen Needle (B-D UF III MINI PEN NEEDLES) 31G X 5 MM MISC, Inject 34 units SC daily in evening, Disp: 100 each, Rfl: 1    Lancets (FREESTYLE) lancets, Use as instructed to check sugar up to 3 times daily, Disp: 300 each, Rfl: 1    LEVEMIR FLEXTOUCH 100 units/mL injection pen, Inject 48 Units under the skin every morning, Disp: 10 pen, Rfl: 2    lisinopril (ZESTRIL) 40 mg tablet, TAKE 1 TABLET (40 MG TOTAL) BY MOUTH DAILY  DAYS, Disp: 90 tablet, Rfl: 1    lovastatin (MEVACOR) 40 MG tablet, Take 2 tablets together every night, Disp: 180 tablet, Rfl: 1    metFORMIN (GLUCOPHAGE) 1000 MG tablet, Take 1 tablet (1,000 mg total) by mouth 2 (two) times a day for 180 days, Disp: 180 tablet, Rfl: 1    metoprolol tartrate (LOPRESSOR) 50 mg tablet, TAKE 1 TABLET BY MOUTH EVERY 12 HOURS, Disp: 120 tablet, Rfl: 0    Multiple Vitamins-Minerals (MULTIVITAMIN ADULT PO), Take by mouth daily, Disp: , Rfl:     amLODIPine (NORVASC) 5 mg tablet, Take 1 tablet (5 mg total) by mouth daily for 180 days, Disp: 90 tablet, Rfl: 1    ALLERGIES:  No Known Allergies        REVIEW OF SYSTEMS:  Review of Systems   Constitutional: Negative for chills, fever and unexpected weight change  HENT: Negative for hearing loss, nosebleeds and sore throat  Eyes: Negative for pain, redness and visual disturbance  Respiratory: Negative for cough, shortness of breath and wheezing  Cardiovascular: Negative for chest pain, palpitations and leg swelling  Gastrointestinal: Negative for abdominal pain, nausea and vomiting  Endocrine: Negative for polydipsia and polyuria  Genitourinary: Negative for difficulty urinating and hematuria  Musculoskeletal: Positive for arthralgias and joint swelling  Negative for myalgias  Skin: Negative for rash and wound  Neurological: Negative for dizziness, numbness and headaches  Psychiatric/Behavioral: Negative for decreased concentration, dysphoric mood and suicidal ideas  The patient is not nervous/anxious          VITALS:  Vitals:    09/27/19 1409   BP: 135/70   Pulse: 81       LABS:  HgA1c:   Lab Results   Component Value Date    HGBA1C 9 2 (H) 09/21/2019     BMP:   Lab Results   Component Value Date    GLUCOSE 178 (H) 09/08/2015    CALCIUM 8 9 09/21/2019     09/08/2015    K 4 1 09/21/2019    CO2 28 09/21/2019     09/21/2019    BUN 11 09/21/2019    CREATININE 0 84 09/21/2019 _____________________________________________________  PHYSICAL EXAMINATION:  General: well developed and well nourished, alert, oriented times 3 and appears comfortable  Psychiatric: Normal  HEENT: Normocephalic, Atraumatic Trachea Midline, No torticollis  Pulmonary: No audible wheezing or respiratory distress   Cardiovascular: No pitting edema, 2+ radial pulse   Skin: No masses, erythema, lacerations, fluctation, ulcerations  Neurovascular: Sensation Intact to the Median, Ulnar, Radial Nerve, Motor Intact to the Median, Ulnar, Radial Nerve and Pulses Intact  Musculoskeletal: Normal, except as noted in detailed exam and in HPI        MUSCULOSKELETAL EXAMINATION:    Left wrist:     Sugar tong splint intact   Able to wiggle fingers   Sensation intact to light touch     ___________________________________________________  STUDIES REVIEWED:  I have personally reviewed AP lateral and oblique radiographs of left wrist demonstrates a distal radius intra-articular fracture with good maintenance of radial height loss of volar tilt with reversal into dorsal          PROCEDURES PERFORMED:  Procedures  No Procedures performed today    _____________________________________________________      Anthony Cooke    I,:   Maged Simental am acting as a scribe while in the presence of the attending physician :        I,:   Tan Mayo MD personally performed the services described in this documentation    as scribed in my presence :

## 2019-09-27 NOTE — H&P (VIEW-ONLY)
Melva NINA  Attending, Orthopaedic Surgery  Hand, Wrist, and Elbow Surgery  [facility]      ORTHOPAEDIC HAND, WRIST, AND ELBOW OFFICE  VISIT       ASSESSMENT/PLAN:      67 y o  female with a left intra-articular distal radius fracture that occurred on 09/25/19  Surgical vs non surgical intervention was discussed with Araceli Mendiola today  Telugu interpretation was provided  Conchis's last Hemoglobin A1 C which was obtained on 09/21/19 was 9 2  It was discussed that due to her Hemoglobin A1 C being above 8, she has a higher chance of infection rate and wound healing complications  Araceli Mendiola understands the risks and wishes to proceed with surgical intervention  Left distal radius ORIF with carpal tunnel release surgical consent was signed in the office today  A carpal tunnel release is being performed so she does not develop carpal tunnel syndrome post surgery due to increased swelling  She will remain in her current splint until the day of surgery  She was instructed to work on digit ROM  I will see her back in the office 10-14 days after surgery for suture removal  She will require PCP clearance prior to surgical intervention as well as some lab work and a EKG  The patient verbalized understanding of exam findings and treatment plan  We engaged in the shared decision-making process and treatment options were discussed at length with the patient  Surgical and conservative management discussed today along with risks and benefits  Diagnoses and all orders for this visit:    Other closed intra-articular fracture of distal end of left radius, initial encounter      Follow Up:  Return 10-14 days after sx  To Do Next Visit:  Re-evaluation of current issue, X-rays of the  left  wrist and Cast/splint off prior to x-ray      General Discussions:  Fracture - Nonoperative Care: The physiology of a fractured bone was discussed with the patient today    With non-displaced or minimally displaced fractures, conservative treatment such as casting or splinting often results in a functional recovery  Typically, these fractures are immobilized in either a cast or splint depending on the pattern  Radiographs are typically taken at intervals throughout the fracture healing to ensure that reduction or alignment is not lost   If the fracture loses its alignment, surgical intervention may be required to stabilize it  Medical conditions such as diabetes, osteoporosis, vitamin D deficiency, and a history of or exposure to smoking may delay or prevent fracture healing  Options between cast/splint immobilization and surgical treatment were offered and the risks and benefits of both were discussed  Operative Discussions:  Fracture Operative Treatment: The physiology of a fractured bone was discussed with the patient today  With displaced fractures, operative treatment often results in a functional recovery  Typically, these fractures undergo reduction either through percutaneous or open methods depending on the location and fracture pattern  Radiographs are typically taken at intervals throughout the fracture healing ensure maintenance of reduction and alignment  If the fracture loses its alignment, revision surgery may be required  Medical conditions such as diabetes, osteoporosis, vitamin D deficiency, and a history of or exposure to smoking may delay or prevent fracture healing  The risks and benefits of the procedure were explained to the patient, which include, but are not limited to: Bleeding, infection, recurrence, pain, scar, malunion, nonunion, damage to tendons, damage to nerves, and damage to blood vessels, and complications related to anesthesia, failure to give desire result, need for more surgery  These risks, along with alternative conservative treatment options, and postoperative protocols were voiced back and understood by the patient    All questions were answered to the patient's satisfaction  The patient agrees to comply with a standard postoperative protocol, and is willing to proceed  Education was provided via written and auditory forms  There were no barriers to learning  Written handouts regarding wound care, incision and scar care, and general preoperative information was provided to the patient  Prior to surgery, the patient may be requested to stop all anti-inflammatory medications  Prophylactic aspirin, Plavix, and Coumadin may be allowed to be continued  Medications including vitamin E , ginkgo, and fish oil are requested to be stopped approximately one week prior to surgery  Hypertensive medications and beta blockers, if taken, should be continued  ____________________________________________________________________________________________________________________________________________      CHIEF COMPLAINT:  Chief Complaint   Patient presents with    Left Wrist - Pain       SUBJECTIVE:  Linda Aleman is a 67y o  year old RHD female who presents to the office today for a left wrist fracture that occurred on 09/25/19  Sharonshannan Collinstyler states on 09/25/19 she fell, injuring her left wrist  Telma Westbrook presented to the ED following the injury where a reduction was performed, x-ray's were taken and she was placed into a splint  Telma Westbrook states her pain is improved  She is taking Tylenol and ibuprofen as needed for pain control  She denies associated numbness and tingling        Pain/symptom timing:  Worse during the day when active  Pain/symptom context:  Worse with activites and work  Pain/symptom modifying factors:  Rest makes better, activities make worse  Pain/symptom associated signs/symptoms: none    Prior treatment   · NSAIDsYes   · Injections No   · Bracing/Orthotics Yes    Physical Therapy No     I have personally reviewed all the relevant PMH, PSH, SH, FH, Medications and allergies      PAST MEDICAL HISTORY:  Past Medical History:   Diagnosis Date    Anemia     Chronic idiopathic constipation     Diabetes mellitus ([facility] [address] )     Diverticulitis, colon     Esophageal reflux     Eustachian tube dysfunction     Gastrointestinal hemorrhage     Hypertension     Non-healing skin lesion     Palpitations     Rectal bleeding     Skin lesion of chest wall        PAST SURGICAL HISTORY:  Past Surgical History:   Procedure Laterality Date     SECTION      CHOLECYSTECTOMY      ECTOPIC PREGNANCY SURGERY      WA COLONOSCOPY FLX DX W/COLLJ SPEC WHEN PFRMD N/A 2016    Procedure: COLONOSCOPY;  Surgeon: Tara Julien MD;  Location: [facility];   Service: Gastroenterology       FAMILY HISTORY:  Family History   Problem Relation Age of Onset    Heart disease Mother     Bleeding Disorder Sister        SOCIAL HISTORY:  Social History     Tobacco Use    Smoking status: Never Smoker    Smokeless tobacco: Never Used   Substance Use Topics    Alcohol use: Never     Frequency: Never     Binge frequency: Never    Drug use: No       MEDICATIONS:    Current Outpatient Medications:     alendronate (FOSAMAX) 70 mg tablet, Take 70 mg by mouth every 7 days, Disp: , Rfl:     cholecalciferol (VITAMIN D3) 1,000 units tablet, Take 1 tablet (1,000 Units total) by mouth daily for 180 days, Disp: 90 tablet, Rfl: 1    fluticasone (FLONASE) 50 mcg/act nasal spray, 1 spray into each nostril 2 (two) times a day, Disp: , Rfl:     glucose blood (FREESTYLE LITE) test strip, USE AS INSTRUCTED TO CHECK SUGAR UP TO 3 TIMES DAILY, Disp: 300 each, Rfl: 5    Insulin Pen Needle (B-D UF III MINI PEN NEEDLES) 31G X 5 MM MISC, Inject 34 units SC daily in evening, Disp: 100 each, Rfl: 1    Lancets (FREESTYLE) lancets, Use as instructed to check sugar up to 3 times daily, Disp: 300 each, Rfl: 1    LEVEMIR FLEXTOUCH 100 units/mL injection pen, Inject 48 Units under the skin every morning, Disp: 10 pen, Rfl: 2    lisinopril (ZESTRIL) 40 mg tablet, TAKE 1 TABLET (40 MG TOTAL) BY MOUTH DAILY  DAYS, Disp: 90 tablet, Rfl: 1    lovastatin (MEVACOR) 40 MG tablet, Take 2 tablets together every night, Disp: 180 tablet, Rfl: 1    metFORMIN (GLUCOPHAGE) 1000 MG tablet, Take 1 tablet (1,000 mg total) by mouth 2 (two) times a day for 180 days, Disp: 180 tablet, Rfl: 1    metoprolol tartrate (LOPRESSOR) 50 mg tablet, TAKE 1 TABLET BY MOUTH EVERY 12 HOURS, Disp: 120 tablet, Rfl: 0    Multiple Vitamins-Minerals (MULTIVITAMIN ADULT PO), Take by mouth daily, Disp: , Rfl:     amLODIPine (NORVASC) 5 mg tablet, Take 1 tablet (5 mg total) by mouth daily for 180 days, Disp: 90 tablet, Rfl: 1    ALLERGIES:  No Known Allergies        REVIEW OF SYSTEMS:  Review of Systems   Constitutional: Negative for chills, fever and unexpected weight change  HENT: Negative for hearing loss, nosebleeds and sore throat  Eyes: Negative for pain, redness and visual disturbance  Respiratory: Negative for cough, shortness of breath and wheezing  Cardiovascular: Negative for chest pain, palpitations and leg swelling  Gastrointestinal: Negative for abdominal pain, nausea and vomiting  Endocrine: Negative for polydipsia and polyuria  Genitourinary: Negative for difficulty urinating and hematuria  Musculoskeletal: Positive for arthralgias and joint swelling  Negative for myalgias  Skin: Negative for rash and wound  Neurological: Negative for dizziness, numbness and headaches  Psychiatric/Behavioral: Negative for decreased concentration, dysphoric mood and suicidal ideas  The patient is not nervous/anxious          VITALS:  Vitals:    09/27/19 1409   BP: 135/70   Pulse: 81       LABS:  HgA1c:   Lab Results   Component Value Date    HGBA1C 9 2 (H) 09/21/2019     BMP:   Lab Results   Component Value Date    GLUCOSE 178 (H) 09/08/2015    CALCIUM 8 9 09/21/2019     09/08/2015    K 4 1 09/21/2019    CO2 28 09/21/2019     09/21/2019    BUN 11 09/21/2019    CREATININE 0 84 09/21/2019 _____________________________________________________  PHYSICAL EXAMINATION:  General: well developed and well nourished, alert, oriented times 3 and appears comfortable  Psychiatric: Normal  HEENT: Normocephalic, Atraumatic Trachea Midline, No torticollis  Pulmonary: No audible wheezing or respiratory distress   Cardiovascular: No pitting edema, 2+ radial pulse   Skin: No masses, erythema, lacerations, fluctation, ulcerations  Neurovascular: Sensation Intact to the Median, Ulnar, Radial Nerve, Motor Intact to the Median, Ulnar, Radial Nerve and Pulses Intact  Musculoskeletal: Normal, except as noted in detailed exam and in HPI        MUSCULOSKELETAL EXAMINATION:    Left wrist:     Sugar tong splint intact   Able to wiggle fingers   Sensation intact to light touch     ___________________________________________________  STUDIES REVIEWED:  I have personally reviewed AP lateral and oblique radiographs of left wrist demonstrates a distal radius intra-articular fracture with good maintenance of radial height loss of volar tilt with reversal into dorsal          PROCEDURES PERFORMED:  Procedures  No Procedures performed today    _____________________________________________________      Patty Ladd    I,:   Quiana Simental am acting as a scribe while in the presence of the attending physician :        I,:   Delfin Frankel, MD personally performed the services described in this documentation    as scribed in my presence :

## 2019-10-03 PROBLEM — Z01.812 PRE-PROCEDURAL LABORATORY EXAMINATION: Status: ACTIVE | Noted: 2019-10-03

## 2019-10-03 RX ORDER — CEFAZOLIN SODIUM 2 G/50ML
2000 SOLUTION INTRAVENOUS ONCE
Status: CANCELLED | OUTPATIENT
Start: 2019-10-03 | End: 2019-10-03

## 2019-10-04 ENCOUNTER — CONSULT (OUTPATIENT)
Dept: INTERNAL MEDICINE CLINIC | Facility: CLINIC | Age: 79
End: 2019-10-04

## 2019-10-04 VITALS
WEIGHT: 135.8 LBS | TEMPERATURE: 97.6 F | SYSTOLIC BLOOD PRESSURE: 146 MMHG | HEART RATE: 80 BPM | BODY MASS INDEX: 27.38 KG/M2 | DIASTOLIC BLOOD PRESSURE: 70 MMHG | HEIGHT: 59 IN

## 2019-10-04 DIAGNOSIS — I10 BENIGN ESSENTIAL HYPERTENSION: ICD-10-CM

## 2019-10-04 DIAGNOSIS — S52.502K CLOSED FRACTURE OF DISTAL END OF LEFT RADIUS WITH NONUNION, UNSPECIFIED FRACTURE MORPHOLOGY, SUBSEQUENT ENCOUNTER: ICD-10-CM

## 2019-10-04 DIAGNOSIS — Z01.818 PREOP EXAMINATION: Primary | Chronic | ICD-10-CM

## 2019-10-04 DIAGNOSIS — E78.5 DYSLIPIDEMIA: ICD-10-CM

## 2019-10-04 DIAGNOSIS — Z79.4 DIABETES MELLITUS TYPE 2, INSULIN DEPENDENT (HCC): Chronic | ICD-10-CM

## 2019-10-04 DIAGNOSIS — E11.9 DIABETES MELLITUS TYPE 2, INSULIN DEPENDENT (HCC): Chronic | ICD-10-CM

## 2019-10-04 PROBLEM — Z01.812 PRE-PROCEDURAL LABORATORY EXAMINATION: Status: RESOLVED | Noted: 2019-10-03 | Resolved: 2019-10-04

## 2019-10-04 PROBLEM — I47.10 SVT (SUPRAVENTRICULAR TACHYCARDIA): Status: RESOLVED | Noted: 2019-02-07 | Resolved: 2019-10-04

## 2019-10-04 PROBLEM — I47.1 SVT (SUPRAVENTRICULAR TACHYCARDIA) (HCC): Status: RESOLVED | Noted: 2019-02-07 | Resolved: 2019-10-04

## 2019-10-04 PROCEDURE — 99214 OFFICE O/P EST MOD 30 MIN: CPT | Performed by: PHYSICIAN ASSISTANT

## 2019-10-04 RX ORDER — AMLODIPINE BESYLATE 5 MG/1
5 TABLET ORAL DAILY
Qty: 90 TABLET | Refills: 1 | Status: SHIPPED | OUTPATIENT
Start: 2019-10-04 | End: 2019-12-31 | Stop reason: ALTCHOICE

## 2019-10-04 RX ORDER — LOVASTATIN 40 MG/1
TABLET ORAL
Qty: 180 TABLET | Refills: 1 | Status: SHIPPED | OUTPATIENT
Start: 2019-10-04 | End: 2020-03-06

## 2019-10-04 NOTE — PROGRESS NOTES
Assessment/Plan:  As we discussed today also utilizing in office  with you you understand the changes we are making for your sugar  You will start taking 52 units of her Levemir once daily  We also reviewed that you are supposed to be taking this in the morning not at bedtime  We reviewed that by taking this in the morning as the insulin is wearing off is when you are sleeping to help prevent low sugars in the middle of the night  You are to contact our office immediately Monday morning if you have any sugars in the morning that are below 100 or any sugars throughout the day that are greater than 300  We reviewed that while not ideal situation and you are aware of the increased risks of having surgery with your sugars significantly elevated including delayed healing and increased risk for infections you are agreeable to proceed with the surgery as scheduled October 8  Patient's preoperative testing completed and acceptable including normal renal functions, EKG with normal sinus rhythm  As noted does report a 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 infarct however has been noted on EKGs since 2015 and unchanged  Patient has been under cardiology care as well and recently May of 2019 underwent ablation for SVT with resolution of tachycardia and symptoms  Patient medically cleared and is moderate risk for urgent surgery as scheduled October 8th for repair of left distal radial impacted fracture  No problem-specific Assessment & Plan notes found for this encounter  Diagnoses and all orders for this visit:    Preop examination    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, unspecified fracture morphology, subsequent encounter    Diabetes mellitus type 2, insulin dependent (HCC)    Benign essential hypertension          Subjective:      Patient ID: Tushar Moreno is a 67 y o  female      Pt here for pre op consultation for orthopaedic surgery for L wrist fracture on 10/8/19    Patient sell down stairs on 9/25/19 in her house while cleaning stairs  Patient reports she did hit her head and her entire left side of her body going down the stairs  Trauma scans in ER positive fracture, patient had impacted fracture of left distal radius  Was reduced and splinted in the emergency room  As noted patient did follow up with Ortho  CT scan of head chest without acute fractures  Ortho is aware of elevated sugars and discussed risks and patient agreed to surgery    No problems with anesthesia in past surgeries  Patient denies any fevers chills and no recent upper respiratory infections  Patient has good support in the home and reports no one in the home is unwell either  At last visit patient and I had been discussing her significantly elevated sugars  Patient had not been checking her sugars on a regular basis and all she had were some readings in the morning that looked pretty good but as noted did not explain her significantly elevated A1c  Patient was instructed to continue her Levemir 48 units every day but in the morning and bring readings  It is only been 1 week and readings in the morning range from 110 -201 and in the evening they are 183-268  At this point because patient is in need of the surgery will have her increase this to 52 units but to take it in the morning so she does not get lower sugars in the morning  This will also help the readings in the evening  Will not have enough time to get her sugars under decent control prior to the surgery on October 8th  Will also have to use caution as patient has had a few readings at 101 evening and 98 in the morning  With her numbers ranging so greatly from 100 to almost 300 as always patient is not eating consistent carbohydrates throughout the day and therefore her sugars range quite significantly    Would prefer to place her on a basal bolus however there is not enough time to get patient educated and using this system before her surgery in a few days  Patient's blood pressure slightly elevated today however she had been out of her amlodipine and the refill was submitted earlier today before this appointment  Patient has not yet picked it up  Patient has also been taking over-the-counter ibuprofen 1 or 2 tablets 2 or 3 times a day since her fall for her pain  Reviewed with patient that she needs to discontinue taking this secondary to scheduled surgery  Preop testing was completed  Patient had a neither A1c 6 days after the 1 she did for me still elevated at 9 0%  Kidney functions normal potassium normal   EKG shows no acute changes normal sinus rhythm  It is noted previous 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 infarct however this has been noted on EKGs since 2015  Patient has remained under care of cardiologist and most recently underwent ablation for SVT in May 2019  Patient reports she has no longer had any of the symptoms nor racing of her heart since the procedure and has remained in normal sinus rhythm  Based on current conditions patient stable for recommended surgery for repair of impacted fracture  Patient is aware as per discussion with the orthopedist earlier and myself again today that it would be prefer a full if her sugars were better controlled and therefore adjustments have been made to her insulin to help provide better coverage  Patient was also advised that she is to contact our office if she has any readings in the morning that are less than 100 or any readings greater than 300 during the day  Patient will continue her blood sugar log and we will follow up with her in a few weeks after her surgery to see how her readings are              The following portions of the patient's history were reviewed and updated as appropriate: allergies, current medications, past family history, past medical history, past social history, past surgical history and problem list     Review of Systems   Constitutional: Positive for activity change (limited by pain from fall)  Negative for appetite change, chills and fever  HENT: Negative for congestion, mouth sores, nosebleeds, sore throat, tinnitus, trouble swallowing and voice change  Respiratory: Negative  Negative for cough, chest tightness and shortness of breath  Cardiovascular: Negative  Negative for chest pain and palpitations  Gastrointestinal: Negative for abdominal pain, diarrhea, nausea and vomiting  Endocrine: Negative  Negative for polydipsia, polyphagia and polyuria  Genitourinary: Negative for dysuria  Musculoskeletal: Positive for arthralgias, back pain and myalgias  Skin: Positive for wound  Neurological: Negative  Negative for dizziness, tremors, syncope, weakness and light-headedness  Psychiatric/Behavioral: Negative  Objective:      /70 (BP Location: Right arm, Patient Position: Sitting, Cuff Size: Standard)   Pulse 80   Temp 97 6 °F (36 4 °C) (Oral)   Ht 4' 11" (1 499 m)   Wt 61 6 kg (135 lb 12 9 oz)   BMI 27 43 kg/m²          Physical Exam   Constitutional: She appears well-developed and well-nourished  No distress  HENT:   Head: Normocephalic  Fair to poor dentition  Patient has a number of missing teeth status post extraction  Patient also has some fillings that are old  No loose teeth on examination no dental abscess   Eyes: Pupils are equal, round, and reactive to light  Conjunctivae are normal    Neck: Neck supple  No JVD present  No thyromegaly present  Cardiovascular: Normal rate, regular rhythm and normal heart sounds  No murmur heard  Pulmonary/Chest: Effort normal and breath sounds normal  She has no wheezes  She has no rales  Abdominal: Soft  Bowel sounds are normal  There is no tenderness  Musculoskeletal: She exhibits no edema  Patient currently wearing left upper extremity splint casting and sling  Patient is able to wiggle her fingers sensation intact color normal   Fingers however are swollen  Lymphadenopathy:     She has no cervical adenopathy  Neurological: She is alert  No cranial nerve deficit  Skin:   Patient does have bruising in various stages located to left side of face very minimal bruising remains there  Patient has bruising left upper extremity shoulder and left hip  Psychiatric: She has a normal mood and affect  Her behavior is normal    Nursing note and vitals reviewed

## 2019-10-04 NOTE — PATIENT INSTRUCTIONS
As we discussed today also utilizing in office  with you you understand the changes we are making for your sugar  You will start taking 52 units of her Levemir once daily  We also reviewed that you are supposed to be taking this in the morning not at bedtime  We reviewed that by taking this in the morning as the insulin is wearing off is when you are sleeping to help prevent low sugars in the middle of the night  You are to contact our office immediately Monday morning if you have any sugars in the morning that are below 100 or any sugars throughout the day that are greater than 300  We reviewed that while not ideal situation and you are aware of the increased risks of having surgery with your sugars significantly elevated including delayed healing and increased risk for infections you are agreeable to proceed with the surgery as scheduled October 8  Patient's preoperative testing completed and acceptable including normal renal functions, EKG with normal sinus rhythm  As noted does report a 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 infarct however has been noted on EKGs since 2015 and unchanged  Patient has been under cardiology care as well and recently May of 2019 underwent ablation for SVT with resolution of tachycardia and symptoms  Patient medically cleared and is moderate risk for urgent surgery as scheduled October 8th for repair of left distal radial impacted fracture

## 2019-10-06 ENCOUNTER — ANESTHESIA EVENT (OUTPATIENT)
Dept: PERIOP | Facility: AMBULARY SURGERY CENTER | Age: 79
End: 2019-10-06
Payer: COMMERCIAL

## 2019-10-08 ENCOUNTER — APPOINTMENT (OUTPATIENT)
Dept: RADIOLOGY | Facility: AMBULARY SURGERY CENTER | Age: 79
End: 2019-10-08
Payer: COMMERCIAL

## 2019-10-08 ENCOUNTER — ANESTHESIA (OUTPATIENT)
Dept: PERIOP | Facility: AMBULARY SURGERY CENTER | Age: 79
End: 2019-10-08
Payer: COMMERCIAL

## 2019-10-08 ENCOUNTER — HOSPITAL ENCOUNTER (OUTPATIENT)
Facility: AMBULARY SURGERY CENTER | Age: 79
Setting detail: OUTPATIENT SURGERY
Discharge: HOME | End: 2019-10-08
Attending: SURGERY | Admitting: SURGERY
Payer: COMMERCIAL

## 2019-10-08 VITALS
HEIGHT: 59 IN | OXYGEN SATURATION: 96 % | SYSTOLIC BLOOD PRESSURE: 158 MMHG | RESPIRATION RATE: 20 BRPM | TEMPERATURE: 97.5 F | BODY MASS INDEX: 26.61 KG/M2 | WEIGHT: 132 LBS | HEART RATE: 82 BPM | DIASTOLIC BLOOD PRESSURE: 70 MMHG

## 2019-10-08 DIAGNOSIS — Z47.89 AFTERCARE FOLLOWING SURGERY OF THE MUSCULOSKELETAL SYSTEM: ICD-10-CM

## 2019-10-08 DIAGNOSIS — S52.572D OTHER CLOSED INTRA-ARTICULAR FRACTURE OF DISTAL END OF LEFT RADIUS WITH ROUTINE HEALING, SUBSEQUENT ENCOUNTER: Primary | ICD-10-CM

## 2019-10-08 LAB
GLUCOSE SERPL-MCNC: 129 MG/DL (ref 65–140)
GLUCOSE SERPL-MCNC: 140 MG/DL (ref 65–140)

## 2019-10-08 PROCEDURE — 25609 OPTX DST RD XART FX/EP SEP3+: CPT | Performed by: PHYSICIAN ASSISTANT

## 2019-10-08 PROCEDURE — 64721 CARPAL TUNNEL SURGERY: CPT | Performed by: SURGERY

## 2019-10-08 PROCEDURE — 73110 X-RAY EXAM OF WRIST: CPT

## 2019-10-08 PROCEDURE — 64721 CARPAL TUNNEL SURGERY: CPT | Performed by: PHYSICIAN ASSISTANT

## 2019-10-08 PROCEDURE — 25609 OPTX DST RD XART FX/EP SEP3+: CPT | Performed by: SURGERY

## 2019-10-08 PROCEDURE — C1713 ANCHOR/SCREW BN/BN,TIS/BN: HCPCS | Performed by: SURGERY

## 2019-10-08 PROCEDURE — 82948 REAGENT STRIP/BLOOD GLUCOSE: CPT

## 2019-10-08 DEVICE — SCREW CORTICAL 3.2 X 11MM: Type: IMPLANTABLE DEVICE | Site: WRIST | Status: FUNCTIONAL

## 2019-10-08 DEVICE — IMPLANTABLE DEVICE: Type: IMPLANTABLE DEVICE | Site: WRIST | Status: FUNCTIONAL

## 2019-10-08 RX ORDER — HYDROMORPHONE HCL/PF 1 MG/ML
0.2 SYRINGE (ML) INJECTION
Status: DISCONTINUED | OUTPATIENT
Start: 2019-10-08 | End: 2019-10-08 | Stop reason: HOSPADM

## 2019-10-08 RX ORDER — FENTANYL CITRATE 50 UG/ML
INJECTION, SOLUTION INTRAMUSCULAR; INTRAVENOUS AS NEEDED
Status: DISCONTINUED | OUTPATIENT
Start: 2019-10-08 | End: 2019-10-08 | Stop reason: SURG

## 2019-10-08 RX ORDER — ROPIVACAINE HYDROCHLORIDE 5 MG/ML
INJECTION, SOLUTION EPIDURAL; INFILTRATION; PERINEURAL
Status: DISCONTINUED | OUTPATIENT
Start: 2019-10-08 | End: 2019-10-08 | Stop reason: SURG

## 2019-10-08 RX ORDER — SODIUM CHLORIDE, SODIUM LACTATE, POTASSIUM CHLORIDE, CALCIUM CHLORIDE 600; 310; 30; 20 MG/100ML; MG/100ML; MG/100ML; MG/100ML
INJECTION, SOLUTION INTRAVENOUS CONTINUOUS PRN
Status: DISCONTINUED | OUTPATIENT
Start: 2019-10-08 | End: 2019-10-08

## 2019-10-08 RX ORDER — HYDROCODONE BITARTRATE AND ACETAMINOPHEN 5; 325 MG/1; MG/1
1 TABLET ORAL EVERY 6 HOURS PRN
Qty: 20 TABLET | Refills: 0 | Status: SHIPPED | OUTPATIENT
Start: 2019-10-08 | End: 2019-10-18

## 2019-10-08 RX ORDER — SODIUM CHLORIDE, SODIUM LACTATE, POTASSIUM CHLORIDE, CALCIUM CHLORIDE 600; 310; 30; 20 MG/100ML; MG/100ML; MG/100ML; MG/100ML
50 INJECTION, SOLUTION INTRAVENOUS CONTINUOUS
Status: DISCONTINUED | OUTPATIENT
Start: 2019-10-08 | End: 2019-10-08 | Stop reason: HOSPADM

## 2019-10-08 RX ORDER — LIDOCAINE HYDROCHLORIDE 10 MG/ML
INJECTION, SOLUTION INFILTRATION; PERINEURAL AS NEEDED
Status: DISCONTINUED | OUTPATIENT
Start: 2019-10-08 | End: 2019-10-08 | Stop reason: SURG

## 2019-10-08 RX ORDER — DEXAMETHASONE SODIUM PHOSPHATE 10 MG/ML
INJECTION, SOLUTION INTRAMUSCULAR; INTRAVENOUS AS NEEDED
Status: DISCONTINUED | OUTPATIENT
Start: 2019-10-08 | End: 2019-10-08 | Stop reason: SURG

## 2019-10-08 RX ORDER — PROPOFOL 10 MG/ML
INJECTION, EMULSION INTRAVENOUS AS NEEDED
Status: DISCONTINUED | OUTPATIENT
Start: 2019-10-08 | End: 2019-10-08 | Stop reason: SURG

## 2019-10-08 RX ORDER — SODIUM CHLORIDE, SODIUM LACTATE, POTASSIUM CHLORIDE, CALCIUM CHLORIDE 600; 310; 30; 20 MG/100ML; MG/100ML; MG/100ML; MG/100ML
125 INJECTION, SOLUTION INTRAVENOUS CONTINUOUS
Status: DISCONTINUED | OUTPATIENT
Start: 2019-10-08 | End: 2019-10-08 | Stop reason: HOSPADM

## 2019-10-08 RX ORDER — CEFAZOLIN SODIUM 2 G/50ML
2000 SOLUTION INTRAVENOUS ONCE
Status: COMPLETED | OUTPATIENT
Start: 2019-10-08 | End: 2019-10-08

## 2019-10-08 RX ORDER — MAGNESIUM HYDROXIDE 1200 MG/15ML
LIQUID ORAL AS NEEDED
Status: DISCONTINUED | OUTPATIENT
Start: 2019-10-08 | End: 2019-10-08 | Stop reason: HOSPADM

## 2019-10-08 RX ORDER — PROMETHAZINE HYDROCHLORIDE 25 MG/ML
6.25 INJECTION, SOLUTION INTRAMUSCULAR; INTRAVENOUS ONCE AS NEEDED
Status: DISCONTINUED | OUTPATIENT
Start: 2019-10-08 | End: 2019-10-08 | Stop reason: HOSPADM

## 2019-10-08 RX ORDER — ONDANSETRON 2 MG/ML
4 INJECTION INTRAMUSCULAR; INTRAVENOUS EVERY 6 HOURS PRN
Status: CANCELLED | OUTPATIENT
Start: 2019-10-08

## 2019-10-08 RX ORDER — ONDANSETRON 2 MG/ML
4 INJECTION INTRAMUSCULAR; INTRAVENOUS ONCE AS NEEDED
Status: DISCONTINUED | OUTPATIENT
Start: 2019-10-08 | End: 2019-10-08 | Stop reason: HOSPADM

## 2019-10-08 RX ORDER — FENTANYL CITRATE/PF 50 MCG/ML
25 SYRINGE (ML) INJECTION
Status: DISCONTINUED | OUTPATIENT
Start: 2019-10-08 | End: 2019-10-08 | Stop reason: HOSPADM

## 2019-10-08 RX ORDER — ALBUTEROL SULFATE 2.5 MG/3ML
2.5 SOLUTION RESPIRATORY (INHALATION) ONCE AS NEEDED
Status: DISCONTINUED | OUTPATIENT
Start: 2019-10-08 | End: 2019-10-08 | Stop reason: HOSPADM

## 2019-10-08 RX ORDER — LIDOCAINE HYDROCHLORIDE 10 MG/ML
0.5 INJECTION, SOLUTION EPIDURAL; INFILTRATION; INTRACAUDAL; PERINEURAL ONCE AS NEEDED
Status: DISCONTINUED | OUTPATIENT
Start: 2019-10-08 | End: 2019-10-08 | Stop reason: HOSPADM

## 2019-10-08 RX ORDER — ONDANSETRON 2 MG/ML
INJECTION INTRAMUSCULAR; INTRAVENOUS AS NEEDED
Status: DISCONTINUED | OUTPATIENT
Start: 2019-10-08 | End: 2019-10-08 | Stop reason: SURG

## 2019-10-08 RX ORDER — ACETAMINOPHEN 325 MG/1
650 TABLET ORAL EVERY 6 HOURS PRN
Status: DISCONTINUED | OUTPATIENT
Start: 2019-10-08 | End: 2019-10-08 | Stop reason: HOSPADM

## 2019-10-08 RX ORDER — CEFAZOLIN SODIUM 1 G/50ML
SOLUTION INTRAVENOUS AS NEEDED
Status: DISCONTINUED | OUTPATIENT
Start: 2019-10-08 | End: 2019-10-08

## 2019-10-08 RX ADMIN — FENTANYL CITRATE 50 MCG: 50 INJECTION, SOLUTION INTRAMUSCULAR; INTRAVENOUS at 11:56

## 2019-10-08 RX ADMIN — CEFAZOLIN SODIUM 2000 MG: 2 SOLUTION INTRAVENOUS at 13:55

## 2019-10-08 RX ADMIN — LIDOCAINE HYDROCHLORIDE 50 MG: 10 INJECTION, SOLUTION INFILTRATION; PERINEURAL at 13:55

## 2019-10-08 RX ADMIN — ONDANSETRON 4 MG: 2 INJECTION INTRAMUSCULAR; INTRAVENOUS at 14:16

## 2019-10-08 RX ADMIN — SODIUM CHLORIDE, SODIUM LACTATE, POTASSIUM CHLORIDE, AND CALCIUM CHLORIDE: .6; .31; .03; .02 INJECTION, SOLUTION INTRAVENOUS at 11:56

## 2019-10-08 RX ADMIN — FENTANYL CITRATE 50 MCG: 50 INJECTION, SOLUTION INTRAMUSCULAR; INTRAVENOUS at 11:52

## 2019-10-08 RX ADMIN — PROPOFOL 200 MG: 10 INJECTION, EMULSION INTRAVENOUS at 13:55

## 2019-10-08 RX ADMIN — ROPIVACAINE HYDROCHLORIDE 20 ML: 5 INJECTION, SOLUTION EPIDURAL; INFILTRATION; PERINEURAL at 12:15

## 2019-10-08 RX ADMIN — DEXAMETHASONE SODIUM PHOSPHATE 4 MG: 10 INJECTION, SOLUTION INTRAMUSCULAR; INTRAVENOUS at 14:16

## 2019-10-08 NOTE — ANESTHESIA PREPROCEDURE EVALUATION
Review of Systems/Medical History  Patient summary reviewed  Chart reviewed      Cardiovascular  Negative cardio ROS Exercise tolerance (METS): >4,  Hypertension ,    Pulmonary  Negative pulmonary ROS        GI/Hepatic  Negative GI/hepatic ROS   GERD ,        Negative  ROS        Endo/Other  Negative endo/other ROS Diabetes ,      GYN  Negative gynecology ROS          Hematology  Negative hematology ROS Anemia ,     Musculoskeletal  Negative musculoskeletal ROS        Neurology  Negative neurology ROS Seizures ,     Psychology   Negative psychology ROS              Physical Exam    Airway    Mallampati score: III  TM Distance: >3 FB  Neck ROM: full     Dental   No notable dental hx     Cardiovascular  Comment: Negative ROS, Rhythm: regular, Rate: normal, Cardiovascular exam normal    Pulmonary  Pulmonary exam normal Breath sounds clear to auscultation,     Other Findings        Anesthesia Plan  ASA Score- 3     Anesthesia Type- general and regional with ASA Monitors  Additional Monitors:   Airway Plan: ETT  Plan Factors- Patient instructed to abstain from smoking on day of procedure  Patient did not smoke on day of surgery  Induction- intravenous  Postoperative Plan- Plan for postoperative opioid use  Planned trial extubation    Informed Consent- Anesthetic plan and risks discussed with patient  I personally reviewed this patient with the CRNA  Discussed and agreed on the Anesthesia Plan with the CRNA  Minda Cardenas

## 2019-10-08 NOTE — ANESTHESIA POSTPROCEDURE EVALUATION
Post-Op Assessment Note    CV Status:  Stable  Pain Score: 1    Pain management: adequate     Mental Status:  Alert and awake   Hydration Status:  Euvolemic   PONV Controlled:  Controlled   Airway Patency:  Patent   Post Op Vitals Reviewed: Yes      Staff: Anesthesiologist, RAHEEM           BP  175/76   Temp   97 3   Pulse  87   Resp   14   SpO2   98

## 2019-10-08 NOTE — INTERVAL H&P NOTE
H&P reviewed  After examining the patient I find no changes in the patients condition since the H&P had been written      Vitals:    10/08/19 1012   BP: (!) 191/75   Pulse: 81   Resp: 16   Temp: 97 7 °F (36 5 °C)   SpO2: 98%

## 2019-10-08 NOTE — ANESTHESIA PROCEDURE NOTES
Peripheral Block    Patient location during procedure: pre-op  Start time: 10/8/2019 12:15 PM  Reason for block: procedure for pain, at surgeon's request and post-op pain management  Staffing  Anesthesiologist: Ted Felty, MD  Performed: anesthesiologist   Preanesthetic Checklist  Completed: patient identified, site marked, surgical consent, pre-op evaluation, timeout performed, IV checked, risks and benefits discussed and monitors and equipment checked  Peripheral Block  Patient position: supine  Prep: ChloraPrep  Patient monitoring: cardiac monitor, continuous pulse ox, frequent blood pressure checks and heart rate  Block type: axillary  Laterality: left  Injection technique: single-shot  Procedures: ultrasound guided, Ultrasound guidance required for the procedure to increase accuracy and safety of medication placement and decrease risk of complications  ropivacaine (NAROPIN) 0 5 % perineural infiltration, 20 mL  Needle  Needle type: short-bevel   Needle gauge: 22 G  Needle length: 10 cm  Needle localization: ultrasound guidance and anatomical landmarks  Test dose: negative  Assessment  Injection assessment: incremental injection and local visualized surrounding nerve on ultrasound  Paresthesia pain: none  Heart rate change: no  Slow fractionated injection: yes  Post-procedure:  site cleaned  patient tolerated the procedure well with no immediate complications

## 2019-10-08 NOTE — OP NOTE
OPERATIVE REPORT  PATIENT NAME: Stephanie Cordoba  :  1940  MRN: [id number]  Pt Location: [facility]    SURGERY DATE: 10/08/19    Surgeon(s) and Role:     * Delfin Frankel, MD - Primary     * Kodak Mina PA-C - Assisting    Pre-Op Diagnosis:  Pre-procedural laboratory examination [Z01 812]  Other closed intra-articular fracture of distal end of left radius, initial encounter [S52 572A]    Post-Op Diagnosis Codes:     * Pre-procedural laboratory examination [Z01 812]     * Other closed intra-articular fracture of distal end of left radius, initial encounter [S52 572A]    Procedure(s):  RADIUS/ULNA (WRIST) OPEN REDUCTION W/ INTERNAL FIXATION (ORIF) (Left)  CARPAL TUNNEL RELEASE (Left)    Specimen(s):  * No orders in the log *    Estimated Blood Loss:   Minimal    Anesthesia Type:   Regional with Sedation    IMPLANTS:  Implant Name Type Inv   Item Serial No   Lot No  LRB No  Used   LOG [id number] - TRIMED WRIST FIXATION SYSTEM - 1      Left    PEG VOLAR THREADED 16MM - [id number]  PEG VOLAR THREADED 16MM  [employer]  Left 3   PEG VOLAR THREADED 18MM - [id number]  PEG VOLAR THREADED 18MM  [employer]  Left 3   SCREW CORTICAL 2 3 X 24MM - [id number]  SCREW CORTICAL 2 3 X 24MM  [employer]  Left 1   SCREW CORTICAL 3 2 X 12MM - [id number]  SCREW CORTICAL 3 2 X 12MM  [employer]  Left 1   SCREW CORTICAL 3 2 X 14MM - [id number]  SCREW CORTICAL 3 2 X 14MM  [employer]  Left 1   SCREW LCK 3 2 X 10MM CORTICAL - [id number]  SCREW LCK 3 2 X 10MM CORTICAL  [employer]  Left 1   SCREW LCK 3 2 X 12MM CORTICAL - [id number]  SCREW LCK 3 2 X 12MM CORTICAL  [employer]  Left 1   PLATE VOLAR BEARING 5 HOLE LEFT - [id number]  PLATE VOLAR BEARING 5 HOLE LEFT  [employer]  Left 1   K-WIRE FIXATION 1 1 X 100MM SS - [id number]  K-WIRE FIXATION 1 1 X 100MM SS  [employer]  Left 6   SCREW CORTICAL 3 2 X 11MM - [id number]  SCREW CORTICAL 3 2 X 11MM  [employer]  Left 1       PERIOPERATIVE ANTIBIOTICS:    cefazolin, 2 grams    Tourniquet Time: 34 min  at 250 mmHg          Operative Indications: The patient has a history of right distal radius fracture  that was recalcitrant to conservative management  The decision was made to bring the patient to the operating room for right open carpal tunnel release for distal radius fracture open reduction internal fixation Risks of the procedure were explained which include, but are not limited to bleeding; infection; damage to nerves, arteries,veins, tendons; scar; pain; need for reoperation; failure to give desired result; and risks of anaesthesia  All questions were answered to satisfaction and they were willing to proceed  Operative Findings:  3 part intra-articular fracture     Complications:   None    Procedure and Technique:  After the patient, site, and procedure were identified, the patient was brought into the operating room in a supine position  Regional and general anaesthesia were provided  A well padded tourniquet was applied to the extremity, set at 250 mmHg  The  right upper extremity was then prepped and drapped in a normal, sterile, orthopedic fashion  An anterior approach to the carpal tunnel was undertaken  A 2 cm incision was made in line with the fourth digit, proximal to Frost's line  The skin and the subcutaneous tissue were sharply incised  Under loupe magnification, dissection was carried down to the palmar fascia and it was incised  The transverse carpal ligament was visualized and  Released distally with a #64 blade  A freer was was passed above and below the TCL in a retrograde fashion, freeing up any adhesions  A Knife Lite was used to release the proximal portion of the transverse carpal ligament under direct visualization  Distally the superficial arch was identified  A complete release was carried out and exploration of the carpal canal revealed no significant tenosynovitis  The median nerve and its branches were intact        A linear 10 cm incision was made overlying the FCR with chevron extension at the distal wrist crease Under loupe magnification the FCR sheath was incised, being careful to avoid the palmar cutaneous branch of the median nerve  The FCR was retracted medially and the sub sheath incised   The FPL was retracted medially  The PQ was detached using a hockey stick incision  The remainder was sharply elevated off the distal radius  The radial styloid was exposed  The fracture site was curettage  mild comminution was noted at the  fracture site  The fracture configuration appear to be a  greater than three-part intraarticular     The  Fracture was reduced and pinned provisionally with a 0 045  Reduction was verified on orthogonal fluoroscopic views  The long standard plate was selected and provisionally pinned in place  Position was verified on orthogonal fluoroscopic views  The place was secured distally with 2 cortical screw, elevating the proximal portion of the plate off of the shaft  Next the radial styloid screw and ulnar most distal row screw were drilled measured and filled with appropriately sized locking screws  Next the cortical screws were exchanged for locking screws  The oblong hole screw was drilled measured and filled with appropriate size screw restoring volar tilt  Additional radial height and inclination were gained using distraction all and radial translation  An additional 1 locking screw was placed within the 2nd distal row  Finally 3  more shaft screws were drilled measured and appropriately placed, using  nonlocking screws  Fluoroscopic images were obtained in orthogonal planes demonstrating good fracture reduction and left fluoroscopy utilized to ensure there is no intra-articular penetration of the screws into the radiocarpal joint or the DRUJ  The DRUJ was tested supination pronation and neutral and found to be stable          At the completion of the procedure, hemostasis was obtained with cautery and direct pressure  The wounds were copiously irrigated with sterile solution  The wounds were closed with Prolene, Monocryl, Histocryl and Steri-strips  Sterile dressings were applied, including Xeroform, gauze, tweeners, webril, ACE and Volar Splint  Please note, all sponge, needle, and instrument counts were correct prior to closure  Loupe magnification was utilized  The patient tolerated the procedure well       I was present for the entire procedure, A qualified resident physician was not available and A physician assistant was required during the procedure for retraction tissue handling,dissection and suturing    Patient Disposition:  PACU     SIGNATURE: Epifanio Sher MD  DATE: 10/08/19  TIME: 2:57 PM

## 2019-10-18 ENCOUNTER — OFFICE VISIT (OUTPATIENT)
Dept: OBGYN CLINIC | Facility: HOSPITAL | Age: 79
End: 2019-10-18

## 2019-10-18 ENCOUNTER — HOSPITAL ENCOUNTER (OUTPATIENT)
Dept: RADIOLOGY | Facility: HOSPITAL | Age: 79
Discharge: HOME | End: 2019-10-18
Attending: SURGERY
Payer: COMMERCIAL

## 2019-10-18 VITALS
HEIGHT: 59 IN | WEIGHT: 136 LBS | SYSTOLIC BLOOD PRESSURE: 158 MMHG | HEART RATE: 78 BPM | BODY MASS INDEX: 27.42 KG/M2 | DIASTOLIC BLOOD PRESSURE: 70 MMHG

## 2019-10-18 DIAGNOSIS — S52.572A OTHER CLOSED INTRA-ARTICULAR FRACTURE OF DISTAL END OF LEFT RADIUS, INITIAL ENCOUNTER: Primary | ICD-10-CM

## 2019-10-18 DIAGNOSIS — S52.572A OTHER CLOSED INTRA-ARTICULAR FRACTURE OF DISTAL END OF LEFT RADIUS, INITIAL ENCOUNTER: ICD-10-CM

## 2019-10-18 PROCEDURE — 99024 POSTOP FOLLOW-UP VISIT: CPT | Performed by: SURGERY

## 2019-10-18 PROCEDURE — 73110 X-RAY EXAM OF WRIST: CPT

## 2019-10-18 NOTE — PROGRESS NOTES
Assessment/Plan:  Patient ID: Derick Eaton 67 y o  female   Surgery: Radius/ulna (wrist) Open Reduction W/ Internal Fixation (orif) - Left and Carpal Tunnel Release - Left  Date of Surgery: 10/8/2019    Sutures removed today, incision sites appear to be healing well with no signs of infection  Patient is very stiff with wrist and finger range of motion, will start her in OT for aggressive range of motion exercises with home exercise program  Will also give her a wrist cockup splint to wean off over the next two weeks  Follow up in 4 weeks for ROM check    Follow Up:  4 week    To Do Next Visit:    range-of-motion check      CHIEF COMPLAINT:  Chief Complaint   Patient presents with    Left Wrist - Post-op         SUBJECTIVE:  Derick Eaton is a 67y o  year old female who presents for follow up after Radius/ulna (wrist) Open Reduction W/ Internal Fixation (orif) - Left and Carpal Tunnel Release - Left  Today patient has some pain and stiffness in the wrist and fingers  No numbness or tingling, no fevers or chills  Patient has been compliant in her postoperative dressing since her operation          PHYSICAL EXAMINATION:  General: well developed and well nourished, alert, oriented times 3 and appears comfortable  Psychiatric: Normal    MUSCULOSKELETAL EXAMINATION:  Incision: Clean, dry, intact  Surgery Site: normal, no evidence of infection   Range of Motion: Limited due to pain and Limited due to stiffness  Neurovascular status: Neuro intact, good cap refill  Activity Restrictions: wean off wrist brace over next two weeks  Done today: Sutures out      STUDIES REVIEWED:  Images were reviewd in PACS:  Stable alignment of distal radius fracture and plate and screw fixation      PROCEDURES PERFORMED:  Procedures  No Procedures performed today      Erickson Pennington PA-C

## 2019-10-22 ENCOUNTER — EVALUATION (OUTPATIENT)
Dept: OCCUPATIONAL THERAPY | Age: 79
End: 2019-10-22
Payer: COMMERCIAL

## 2019-10-22 DIAGNOSIS — S52.571A OTH INTARTIC FRACTURE OF LOWER END OF RIGHT RADIUS, INIT: Primary | ICD-10-CM

## 2019-10-22 PROCEDURE — 97165 OT EVAL LOW COMPLEX 30 MIN: CPT

## 2019-10-22 PROCEDURE — 97110 THERAPEUTIC EXERCISES: CPT

## 2019-10-22 NOTE — PROGRESS NOTES
OT Evaluation     Today's date: 10/22/2019  Patient name: Rubin Jay  : 1940  MRN: [id number]  Referring provider: Jenelle Sanchez MD  Dx:   Encounter Diagnosis     ICD-10-CM    1  Oth intartic fracture of lower end of right radius, init B08 383A                   Assessment  Assessment details: Pt is a 68 y/o female who presents to skilled OT tx with L wrist fx s/p fall and surgery 2 weeks ago  Pt did not have brace on when she came to treatment and therapist educated pt on the importance of wearing  Pt also reported she takes it off at night and therapist educated pt again on need of wearing at night  Pt demo moderate swelling in her hands and fingers which is decreasing her ROM as well  Pt reports no pain without movement but pain with movement of 8/10 at wrist and hand  Pt demo moderate to severe ROM impairments of wrist and fingers  Pt would benefit from skilled OT tx to inc ROM at digits and wrist with HEP and movement to dec edema in hand  Impairments: abnormal movement, impaired physical strength, lacks appropriate home exercise program and pain with function  Other impairment: moderate edema to L hand/wrist  Functional limitations: brace on at all times, except ROM and therapy  Symptom irritability: moderateBarriers to therapy: Language barrier; pt native language is Maldivian but can speak and understand some english  Understanding of Dx/Px/POC: good   Prognosis: good    Goals  STGs:  1  Pt will inc wrist flex/ext + 10 degrees  2  Pt will inc digit MCP and PIP flexion + 10 degrees   3  Pt will dec edema in L hand to moderate edema  4  Pt will be able to perf opposition of thumb to pinky and thumb to 4th digit  LTGs:  1  Pt will inc L wrist AROM to WFLs  2  Pt will demo independence in scar mgmt and massage  3  Pt will be independent with ROM HEP  4  Pt will demo digit flex/ext to [facility]  5   Pt will report no pain during movement of L wrist     Plan  Patient would benefit from: skilled occupational therapy  Planned modality interventions: fluidotherapy, cryotherapy and thermotherapy: hydrocollator packs  Other planned modality interventions: IAS  Planned therapy interventions: massage, manual therapy, patient education, stretching, therapeutic activities, therapeutic exercise, functional ROM exercises and home exercise program  Frequency: 2x week  Duration in weeks: 6  Plan of Care beginning date: 10/22/2019  Plan of Care expiration date: 12/3/2019  Treatment plan discussed with: patient and caregiver        Subjective Evaluation    History of Present Illness  Date of onset: 2019  Date of surgery: 10/8/2019  Mechanism of injury: surgery and trauma  Mechanism of injury: Pt is a 66 y/o female who presents to skilled OT tx with L wrist fx s/p fall and surgery ORIF 2 weeks ago  Pt did not have brace on when she came to treatment and therapist educated pt on the importance of wearing  Pt also reported she takes it off at night and therapist educated pt again on need of wearing at night  Pt demo moderate swelling in her hands and fingers which is decreasing her ROM as well  Pt reports no pain without movement but pain with movement of 8/10 at wrist and hand  Pt demo moderate to severe ROM impairments of wrist and fingers  Pt would benefit from skilled OT tx to inc ROM at digits and wrist with HEP and movement to dec edema in hand  Not a recurrent problem   Quality of life: good    Pain  Current pain ratin  At best pain ratin  At worst pain ratin  Location: L wrist  Quality: burning, discomfort and radiating  Relieving factors: rest and support      Diagnostic Tests  X-ray: abnormal  Treatments  No previous or current treatments  Patient Goals  Patient goals for therapy: decreased edema, decreased pain, increased motion and independence with ADLs/IADLs          Objective     Observations     Left Wrist/Hand   Negative for adhesive scar and edema       Active Range of Motion Left Wrist   Wrist flexion: 40 degrees with pain  Wrist extension: 5 degrees with pain  Radial deviation: 15 degrees   Ulnar deviation: 10 degrees     Right Wrist   Normal active range of motion    Left Thumb   Flexion     MP: 30 degrees    DIP: 25 degrees  Palmar Abduction     CMC: 25 degrees    Left Digits    Flexion   Index     MCP: 35    PIP: 65    DIP: 35  Middle     MCP: 40    PIP: 55    DIP: 60  Ring     MCP: 30    PIP: 60    DIP: 40  Little     MCP: 30    PIP: 60    DIP: 40    Swelling     Left Wrist/Hand   Figure 8: 19 cm  Circumference MCP: 18 5 cm  Circumference wrist: 16 5 cm    General Comments:      Wrist/Hand Comments  Unable to perform opposition of thumb to pinky ( 5cm away) and thumb to 4th digit ( 5cm away)        Flowsheet Rows      Most Recent Value   PT/OT G-Codes   Current Score  21   Projected Score  57             Precautions: AROM/PROM of Left wrist and fingers, only remove brace for therapy and ROM      Manual                                                                                   Exercise Diary  10/22            Wrist A/PROM flex/ext 10x            Wrist ulnar/radial deviation 10x            Tendon gliding 10x            Finger lifts 10x            fisting             Overhead pumping 10x            opposition 10x            Finger crawl             Finger HEP                                                                                                                                                                Modalities

## 2019-10-22 NOTE — LETTER
2019    Jean Christine MD  [address] [facility] [address]    Patient: Sherry Goss   YOB: 1940   Date of Visit: 10/22/2019     Encounter Diagnosis     ICD-10-CM    1  Oth intartic fracture of lower end of right radius, init S56 751A        Dear Dr Haylee Evangelista: Thank you for your recent referral of Sherry Goss  Please review the attached evaluation summary from Conchis's recent visit  Please verify that you agree with the plan of care by signing the attached order  If you have any questions or concerns, please do not hesitate to call  I sincerely appreciate the opportunity to share in the care of one of your patients and hope to have another opportunity to work with you in the near future  Sincerely,    Raquel Strauss OT      Referring Provider:     I certify that I have read the below Plan of Care and certify the need for these services furnished under this plan of treatment while under my care  Jean Christine MD  [address] [address]  [place]        OT Evaluation     Today's date: 10/22/2019  Patient name: Sherry Goss  : 1940  MRN: [id number]  Referring provider: Justina Staples MD  Dx:   Encounter Diagnosis     ICD-10-CM    1  Oth intartic fracture of lower end of right radius, init D30 549O                   Assessment  Assessment details: Pt is a 66 y/o female who presents to skilled OT tx with L wrist fx s/p fall and surgery 2 weeks ago  Pt did not have brace on when she came to treatment and therapist educated pt on the importance of wearing  Pt also reported she takes it off at night and therapist educated pt again on need of wearing at night  Pt demo moderate swelling in her hands and fingers which is decreasing her ROM as well  Pt reports no pain without movement but pain with movement of 8/10 at wrist and hand   Pt demo moderate to severe ROM impairments of wrist and fingers  Pt would benefit from skilled OT tx to inc ROM at digits and wrist with HEP and movement to dec edema in hand  Impairments: abnormal movement, impaired physical strength, lacks appropriate home exercise program and pain with function  Other impairment: moderate edema to L hand/wrist  Functional limitations: brace on at all times, except ROM and therapy  Symptom irritability: moderateBarriers to therapy: Language barrier; pt native language is Qatari but can speak and understand some english  Understanding of Dx/Px/POC: good   Prognosis: good    Goals  STGs:  1  Pt will inc wrist flex/ext + 10 degrees  2  Pt will inc digit MCP and PIP flexion + 10 degrees   3  Pt will dec edema in L hand to moderate edema  4  Pt will be able to perf opposition of thumb to pinky and thumb to 4th digit  LTGs:  1  Pt will inc L wrist AROM to WFLs  2  Pt will demo independence in scar mgmt and massage  3  Pt will be independent with ROM HEP  4  Pt will demo digit flex/ext to [facility]  5  Pt will report no pain during movement of L wrist     Plan  Patient would benefit from: skilled occupational therapy  Planned modality interventions: fluidotherapy, cryotherapy and thermotherapy: hydrocollator packs  Other planned modality interventions: [facility]  Planned therapy interventions: massage, manual therapy, patient education, stretching, therapeutic activities, therapeutic exercise, functional ROM exercises and home exercise program  Frequency: 2x week  Duration in weeks: 6  Plan of Care beginning date: 10/22/2019  Plan of Care expiration date: 12/3/2019  Treatment plan discussed with: patient and caregiver        Subjective Evaluation    History of Present Illness  Date of onset: 9/25/2019  Date of surgery: 10/8/2019  Mechanism of injury: surgery and trauma  Mechanism of injury: Pt is a 68 y/o female who presents to skilled OT tx with L wrist fx s/p fall and surgery ORIF 2 weeks ago   Pt did not have brace on when she came to treatment and therapist educated pt on the importance of wearing  Pt also reported she takes it off at night and therapist educated pt again on need of wearing at night  Pt demo moderate swelling in her hands and fingers which is decreasing her ROM as well  Pt reports no pain without movement but pain with movement of 8/10 at wrist and hand  Pt demo moderate to severe ROM impairments of wrist and fingers  Pt would benefit from skilled OT tx to inc ROM at digits and wrist with HEP and movement to dec edema in hand  Not a recurrent problem   Quality of life: good    Pain  Current pain ratin  At best pain ratin  At worst pain ratin  Location: L wrist  Quality: burning, discomfort and radiating  Relieving factors: rest and support      Diagnostic Tests  X-ray: abnormal  Treatments  No previous or current treatments  Patient Goals  Patient goals for therapy: decreased edema, decreased pain, increased motion and independence with ADLs/IADLs          Objective     Observations     Left Wrist/Hand   Negative for adhesive scar and edema  Active Range of Motion     Left Wrist   Wrist flexion: 40 degrees with pain  Wrist extension: 5 degrees with pain  Radial deviation: 15 degrees   Ulnar deviation: 10 degrees     Right Wrist   Normal active range of motion    Left Thumb   Flexion     MP: 30 degrees    DIP: 25 degrees  Palmar Abduction     CMC: 25 degrees    Left Digits    Flexion   Index     MCP: 35    PIP: 65    DIP: 35  Middle     MCP: 40    PIP: 55    DIP: 60  Ring     MCP: 30    PIP: 60    DIP: 40  Little     MCP: 30    PIP: 60    DIP: 40    Swelling     Left Wrist/Hand   Figure 8: 19 cm  Circumference MCP: 18 5 cm  Circumference wrist: 16 5 cm    General Comments:      Wrist/Hand Comments  Unable to perform opposition of thumb to pinky ( 5cm away) and thumb to 4th digit ( 5cm away)        Flowsheet Rows      Most Recent Value   PT/OT G-Codes   Current Score  21   Projected Score  57 Precautions: AROM/PROM of Left wrist and fingers, only remove brace for therapy and ROM      Manual                                                                                   Exercise Diary  10/22            Wrist A/PROM flex/ext 10x            Wrist ulnar/radial deviation 10x            Tendon gliding 10x            Finger lifts 10x            fisting             Overhead pumping 10x            opposition 10x            Finger crawl             Finger HEP                                                                                                                                                                Modalities

## 2019-10-29 ENCOUNTER — OFFICE VISIT (OUTPATIENT)
Dept: OCCUPATIONAL THERAPY | Age: 79
End: 2019-10-29
Payer: COMMERCIAL

## 2019-10-29 DIAGNOSIS — S52.571A OTH INTARTIC FRACTURE OF LOWER END OF RIGHT RADIUS, INIT: Primary | ICD-10-CM

## 2019-10-29 PROCEDURE — 97110 THERAPEUTIC EXERCISES: CPT

## 2019-10-29 PROCEDURE — 97140 MANUAL THERAPY 1/> REGIONS: CPT

## 2019-10-29 NOTE — PROGRESS NOTES
Daily Note     Today's date: 10/29/2019  Patient name: Fabio Mendez  : 1940  MRN: [id number]  Referring provider: Trisha Moran MD  Dx:   Encounter Diagnosis     ICD-10-CM    1  Oth intartic fracture of lower end of right radius, init S52 736Y                   Subjective: "I keep my brace on all the time, except when doing the wrist exercises "  Pt reports no pain at start of treatment 2* not moving  As tx progressed and pt began moving L wrist, she reported an inc of 3/10 pain, but able to complete all tasks  Pt educated on ice after treatment  Objective: See treatment diary below  Tx focused on edema mgmt and wrist/digit ROM  Pt provided with edema glove to L hand to dec edema at fingers, wrist, and forearm to help inc AROM  Assessment: Tolerated treatment well  Patient would benefit from continued OT      Plan: Continue per plan of care        Precautions: AROM/PROM of Left wrist and fingers, only remove brace for therapy and ROM      Manual  10/29            PROM wrist PRE's 3 sets 15                                                                    Exercise Diary  10/22 10/29           Wrist A/PROM flex/ext 10x 3 set 10           Wrist ulnar/radial deviation 10x 2 set 10           Tendon gliding 10x 10x           Finger lifts 10x 10x           fisting             Overhead pumping 10x 10x           opposition 10x 10x           Finger crawl             Finger HEP                                                                                                                                                                Modalities

## 2019-10-30 ENCOUNTER — OFFICE VISIT (OUTPATIENT)
Dept: GASTROENTEROLOGY | Facility: CLINIC | Age: 79
End: 2019-10-30
Payer: COMMERCIAL

## 2019-10-30 VITALS
HEIGHT: 59 IN | BODY MASS INDEX: 27.21 KG/M2 | HEART RATE: 82 BPM | RESPIRATION RATE: 18 BRPM | SYSTOLIC BLOOD PRESSURE: 177 MMHG | WEIGHT: 135 LBS | DIASTOLIC BLOOD PRESSURE: 61 MMHG | TEMPERATURE: 99.5 F

## 2019-10-30 DIAGNOSIS — Z00.00 PREVENTATIVE HEALTH CARE: Primary | ICD-10-CM

## 2019-10-30 DIAGNOSIS — K63.5 POLYP OF COLON, UNSPECIFIED PART OF COLON, UNSPECIFIED TYPE: ICD-10-CM

## 2019-10-30 PROCEDURE — 99204 OFFICE O/P NEW MOD 45 MIN: CPT | Performed by: INTERNAL MEDICINE

## 2019-10-30 NOTE — PATIENT INSTRUCTIONS
Recall entered for colonoscopy during clinic time  Golytely/dulcolax and diabetic instructions given to pt during OV

## 2019-10-30 NOTE — PROGRESS NOTES
[facility] - Outpatient Consultation  Fabio Mendez 67 y o  female MRN: [id number]  Encounter: [id number]          ASSESSMENT AND PLAN:      1  History of colon polyps  Patient has history of large polyp removal   Colonoscopy 3 years ago had adequate prep  Repeat colonoscopy in 2-3 years was recommended  Patient agreeable to get colonoscopy  Will order colonoscopy today along with prep  Request for schedule of colonoscopy after patient recovers from recent wrist surgery  2  Iron deficiency anemia    Hemoglobin normal in May 2019  Ferritin was low 3 years ago  Will check blood counts and iron levels  If patient has iron deficiency on labs then would at EGD to colonoscopy  3  Preventative health care  Will screen patient for hepatitis-C  Patient is up-to-date with flu vaccine and pneumonia vaccine  Patient seen and staffed with attending, Dr Jose Angel Lyons MD  Gastroenterology Fellow  [address]  Date: October 30, 2019      Orders Placed This Encounter   Procedures    CBC and differential    Hepatitis C antibody    Colonoscopy         ______________________________________________________________________    HPI:  45-year-old  female with history of iron deficiency anemia, diabetes hypertension, hyperlipidemia presents for evaluation of colon cancer screening due to history of colon polyps  Patient denies any nausea, vomiting, constipation, diarrhea, abdominal pain, hematochezia, melena, weight loss  She does not have any family history of GI or liver cancer  She does not smoke cigarettes, drink alcohol or use recreational drugs  Patient is not on any antiplatelets or anticoagulants    The patient had colonoscopy in 2016 where 1 small polyp was removed from transverse colon which turned out to be normal colonic mucosa, biopsies were taken from an area of previous polypectomy because of erythema and those biopsies showed normal colon mucosa as well  Sigmoid diverticulosis was seen as well  The colonoscopy prep was adequate and a repeat colonoscopy was recommended in 2-3 years  On review of records, CBC from May 2019 was normal except for white count of 13 3  BMP from 2019 was normal except for glucose of 144  She has had history of iron deficiency anemia on labs  Last ferritin in 2016 was 30  REVIEW OF SYSTEMS:    CONSTITUTIONAL: Denies any fever, chills, rigors, and weight loss  HEENT: No earache or tinnitus  Denies hearing loss or visual disturbances  CARDIOVASCULAR: No chest pain or palpitations  RESPIRATORY: Denies any cough, hemoptysis, shortness of breath or dyspnea on exertion  GASTROINTESTINAL: As noted in the History of Present Illness  GENITOURINARY: No problems with urination  Denies any hematuria or dysuria  NEUROLOGIC: No dizziness or vertigo, denies headaches  MUSCULOSKELETAL: Denies any muscle or joint pain  SKIN: Denies skin rashes or itching  ENDOCRINE: Denies excessive thirst  Denies intolerance to heat or cold  PSYCHOSOCIAL: Denies depression or anxiety  Denies any recent memory loss  Historical Information   Past Medical History:   Diagnosis Date    Anemia     Chronic idiopathic constipation     Colon polyp     Diabetes mellitus (HCC)     Diverticulitis, colon     Esophageal reflux     Eustachian tube dysfunction     Gastrointestinal hemorrhage     Hypertension     Myocardial infarction (HCC)     Non-healing skin lesion     Palpitations     Rectal bleeding     Skin lesion of chest wall      Past Surgical History:   Procedure Laterality Date     SECTION      CHOLECYSTECTOMY      COLONOSCOPY      ECTOPIC PREGNANCY SURGERY      AK COLONOSCOPY FLX DX W/COLLJ SPEC WHEN PFRMD N/A 2016    Procedure: COLONOSCOPY;  Surgeon: Carroll Ward MD;  Location: [facility];   Service: Gastroenterology    AK OPEN RX DISTAL RADIUS FX, INTRA-ARTICULAR, 3+ FRAG Left 10/8/2019    Procedure: RADIUS/ULNA (WRIST) OPEN REDUCTION W/ INTERNAL FIXATION (ORIF); Surgeon: Marcela Wolf MD;  Location: [facility];  Service: Orthopedics    NY REVISE MEDIAN N/CARPAL TUNNEL SURG Left 10/8/2019    Procedure: CARPAL TUNNEL RELEASE;  Surgeon: Marcela Wolf MD;  [facility];  Service: Orthopedics     Social History   Social History     Substance and Sexual Activity   Alcohol Use Never    Frequency: Never    Binge frequency: Never     Social History     Substance and Sexual Activity   Drug Use No     Social History     Tobacco Use   Smoking Status Never Smoker   Smokeless Tobacco Never Used     Family History   Problem Relation Age of Onset    Heart disease Mother     Bleeding Disorder Sister        Meds/Allergies       Current Outpatient Medications:     alendronate (FOSAMAX) 70 mg tablet    amLODIPine (NORVASC) 5 mg tablet    cholecalciferol (VITAMIN D3) 1,000 units tablet    glucose blood (FREESTYLE LITE) test strip    Insulin Pen Needle (B-D UF III MINI PEN NEEDLES) 31G X 5 MM MISC    Lancets (FREESTYLE) lancets    LEVEMIR FLEXTOUCH 100 units/mL injection pen    lisinopril (ZESTRIL) 40 mg tablet    lovastatin (MEVACOR) 40 MG tablet    metFORMIN (GLUCOPHAGE) 1000 MG tablet    metoprolol tartrate (LOPRESSOR) 50 mg tablet    Multiple Vitamins-Minerals (MULTIVITAMIN ADULT PO)    bisacodyl (DULCOLAX) 5 mg EC tablet    polyethylene glycol (GOLYTELY) 4000 mL solution    No Known Allergies        Objective     Blood pressure (!) 177/61, pulse 82, temperature 99 5 °F (37 5 °C), temperature source Tympanic, resp  rate 18, height 4' 11" (1 499 m), weight 61 2 kg (135 lb), not currently breastfeeding  Body mass index is 27 27 kg/m²          PHYSICAL EXAM:      General Appearance:   Alert, cooperative, no distress   HEENT:   Normocephalic, atraumatic, anicteric      Neck:  Supple, symmetrical, trachea midline   Lungs:   Clear to auscultation bilaterally; no rales, rhonchi or wheezing; respirations unlabored    Heart[de-identified]   Regular rate and rhythm; no murmur, rub, or gallop  Abdomen:   Soft, non-tender, non-distended; normal bowel sounds; no masses, no organomegaly    Genitalia:   Deferred    Rectal:   Deferred    Extremities:  No cyanosis, clubbing or edema    Pulses:  2+ and symmetric    Skin:  No jaundice, rashes, or lesions    Lymph nodes:  No palpable cervical lymphadenopathy        Lab Results:   No visits with results within 1 Day(s) from this visit  Latest known visit with results is:   Admission on 10/08/2019, Discharged on 10/08/2019   Component Date Value    POC Glucose 10/08/2019 140     POC Glucose 10/08/2019 129          Radiology Results:   Xr Wrist 3+ Vw Left    Result Date: 10/24/2019  Narrative: LEFT WRIST INDICATION:   S52 572A: Other intraarticular fracture of lower end of left radius, initial encounter for closed fracture  COMPARISON:  10/8/2019 VIEWS:  XR WRIST 3+ VW LEFT FINDINGS: Near-anatomic alignment of healing, distal radial fracture status post ORIF  Hardware appears intact  No significant degenerative changes  No lytic or blastic lesions are seen  Mild soft tissue swelling  Impression: Near-anatomic alignment of healing, distal radial fracture status post ORIF  Workstation performed: [id number]     Xr Wrist 3+ Vw Left    Result Date: 10/13/2019  Narrative: C-ARM - LEFT WRIST INDICATION: Other closed intra-articular fracture of distal end of left radius, initial encounter [S52 572A]  Procedure guidance  COMPARISON:  9/25/2019 TECHNIQUE: FLUOROSCOPY TIME:   15 05 SECONDS 9 FLUOROSCOPIC IMAGES FINDINGS: Fluoroscopic guidance provided for surgical procedure  There is placement of a plate and screw device along the volar are aspect of the distal radius traversing a distal radial fracture in anatomic alignment  Osseous and soft tissue detail limited by technique       Impression: Fluoroscopic guidance provided for surgical procedure  Please refer to the separate procedure notes for additional details    Workstation performed: [id number]

## 2019-10-31 ENCOUNTER — OFFICE VISIT (OUTPATIENT)
Dept: OCCUPATIONAL THERAPY | Age: 79
End: 2019-10-31
Payer: COMMERCIAL

## 2019-10-31 DIAGNOSIS — S52.571A OTH INTARTIC FRACTURE OF LOWER END OF RIGHT RADIUS, INIT: Primary | ICD-10-CM

## 2019-10-31 PROCEDURE — 97110 THERAPEUTIC EXERCISES: CPT

## 2019-10-31 PROCEDURE — 97140 MANUAL THERAPY 1/> REGIONS: CPT

## 2019-10-31 NOTE — PROGRESS NOTES
Daily Note     Today's date: 10/31/2019  Patient name: Christiano Mijares  : 1940  MRN: [id number]  Referring provider: Moises Garrison MD  Dx:   Encounter Diagnosis     ICD-10-CM    1  Oth intartic fracture of lower end of right radius, init S57 962Z                   Subjective: "I really like this glove "  Therapist provided pt with a compression glove, and pt edema at digits looked better than last appt  Pt reported only perf ROM exercises 2x per day, and therapist educated pt she should be perf 5-6x per day  Objective: See treatment diary below  By end of skilled OT tx, pt noted with inc wrist flexion ROM  Assessment: Tolerated treatment well  Patient would benefit from continued OT  Pt scar noted scab and hard scar tissue  Therapist perf scar massage; pt educated to perf at home  Therapist to continue to focus scar mgmt in therapy as well  Plan: Continue per plan of care        Precautions: AROM/PROM of Left wrist and fingers, only remove brace for therapy and ROM      Manual  10/29 10/31           PROM wrist PRE's 3 sets 15 3 sets 15 hold 20           Scar massage  5'                                                      Exercise Diary  10/22 10/29 10/31          Wrist A/PROM flex/ext 10x 3 set 10 3 sets 15          Wrist ulnar/radial deviation 10x 2 set 10 2 sets 15          Tendon gliding 10x 10x 2 set 10x          Finger lifts 10x 10x           fisting             Overhead pumping 10x 10x 2 set 10x          opposition 10x 10x           Finger crawl   2 set 10          Finger HEP                                                                                                                                                                Modalities

## 2019-11-04 ENCOUNTER — OFFICE VISIT (OUTPATIENT)
Dept: OCCUPATIONAL THERAPY | Age: 79
End: 2019-11-04
Payer: COMMERCIAL

## 2019-11-04 DIAGNOSIS — S52.571A OTH INTARTIC FRACTURE OF LOWER END OF RIGHT RADIUS, INIT: Primary | ICD-10-CM

## 2019-11-04 PROCEDURE — 97140 MANUAL THERAPY 1/> REGIONS: CPT

## 2019-11-04 PROCEDURE — 97110 THERAPEUTIC EXERCISES: CPT

## 2019-11-04 NOTE — PROGRESS NOTES
Daily Note     Today's date: 2019  Patient name: William Adam  : 1940  MRN: [id number]  Referring provider: Priit Michaels MD  Dx:   Encounter Diagnosis     ICD-10-CM    1  Oth intartic fracture of lower end of right radius, init S52 571A                   Subjective: "I don't feel pain too much during the day "  Pt reports that her swelling looks and feels better, as she is compliant with edema glove  Pt fingers noted with dec edema  Pt reported performing exercises 3x per day at home; therapist educated pt she needs to inc her exercises at home to 6x, pt reported she will try to do more at home  Objective: See treatment diary below  Tx focused on edema and scar mgmt, as well as A/PROM of L wrist and hand  Assessment: Tolerated treatment well  Patient would benefit from continued OT  Pt scar noted hard at forearm and therapist perf scar massage with gladys-butter  Therapist educated pt on the importance of her performing at home as well  Plan: Continue per plan of care        Precautions: AROM/PROM of Left wrist and fingers, only remove brace for therapy and ROM      Manual  10/29 10/31 11/4          PROM wrist PRE's 3 sets 15 3 sets 15 hold 20 3 sets 15 hold 20          Scar massage  5' 5'                                                     Exercise Diary  10/22 10/29 10/31 114         Wrist A/PROM flex/ext 10x 3 set 10 3 sets 15 3 sets 15         Wrist ulnar/radial deviation 10x 2 set 10 2 sets 15 2 sets 15         Tendon gliding 10x 10x 2 set 10x 2 x 10         Finger lifts 10x 10x  10x         fisting             Overhead pumping 10x 10x 2 set 10x          opposition 10x 10x  10x         Finger crawl   2 set 10 10x         Finger HEP                                                                                                                                                                Modalities

## 2019-11-07 ENCOUNTER — OFFICE VISIT (OUTPATIENT)
Dept: OCCUPATIONAL THERAPY | Age: 79
End: 2019-11-07
Payer: COMMERCIAL

## 2019-11-07 DIAGNOSIS — S52.571A OTH INTARTIC FRACTURE OF LOWER END OF RIGHT RADIUS, INIT: Primary | ICD-10-CM

## 2019-11-07 PROCEDURE — 97110 THERAPEUTIC EXERCISES: CPT

## 2019-11-07 PROCEDURE — 97140 MANUAL THERAPY 1/> REGIONS: CPT

## 2019-11-07 NOTE — PROGRESS NOTES
Daily Note     Today's date: 2019  Patient name: Nancy Burkett  : 1940  MRN: [id number]  Referring provider: Tawana Orosco MD  Dx:   Encounter Diagnosis     ICD-10-CM    1  Oth intartic fracture of lower end of right radius, init S52 579I                   Subjective: "I don't have much pain "  Pt reported performing exercises at home 1x per day  Pt highly educated the importance of performing her exercises  Therapist told pt that she needs to perf her exercises at least 4x per day, as well as scar massage with gladys butter and edema massage  Pt reported she will try to perf exercises more at home  Objective: See treatment diary below  Pt reported mild 3/10 pain during wrist extension exercises  Assessment: Tolerated treatment well  Patient would benefit from continued OT  Therapist will provide pt with newer HEP that has self PROM to L wrist     Plan: Continue per plan of care        Precautions: AROM/PROM of Left wrist and fingers, only remove brace for therapy and ROM      Manual  10/29 10/31 11/4 11         PROM wrist PRE's 3 sets 15 3 sets 15 hold 20 3 sets 15 hold 20 4 sets 15 hold 10         Scar massage  5' 5' 5'                                                    Exercise Diary  10/22 10/29 10/31 11/4 11        Wrist A/PROM flex/ext 10x 3 set 10 3 sets 15 3 sets 15 4 sets 15        Wrist ulnar/radial deviation 10x 2 set 10 2 sets 15 2 sets 15 2 sets 15        Tendon gliding 10x 10x 2 set 10x 2 x 10 5x        Finger lifts 10x 10x  10x 5x        fisting             Overhead pumping 10x 10x 2 set 10x          opposition 10x 10x  10x 5x        Finger crawl   2 set 10 10x         Finger HEP                                                                                                                                                                Modalities

## 2019-11-09 DIAGNOSIS — I47.1 AVNRT (AV NODAL RE-ENTRY TACHYCARDIA) (HCC): ICD-10-CM

## 2019-11-09 RX ORDER — METOPROLOL TARTRATE 50 MG/1
TABLET, FILM COATED ORAL
Qty: 120 TABLET | Refills: 0 | Status: SHIPPED | OUTPATIENT
Start: 2019-11-09 | End: 2020-01-13

## 2019-11-11 ENCOUNTER — OFFICE VISIT (OUTPATIENT)
Dept: OCCUPATIONAL THERAPY | Age: 79
End: 2019-11-11
Payer: COMMERCIAL

## 2019-11-11 DIAGNOSIS — S52.571A OTH INTARTIC FRACTURE OF LOWER END OF RIGHT RADIUS, INIT: Primary | ICD-10-CM

## 2019-11-11 PROCEDURE — 97110 THERAPEUTIC EXERCISES: CPT

## 2019-11-11 PROCEDURE — 97140 MANUAL THERAPY 1/> REGIONS: CPT

## 2019-11-11 NOTE — PROGRESS NOTES
Daily Note     Today's date: 2019  Patient name: Chase Ortiz  : 1940  MRN: [id number]  Referring provider: Alysia Walters MD  Dx:   Encounter Diagnosis     ICD-10-CM    1  Oth intartic fracture of lower end of right radius, init S52 919F                   Subjective: "I have no pain, and have been rubbing my scar more "  Pt reported no pain at start and throughout therapy  Therapist asked pt how often she is doing her HEP, and pt reported 2x per day  Pt also reports she is scared to move her wrist and cause pain  Therapist educated pt the importance of the HEP      Objective: See treatment diary below  Tx focused on ROM of wrist and fingers of L hand  Assessment: Tolerated treatment well  Patient would benefit from continued OT  Therapist had to hold pt forearm down during all AROM exercises to avoid compensation for wrist extension and deviation  Plan: Continue per plan of care        Precautions: AROM/PROM of Left wrist and fingers, only remove brace for therapy and ROM      Manual  10/29 10/31 11/4 11/7 11/11        PROM wrist PRE's 3 sets 15 3 sets 15 hold 20 3 sets 15 hold 20 4 sets 15 hold 10 4 sets 10        Scar massage  5' 5' 5' 5'                                                   Exercise Diary  10/22 10/29 10/31 11/4 11/7 11/11       Wrist A/PROM flex/ext 10x 3 set 10 3 sets 15 3 sets 15 4 sets 15 4 sets 10       Wrist ulnar/radial deviation 10x 2 set 10 2 sets 15 2 sets 15 2 sets 15 2 sets 15       Tendon gliding 10x 10x 2 set 10x 2 x 10 5x 5x       Finger lifts 10x 10x  10x 5x 10x       fisting             Overhead pumping 10x 10x 2 set 10x   10x       opposition 10x 10x  10x 5x 5x       Finger crawl   2 set 10 10x         Finger HEP             fisting      10x                                                                                                                                             Modalities

## 2019-11-14 ENCOUNTER — OFFICE VISIT (OUTPATIENT)
Dept: OCCUPATIONAL THERAPY | Age: 79
End: 2019-11-14
Payer: COMMERCIAL

## 2019-11-14 DIAGNOSIS — S52.571A OTH INTARTIC FRACTURE OF LOWER END OF RIGHT RADIUS, INIT: Primary | ICD-10-CM

## 2019-11-14 PROCEDURE — 97110 THERAPEUTIC EXERCISES: CPT

## 2019-11-14 PROCEDURE — 97140 MANUAL THERAPY 1/> REGIONS: CPT

## 2019-11-14 PROCEDURE — 97010 HOT OR COLD PACKS THERAPY: CPT

## 2019-11-14 NOTE — PROGRESS NOTES
Daily Note     Today's date: 2019  Patient name: Derick Eaton  : 1940  MRN: [id number]  Referring provider: Maryjo Verma MD  Dx:   Encounter Diagnosis     ICD-10-CM    1  Oth intartic fracture of lower end of right radius, init S52 597K                   Subjective: "My scar looks and feels better "  Pt denied pain, and reported she has been trying to perf HEP more at home  Objective: See treatment diary below  Assessment: Tolerated treatment well  Patient would benefit from continued OT  Pt noted with finger tremors during movement at start of exercise, but stopped by the end  Plan: Continue per plan of care        Precautions: AROM/PROM of Left wrist and fingers, only remove brace for therapy and ROM      Manual  10/29 10/31 11/4 11/7 11/11 11/14       PROM wrist PRE's 3 sets 15 3 sets 15 hold 20 3 sets 15 hold 20 4 sets 15 hold 10 4 sets 10 3 sets 10       Scar massage  5' 5' 5' 5' 5'                                                  Exercise Diary  10/22 10/29 10/31 11/4 11/7 11/11 11/14      Wrist A/PROM flex/ext 10x 3 set 10 3 sets 15 3 sets 15 4 sets 15 4 sets 10 2 sets 20      Wrist ulnar/radial deviation 10x 2 set 10 2 sets 15 2 sets 15 2 sets 15 2 sets 15 2 sets 10      Tendon gliding 10x 10x 2 set 10x 2 x 10 5x 5x       Finger lifts 10x 10x  10x 5x 10x 10x      fisting             Overhead pumping 10x 10x 2 set 10x   10x       opposition 10x 10x  10x 5x 5x 2 sets 10x      Finger crawl   2 set 10 10x         Finger HEP             fisting      10x                                                                                                                                             Modalities

## 2019-11-16 DIAGNOSIS — E11.65 TYPE 2 DIABETES MELLITUS WITH HYPERGLYCEMIA, WITH LONG-TERM CURRENT USE OF INSULIN (HCC): ICD-10-CM

## 2019-11-16 DIAGNOSIS — Z79.4 TYPE 2 DIABETES MELLITUS WITH HYPERGLYCEMIA, WITH LONG-TERM CURRENT USE OF INSULIN (HCC): ICD-10-CM

## 2019-11-18 ENCOUNTER — OFFICE VISIT (OUTPATIENT)
Dept: OCCUPATIONAL THERAPY | Age: 79
End: 2019-11-18
Payer: COMMERCIAL

## 2019-11-18 DIAGNOSIS — S52.571A OTH INTARTIC FRACTURE OF LOWER END OF RIGHT RADIUS, INIT: Primary | ICD-10-CM

## 2019-11-18 PROCEDURE — 97010 HOT OR COLD PACKS THERAPY: CPT

## 2019-11-18 PROCEDURE — 97110 THERAPEUTIC EXERCISES: CPT

## 2019-11-18 PROCEDURE — 97140 MANUAL THERAPY 1/> REGIONS: CPT

## 2019-11-18 NOTE — PROGRESS NOTES
Daily Note     Today's date: 2019  Patient name: Mina Soliz  : 1940  MRN: [id number]  Referring provider: Sissy Chavez MD  Dx:   Encounter Diagnosis     ICD-10-CM    1  Oth intartic fracture of lower end of right radius, init S55 331J                   Subjective: "My fingers were bothering me over the weekend "  Pt was unable to rate pain in her fingers, but reported mild from the weekend when performing her exercises  Pt reporting she is afraid to push herself in her exercises as to not cause pain  Pt encouraged to have more time at home with brace off  Objective: See treatment diary below  Tx focused on wrist AROM  Pt scars are fully closed and healed; therapist placed pt in fluidotherapy and after the tx pt noted with inc wrist flex/ext  Assessment: Tolerated treatment well  Patient would benefit from continued OT      Plan: Continue per plan of care        Precautions: AROM/PROM of Left wrist and fingers, only remove brace for therapy and ROM      Manual  10/29 10/31 11/4 11/7 11/11 11/14 11/18      PROM wrist PRE's 3 sets 15 3 sets 15 hold 20 3 sets 15 hold 20 4 sets 15 hold 10 4 sets 10 3 sets 10 3 sets 10      Scar massage  5' 5' 5' 5' 5' 5'                                                 Exercise Diary  10/22 10/29 10/31 11/4 11/7 11/11 11/14 11/18     Wrist A/PROM flex/ext 10x 3 set 10 3 sets 15 3 sets 15 4 sets 15 4 sets 10 2 sets 20 3 sets 15  And fluido 10'     Wrist ulnar/radial deviation 10x 2 set 10 2 sets 15 2 sets 15 2 sets 15 2 sets 15 2 sets 10 3 sets 15     Tendon gliding 10x 10x 2 set 10x 2 x 10 5x 5x  5x     Finger lifts 10x 10x  10x 5x 10x 10x 5x     fisting             Overhead pumping 10x 10x 2 set 10x   10x  10x     opposition 10x 10x  10x 5x 5x 2 sets 10x 10x     Finger crawl   2 set 10 10x         Finger HEP             fisting      10x Modalities

## 2019-11-21 ENCOUNTER — OFFICE VISIT (OUTPATIENT)
Dept: OCCUPATIONAL THERAPY | Age: 79
End: 2019-11-21
Payer: COMMERCIAL

## 2019-11-21 DIAGNOSIS — S52.571A OTH INTARTIC FRACTURE OF LOWER END OF RIGHT RADIUS, INIT: Primary | ICD-10-CM

## 2019-11-21 PROCEDURE — 97140 MANUAL THERAPY 1/> REGIONS: CPT

## 2019-11-21 PROCEDURE — 97110 THERAPEUTIC EXERCISES: CPT

## 2019-11-21 NOTE — PROGRESS NOTES
OT Progress Note    Today's date: 2019  Patient name: Emanuel Martinez  : 1940  MRN: [id number]  Referring provider: Saranya Kidd MD  Dx:   Encounter Diagnosis     ICD-10-CM    1  Oth intartic fracture of lower end of right radius, init S52 571A                   Assessment  Assessment details: - Pt continues to wear wrist brace and edema glove  Pt has demo 15 degree inc of wrist extension, from 5 degrees to 20 degrees, and a 12 degree inc of ulnar deviation from 10 degrees to 22 degrees  Pt scar noted healing well, but area of distal wrist noted tight and hard  Therapist to continue to address scar and scar tissue as scars now healed  Pt is continuously educated on the need to perf her HEP more than 2x per day to dec stiffness  Pt is a 66 y/o female who presents to skilled OT tx with L wrist fx s/p fall and surgery 2 weeks ago  Pt did not have brace on when she came to treatment and therapist educated pt on the importance of wearing  Pt also reported she takes it off at night and therapist educated pt again on need of wearing at night  Pt demo moderate swelling in her hands and fingers which is decreasing her ROM as well  Pt reports no pain without movement but pain with movement of 8/10 at wrist and hand  Pt demo moderate to severe ROM impairments of wrist and fingers  Pt would benefit from skilled OT tx to inc ROM at digits and wrist with HEP and movement to dec edema in hand  Impairments: abnormal movement, impaired physical strength, lacks appropriate home exercise program and pain with function  Other impairment: moderate edema to L hand/wrist  Functional limitations: brace on at all times, except ROM and therapy  Symptom irritability: moderateBarriers to therapy: Language barrier; pt native language is Panamanian but can speak and understand some english  Understanding of Dx/Px/POC: good   Prognosis: good    Goals  STGs:  1  Pt will inc wrist flex/ext + 10 degrees (Part Met)  2   Pt will inc digit MCP and PIP flexion + 10 degrees   3  Pt will dec edema in L hand to moderate edema  (Part Met)  4  Pt will be able to perf opposition of thumb to pinky and thumb to 4th digit  (MET)  LTGs:  1  Pt will inc L wrist AROM to WFLs  2  Pt will demo independence in scar mgmt and massage  3  Pt will be independent with ROM HEP  4  Pt will demo digit flex/ext to [facility]  5  Pt will report no pain during movement of L wrist     Plan  Patient would benefit from: skilled occupational therapy  Planned modality interventions: fluidotherapy, cryotherapy and thermotherapy: hydrocollator packs  Other planned modality interventions: IAS  Planned therapy interventions: massage, manual therapy, patient education, stretching, therapeutic activities, therapeutic exercise, functional ROM exercises and home exercise program  Frequency: 2x week  Duration in weeks: 6  Plan of Care beginning date: 10/22/2019  Plan of Care expiration date: 12/3/2019  Treatment plan discussed with: patient and caregiver        Subjective Evaluation    History of Present Illness  Date of onset: 2019  Date of surgery: 10/8/2019  Mechanism of injury: surgery and trauma  Mechanism of injury: Pt is a 68 y/o female who presents to skilled OT tx with L wrist fx s/p fall and surgery ORIF 2 weeks ago  Pt did not have brace on when she came to treatment and therapist educated pt on the importance of wearing  Pt also reported she takes it off at night and therapist educated pt again on need of wearing at night  Pt demo moderate swelling in her hands and fingers which is decreasing her ROM as well  Pt reports no pain without movement but pain with movement of 8/10 at wrist and hand  Pt demo moderate to severe ROM impairments of wrist and fingers  Pt would benefit from skilled OT tx to inc ROM at digits and wrist with HEP and movement to dec edema in hand            Not a recurrent problem   Quality of life: good    Pain  Current pain ratin  At best pain ratin  At worst pain ratin  Location: L wrist  Quality: burning, discomfort and radiating  Relieving factors: rest and support      Diagnostic Tests  X-ray: abnormal  Treatments  No previous or current treatments  Patient Goals  Patient goals for therapy: decreased edema, decreased pain, increased motion and independence with ADLs/IADLs          Objective     Observations     Left Wrist/Hand   Negative for adhesive scar and edema  Active Range of Motion     Left Wrist   Wrist flexion: 40 degrees with pain  Wrist extension: 20 degrees with pain  Radial deviation: 15 degrees   Ulnar deviation: 22 degrees     Right Wrist   Normal active range of motion    Left Thumb   Flexion     MP: 30 degrees    DIP: 25 degrees  Palmar Abduction     CMC: 25 degrees    Left Digits    Flexion   Index     MCP: 35    PIP: 65    DIP: 35  Middle     MCP: 40    PIP: 55    DIP: 60  Ring     MCP: 30    PIP: 60    DIP: 40  Little     MCP: 30    PIP: 60    DIP: 40    Additional Active Range of Motion Details  - 15 degree inc for wrist ext (5* to 20*), and ulnar deviation inc 12 degrees (10* to 22*)      Swelling     Left Wrist/Hand   Figure 8: 19 cm  Circumference MCP: 18 5 cm  Circumference wrist: 16 5 cm             Precautions: AROM/PROM of Left wrist and fingers, only remove brace for therapy and ROM      Manual              PROM wrist and fingers 5'                                                                    Exercise Diary  10/22 11/21           Wrist A/PROM flex/ext 10x 2 sets 10 and fluido 10'           Wrist ulnar/radial deviation 10x 2 sets 10           Tendon gliding 10x 10x           Finger lifts 10x            fisting             Overhead pumping 10x 10x           opposition 10x            Finger crawl             Finger HEP                                                                                                                                                                Modalities

## 2019-11-22 ENCOUNTER — OFFICE VISIT (OUTPATIENT)
Dept: OBGYN CLINIC | Facility: HOSPITAL | Age: 79
End: 2019-11-22

## 2019-11-22 VITALS
SYSTOLIC BLOOD PRESSURE: 156 MMHG | BODY MASS INDEX: 29.12 KG/M2 | WEIGHT: 135 LBS | HEART RATE: 76 BPM | HEIGHT: 57 IN | DIASTOLIC BLOOD PRESSURE: 77 MMHG

## 2019-11-22 DIAGNOSIS — S52.572D OTHER CLOSED INTRA-ARTICULAR FRACTURE OF DISTAL END OF LEFT RADIUS WITH ROUTINE HEALING, SUBSEQUENT ENCOUNTER: Primary | ICD-10-CM

## 2019-11-22 PROCEDURE — 99024 POSTOP FOLLOW-UP VISIT: CPT | Performed by: SURGERY

## 2019-11-22 NOTE — PROGRESS NOTES
Assessment/Plan:  Patient ID: Christiano Mijares 67 y o  female   Surgery: Radius/ulna (wrist) Open Reduction W/ Internal Fixation (orif) - Left and Carpal Tunnel Release - Left  Date of Surgery: 10/8/2019    Patient is doing well but is still very stiff   Advised her to discontinue use of the wrist brace completely as this continues to make her stiff, she may continue using the edema glove  Continue working with therapy and doing exercises at home  Follow-up in 4-6 weeks for range-of-motion check      Follow Up:  4-6 weeks    To Do Next Visit:   ROM check      CHIEF COMPLAINT:  Chief Complaint   Patient presents with    Left Wrist - Post-op         SUBJECTIVE:  Christiano Mijares is a 67y o  year old female who presents for follow up after Radius/ulna (wrist) Open Reduction W/ Internal Fixation (orif) - Left and Carpal Tunnel Release - Left  Today patient has no significant pain but does note some discomfort when doing her exercises  No numbness/tingling  She has been working with therapy, but also notes she has been wearing her wrist brace most of the time          PHYSICAL EXAMINATION:  General: well developed and well nourished, alert, oriented times 3 and appears comfortable  Psychiatric: Normal    MUSCULOSKELETAL EXAMINATION:  Incision: healed  Surgery Site: normal, no evidence of infection   Range of Motion: Limited due to stiffness  Neurovascular status: Neuro intact, good cap refill  Activity Restrictions: No restrictions       STUDIES REVIEWED:  No Studies to review      PROCEDURES PERFORMED:  Procedures  No Procedures performed today      Connor Curling, PA-C

## 2019-11-25 ENCOUNTER — OFFICE VISIT (OUTPATIENT)
Dept: OCCUPATIONAL THERAPY | Age: 79
End: 2019-11-25
Payer: COMMERCIAL

## 2019-11-25 DIAGNOSIS — S52.571A OTH INTARTIC FRACTURE OF LOWER END OF RIGHT RADIUS, INIT: Primary | ICD-10-CM

## 2019-11-25 PROCEDURE — 97140 MANUAL THERAPY 1/> REGIONS: CPT

## 2019-11-25 PROCEDURE — 97110 THERAPEUTIC EXERCISES: CPT

## 2019-11-25 NOTE — PROGRESS NOTES
Daily Note     Today's date: 2019  Patient name: Linda Aleman  : 1940  MRN: [id number]  Referring provider: Magda Anaya MD  Dx:   Encounter Diagnosis     ICD-10-CM    1  Oth intartic fracture of lower end of right radius, init [id number] 673X                   Subjective: "I am afraid to move my hand "  Pt reported 8/10 pain with ROM today, but pt did not demo any grimacing or other physical aspects of pain with ROM  Therapist educated pt the need to perf her ROM more, of the wrist and fingers  Objective: See treatment diary below  Tx focused on the importance of AROM HEP, edema massage  Pt noted with tightness at scar area and ultra sound to area for dec scar tissue will be performed  Assessment: Tolerated treatment well  Patient would benefit from continued OT      Plan: Continue per plan of care        Precautions: AROM/PROM of Left wrist and fingers, 19- d/c brace      Manual             PROM wrist and fingers 5' 10'                                                                   Exercise Diary  10/22 11/21 11/25          Wrist A/PROM flex/ext 10x 2 sets 10 and fluido 10' fluido 15'          Wrist ulnar/radial deviation 10x 2 sets 10           Tendon gliding 10x 10x 10x          Finger lifts 10x            fisting   5x          Overhead pumping 10x 10x 5x          opposition 10x            Finger crawl   5x          Finger HEP                                                                                                                                                                Modalities

## 2019-11-26 ENCOUNTER — OFFICE VISIT (OUTPATIENT)
Dept: OCCUPATIONAL THERAPY | Age: 79
End: 2019-11-26
Payer: COMMERCIAL

## 2019-11-26 DIAGNOSIS — S52.571A OTH INTARTIC FRACTURE OF LOWER END OF RIGHT RADIUS, INIT: Primary | ICD-10-CM

## 2019-11-26 PROCEDURE — 97110 THERAPEUTIC EXERCISES: CPT

## 2019-11-26 PROCEDURE — 97034 APP MDLTY 1+CNTRST BTH EA 15: CPT

## 2019-11-26 PROCEDURE — 97140 MANUAL THERAPY 1/> REGIONS: CPT

## 2019-11-26 NOTE — PROGRESS NOTES
Daily Note     Today's date: 2019  Patient name: Gordo Adamson  : 1940  MRN: [id number]  Referring provider: Dottie Jung MD  Dx:   Encounter Diagnosis     ICD-10-CM    1  Oth intartic fracture of lower end of right radius, init S57 262A                   Subjective: "The swelling in my fingers are keeping me from moving "  Pt denied pain at start of skilled OT tx  Pt educated on contrast bathe and to perf 2x per day to address edema; pt in agreement  Objective: See treatment diary below  Pt tolerated and per contrast bath treatment; after bathe treatment therapist was able to range pt wrist and fingers in more flexion and extension  Assessment: Tolerated treatment well  Patient would benefit from continued OT      Plan: Continue per plan of care        Precautions: AROM/PROM of Left wrist and fingers, 19- d/c brace      Manual            PROM wrist and fingers 5' 10' 10'                                                                  Exercise Diary  10/22 11/21 11/25 11/26         Wrist A/PROM flex/ext 10x 2 sets 10 and fluido 10' fluido 15' fluido 10'         Wrist ulnar/radial deviation 10x 2 sets 10           Tendon gliding 10x 10x 10x          Finger lifts 10x            fisting   5x          Overhead pumping 10x 10x 5x 5x         opposition 10x            Finger crawl   5x          Finger HEP             Contrast bathe    15'                                                                                                                                               Modalities

## 2019-11-27 ENCOUNTER — OFFICE VISIT (OUTPATIENT)
Dept: CARDIOLOGY CLINIC | Facility: CLINIC | Age: 79
End: 2019-11-27
Payer: COMMERCIAL

## 2019-11-27 VITALS
BODY MASS INDEX: 29.34 KG/M2 | HEART RATE: 64 BPM | SYSTOLIC BLOOD PRESSURE: 130 MMHG | DIASTOLIC BLOOD PRESSURE: 82 MMHG | WEIGHT: 136 LBS | HEIGHT: 57 IN

## 2019-11-27 DIAGNOSIS — Z79.4 DIABETES MELLITUS TYPE 2, INSULIN DEPENDENT (HCC): Chronic | ICD-10-CM

## 2019-11-27 DIAGNOSIS — E78.5 DYSLIPIDEMIA: ICD-10-CM

## 2019-11-27 DIAGNOSIS — E66.3 OVERWEIGHT (BMI 25.0-29.9): Chronic | ICD-10-CM

## 2019-11-27 DIAGNOSIS — I10 BENIGN ESSENTIAL HYPERTENSION: ICD-10-CM

## 2019-11-27 DIAGNOSIS — I47.1 SVT (SUPRAVENTRICULAR TACHYCARDIA) (HCC): Primary | ICD-10-CM

## 2019-11-27 DIAGNOSIS — E11.9 DIABETES MELLITUS TYPE 2, INSULIN DEPENDENT (HCC): Chronic | ICD-10-CM

## 2019-11-27 PROCEDURE — 93000 ELECTROCARDIOGRAM COMPLETE: CPT | Performed by: INTERNAL MEDICINE

## 2019-11-27 PROCEDURE — 99213 OFFICE O/P EST LOW 20 MIN: CPT | Performed by: INTERNAL MEDICINE

## 2019-11-27 NOTE — PROGRESS NOTES
Cardiology Follow Up    Coy Forward  1940  [id number]  [facility] [address]  [place] [address]   [phone]    1  SVT (supraventricular tachycardia) (HCC)  POCT ECG   2  Diabetes mellitus type 2, insulin dependent ([address] )     3  Overweight (BMI 25 0-29 9)     4  Dyslipidemia     5   Benign essential hypertension         Interval History:   The patient has undergone SVT ablation in March of 2019  There has been no further episodes of palpitation since that time    She did have a fall in her attic  There were injury to both forearms requiring orthopedic procedures    She has no complaints as pertains to her heart      Previous history  The patient is having recurrent episodes of palpitation, for which has been referred to me by her PCP  I had seen her previously in the hospital and an ablation was planned  However at that time 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 and procedure was postponed        It has been present for quite some time now  Initially to present as episodes of palpitation  This has progressively increased in frequency and duration     The patient is not complaining of anginal like chest pain or chest pressure  There is no worsening orthopnea, paroxysmal nocturnal dyspnea  There is no leg swelling     Patient does get palpitation when it happens  There is no history of presyncope or syncope  There is rare history of transient  lightheadedness  When patient has these palpitations, it is associated with exertional intolerance        There is history of snoring at night  There is history of morning fatigability  There is occasional history of daytime sleepiness        Patient Active Problem List   Diagnosis    Benign essential hypertension    Colon polyp    Dyslipidemia    Mild nonproliferative diabetic retinopathy of both eyes without macular edema associated with type 2 diabetes mellitus ([facility] )    Postmenopausal osteoporosis    Visit for screening mammogram    Healthcare maintenance    Diabetes mellitus type 2, insulin dependent ([place] [address] )    Overweight (BMI 25 0-29  9)    Closed fracture of left distal radius    Preop examination     Past Medical History:   Diagnosis Date    Anemia     Chronic idiopathic constipation     Colon polyp     Diabetes mellitus (HCC)     Diverticulitis, colon     Esophageal reflux     Eustachian tube dysfunction     Gastrointestinal hemorrhage     Hypertension     Myocardial infarction (HCC)     Non-healing skin lesion     Palpitations     Rectal bleeding     Skin lesion of chest wall      Social History     Socioeconomic History    Marital status: /Civil Union     Spouse name: Not on file    Number of children: Not on file    Years of education: Not on file    Highest education level: Not on file   Occupational History    Occupation: packaging   Social Needs    Financial resource strain: Not on file    Food insecurity:     Worry: Not on file     Inability: Not on file    Transportation needs:     Medical: Not on file     Non-medical: Not on file   Tobacco Use    Smoking status: Never Smoker    Smokeless tobacco: Never Used   Substance and Sexual Activity    Alcohol use: Never     Frequency: Never     Binge frequency: Never    Drug use: No    Sexual activity: Not Currently   Lifestyle    Physical activity:     Days per week: Not on file     Minutes per session: Not on file    Stress: Not on file   Relationships    Social connections:     Talks on phone: Not on file     Gets together: Not on file     Attends [employer] service: Not on file     Active member of club or organization: Not on file     Attends meetings of clubs or organizations: Not on file     Relationship status: Not on file    Intimate partner violence:     Fear of current or ex partner: Not on file     Emotionally abused: Not on file     Physically abused: Not on file     Forced sexual activity: Not on file   Other Topics Concern    Not on file   Social History Narrative    Caffeine use    Denied exercising      Family History   Problem Relation Age of Onset    Heart disease Mother     Bleeding Disorder Sister      Past Surgical History:   Procedure Laterality Date     SECTION      CHOLECYSTECTOMY      COLONOSCOPY      ECTOPIC PREGNANCY SURGERY      MN COLONOSCOPY FLX DX W/COLLJ SPEC WHEN PFRMD N/A 2016    Procedure: COLONOSCOPY;  Surgeon: Alejandro Lopez MD;  Location: [facility]; Service: Gastroenterology    MN OPEN RX DISTAL RADIUS FX, INTRA-ARTICULAR, 3+ FRAG Left 10/8/2019    Procedure: RADIUS/ULNA (WRIST) OPEN REDUCTION W/ INTERNAL FIXATION (ORIF);   Surgeon: Aguilar Mohr MD;  [facility];  Service: Orthopedics    MN REVISE MEDIAN N/CARPAL TUNNEL SURG Left 10/8/2019    Procedure: CARPAL TUNNEL RELEASE;  Surgeon: Aguilar Mohr MD;  [facility];  Service: Orthopedics       Current Outpatient Medications:     alendronate (FOSAMAX) 70 mg tablet, Take 70 mg by mouth every 7 days, Disp: , Rfl:     amLODIPine (NORVASC) 5 mg tablet, TAKE 1 TABLET (5 MG TOTAL) BY MOUTH DAILY  DAYS, Disp: 90 tablet, Rfl: 1    cholecalciferol (VITAMIN D3) 1,000 units tablet, Take 1 tablet (1,000 Units total) by mouth daily for 180 days, Disp: 90 tablet, Rfl: 1    glucose blood (FREESTYLE LITE) test strip, USE AS INSTRUCTED TO CHECK SUGAR UP TO 3 TIMES DAILY, Disp: 300 each, Rfl: 5    Insulin Pen Needle (B-D UF III MINI PEN NEEDLES) 31G X 5 MM MISC, Inject 34 units SC daily in evening (Patient taking differently: 52 Units/100 mL Inject 34 units SC daily in evening), Disp: 100 each, Rfl: 1    LEVEMIR FLEXTOUCH 100 units/mL injection pen, Inject 48 Units under the skin every morning (Patient taking differently: Inject 52 Units under the skin every morning ), Disp: 10 pen, Rfl: 2    lisinopril (ZESTRIL) 40 mg tablet, TAKE 1 TABLET (40 MG TOTAL) BY MOUTH DAILY  DAYS, Disp: 90 tablet, Rfl: 1    lovastatin (MEVACOR) 40 MG tablet, TAKE 2 TABLETS TOGETHER EVERY NIGHT, Disp: 180 tablet, Rfl: 1    metFORMIN (GLUCOPHAGE) 1000 MG tablet, TAKE 1 TABLET (1,000 MG TOTAL) BY MOUTH 2 (TWO) TIMES A DAY  DAYS, Disp: 180 tablet, Rfl: 1    metoprolol tartrate (LOPRESSOR) 50 mg tablet, TAKE 1 TABLET BY MOUTH EVERY 12 HOURS, Disp: 120 tablet, Rfl: 0    Multiple Vitamins-Minerals (MULTIVITAMIN ADULT PO), Take by mouth daily, Disp: , Rfl:     bisacodyl (DULCOLAX) 5 mg EC tablet, Take 4 tablets (20 mg total) by mouth once for 1 dose Take on evening before day of procedure, with golytely, Disp: 4 tablet, Rfl: 0    Lancets (FREESTYLE) lancets, Use as instructed to check sugar up to 3 times daily, Disp: 300 each, Rfl: 1    polyethylene glycol (GOLYTELY) 4000 mL solution, Take 4,000 mL by mouth once for 1 dose, Disp: 4000 mL, Rfl: 0  No Known Allergies    Labs:  Lab Results   Component Value Date     09/08/2015    K 4 1 09/27/2019    K 4 8 09/08/2015     09/27/2019     09/08/2015    CO2 21 09/27/2019    CO2 28 09/08/2015    BUN 17 09/27/2019    BUN 15 09/08/2015    CREATININE 1 06 09/27/2019    CREATININE 0 93 09/08/2015    GLUCOSE 178 (H) 09/08/2015    CALCIUM 9 0 09/27/2019    CALCIUM 9 3 09/08/2015     Lab Results   Component Value Date    CKTOTAL 46 03/13/2014    TROPONINI <0 02 02/08/2019    TROPONINI 0 04 02/24/2015     Lab Results   Component Value Date    WBC 13 30 (H) 05/04/2019    WBC 7 05 12/22/2015    HGB 12 2 05/04/2019    HGB 11 4 (L) 12/22/2015    HCT 38 5 05/04/2019    HCT 37 8 12/22/2015    MCV 87 05/04/2019    MCV 75 (L) 12/22/2015     05/04/2019     12/22/2015     Lab Results   Component Value Date    CHOL 174 09/08/2015    TRIG 322 (H) 03/01/2019    TRIG 230 09/08/2015    HDL 39 (L) 03/01/2019    HDL 36 09/08/2015    LDLDIRECT 101 (H) 08/10/2018     Imaging: No results found      Review of Systems:  Review of Systems   All other systems reviewed and are negative  As described in the history of present illness        Physical Exam:  Physical Exam   Constitutional: She is oriented to person, place, and time  She appears well-developed and well-nourished  Not in any distress at the current time   HENT:   Head: Normocephalic and atraumatic  Right Ear: External ear normal    Left Ear: External ear normal    Nose: Nose normal    Mouth/Throat: Uvula is midline and mucous membranes are normal    Posterior pharynx is crowded   Eyes: Pupils are equal, round, and reactive to light  Conjunctivae, EOM and lids are normal  No scleral icterus  No pallor  No cyanosis  No icterus   Neck: Trachea normal and normal range of motion  No JVD present  Carotid bruit is not present  No thyromegaly present  No jugular lymphadenopathy   Cardiovascular: Normal rate, regular rhythm, S1 normal, S2 normal, normal heart sounds, intact distal pulses and normal pulses  PMI is not displaced  Exam reveals no gallop, no S3, no S4 and no friction rub  No murmur heard  Pulmonary/Chest: Effort normal and breath sounds normal  No accessory muscle usage  No respiratory distress  She has no decreased breath sounds  She has no wheezes  She has no rhonchi  She has no rales  She exhibits no tenderness  Abdominal: Soft  Normal appearance and bowel sounds are normal  She exhibits no distension and no mass  There is no splenomegaly or hepatomegaly  There is no tenderness  Musculoskeletal: Normal range of motion  She exhibits no edema, tenderness or deformity  She did have a fracture of her right wrist which is currently healing    She also had fracture of left forearm and has metal implant   Lymphadenopathy:     She has no cervical adenopathy  Neurological: She is alert and oriented to person, place, and time     Facial symmetry is retained  Extraocular movements are retained  Head neck tongue and palate movement are retained and symmetric   Skin: Skin is intact  No abrasion, no lesion and no rash noted  No erythema  Nails show no clubbing  Psychiatric: She has a normal mood and affect  Her speech is normal and behavior is normal  Thought content normal    Vitals reviewed  Discussion/Summary:    1  Recurrent episode of supraventricular tachycardia     Patient has undergone AVNRT ablation in March of 2019  There has been no recurrence of palpitation since then       2  Hypertension  Today blood pressure is 130/80  If it continues to remain elevated on multiple occasions, increase antihypertensive        3  Hyperlipidemia  Continue with lovastatin        4  Diabetes mellitus  Increased control of the same      5   Overweight  BMI is 29  Patient is slowly losing weight        Summary of my recommendation  Follow up with Dr Almendarez for lipid abnormalities  EP visits can be only if needed

## 2019-11-27 NOTE — LETTER
November 27, 2019     Nicolette Caban PA-C  [address] [facility]    Patient: Mukul Mohr   YOB: 1940   Date of Visit: 11/27/2019       Dear Dr Delilah Oliveira: Thank you for referring Yovanny Carrera to me for evaluation  Below are my notes for this consultation  If you have questions, please do not hesitate to call me  I look forward to following your patient along with you  Sincerely,        Deberah Bernheim, MD        CC: Lowell Conger Roberto Mac, MD Deberah Bernheim, MD  11/27/2019 10:29 AM  Sign at close encounter                                             Cardiology Follow Up    Mukul Mohr  1940  [id number]  [address]  [place] [address]    1  SVT (supraventricular tachycardia) (HCC)  POCT ECG   2  Diabetes mellitus type 2, insulin dependent ([address] )     3  Overweight (BMI 25 0-29 9)     4  Dyslipidemia     5   Benign essential hypertension         Interval History:   The patient has undergone SVT ablation in March of 2019  There has been no further episodes of palpitation since that time    She did have a fall in her attic  There were injury to both forearms requiring orthopedic procedures    She has no complaints as pertains to her heart      Previous history  The patient is having recurrent episodes of palpitation, for which has been referred to me by her PCP  I had seen her previously in the hospital and an ablation was planned  However at that time 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 and procedure was postponed        It has been present for quite some time now  Initially to present as episodes of palpitation  This has progressively increased in frequency and duration     The patient is not complaining of anginal like chest pain or chest pressure  There is no worsening orthopnea, paroxysmal nocturnal dyspnea  There is no leg swelling     Patient does get palpitation when it happens  There is no history of presyncope or syncope  There is rare history of transient  lightheadedness  When patient has these palpitations, it is associated with exertional intolerance        There is history of snoring at night  There is history of morning fatigability  There is occasional history of daytime sleepiness        Patient Active Problem List   Diagnosis    Benign essential hypertension    Colon polyp    Dyslipidemia    Mild nonproliferative diabetic retinopathy of both eyes without macular edema associated with type 2 diabetes mellitus ([facility] )    Postmenopausal osteoporosis    Visit for screening mammogram    Healthcare maintenance    Diabetes mellitus type 2, insulin dependent ([facility] )    Overweight (BMI 25 0-29  9)    Closed fracture of left distal radius    Preop examination     Past Medical History:   Diagnosis Date    Anemia     Chronic idiopathic constipation     Colon polyp     Diabetes mellitus (HCC)     Diverticulitis, colon     Esophageal reflux     Eustachian tube dysfunction     Gastrointestinal hemorrhage     Hypertension     Myocardial infarction (HCC)     Non-healing skin lesion     Palpitations     Rectal bleeding     Skin lesion of chest wall      Social History     Socioeconomic History    Marital status: /Civil Union     Spouse name: Not on file    Number of children: Not on file    Years of education: Not on file    Highest education level: Not on file   Occupational History    Occupation: packaging   Social Needs    Financial resource strain: Not on file    Food insecurity:     Worry: Not on file     Inability: Not on file    Transportation needs:     Medical: Not on file     Non-medical: Not on file   Tobacco Use    Smoking status: Never Smoker    Smokeless tobacco: Never Used   Substance and Sexual Activity    Alcohol use: Never     Frequency: Never     Binge frequency: Never    Drug use: No    Sexual activity: Not Currently   Lifestyle    Physical activity:     Days per week: Not on file     Minutes per session: Not on file    Stress: Not on file   Relationships    Social connections:     Talks on phone: Not on file     Gets together: Not on file     Attends Nondenominational service: Not on file     Active member of club or organization: Not on file     Attends meetings of clubs or organizations: Not on file     Relationship status: Not on file    Intimate partner violence:     Fear of current or ex partner: Not on file     Emotionally abused: Not on file     Physically abused: Not on file     Forced sexual activity: Not on file   Other Topics Concern    Not on file   Social History Narrative    Caffeine use    Denied exercising      Family History   Problem Relation Age of Onset    Heart disease Mother     Bleeding Disorder Sister      Past Surgical History:   Procedure Laterality Date     SECTION      CHOLECYSTECTOMY      COLONOSCOPY      ECTOPIC PREGNANCY SURGERY      WY COLONOSCOPY FLX DX W/COLLJ [address]  PFRMD N/A 2016    Procedure: COLONOSCOPY;  Surgeon: Latonya Johnson MD;  [facility]; Service: Gastroenterology    WY OPEN RX DISTAL RADIUS FX, INTRA-ARTICULAR, 3+ FRAG Left 10/8/2019    Procedure: RADIUS/ULNA (WRIST) OPEN REDUCTION W/ INTERNAL FIXATION (ORIF);   Surgeon: Bobbi Rodas MD;  [facility];  Service: Orthopedics    WY REVISE MEDIAN N/CARPAL TUNNEL SURG Left 10/8/2019    Procedure: CARPAL TUNNEL RELEASE;  Surgeon: Bobbi Rodas MD;  [facility];  Service: Orthopedics       Current Outpatient Medications:     alendronate (FOSAMAX) 70 mg tablet, Take 70 mg by mouth every 7 days, Disp: , Rfl:     amLODIPine (NORVASC) 5 mg tablet, TAKE 1 TABLET (5 MG TOTAL) BY MOUTH DAILY  DAYS, Disp: 90 tablet, Rfl: 1    cholecalciferol (VITAMIN D3) 1,000 units tablet, Take 1 tablet (1,000 Units total) by mouth daily for 180 days, Disp: 90 tablet, Rfl: 1    glucose blood (FREESTYLE LITE) test strip, USE AS INSTRUCTED TO CHECK SUGAR UP TO 3 TIMES DAILY, Disp: 300 each, Rfl: 5    Insulin Pen Needle (B-D UF III MINI PEN NEEDLES) 31G X 5 MM MISC, Inject 34 units SC daily in evening (Patient taking differently: 52 Units/100 mL Inject 34 units SC daily in evening), Disp: 100 each, Rfl: 1    LEVEMIR FLEXTOUCH 100 units/mL injection pen, Inject 48 Units under the skin every morning (Patient taking differently: Inject 52 Units under the skin every morning ), Disp: 10 pen, Rfl: 2    lisinopril (ZESTRIL) 40 mg tablet, TAKE 1 TABLET (40 MG TOTAL) BY MOUTH DAILY  DAYS, Disp: 90 tablet, Rfl: 1    lovastatin (MEVACOR) 40 MG tablet, TAKE 2 TABLETS TOGETHER EVERY NIGHT, Disp: 180 tablet, Rfl: 1    metFORMIN (GLUCOPHAGE) 1000 MG tablet, TAKE 1 TABLET (1,000 MG TOTAL) BY MOUTH 2 (TWO) TIMES A DAY  DAYS, Disp: 180 tablet, Rfl: 1    metoprolol tartrate (LOPRESSOR) 50 mg tablet, TAKE 1 TABLET BY MOUTH EVERY 12 HOURS, Disp: 120 tablet, Rfl: 0    Multiple Vitamins-Minerals (MULTIVITAMIN ADULT PO), Take by mouth daily, Disp: , Rfl:     bisacodyl (DULCOLAX) 5 mg EC tablet, Take 4 tablets (20 mg total) by mouth once for 1 dose Take on evening before day of procedure, with golytely, Disp: 4 tablet, Rfl: 0    Lancets (FREESTYLE) lancets, Use as instructed to check sugar up to 3 times daily, Disp: 300 each, Rfl: 1    polyethylene glycol (GOLYTELY) 4000 mL solution, Take 4,000 mL by mouth once for 1 dose, Disp: 4000 mL, Rfl: 0  No Known Allergies    Labs:  Lab Results   Component Value Date     09/08/2015    K 4 1 09/27/2019    K 4 8 09/08/2015     09/27/2019     09/08/2015    CO2 21 09/27/2019    CO2 28 09/08/2015    BUN 17 09/27/2019    BUN 15 09/08/2015    CREATININE 1 06 09/27/2019    CREATININE 0 93 09/08/2015    GLUCOSE 178 (H) 09/08/2015    CALCIUM 9 0 09/27/2019    CALCIUM 9 3 09/08/2015     Lab Results   Component Value Date    CKTOTAL 46 03/13/2014    TROPONINI <0 02 02/08/2019    TROPONINI 0 04 02/24/2015     Lab Results   Component Value Date    WBC 13 30 (H) 05/04/2019    WBC 7 05 12/22/2015    HGB 12 2 05/04/2019    HGB 11 4 (L) 12/22/2015    HCT 38 5 05/04/2019    HCT 37 8 12/22/2015    MCV 87 05/04/2019    MCV 75 (L) 12/22/2015     05/04/2019     12/22/2015     Lab Results   Component Value Date    CHOL 174 09/08/2015    TRIG 322 (H) 03/01/2019    TRIG 230 09/08/2015    HDL 39 (L) 03/01/2019    HDL 36 09/08/2015    LDLDIRECT 101 (H) 08/10/2018     Imaging: No results found  Review of Systems:  Review of Systems   All other systems reviewed and are negative  As described in the history of present illness        Physical Exam:  Physical Exam   Constitutional: She is oriented to person, place, and time  She appears well-developed and well-nourished  Not in any distress at the current time   HENT:   Head: Normocephalic and atraumatic  Right Ear: External ear normal    Left Ear: External ear normal    Nose: Nose normal    Mouth/Throat: Uvula is midline and mucous membranes are normal    Posterior pharynx is crowded   Eyes: Pupils are equal, round, and reactive to light  Conjunctivae, EOM and lids are normal  No scleral icterus  No pallor  No cyanosis  No icterus   Neck: Trachea normal and normal range of motion  No JVD present  Carotid bruit is not present  No thyromegaly present  No jugular lymphadenopathy   Cardiovascular: Normal rate, regular rhythm, S1 normal, S2 normal, normal heart sounds, intact distal pulses and normal pulses  PMI is not displaced  Exam reveals no gallop, no S3, no S4 and no friction rub  No murmur heard  Pulmonary/Chest: Effort normal and breath sounds normal  No accessory muscle usage  No respiratory distress  She has no decreased breath sounds  She has no wheezes  She has no rhonchi  She has no rales  She exhibits no tenderness  Abdominal: Soft   Normal appearance and bowel sounds are normal  She exhibits no distension and no mass  There is no splenomegaly or hepatomegaly  There is no tenderness  Musculoskeletal: Normal range of motion  She exhibits no edema, tenderness or deformity  She did have a fracture of her right wrist which is currently healing    She also had fracture of left forearm and has metal implant   Lymphadenopathy:     She has no cervical adenopathy  Neurological: She is alert and oriented to person, place, and time  Facial symmetry is retained  Extraocular movements are retained  Head neck tongue and palate movement are retained and symmetric   Skin: Skin is intact  No abrasion, no lesion and no rash noted  No erythema  Nails show no clubbing  Psychiatric: She has a normal mood and affect  Her speech is normal and behavior is normal  Thought content normal    Vitals reviewed  Discussion/Summary:    1  Recurrent episode of supraventricular tachycardia     Patient has undergone AVNRT ablation in March of 2019  There has been no recurrence of palpitation since then       2  Hypertension  Today blood pressure is 130/80  If it continues to remain elevated on multiple occasions, increase antihypertensive        3  Hyperlipidemia  Continue with lovastatin        4  Diabetes mellitus  Increased control of the same      5   Overweight  BMI is 29  Patient is slowly losing weight        Summary of my recommendation  Follow up with Dr Almendarez for lipid abnormalities  EP visits can be only if needed

## 2019-12-02 ENCOUNTER — OFFICE VISIT (OUTPATIENT)
Dept: OCCUPATIONAL THERAPY | Age: 79
End: 2019-12-02
Payer: COMMERCIAL

## 2019-12-02 DIAGNOSIS — S52.571A OTH INTARTIC FRACTURE OF LOWER END OF RIGHT RADIUS, INIT: Primary | ICD-10-CM

## 2019-12-02 PROCEDURE — 97168 OT RE-EVAL EST PLAN CARE: CPT

## 2019-12-02 PROCEDURE — 97530 THERAPEUTIC ACTIVITIES: CPT

## 2019-12-02 PROCEDURE — 97035 APP MDLTY 1+ULTRASOUND EA 15: CPT

## 2019-12-02 PROCEDURE — 97140 MANUAL THERAPY 1/> REGIONS: CPT

## 2019-12-02 PROCEDURE — 97110 THERAPEUTIC EXERCISES: CPT

## 2019-12-02 NOTE — PROGRESS NOTES
OT Re-Evaluation     Today's date: 2019  Patient name: Alvaro Leung  : 1940  MRN: [id number]  Referring provider: Kirt Samano MD  Dx:   Encounter Diagnosis     ICD-10-CM    1  Oth intartic fracture of lower end of right radius, init S52 421A                   Assessment  Assessment details: 19- Pt has order for discontinuing her brace as it has made her wrist stiff  Pt continues to demo moderate edema of L hand/fingers  Pt continues to demo moderate/severe ROM limitations in L wrist  Pt has demo improvements in L wrist flex/ext AROM compared to IE, but still not a functional motion range to be independent with ADL tasks  Pt has reported decreased pain with exercises and trying to incorporate hand back into every day tasks  Pt has been hesitant and afraid in the past that she was going to re-injury wrist with her HEP  Pt educated and understands the importance of performing her exercises frequently throughout the day  It is medically necessary for pt to continue with skilled OT tx in order to inc wrist and finger AROM, dec edema, and inc functional use of hand to be independent  Pt requires 2x per week, 4-6x a week  Pt has demo improvements and has the potential and ability to increase more and achieve her goals  Pt is a 68 y/o female who presents to skilled OT tx with L wrist fx s/p fall and surgery 2 weeks ago  Pt did not have brace on when she came to treatment and therapist educated pt on the importance of wearing  Pt also reported she takes it off at night and therapist educated pt again on need of wearing at night  Pt demo moderate swelling in her hands and fingers which is decreasing her ROM as well  Pt reports no pain without movement but pain with movement of 8/10 at wrist and hand  Pt demo moderate to severe ROM impairments of wrist and fingers  Pt would benefit from skilled OT tx to inc ROM at digits and wrist with HEP and movement to dec edema in hand    Impairments: abnormal movement, impaired physical strength, lacks appropriate home exercise program and pain with function  Other impairment: moderate edema to L hand/wrist    Symptom irritability: moderateBarriers to therapy: Language barrier; pt native language is [place] but can speak and understand some english  Understanding of Dx/Px/POC: good   Prognosis: good    Goals  STGs:  1  Pt will inc wrist flex/ext + 10 degrees (Met)  2  Pt will inc digit MCP and PIP flexion + 10 degrees (Part Met)  3  Pt will dec edema in L hand to moderate edema  (MET)  4  Pt will be able to perf opposition of thumb to pinky and thumb to 4th digit  (MET)  LTGs:  1  Pt will inc L wrist AROM to WFLs (NOT MET)  2  Pt will demo independence in scar mgmt and massage (Part Met)  3  Pt will be independent with ROM HEP (Part Met)  4  Pt will demo digit flex/ext to [facility] (NOT MET)  5  Pt will report no pain during movement of L wrist  (NOT MET)  6  Pt will demo no edema to L hand  Plan  Patient would benefit from: skilled occupational therapy  Planned modality interventions: fluidotherapy, cryotherapy and thermotherapy: hydrocollator packs  Other planned modality interventions: [facility]  Planned therapy interventions: massage, manual therapy, patient education, stretching, therapeutic activities, therapeutic exercise, functional ROM exercises and home exercise program  Frequency: 2x week  Duration in weeks: 6  Plan of Care beginning date: 12/2/2019  Plan of Care expiration date: 1/13/2020  Treatment plan discussed with: patient and caregiver        Subjective Evaluation    History of Present Illness  Date of onset: 9/25/2019  Date of surgery: 10/8/2019  Mechanism of injury: surgery and trauma  Mechanism of injury: Pt is a 66 y/o female who presents to skilled OT tx with L wrist fx s/p fall and surgery ORIF 2 weeks ago  Pt did not have brace on when she came to treatment and therapist educated pt on the importance of wearing   Pt also reported she takes it off at night and therapist educated pt again on need of wearing at night  Pt demo moderate swelling in her hands and fingers which is decreasing her ROM as well  Pt reports no pain without movement but pain with movement of 8/10 at wrist and hand  Pt demo moderate to severe ROM impairments of wrist and fingers  Pt would benefit from skilled OT tx to inc ROM at digits and wrist with HEP and movement to dec edema in hand  Not a recurrent problem   Quality of life: good    Pain  Current pain ratin  At best pain ratin  At worst pain ratin  Location: L wrist  Quality: burning, discomfort and radiating  Relieving factors: rest and support      Diagnostic Tests  X-ray: abnormal  Treatments  No previous or current treatments  Patient Goals  Patient goals for therapy: decreased edema, decreased pain, increased motion and independence with ADLs/IADLs          Objective     Observations     Left Wrist/Hand   Negative for adhesive scar and edema  Active Range of Motion     Left Wrist   Wrist flexion: 41 degrees with pain  Wrist extension: 30 degrees with pain  Radial deviation: 15 degrees   Ulnar deviation: 25 degrees     Right Wrist   Normal active range of motion    Left Thumb   Flexion     MP: 30 degrees    DIP: 25 degrees  Palmar Abduction     CMC: 25 degrees    Left Digits    Flexion   Index     MCP: 42    PIP: 65    DIP: 38  Middle     MCP: 50    PIP: 55    DIP: 60  Ring     MCP: 30    PIP: 60    DIP: 40  Little     MCP: 32    PIP: 60    DIP: 40    Additional Active Range of Motion Details  - 15 degree inc for wrist ext (5* to 20*), and ulnar deviation inc 12 degrees (10* to 22*)  Swelling     Left Wrist/Hand   Figure 8: 19 cm  Circumference MCP: 18 1 cm  Circumference wrist: 16 4 cm             Precautions: discontinue use of brace         Manual              Wrist PROM 5'            Finger/digit PROM 5'            Scar/edema massage 5' Exercise Diary  12/2            Wrist AROM 2 x 15            Tendin glides 10x            Finger opposition 10x            Wrist ext  leaning against table 5', AROM 2 x 10            Wrist flex Twist bar 2 x 10, AROM 2 x 20            Contrast bath             Digit ROM marble                                                                                                                                                                                          Modalities  12/2            MP             U S

## 2019-12-12 ENCOUNTER — OFFICE VISIT (OUTPATIENT)
Dept: OCCUPATIONAL THERAPY | Age: 79
End: 2019-12-12
Payer: COMMERCIAL

## 2019-12-12 DIAGNOSIS — S52.571A OTH INTARTIC FRACTURE OF LOWER END OF RIGHT RADIUS, INIT: Primary | ICD-10-CM

## 2019-12-12 PROCEDURE — 97140 MANUAL THERAPY 1/> REGIONS: CPT

## 2019-12-12 PROCEDURE — 97110 THERAPEUTIC EXERCISES: CPT

## 2019-12-12 NOTE — PROGRESS NOTES
Daily Note     Today's date: 2019  Patient name: Linda Aleman  : 1940  MRN: [id number]  Referring provider: Magda Anaya MD  Dx:   Encounter Diagnosis     ICD-10-CM    1  Oth intartic fracture of lower end of right radius, init S52 992C CANCELED: OT Plan of Care Cert/Re-cert     CANCELED: OT Plan of Care Cert/Re-cert                  Subjective: 'I am doing everything you told me to do "  Pt denies pain at or during skilled OT tx  Pt reports she doesn't time how often and for how long she does her exercises, but that she does them frequently throughout the day  Objective: See treatment diary below  Pt noted with dec edema of L hand and inc wrist flexion  Assessment: Tolerated treatment well  Patient would benefit from continued OT  Therapist reviewed HEP with pt, to ensure she is stretching and perf A/PROM hand/wrist appropriately  Plan: Continue per plan of care  Precautions: discontinue use of brace         Manual             Wrist PROM 5' 10'           Finger/digit PROM 5' 10'           Scar/edema massage 5'                                          Exercise Diary             Wrist AROM 2 x 15 fluido 10' and 2 x 10 (flex/ext)           Tendin glides 10x 10x           Finger opposition 10x 10x           Wrist ext  leaning against table 5', AROM 2 x 10 prayer stretch           Wrist flex Twist bar 2 x 10, AROM 2 x 20            Contrast bath             Digit ROM marble                                                                                                                                                                                          Modalities              MP             U S

## 2019-12-16 ENCOUNTER — APPOINTMENT (OUTPATIENT)
Dept: OCCUPATIONAL THERAPY | Age: 79
End: 2019-12-16
Payer: COMMERCIAL

## 2019-12-17 ENCOUNTER — APPOINTMENT (OUTPATIENT)
Dept: OCCUPATIONAL THERAPY | Age: 79
End: 2019-12-17
Payer: COMMERCIAL

## 2019-12-19 ENCOUNTER — APPOINTMENT (OUTPATIENT)
Dept: OCCUPATIONAL THERAPY | Age: 79
End: 2019-12-19
Payer: COMMERCIAL

## 2019-12-19 ENCOUNTER — OFFICE VISIT (OUTPATIENT)
Dept: OCCUPATIONAL THERAPY | Age: 79
End: 2019-12-19
Payer: COMMERCIAL

## 2019-12-19 DIAGNOSIS — S52.571A OTH INTARTIC FRACTURE OF LOWER END OF RIGHT RADIUS, INIT: Primary | ICD-10-CM

## 2019-12-19 PROCEDURE — 97140 MANUAL THERAPY 1/> REGIONS: CPT

## 2019-12-19 PROCEDURE — 97110 THERAPEUTIC EXERCISES: CPT

## 2019-12-19 NOTE — PROGRESS NOTES
Daily Note     Today's date: 2019  Patient name: Saad Metz  : 1940  MRN: [id number]  Referring provider: Carmen Saini MD  Dx:   Encounter Diagnosis     ICD-10-CM    1  Oth intartic fracture of lower end of right radius, init S58 135J                   Subjective: "My fingers are still swollen "  Pt presents to skilled OT tx with L hand out of brace and no edema glove but hand guarded and fingers in extension and wrist neutral; same positioning of pt holding her phone and papers  Therapist educated pt on the need to move and bend fingers as well to dec the edema  Therapist provided pt with yellow foam gripper to just hold throughout the day for 5 min at a time to promote finger flexion and movement  Objective: See treatment diary below  Pt noted with ease in wrist ext/flex and dec pain after therapist perf IASTM to extrinsic muscles at forearm  Assessment: Tolerated treatment well  Patient would benefit from continued OT  Therapist recommended for pt to perf the contrast bath again at home to dec edema at fingers  Plan: Continue per plan of care  Precautions: discontinue use of brace         Manual            Wrist PROM 5' 10' 20'          Finger/digit PROM 5' 10' 5'          Scar/edema massage 5'  5'                                        Exercise Diary            Wrist AROM 2 x 15 fluido 10' and 2 x 10 (flex/ext) fluido 10' AROM 2 x 10          Tendin glides 10x 10x           Finger opposition 10x 10x 10x          Wrist ext  leaning against table 5', AROM 2 x 10 prayer stretch prayer stretch 3 x 30 sec          Wrist flex Twist bar 2 x 10, AROM 2 x 20  Twist bar 2 x 10          Contrast bath             Digit ROM marble             Wrist maze   4x                                                                                                                                                                          Modalities   RUBIO             U S

## 2019-12-23 ENCOUNTER — OFFICE VISIT (OUTPATIENT)
Dept: OCCUPATIONAL THERAPY | Age: 79
End: 2019-12-23
Payer: COMMERCIAL

## 2019-12-23 ENCOUNTER — APPOINTMENT (OUTPATIENT)
Dept: OCCUPATIONAL THERAPY | Age: 79
End: 2019-12-23
Payer: COMMERCIAL

## 2019-12-23 DIAGNOSIS — S52.571A OTH INTARTIC FRACTURE OF LOWER END OF RIGHT RADIUS, INIT: Primary | ICD-10-CM

## 2019-12-23 PROCEDURE — 97140 MANUAL THERAPY 1/> REGIONS: CPT

## 2019-12-23 PROCEDURE — 97110 THERAPEUTIC EXERCISES: CPT

## 2019-12-23 NOTE — PROGRESS NOTES
Daily Note     Today's date: 2019  Patient name: Fili Velasquez  : 1940  MRN: [id number]  Referring provider: Gilbert Figueredo MD  Dx:   Encounter Diagnosis     ICD-10-CM    1  Oth intartic fracture of lower end of right radius, init S50 415A                   Subjective: "I was doing the hot and ice bath and it looks like my swelling has gone down "  Pt denied pain at start of treatment and reported only 2/10 pain during stretching/PROM/AROM of L wrist    Pt edema noted to be getting less 2* pts MCPs are not more visible then before and pt noted to be moving fingers more and with ease  Objective: See treatment diary below      Assessment: Tolerated treatment well  Patient demonstrated fatigue post treatment and would benefit from continued OT  Pt educated again importance of performing exercises 6-8x per day for 10 min at a time to inc stretch and ROM  Pt noted with inc in wrist flexion and finger ROM 2* dec edema  Pt educated to continue with contrast bath at home as that has helped with edema mgmt  Plan: Continue per plan of care  Precautions: discontinue use of brace         Manual  19         Wrist PROM 5' 10' 20' 15'         Finger/digit PROM 5' 10' 5' 5'         Scar/edema massage 5'  5' 5'                                       Exercise Diary           Wrist AROM 2 x 15 fluido 10' and 2 x 10 (flex/ext) fluido 10' AROM 2 x 10 fluido 10' AROM 2 x 15         Tendin glides 10x 10x  10x         Finger opposition 10x 10x 10x 10x         Wrist ext  leaning against table 5', AROM 2 x 10 prayer stretch prayer stretch 3 x 30 sec 3 x 30 sec         Wrist flex Twist bar 2 x 10, AROM 2 x 20  Twist bar 2 x 10          Contrast bath             Digit ROM marble             Wrist maze   4x Modalities  12/2            MP             U S

## 2019-12-26 ENCOUNTER — OFFICE VISIT (OUTPATIENT)
Dept: OCCUPATIONAL THERAPY | Age: 79
End: 2019-12-26
Payer: COMMERCIAL

## 2019-12-26 DIAGNOSIS — S52.571A OTH INTARTIC FRACTURE OF LOWER END OF RIGHT RADIUS, INIT: Primary | ICD-10-CM

## 2019-12-26 PROCEDURE — 97112 NEUROMUSCULAR REEDUCATION: CPT

## 2019-12-26 PROCEDURE — 97110 THERAPEUTIC EXERCISES: CPT

## 2019-12-26 PROCEDURE — 97140 MANUAL THERAPY 1/> REGIONS: CPT

## 2019-12-26 NOTE — PROGRESS NOTES
Daily Note     Today's date: 2019  Patient name: Flavio Rose  : 1940  MRN: [id number]  Referring provider: Jensen Willis MD  Dx:   Encounter Diagnosis     ICD-10-CM    1  Oth intartic fracture of lower end of right radius, init S55 [id number]                   Subjective: "I have been doing my exercises at home and feel like I can move my fingers and wrist better "  Pt denied any pain during treatment  Pt digits, PROM wrist extension and wrist flexion noted getting better and inc of movement; but pt wrist extension AROM still limited  Objective: See treatment diary below      Assessment: Tolerated treatment well  Patient would benefit from continued OT  Pt edema of finger and hand noted less 2* inc functional use  Plan: Continue per plan of care  Precautions: discontinue use of brace         Manual  19        Wrist PROM 5' 10' 20' 15' 10'        Finger/digit PROM 5' 10' 5' 5' 5'        Scar/edema massage 5'  5' 5'                                       Exercise Diary          Wrist AROM 2 x 15 fluido 10' and 2 x 10 (flex/ext) fluido 10' AROM 2 x 10 fluido 10' AROM 2 x 15 fluidp 10', AROM 2 x 20        Tendin glides 10x 10x  10x 10x        Finger opposition 10x 10x 10x 10x 10x        Wrist ext  leaning against table 5', AROM 2 x 10 prayer stretch prayer stretch 3 x 30 sec 3 x 30 sec 3 x 30 sec        Wrist flex Twist bar 2 x 10, AROM 2 x 20  Twist bar 2 x 10  2 x 20        Contrast bath             Digit ROM marble             Wrist maze   4x          Twist bar     2 x 20                                                                                                                                                           Modalities              MP             U S

## 2019-12-27 ENCOUNTER — OFFICE VISIT (OUTPATIENT)
Dept: OBGYN CLINIC | Facility: HOSPITAL | Age: 79
End: 2019-12-27

## 2019-12-27 VITALS
BODY MASS INDEX: 29.34 KG/M2 | WEIGHT: 136 LBS | HEIGHT: 57 IN | SYSTOLIC BLOOD PRESSURE: 162 MMHG | HEART RATE: 72 BPM | DIASTOLIC BLOOD PRESSURE: 76 MMHG

## 2019-12-27 DIAGNOSIS — S52.572D OTHER CLOSED INTRA-ARTICULAR FRACTURE OF DISTAL END OF LEFT RADIUS WITH ROUTINE HEALING, SUBSEQUENT ENCOUNTER: Primary | ICD-10-CM

## 2019-12-27 PROCEDURE — 99024 POSTOP FOLLOW-UP VISIT: CPT | Performed by: SURGERY

## 2019-12-27 NOTE — PROGRESS NOTES
Assessment/Plan:  Patient ID: Mariah Allen 67 y o  female   Surgery: Radius/ulna (wrist) Open Reduction W/ Internal Fixation (orif) - Left and Carpal Tunnel Release - Left  Date of Surgery: 10/8/2019    11 weeks s/p left wrist ORIF with carpal tunnel release  Marva Aguero was advised to continue to work on wrist extension and flexion  She may benefit from using a warm wash cloth prior to ROM exercises  Activities as tolerated, new OT script was provided  Follow up in 8 weeks time  Follow Up:  8  week(s)    To Do Next Visit:  Re-evaluation       CHIEF COMPLAINT:  Chief Complaint   Patient presents with    Left Wrist - Post-op         SUBJECTIVE:  Mariah Allen is a 67y o  year old female who presents for follow up after Radius/ulna (wrist) Open Reduction W/ Internal Fixation (orif) - Left and Carpal Tunnel Release - Left  Today patient has None and No Complaints         PHYSICAL EXAMINATION:  General: well developed and well nourished, alert, oriented times 3 and appears comfortable  Psychiatric: Normal    MUSCULOSKELETAL EXAMINATION:  Incision: healed  Surgery Site: normal, no evidence of infection   Range of Motion: As expected  Neurovascular status: Neuro intact, good cap refill  Activity Restrictions: No restrictions    Range of motion is approximately 25° of extension and about 40° of flexion  Supination pronation roughly 60°  Digital motion loose composite fist does have some residual digital swelling to all the fingers  STUDIES REVIEWED:  No Studies to review      PROCEDURES PERFORMED:  Procedures  No Procedures performed today       Scribe Attestation    I,:   Gabby Simental am acting as a scribe while in the presence of the attending physician :        I,:   Perri Dubois MD personally performed the services described in this documentation    as scribed in my presence :

## 2019-12-30 ENCOUNTER — OFFICE VISIT (OUTPATIENT)
Dept: OCCUPATIONAL THERAPY | Age: 79
End: 2019-12-30
Payer: COMMERCIAL

## 2019-12-30 DIAGNOSIS — S52.571A OTH INTARTIC FRACTURE OF LOWER END OF RIGHT RADIUS, INIT: Primary | ICD-10-CM

## 2019-12-30 PROCEDURE — 97140 MANUAL THERAPY 1/> REGIONS: CPT

## 2019-12-30 PROCEDURE — 97110 THERAPEUTIC EXERCISES: CPT

## 2019-12-30 NOTE — PROGRESS NOTES
Daily Note     Today's date: 2019  Patient name: Fabio Mendez  : 1940  MRN: [id number]  Referring provider: Trisha Moran MD  Dx:   Encounter Diagnosis     ICD-10-CM    1  Oth intartic fracture of lower end of right radius, init S52 [id number]                   Subjective: "I was able to do cooking with my hands "  Pt denied any pain at start, during or after tx  Pt had f/u with MD; and MD ordered more therapy, with ordered of aggressive ROM  Objective: See treatment diary below  Tx focused on stress loading and passive exercises such as table leans to aggressively inc wrist extension and table pushes to inc wrist flexion  Pt educated to perf at home 2 x per day, 5 sets 1 min each  Assessment: Tolerated treatment well  Patient would benefit from continued OT      Plan: Continue per plan of care  Precautions: discontinue use of brace         Manual  19       Wrist PROM 5' 10' 20' 15' 10' 15'       Finger/digit PROM 5' 10' 5' 5' 5' 5'       Scar/edema massage 5'  5' 5'                                       Exercise Diary         Wrist AROM 2 x 15 fluido 10' and 2 x 10 (flex/ext) fluido 10' AROM 2 x 10 fluido 10' AROM 2 x 15 fluidp 10', AROM 2 x 20 fluido  12'  AROM 2 x 15       Tendin glides 10x 10x  10x 10x 10x       Finger opposition 10x 10x 10x 10x 10x 10x       Wrist ext  leaning against table 5', AROM 2 x 10 prayer stretch prayer stretch 3 x 30 sec 3 x 30 sec 3 x 30 sec prayer stretch 2x 30 sec       Wrist flex Twist bar 2 x 10, AROM 2 x 20  Twist bar 2 x 10  2 x 20        Contrast bath             Digit ROM marble             Wrist maze   4x          Twist bar     2 x 20        Table lean: wrist ext      5 sets 1 min       Table push: wrist flex      5 sets 1 min                                                                                                                                Modalities   RUBIO             U S

## 2019-12-31 ENCOUNTER — OFFICE VISIT (OUTPATIENT)
Dept: INTERNAL MEDICINE CLINIC | Facility: CLINIC | Age: 79
End: 2019-12-31

## 2019-12-31 VITALS
HEIGHT: 60 IN | TEMPERATURE: 97.9 F | SYSTOLIC BLOOD PRESSURE: 150 MMHG | OXYGEN SATURATION: 97 % | HEART RATE: 77 BPM | BODY MASS INDEX: 26.4 KG/M2 | WEIGHT: 134.48 LBS | DIASTOLIC BLOOD PRESSURE: 72 MMHG

## 2019-12-31 DIAGNOSIS — I10 BENIGN ESSENTIAL HYPERTENSION: ICD-10-CM

## 2019-12-31 DIAGNOSIS — R42 VERTIGO: Primary | ICD-10-CM

## 2019-12-31 PROCEDURE — 99213 OFFICE O/P EST LOW 20 MIN: CPT | Performed by: PHYSICIAN ASSISTANT

## 2019-12-31 RX ORDER — AMLODIPINE BESYLATE 10 MG/1
10 TABLET ORAL DAILY
Qty: 90 TABLET | Refills: 1 | Status: SHIPPED | OUTPATIENT
Start: 2019-12-31 | End: 2020-06-02 | Stop reason: SDUPTHER

## 2019-12-31 RX ORDER — MECLIZINE HYDROCHLORIDE 25 MG/1
25 TABLET ORAL 3 TIMES DAILY PRN
Qty: 21 TABLET | Refills: 0 | Status: SHIPPED | OUTPATIENT
Start: 2019-12-31 | End: 2020-03-16 | Stop reason: SDUPTHER

## 2019-12-31 NOTE — PROGRESS NOTES
Assessment/Plan:  As per our discussion this is the 2nd time her blood pressure has been elevated  You will continue your metoprolol and your lisinopril but I am increasing your amlodipine to 10 mg 1 tablet daily  New prescription sent to your pharmacy  As we reviewed today you do have vertigo  This usually resolves within approximately 1 week  I have sent medication for symptoms and we also completed the Epley maneuver in the office which can help with her symptoms as well  Additional handout with the instructions provided today as well  If you have no improvement or worsening of your dizziness/vertigo please call our office  You confirm you have your script and will schedule follow-up appointment with me for routine after labs as dated in March  No problem-specific Assessment & Plan notes found for this encounter  Diagnoses and all orders for this visit:    Vertigo  -     meclizine (ANTIVERT) 25 mg tablet; Take 1 tablet (25 mg total) by mouth 3 (three) times a day as needed for dizziness for up to 7 days    Benign essential hypertension  -     amLODIPine (NORVASC) 10 mg tablet; Take 1 tablet (10 mg total) by mouth daily          Subjective:      Patient ID: Sherry Goss is a 67 y o  female  Pt here for [facility] with c/o room spinning when change position for past 1 week  States is getting a bit better but not resolved  No pain    Positive nausea and 1X vomited    Before symptoms no URI symptoms, no change hearing  Patient confirms taking all of her medications and as noted this is the 2nd time patient's blood pressure has been slightly elevated  Last visit 146 today 150  Will increase her amlodipine today  Dizziness   This is a new problem  The current episode started in the past 7 days  The problem occurs daily  The problem has been gradually improving  Associated symptoms include nausea, vertigo and vomiting   Pertinent negatives include no abdominal pain, chest pain, chills, congestion, coughing, fever, headaches, neck pain, sore throat, swollen glands or visual change  Exacerbated by: change position  She has tried nothing for the symptoms  The following portions of the patient's history were reviewed and updated as appropriate: allergies, current medications, past family history, past medical history, past social history, past surgical history and problem list     Review of Systems   Constitutional: Negative for chills and fever  HENT: Negative for congestion, postnasal drip, rhinorrhea, sneezing and sore throat  Eyes: Negative for visual disturbance  Respiratory: Negative  Negative for cough  Cardiovascular: Negative  Negative for chest pain, palpitations and leg swelling  Gastrointestinal: Positive for nausea and vomiting  Negative for abdominal pain  Musculoskeletal: Negative for neck pain  Neurological: Positive for dizziness and vertigo  Negative for seizures, syncope and headaches  Psychiatric/Behavioral: Negative  Objective:      /72 (BP Location: Right arm, Patient Position: Sitting, Cuff Size: Adult)   Pulse 77   Temp 97 9 °F (36 6 °C) (Oral)   Ht 5' (1 524 m)   Wt 61 kg (134 lb 7 7 oz)   SpO2 97%   BMI 26 26 kg/m²          Physical Exam   Constitutional: She appears well-developed and well-nourished  HENT:   Head: Normocephalic and atraumatic  Nose: Nose normal    Mouth/Throat: Oropharynx is clear and moist    Bilateral TMs are intact however both are sclerotic slightly cloudy but no erythema bulging or retraction  Patient advised to discontinue cleaning ears  Eyes: Pupils are equal, round, and reactive to light  Conjunctivae and EOM are normal  Right eye exhibits no nystagmus  Left eye exhibits no nystagmus  Cardiovascular: Normal rate and regular rhythm  Pulmonary/Chest: Effort normal and breath sounds normal    Neurological: She displays normal reflexes  No cranial nerve deficit or sensory deficit     Patient does not have any focal neurologic deficits however she does have positive vertigo with Epley maneuver left greater than right   Skin: Skin is warm  Psychiatric: She has a normal mood and affect  Her behavior is normal    Nursing note and vitals reviewed

## 2019-12-31 NOTE — PATIENT INSTRUCTIONS
As per our discussion this is the 2nd time her blood pressure has been elevated  You will continue your metoprolol and your lisinopril but I am increasing your amlodipine to 10 mg 1 tablet daily  New prescription sent to your pharmacy  As we reviewed today you do have vertigo  This usually resolves within approximately 1 week  I have sent medication for symptoms and we also completed the Epley maneuver in the office which can help with her symptoms as well  Additional handout with the instructions provided today as well  If you have no improvement or worsening of your dizziness/vertigo please call our office  You confirm you have your script and will schedule follow-up appointment with me for routine after labs as dated in March  Vértigo   LO QUE NECESITA SABER:   ¿Qué es el vértigo? El vértigo es salvatore afección que hace que usted se sienta mareado  Es posible que tenga la sensación de que usted o todo a asher alrededor se está moviendo o dando vueltas  Usted también podría sentir maureen que lo empujan hacia el piso o hacia un lado  ¿Qué provoca el vértigo? El oído interno está lleno de líquido, un nervio y [employer]  Estas estructuras lo ayudan a [employer]  El vértigo puede ser provocado por enfermedades o afecciones que afectan al oído interno o la parte de asher cerebro que controla el equilibrio  Cualquiera de las siguientes condiciones pueden provocar vértigo:  · Las partículas pequeñas que flotan en el líquido del oído interno se salen de asher lugar y provocan irritación    · Enfermedad de [place]     · Trauma del oído    · Salvatore infección del oído interno    · Salvatore condición neurológica maureen la esclerosis múltiple, migraña, tumor o un derrame cerebral    · Trastornos de pánico y de ansiedad     · Halima salvatore gran cantidad de alcohol  ¿Qué signos y síntomas pueden ocurrir con el vértigo?    · Náuseas o vómito    · Dificultad con asher equilibrio    · Sensibilidad a la purnima o al liss    · Debilidad, dificultad para hablar, problemas para becka o para moverse, aumento de la somnolencia    · Debilidad facial y dolor de nancie    · Pérdida de la audición, sensación de tener el oído lleno o dolor, o escuchar zumbidos    · Movimiento rápido e incontrolable de lyubov ojos  ¿Cómo se diagnostica el vértigo? Asher médico le preguntará acerca de lyubov síntomas  Infórmele al Tyler Chaudhry lyubov enfermedades [place], [place], traumas y medicamentos  Asher médico puede  asher nancie en diferentes direcciones  Eustis le sirve para revisar si hay un problema en el oído interno que le está provocando el vértigo  Es posible que le pidan que tj algunos ejercicios que [place]  También puede necesitar reece o más de los siguientes para encontrar la causa del vértigo:  · Jose Alfredo Judith (ENG)  se realiza para examinar los problemas que usted podría tener con el equilibrio o los [place]  Sobre la piel que rodea lyubov ojos se colocan unos parches adhesivos que tienen unos cables  Los cables se conectan a salvatore máquina que registra información paresh asher ENG  Le pondrán aire o agua cálida o fría sobre lyuobv ojos mientras se registran los movimientos oculares  No tome alcohol ni coma alimentos pesados antes de consuelo examen  Es posible que se sienta mareado o con náuseas después del examen  · El examen de respuesta auditiva del tronco encefálico  se [place] para producir salvatore serie de clics por medio de unos audífonos en lyubov oídos  Salvatore máquina mide cómo asher cóclea y nervios reaccionan a los clics  · Salvatore imagen por resonancia magnética (IRM) del cerebro  podría usarse para buscar problemas que pueden provocar vértigo  No entre a la deborah donde se realiza la resonancia magnética con algo de metal  El metal puede causar daños graves  Dígale al médico si usted tiene algo de metal por dentro o sobre asher cuerpo  ¿Cómo se trata el vértigo? El tratamiento dependerá de la condición que le provoca el vértigo   Asher médico puede sugerir que usted descanse en la cama o evitar ciertas actividades por un tiempo  Puede ser que usted deba ramesh menos o dejar de ramesh medicamentos que le causan vértigo  También se pueden prescribir medicamentos para ayudar a aliviar los síntomas  ¿Cómo puedo controlar los síntomas? · No maneje  No camine sin ayuda ni maneje maquinaria pesada cuando está mareado  · Muévase lentamente  cuando pase de salvatore posición a otra  Levántese lentamente después de wilver estado sentado o acostado  Siéntese o acuéstese en seguida si se siente mareado  · Fellows suficiente líquidos  Los líquidos ayudan a evitar la deshidratación  Pregunte cuánto líquido debe ramesh cada día y cuáles líquidos son los más adecuados para usted  · Terapia de rehabilitación vestibular y del equilibrio (TRVE)  se [place] para enseñarle ejercicios para mejorar asher equilibrio y fuerza  Estos ejercicios pueden ayudarle a disminuir asher vértigo y a mejorar asher equilibrio  Pida más información sobre esta terapia  ¿Cuándo enrique buscar atención inmediata? · Usted tiene dolor de [place] y rigidez en el abdirahman  · Usted tiene escalofríos con temblores y [place]  · Usted vomita salvatore y [place] vez sin [place]  · [employer], pus o líquido de los oídos  · Usted está confundido  ¿Cuándo enrique comunicarme con mi médico?   · Lyubov síntomas no mejoran con el tratamiento  · Tiene alguna pregunta acerca de asher condición o cuidado  ACUERDOS SOBRE ASHER CUIDADO:   Usted tiene el derecho de ayudar a planear asher cuidado  Aprenda todo lo que pueda sobre asher condición y maureen darle tratamiento  Discuta lyubov opciones de tratamiento con lyubov médicos para decidir el cuidado que usted desea recibir  Usted siempre tiene el derecho de rechazar el tratamiento  Esta información es sólo para uso en educación  Asher intención no es darle un consejo médico sobre enfermedades o tratamientos   Colsulte con asher Eliana Clipper farmacéutico antes de seguir cualquier régimen médico para saber si es seguro y efectivo para usted  © 2017 [address] [employer] Information is for End User's use only and may not be sold, redistributed or otherwise used for commercial purposes  All illustrations and images included in CareNotes® are the copyrighted property of A D A M , Inc  or Edwin Martinez

## 2020-01-02 ENCOUNTER — APPOINTMENT (OUTPATIENT)
Dept: OCCUPATIONAL THERAPY | Age: 80
End: 2020-01-02
Payer: COMMERCIAL

## 2020-01-06 ENCOUNTER — OFFICE VISIT (OUTPATIENT)
Dept: OCCUPATIONAL THERAPY | Age: 80
End: 2020-01-06
Payer: COMMERCIAL

## 2020-01-06 ENCOUNTER — APPOINTMENT (OUTPATIENT)
Dept: OCCUPATIONAL THERAPY | Age: 80
End: 2020-01-06
Payer: COMMERCIAL

## 2020-01-06 DIAGNOSIS — S52.571A OTH INTARTIC FRACTURE OF LOWER END OF RIGHT RADIUS, INIT: Primary | ICD-10-CM

## 2020-01-06 PROCEDURE — 97110 THERAPEUTIC EXERCISES: CPT

## 2020-01-06 PROCEDURE — 97140 MANUAL THERAPY 1/> REGIONS: CPT

## 2020-01-06 NOTE — PROGRESS NOTES
Daily Note     Today's date: 2020  Patient name: Dino Riley  : 1940  MRN: [id number]  Referring provider: Anette Vaughan MD  Dx:   Encounter Diagnosis     ICD-10-CM    1  Oth intartic fracture of lower end of right radius, init S52 [id number]                   Subjective: "I was sick over the weekend and did not perform my exercises like usual      Objective: See treatment diary below  Therapist had pt perf isometric contraction exercises of wrist flex/ext and F+ strength felt and pt reported no pain  Assessment: Tolerated treatment well  Patient would benefit from continued OT      Plan: Continue per plan of care  Precautions: discontinue use of brace         Manual  19      Wrist PROM 5' 10' 20' 15' 10' 15' 10'      Finger/digit PROM 5' 10' 5' 5' 5' 5'       Scar/edema massage 5'  5' 5'                                       Exercise Diary        Wrist AROM 2 x 15 fluido 10' and 2 x 10 (flex/ext) fluido 10' AROM 2 x 10 fluido 10' AROM 2 x 15 fluidp 10', AROM 2 x 20 fluido  12'  AROM 2 x 15 AROM 2 x 15      Tendin glides 10x 10x  10x 10x 10x       Finger opposition 10x 10x 10x 10x 10x 10x       Wrist ext  leaning against table 5', AROM 2 x 10 prayer stretch prayer stretch 3 x 30 sec 3 x 30 sec 3 x 30 sec prayer stretch 2x 30 sec       Wrist flex Twist bar 2 x 10, AROM 2 x 20  Twist bar 2 x 10  2 x 20        Contrast bath             Digit ROM marble             Wrist maze   4x          Twist bar     2 x 20        Table lean: wrist ext      5 sets 1 min 5 sets 30 sec      Table push: wrist flex      5 sets 1 min       Isometric contraction       Wrist flex/ext 10x 5 sec                                                                                                                  Modalities  /            MP             U S

## 2020-01-09 ENCOUNTER — APPOINTMENT (OUTPATIENT)
Dept: OCCUPATIONAL THERAPY | Age: 80
End: 2020-01-09
Payer: COMMERCIAL

## 2020-01-12 DIAGNOSIS — I47.1 AVNRT (AV NODAL RE-ENTRY TACHYCARDIA) (HCC): ICD-10-CM

## 2020-01-13 ENCOUNTER — OFFICE VISIT (OUTPATIENT)
Dept: OCCUPATIONAL THERAPY | Age: 80
End: 2020-01-13
Payer: COMMERCIAL

## 2020-01-13 DIAGNOSIS — S52.571A OTH INTARTIC FRACTURE OF LOWER END OF RIGHT RADIUS, INIT: Primary | ICD-10-CM

## 2020-01-13 PROCEDURE — 97168 OT RE-EVAL EST PLAN CARE: CPT

## 2020-01-13 PROCEDURE — 97110 THERAPEUTIC EXERCISES: CPT

## 2020-01-13 PROCEDURE — 97140 MANUAL THERAPY 1/> REGIONS: CPT

## 2020-01-13 RX ORDER — METOPROLOL TARTRATE 50 MG/1
TABLET, FILM COATED ORAL
Qty: 120 TABLET | Refills: 0 | Status: SHIPPED | OUTPATIENT
Start: 2020-01-13 | End: 2020-03-07

## 2020-01-13 NOTE — PROGRESS NOTES
OT Re-Evaluation     Today's date: 2020  Patient name: Shan Corado  : 1940  MRN: [id number]  Referring provider: Candace Rose MD  Dx:   Encounter Diagnosis     ICD-10-CM    1  Oth intartic fracture of lower end of right radius, init S52 571A                   Assessment  Assessment details: 2020- Pt has demo increased L wrist extension to 42 degrees from previous 30 degrees  Pt noted with moderate edema on hand/fingers fluctuating from moderate to mild  Pt noted with inc L hand  and pt reports able to hold more items for a longer period of time  Pt, however, still demo moderate wrist ROM impairments and composite  functional use  Pt has been educated on more aggressive HEP, which she is inconsistent with follow through as she is afraid to cause inc pain  Pt also educated to continue with contrast bath again at home for edema of fingers  Pt noted to move wrist and hand better after IASTM to extrinsic and intrinsic muscles, as stiffness/tightness till noted  Pt is overall making progress and should continue with skilled OT tx inorder for L hand to reach PLOF and independence  19- Pt has order for discontinuing her brace as it has made her wrist stiff  Pt continues to demo moderate edema of L hand/fingers  Pt continues to demo moderate/severe ROM limitations in L wrist  Pt has demo improvements in L wrist flex/ext AROM compared to IE, but still not a functional motion range to be independent with ADL tasks  Pt has reported decreased pain with exercises and trying to incorporate hand back into every day tasks  Pt has been hesitant and afraid in the past that she was going to re-injury wrist with her HEP  Pt educated and understands the importance of performing her exercises frequently throughout the day  It is medically necessary for pt to continue with skilled OT tx in order to inc wrist and finger AROM, dec edema, and inc functional use of hand to be independent   Pt requires 2x per week, 4-6x a week  Pt has demo improvements and has the potential and ability to increase more and achieve her goals  Pt is a 68 y/o female who presents to skilled OT tx with L wrist fx s/p fall and surgery 2 weeks ago  Pt did not have brace on when she came to treatment and therapist educated pt on the importance of wearing  Pt also reported she takes it off at night and therapist educated pt again on need of wearing at night  Pt demo moderate swelling in her hands and fingers which is decreasing her ROM as well  Pt reports no pain without movement but pain with movement of 8/10 at wrist and hand  Pt demo moderate to severe ROM impairments of wrist and fingers  Pt would benefit from skilled OT tx to inc ROM at digits and wrist with HEP and movement to dec edema in hand  Impairments: abnormal movement, impaired physical strength, lacks appropriate home exercise program and pain with function  Other impairment: moderate edema to L hand/wrist    Symptom irritability: moderateBarriers to therapy: Language barrier; pt native language is [place]i but can speak and understand some english  Understanding of Dx/Px/POC: good   Prognosis: good    Goals  STGs:  1  Pt will inc wrist flex/ext + 10 degrees (Met)  2  Pt will inc digit MCP and PIP flexion + 10 degrees (Part Met)  3  Pt will dec edema in L hand to moderate edema  (MET)  4  Pt will be able to perf opposition of thumb to pinky and thumb to 4th digit  (MET)  LTGs:  1  Pt will inc L wrist AROM to WFLs (NOT MET)  2  Pt will demo independence in scar mgmt and massage (Part Met)  3  Pt will be independent with ROM HEP (Part Met)  4  Pt will demo digit flex/ext to [facility] (NOT MET)  5  Pt will report no pain during movement of L wrist  (NOT MET)  6  Pt will demo no edema to L hand       Plan  Patient would benefit from: skilled occupational therapy  Planned modality interventions: fluidotherapy, cryotherapy and thermotherapy: hydrocollator packs  Other planned modality interventions: [facility]  Planned therapy interventions: massage, manual therapy, patient education, stretching, therapeutic activities, therapeutic exercise, functional ROM exercises and home exercise program  Frequency: 2x week  Duration in weeks: 6  Plan of Care beginning date: 2020  Plan of Care expiration date: 2020  Treatment plan discussed with: patient and caregiver        Subjective Evaluation    History of Present Illness  Date of onset: 2019  Date of surgery: 10/8/2019  Mechanism of injury: surgery and trauma  Mechanism of injury: Pt is a 66 y/o female who presents to skilled OT tx with L wrist fx s/p fall and surgery ORIF 2 weeks ago  Pt did not have brace on when she came to treatment and therapist educated pt on the importance of wearing  Pt also reported she takes it off at night and therapist educated pt again on need of wearing at night  Pt demo moderate swelling in her hands and fingers which is decreasing her ROM as well  Pt reports no pain without movement but pain with movement of 8/10 at wrist and hand  Pt demo moderate to severe ROM impairments of wrist and fingers  Pt would benefit from skilled OT tx to inc ROM at digits and wrist with HEP and movement to dec edema in hand  Not a recurrent problem   Quality of life: good    Pain  Current pain ratin  At best pain ratin  At worst pain rating: 3  Location: L wrist  Quality: burning, discomfort and radiating  Relieving factors: rest and support      Diagnostic Tests  X-ray: abnormal  Treatments  No previous or current treatments  Patient Goals  Patient goals for therapy: decreased edema, decreased pain, increased motion and independence with ADLs/IADLs          Objective     Observations     Left Wrist/Hand   Positive for adhesive scar and edema       Active Range of Motion     Left Wrist   Wrist flexion: 45 degrees with pain  Wrist extension: 42 degrees with pain  Radial deviation: 20 degrees   Ulnar deviation: 20 degrees     Right Wrist   Normal active range of motion    Left Thumb   Flexion     MP: 30 degrees    DIP: 25 degrees  Palmar Abduction     CMC: 25 degrees    Left Digits    Flexion   Index     MCP: 42    PIP: 65    DIP: 38  Middle     MCP: 50    PIP: 55    DIP: 60  Ring     MCP: 30    PIP: 60    DIP: 40  Little     MCP: 32    PIP: 60    DIP: 40    Additional Active Range of Motion Details  1/13/2020- 12 degree inc for wrist ext (30* to 42*)    Swelling     Left Wrist/Hand   Figure 8: 19 cm  Circumference MCP: 18 1 cm  Circumference wrist: 16 4 cm             Precautions: Universal       Manual  12/2 12/12 12/19 12/23/19 12/26 12/30 1/6 1/13       Wrist PROM 5' 10' 20' 15' 10' 15' 10'  5'       Finger/digit PROM 5' 10' 5' 5' 5' 5'    5'       Scar/edema massage 5'   5' 5'                                                                     Exercise Diary  12/2 12/12 12/19 12/23 12/26 12/30 1/6 1/13       Wrist AROM 2 x 15 fluido 10' and 2 x 10 (flex/ext) fluido 10' AROM 2 x 10 fluido 10' AROM 2 x 15 fluidp 10', AROM 2 x 20 fluido  12'  AROM 2 x 15 AROM 2 x 15  fluido 10'       Tendin glides 10x 10x   10x 10x 10x    5x       Finger opposition 10x 10x 10x 10x 10x 10x           Wrist ext  leaning against table 5', AROM 2 x 10 prayer stretch prayer stretch 3 x 30 sec 3 x 30 sec 3 x 30 sec prayer stretch 2x 30 sec    prayer and table stretch 3x 30 sec       Wrist flex Twist bar 2 x 10, AROM 2 x 20   Twist bar 2 x 10   2 x 20             Contrast bath                       Digit ROM marble                      Wrist maze     4x                 Twist bar         2 x 20             Table lean: wrist ext           5 sets 1 min 5 sets 30 sec         Table push: wrist flex           5 sets 1 min           Isometric contraction             Wrist flex/ext 10x 5 sec                                                                                                                                                                                                               Modalities  12/2 1/13                   MP                       U  S    8'

## 2020-01-14 ENCOUNTER — APPOINTMENT (OUTPATIENT)
Dept: OCCUPATIONAL THERAPY | Age: 80
End: 2020-01-14
Payer: COMMERCIAL

## 2020-01-20 ENCOUNTER — OFFICE VISIT (OUTPATIENT)
Dept: OCCUPATIONAL THERAPY | Age: 80
End: 2020-01-20
Payer: COMMERCIAL

## 2020-01-20 DIAGNOSIS — S52.571A OTH INTARTIC FRACTURE OF LOWER END OF RIGHT RADIUS, INIT: Primary | ICD-10-CM

## 2020-01-20 PROCEDURE — 97530 THERAPEUTIC ACTIVITIES: CPT

## 2020-01-20 PROCEDURE — 97140 MANUAL THERAPY 1/> REGIONS: CPT

## 2020-01-20 PROCEDURE — 97034 APP MDLTY 1+CNTRST BTH EA 15: CPT

## 2020-01-20 PROCEDURE — 97110 THERAPEUTIC EXERCISES: CPT

## 2020-01-20 NOTE — PROGRESS NOTES
Daily Note     Today's date: 2020  Patient name: Tushar Moreno  : 1940  MRN: [id number]  Referring provider: Abimael Hammer MD  Dx:   Encounter Diagnosis     ICD-10-CM    1  Oth intartic fracture of lower end of right radius, init S52 571A                   Subjective: "I cooked a lot today, and my hand feels fine "  Pt denied pain at start, during, or end of skilled OT tx  Pt demo with inc L hand fist at end of tx; the most her L hand has closed since start of therapy  Objective: See treatment diary below  Tx focused on L hand composite   Therapist had pt engaged in contrast bath to dec edema at fingers, which allowed her to make the best composite fist since start of therapy  Assessment: Tolerated treatment well  Patient would benefit from continued OT      Plan: Continue per plan of care        Precautions: Universal       Manual  19     Wrist PROM 5' 10' 20' 15' 10' 15' 10'  5'  5'     Finger/digit PROM 5' 10' 5' 5' 5' 5'    5'  5'     Scar/edema massage 5'   5' 5'                                                                     Exercise Diary       Wrist AROM 2 x 15 fluido 10' and 2 x 10 (flex/ext) fluido 10' AROM 2 x 10 fluido 10' AROM 2 x 15 fluidp 10', AROM 2 x 20 fluido  12'  AROM 2 x 15 AROM 2 x 15  fluido 10'  2 x 15     Tendin glides 10x 10x   10x 10x 10x    5x  10x     Finger opposition 10x 10x 10x 10x 10x 10x      10x     Wrist ext  leaning against table 5', AROM 2 x 10 prayer stretch prayer stretch 3 x 30 sec 3 x 30 sec 3 x 30 sec prayer stretch 2x 30 sec    prayer and table stretch 3x 30 sec  3 sets 30 prayer and table leans     Wrist flex Twist bar 2 x 10, AROM 2 x 20   Twist bar 2 x 10   2 x 20             Contrast bath                  15min     Digit ROM marble                      Wrist maze     4x                 Twist bar         2 x 20           Table lean: wrist ext           5 sets 1 min 5 sets 30 sec         Table push: wrist flex           5 sets 1 min           Isometric contraction             Wrist flex/ext 10x 5 sec                                                                                                                                                                                                               Modalities  12/2 1/13                   MP                       U  S    8'

## 2020-01-23 ENCOUNTER — OFFICE VISIT (OUTPATIENT)
Dept: OCCUPATIONAL THERAPY | Age: 80
End: 2020-01-23
Payer: COMMERCIAL

## 2020-01-23 DIAGNOSIS — S52.571A OTH INTARTIC FRACTURE OF LOWER END OF RIGHT RADIUS, INIT: Primary | ICD-10-CM

## 2020-01-23 PROCEDURE — 97110 THERAPEUTIC EXERCISES: CPT

## 2020-01-23 PROCEDURE — 97140 MANUAL THERAPY 1/> REGIONS: CPT

## 2020-01-23 NOTE — PROGRESS NOTES
Daily Note     Today's date: 2020  Patient name: Rubin Jay  : 1940  MRN: [id number]  Referring provider: Jenelle Sanchez MD  Dx:   Encounter Diagnosis     ICD-10-CM    1  Oth intartic fracture of lower end of right radius, init S57 793N                   Subjective: "This is the best I have ever closed my hand "  Pt noted with inc composite fist, pt reporting because of the contrast bathes which are helping  Pt reported soreness after last treatment session, but reported able to cook more with less effort than before  Objective: See treatment diary below      Assessment: Tolerated treatment well  Patient would benefit from continued OT  It is medically necessary for pt to continue with skilled OT tx sessions for pt to achieve [facility] [place] AROM of L wrist in order to be independent and return to [facility] for I/ADL tasks  Pt still demonstrates abnormal movement and loss of ROM of L wrist, composite fist, digits, and hand but steadily making progress  Plan: Continue per plan of care        Precautions: Universal       Manual  19   Wrist PROM 5' 10' 20' 15' 10' 15' 10'  5'  5'  20'   Finger/digit PROM 5' 10' 5' 5' 5' 5'    5'  5'  5'   Scar/edema massage 5'   5' 5'                                                                     Exercise Diary     Wrist AROM 2 x 15 fluido 10' and 2 x 10 (flex/ext) fluido 10' AROM 2 x 10 fluido 10' AROM 2 x 15 fluidp 10', AROM 2 x 20 fluido  12'  AROM 2 x 15 AROM 2 x 15  fluido 10'  2 x 15  2 x 20   Tendin glides 10x 10x   10x 10x 10x    5x  10x  10x   Finger opposition 10x 10x 10x 10x 10x 10x      10x  10x   Wrist ext  leaning against table 5', AROM 2 x 10 prayer stretch prayer stretch 3 x 30 sec 3 x 30 sec 3 x 30 sec prayer stretch 2x 30 sec    prayer and table stretch 3x 30 sec  3 sets 30 prayer and table leans  3 x 30sec hold prayer   Wrist flex Twist bar 2 x 10, AROM 2 x 20   Twist bar 2 x 10   2 x 20          twist bar 2 x 20   Contrast bath                  15min     Digit ROM marble                      Wrist maze     4x              4x   Twist bar         2 x 20             Table lean: wrist ext           5 sets 1 min 5 sets 30 sec         Table push: wrist flex           5 sets 1 min           Isometric contraction             Wrist flex/ext 10x 5 sec                                                                                                                                                                                                               Modalities  12/2 1/13                   MP                       U  S    8'

## 2020-01-30 ENCOUNTER — OFFICE VISIT (OUTPATIENT)
Dept: OCCUPATIONAL THERAPY | Age: 80
End: 2020-01-30
Payer: COMMERCIAL

## 2020-01-30 DIAGNOSIS — S52.571A OTH INTARTIC FRACTURE OF LOWER END OF RIGHT RADIUS, INIT: Primary | ICD-10-CM

## 2020-01-30 PROCEDURE — 97110 THERAPEUTIC EXERCISES: CPT

## 2020-01-30 PROCEDURE — 97140 MANUAL THERAPY 1/> REGIONS: CPT

## 2020-01-31 NOTE — PROGRESS NOTES
Daily Note     Today's date: 2020  Patient name: Chase Ortiz  : 1940  MRN: [id number]  Referring provider: Alysia Walters MD  Dx:   Encounter Diagnosis     ICD-10-CM    1  Oth intartic fracture of lower end of right radius, init S52 096S                   Subjective: "Look, my fist is better "  Pt denied any pain at start, during or end of treatment  Pt noted with inc flexion of fingers which inc  of L hand  Objective: See treatment diary below      Assessment: Tolerated treatment well  Patient would benefit from continued OT      Plan: Continue per plan of care        Precautions: Universal       Manual  19   Wrist PROM 10 10' 20' 15' 10' 15' 10'  5'  5'  20'   Finger/digit PROM 5' 10' 5' 5' 5' 5'    5'  5'  5'   Scar/edema massage    5' 5'                                                                     Exercise Diary     Wrist AROM 2 x 15 fluido 10' and 2 x 10 (flex/ext) fluido 10' AROM 2 x 10 fluido 10' AROM 2 x 15 fluidp 10', AROM 2 x 20 fluido  12'  AROM 2 x 15 AROM 2 x 15  fluido 10'  2 x 15  2 x 20   Tendin glides 10x 10x   10x 10x 10x    5x  10x  10x   Finger opposition 10x 10x 10x 10x 10x 10x      10x  10x   Wrist ext  leaning against table 5', AROM 2 x 10 prayer stretch prayer stretch 3 x 30 sec 3 x 30 sec 3 x 30 sec prayer stretch 2x 30 sec    prayer and table stretch 3x 30 sec  3 sets 30 prayer and table leans  3 x 30sec hold prayer   Wrist flex Twist bar 2 x 10, AROM 2 x 20   Twist bar 2 x 10   2 x 20          twist bar 2 x 20   Contrast bath                  15min     Digit ROM marble                      Wrist maze     4x              4x   Twist bar         2 x 20             Table lean: wrist ext           5 sets 1 min 5 sets 30 sec         Table push: wrist flex           5 sets 1 min           Isometric contraction             Wrist flex/ext 10x 5 sec                                                                                                                                                                                                               Modalities  12/2 1/13                   MP                       U  S    8'

## 2020-02-04 ENCOUNTER — OFFICE VISIT (OUTPATIENT)
Dept: OCCUPATIONAL THERAPY | Age: 80
End: 2020-02-04
Payer: COMMERCIAL

## 2020-02-04 DIAGNOSIS — S52.571A OTH INTARTIC FRACTURE OF LOWER END OF RIGHT RADIUS, INIT: Primary | ICD-10-CM

## 2020-02-04 PROCEDURE — 97112 NEUROMUSCULAR REEDUCATION: CPT

## 2020-02-04 PROCEDURE — 97110 THERAPEUTIC EXERCISES: CPT

## 2020-02-04 PROCEDURE — 97140 MANUAL THERAPY 1/> REGIONS: CPT

## 2020-02-04 NOTE — PROGRESS NOTES
Daily Note     Today's date: 2020  Patient name: William Adam  : 1940  MRN: [id number]  Referring provider: Priti Michaels MD  Dx:   Encounter Diagnosis     ICD-10-CM    1  Oth intartic fracture of lower end of right radius, init S53 437K                   Subjective: "I can close my hand better "  Pt denied pain at start, during or end of tx  Therapist wanting to focus therapy on increasing pts composite fist, dec edema of fingers, and inc  strength  Pt demo inc  after static flexion stretch  Objective: See treatment diary below      Assessment: Tolerated treatment well  Patient would benefit from continued OT      Plan: Progress treatment as tolerated         Precautions: Universal       Manual  19   Wrist PROM 10 10' 20' 15' 10' 15' 10'  5'  5'  20'   Finger/digit PROM 5' 5' 5' 5' 5' 5'    5'  5'  5'   Scar/edema massage    5' 5'                static flexion stretch of     10'                                                 Exercise Diary     Wrist AROM 2 x 15 fluido 10' and 2 x 10 (flex/ext) fluido 10' AROM 2 x 10 fluido 10' AROM 2 x 15 fluidp 10', AROM 2 x 20 fluido  12'  AROM 2 x 15 AROM 2 x 15  fluido 10'  2 x 15  2 x 20   Tendin glides 10x    10x 10x 10x    5x  10x  10x   Finger opposition 10x  10x 10x 10x 10x      10x  10x   Wrist ext  leaning against table 5', AROM 2 x 10 prayer stretch prayer stretch 3 x 30 sec 3 x 30 sec 3 x 30 sec prayer stretch 2x 30 sec    prayer and table stretch 3x 30 sec  3 sets 30 prayer and table leans  3 x 30sec hold prayer   Wrist flex Twist bar 2 x 10, AROM 2 x 20   Twist bar 2 x 10   2 x 20          twist bar 2 x 20   Contrast bath                  15min     Digit ROM marble                      Wrist maze     4x              4x   Twist bar         2 x 20             Table lean: wrist ext           5 sets 1 min 5 sets 30 sec         Table push: wrist flex           5 sets 1 min           Isometric contraction             Wrist flex/ext 10x 5 sec          gripping    dowel and putty 10x                                                                                                                                                                                                 Modalities  12/2 1/13                   MP                       U  S    8'

## 2020-02-06 ENCOUNTER — OFFICE VISIT (OUTPATIENT)
Dept: OCCUPATIONAL THERAPY | Age: 80
End: 2020-02-06
Payer: COMMERCIAL

## 2020-02-06 DIAGNOSIS — S52.571A OTH INTARTIC FRACTURE OF LOWER END OF RIGHT RADIUS, INIT: Primary | ICD-10-CM

## 2020-02-06 PROCEDURE — 97110 THERAPEUTIC EXERCISES: CPT

## 2020-02-06 PROCEDURE — 97530 THERAPEUTIC ACTIVITIES: CPT

## 2020-02-06 PROCEDURE — 97140 MANUAL THERAPY 1/> REGIONS: CPT

## 2020-02-06 NOTE — PROGRESS NOTES
Daily Note     Today's date: 2020  Patient name: Nancy Burkett  : 1940  MRN: [id number]  Referring provider: Tawana Orosco MD  Dx:   Encounter Diagnosis     ICD-10-CM    1  Oth intartic fracture of lower end of right radius, init S52 109L                   Subjective: "I can make a fist better "  Pt denied pain  Pt and therapist focusing on  and finger strength/AROM more to inc pt  and functional ability with ADL tasks  Objective: See treatment diary below  Tx focused on static  ROM, finger ROM with digit blocking and  strength with tennis ball and yellow thera putty  Assessment: Tolerated treatment well  Patient exhibited good technique with therapeutic exercises and would benefit from continued OT      Plan: Progress treatment as tolerated         Precautions: Universal       Manual  19   Wrist PROM 10 10' 10 15' 10' 15' 10'  5'  5'  20'   Finger/digit PROM 5' 5' 5' 5' 5' 5'    5'  5'  5'   Scar/edema massage     5'                static flexion stretch of     10'                                                 Exercise Diary     Wrist AROM 2 x 15 fluido 10' and 2 x 10 (flex/ext) fluido 10' AROM 2 x 10 fluido 10' AROM 2 x 15 fluidp 10', AROM 2 x 20 fluido  12'  AROM 2 x 15 AROM 2 x 15  fluido 10'  2 x 15  2 x 20   Tendin glides 10x   5x 10x 10x 10x    5x  10x  10x   Finger opposition 10x  5x 10x 10x 10x      10x  10x   Wrist ext  leaning against table 5', AROM 2 x 10 prayer stretch  3 x 30 sec 3 x 30 sec prayer stretch 2x 30 sec    prayer and table stretch 3x 30 sec  3 sets 30 prayer and table leans  3 x 30sec hold prayer   Wrist flex Twist bar 2 x 10, AROM 2 x 20      2 x 20          twist bar 2 x 20   Contrast bath                  15min     Digit ROM marble     joint block each digit/joint 10x                 Wrist maze                   4x   Twist bar         2 x 20             Table lean: wrist ext           5 sets 1 min 5 sets 30 sec         Table push: wrist flex           5 sets 1 min           Isometric contraction             Wrist flex/ext 10x 5 sec          gripping    dowel and putty 10x  there aputty squeezes, tennis ball and pennies 2x                                                                                                                                                                                               Modalities  12/2 1/13                   MP                       U  S    8'

## 2020-02-07 ENCOUNTER — OFFICE VISIT (OUTPATIENT)
Dept: OBGYN CLINIC | Facility: HOSPITAL | Age: 80
End: 2020-02-07
Payer: COMMERCIAL

## 2020-02-07 VITALS
BODY MASS INDEX: 26.31 KG/M2 | HEIGHT: 60 IN | DIASTOLIC BLOOD PRESSURE: 82 MMHG | SYSTOLIC BLOOD PRESSURE: 147 MMHG | WEIGHT: 134 LBS | HEART RATE: 73 BPM

## 2020-02-07 DIAGNOSIS — S52.572D OTHER CLOSED INTRA-ARTICULAR FRACTURE OF DISTAL END OF LEFT RADIUS WITH ROUTINE HEALING, SUBSEQUENT ENCOUNTER: Primary | ICD-10-CM

## 2020-02-07 PROCEDURE — 4040F PNEUMOC VAC/ADMIN/RCVD: CPT | Performed by: SURGERY

## 2020-02-07 PROCEDURE — 3079F DIAST BP 80-89 MM HG: CPT | Performed by: SURGERY

## 2020-02-07 PROCEDURE — 99212 OFFICE O/P EST SF 10 MIN: CPT | Performed by: SURGERY

## 2020-02-07 PROCEDURE — 3008F BODY MASS INDEX DOCD: CPT | Performed by: SURGERY

## 2020-02-07 PROCEDURE — 1036F TOBACCO NON-USER: CPT | Performed by: SURGERY

## 2020-02-07 PROCEDURE — 1160F RVW MEDS BY RX/DR IN RCRD: CPT | Performed by: SURGERY

## 2020-02-07 PROCEDURE — 3077F SYST BP >= 140 MM HG: CPT | Performed by: SURGERY

## 2020-02-07 NOTE — PROGRESS NOTES
ASSESSMENT/PLAN:      78 yo female status post ORIF left distal radius and carpal tunnel release on 10/08/2019  Patient's motion has improved significantly and she states she is able to do pretty much everything she wants to do with her hand  Advise that she continue therapy until she feels she has made the maximum amount of progress that she is going to make  She has no pain or functional limitations, she may follow up with us on an as needed basis  The patient verbalized understanding of exam findings and treatment plan  We engaged in the shared decision-making process and treatment options were discussed at length with the patient  Surgical and conservative management discussed today along with risks and benefits  Diagnoses and all orders for this visit:    Other closed intra-articular fracture of distal end of left radius with routine healing, subsequent encounter  -     Ambulatory referral to PT/OT hand therapy; Future      Follow Up:  Return if symptoms worsen or fail to improve  To Do Next Visit:       ____________________________________________________________________________________________________________________________________________      CHIEF COMPLAINT:  Chief Complaint   Patient presents with    Left Wrist - Follow-up       SUBJECTIVE:  Fili Velasquez is a 79y o  year old RHD female who presents for follow-up evaluation after a left distal radius ORIF on 10/08/2019  Patient reports she is doing very well with therapy and able to do pretty much everything she wants to do with her hand  She denies any pain, numbness, tingling  Overall she is very happy with her progress        I have personally reviewed all the relevant PMH, PSH, SH, FH, Medications and allergies       PAST MEDICAL HISTORY:  Past Medical History:   Diagnosis Date    Anemia     Chronic idiopathic constipation     Colon polyp     Diabetes mellitus (HCC)     Diverticulitis, colon     Esophageal reflux     Eustachian tube dysfunction     Gastrointestinal hemorrhage     Hypertension     Myocardial infarction ([facility] [address] )     Non-healing skin lesion     Palpitations     Rectal bleeding     Skin lesion of chest wall        PAST SURGICAL HISTORY:  Past Surgical History:   Procedure Laterality Date     SECTION      CHOLECYSTECTOMY      COLONOSCOPY      ECTOPIC PREGNANCY SURGERY      GA COLONOSCOPY FLX DX W/COLLJ SPEC WHEN PFRMD N/A 2016    Procedure: COLONOSCOPY;  Surgeon: Eddie Tai MD;  Location: [facility]; Service: Gastroenterology    GA OPEN RX DISTAL RADIUS FX, INTRA-ARTICULAR, 3+ FRAG Left 10/8/2019    Procedure: RADIUS/ULNA (WRIST) OPEN REDUCTION W/ INTERNAL FIXATION (ORIF);   Surgeon: Ginger Cason MD;  [facility];  Service: Orthopedics    GA REVISE MEDIAN N/CARPAL TUNNEL SURG Left 10/8/2019    Procedure: CARPAL TUNNEL RELEASE;  Surgeon: Ginger Cason MD;  [facility];  Service: Orthopedics       FAMILY HISTORY:  Family History   Problem Relation Age of Onset    Heart disease Mother     Bleeding Disorder Sister        SOCIAL HISTORY:  Social History     Tobacco Use    Smoking status: Never Smoker    Smokeless tobacco: Never Used   Substance Use Topics    Alcohol use: Never     Frequency: Never     Binge frequency: Never    Drug use: No       MEDICATIONS:    Current Outpatient Medications:     alendronate (FOSAMAX) 70 mg tablet, Take 70 mg by mouth every 7 days, Disp: , Rfl:     amLODIPine (NORVASC) 10 mg tablet, Take 1 tablet (10 mg total) by mouth daily, Disp: 90 tablet, Rfl: 1    glucose blood (FREESTYLE LITE) test strip, USE AS INSTRUCTED TO CHECK SUGAR UP TO 3 TIMES DAILY, Disp: 300 each, Rfl: 5    Insulin Pen Needle (B-D UF III MINI PEN NEEDLES) 31G X 5 MM MISC, Inject 34 units SC daily in evening (Patient taking differently: 52 Units/100 mL Inject 34 units SC daily in evening), Disp: 100 each, Rfl: 1    Lancets (FREESTYLE) lancets, Use as instructed to check sugar up to 3 times daily, Disp: 300 each, Rfl: 1    LEVEMIR FLEXTOUCH 100 units/mL injection pen, Inject 48 Units under the skin every morning (Patient taking differently: Inject 52 Units under the skin every morning ), Disp: 10 pen, Rfl: 2    lisinopril (ZESTRIL) 40 mg tablet, TAKE 1 TABLET (40 MG TOTAL) BY MOUTH DAILY  DAYS, Disp: 90 tablet, Rfl: 1    lovastatin (MEVACOR) 40 MG tablet, TAKE 2 TABLETS TOGETHER EVERY NIGHT, Disp: 180 tablet, Rfl: 1    metFORMIN (GLUCOPHAGE) 1000 MG tablet, TAKE 1 TABLET (1,000 MG TOTAL) BY MOUTH 2 (TWO) TIMES A DAY  DAYS, Disp: 180 tablet, Rfl: 1    metoprolol tartrate (LOPRESSOR) 50 mg tablet, TAKE 1 TABLET BY MOUTH EVERY 12 HOURS, Disp: 120 tablet, Rfl: 0    Multiple Vitamins-Minerals (MULTIVITAMIN ADULT PO), Take by mouth daily, Disp: , Rfl:     bisacodyl (DULCOLAX) 5 mg EC tablet, Take 4 tablets (20 mg total) by mouth once for 1 dose Take on evening before day of procedure, with golytely, Disp: 4 tablet, Rfl: 0    cholecalciferol (VITAMIN D3) 1,000 units tablet, Take 1 tablet (1,000 Units total) by mouth daily for 180 days (Patient not taking: Reported on 12/31/2019), Disp: 90 tablet, Rfl: 1    meclizine (ANTIVERT) 25 mg tablet, Take 1 tablet (25 mg total) by mouth 3 (three) times a day as needed for dizziness for up to 7 days, Disp: 21 tablet, Rfl: 0    polyethylene glycol (GOLYTELY) 4000 mL solution, Take 4,000 mL by mouth once for 1 dose, Disp: 4000 mL, Rfl: 0    ALLERGIES:  No Known Allergies    REVIEW OF SYSTEMS:  Review of Systems   Constitutional: Negative for chills, diaphoresis, fatigue and fever  HENT: Negative for congestion, facial swelling, hearing loss, sore throat, trouble swallowing and voice change  Respiratory: Negative for apnea, cough, shortness of breath, wheezing and stridor  Cardiovascular: Negative for chest pain, palpitations and leg swelling     Gastrointestinal: Negative for abdominal pain, constipation, nausea and vomiting  Endocrine: Negative for cold intolerance and heat intolerance  Musculoskeletal: Negative for arthralgias, gait problem, joint swelling and myalgias  Skin: Negative for color change, pallor, rash and wound  Neurological: Negative for tremors, speech difficulty, weakness and numbness  Psychiatric/Behavioral: Negative for agitation, confusion, decreased concentration and hallucinations  The patient is not nervous/anxious  VITALS:  Vitals:    02/07/20 1335   BP: 147/82   Pulse: 73       LABS:  HgA1c:   Lab Results   Component Value Date    HGBA1C 9 0 (H) 09/27/2019     BMP:   Lab Results   Component Value Date    GLUCOSE 178 (H) 09/08/2015    CALCIUM 9 0 09/27/2019     09/08/2015    K 4 1 09/27/2019    CO2 21 09/27/2019     09/27/2019    BUN 17 09/27/2019    CREATININE 1 06 09/27/2019       _____________________________________________________  PHYSICAL EXAMINATION:  General: well developed and well nourished, alert, oriented times 3 and appears comfortable  Psychiatric: Normal  HEENT: Normocephalic, Atraumatic Trachea Midline, No torticollis  Pulmonary: No audible wheezing or respiratory distress   Cardiovascular: No pitting edema, 2+ radial pulse   Skin: No masses, erythema, lacerations, fluctation, ulcerations  Neurovascular: Sensation Intact to the Median, Ulnar, Radial Nerve, Motor Intact to the Median, Ulnar, Radial Nerve and Pulses Intact  Musculoskeletal: Normal, except as noted in detailed exam and in HPI  MUSCULOSKELETAL EXAMINATION:  Left wrist  Nontender to palpation   Range of motion of the left wrist  is 55 degress flexion, 30 degress extension, 90 degrees pronation,90 degrees supination  There is no swelling present  There is no ecchymosis noted      Pulses are present and capillary fill is normal      Near full composite fist  ___________________________________________________  STUDIES REVIEWED:  No new studies to review      PROCEDURES PERFORMED:  Procedures  No Procedures performed today    _____________________________________________________      DEB Barnes: I supervised the physician assistant and discussed the case with them  I personally performed history and physical exam  I agree with the assessment and plan of care as documented by the physician assistant

## 2020-02-11 ENCOUNTER — OFFICE VISIT (OUTPATIENT)
Dept: OCCUPATIONAL THERAPY | Age: 80
End: 2020-02-11
Payer: COMMERCIAL

## 2020-02-11 DIAGNOSIS — S52.571A OTH INTARTIC FRACTURE OF LOWER END OF RIGHT RADIUS, INIT: Primary | ICD-10-CM

## 2020-02-11 PROCEDURE — 97110 THERAPEUTIC EXERCISES: CPT

## 2020-02-11 PROCEDURE — 97140 MANUAL THERAPY 1/> REGIONS: CPT

## 2020-02-11 NOTE — PROGRESS NOTES
Daily Note     Today's date: 2020  Patient name: Janes Beckham  : 1940  MRN: [id number]  Referring provider: Jason Avila MD  Dx:   Encounter Diagnosis     ICD-10-CM    1  Oth intartic fracture of lower end of right radius, init S59 221O                   Subjective: "the doctor was happy with my progress "  Pt denies pain, and reports being able to resume mostly all her activities  Therapist and pt in agreement to work a little longer to inc her  strength and  ROm for better and safer hold on items  Therapist will re-evaluate pt progress next Thursday to decide d/c or not  Objective: See treatment diary below  Tx focused on static /compsite flexion  stretch to promote inc flexion at DIP/PIP/MCPS  Assessment: Tolerated treatment well  Patient demonstrated fatigue post treatment, exhibited good technique with therapeutic exercises and would benefit from continued OT      Plan: Progress treatment as tolerated         Precautions: Universal       Manual     Wrist PROM 10 10' 10 5' 10' 15' 10'  5'  5'  20'   Finger/digit PROM 5' 5' 5' 10' 5' 5'    5'  5'  5'   Scar/edema massage     5'                static flexion stretch of     10'    10'                                             Exercise Diary     Wrist AROM 2 x 15 fluido 10' and 2 x 10 (flex/ext) fluido 10' AROM 2 x 10 fluido 10' AROM 2 x 15 fluidp 10', AROM 2 x 20 fluido  12'  AROM 2 x 15 AROM 2 x 15  fluido 10'  2 x 15  2 x 20   Tendin glides 10x   5x 10x 10x 10x    5x  10x  10x   Finger opposition 10x  5x 10x 10x 10x      10x  10x   Wrist ext  leaning against table 5', AROM 2 x 10 prayer stretch   3 x 30 sec prayer stretch 2x 30 sec    prayer and table stretch 3x 30 sec  3 sets 30 prayer and table leans  3 x 30sec hold prayer   Wrist flex Twist bar 2 x 10, AROM 2 x 20      2 x 20          twist bar 2 x 20 Contrast bath                  15min     Digit ROM marble     joint block each digit/joint 10x                 Wrist maze                   4x   Twist bar         2 x 20             Table lean: wrist ext           5 sets 1 min 5 sets 30 sec         Table push: wrist flex           5 sets 1 min           Isometric contraction             Wrist flex/ext 10x 5 sec          gripping    dowel and putty 10x  there aputty squeezes, tennis ball and pennies 2x                                                                                                                                                                                               Modalities  12/2 1/13                   MP                       U  S    8'

## 2020-02-13 ENCOUNTER — OFFICE VISIT (OUTPATIENT)
Dept: OCCUPATIONAL THERAPY | Age: 80
End: 2020-02-13
Payer: COMMERCIAL

## 2020-02-13 DIAGNOSIS — S52.571A OTH INTARTIC FRACTURE OF LOWER END OF RIGHT RADIUS, INIT: Primary | ICD-10-CM

## 2020-02-13 PROCEDURE — 97110 THERAPEUTIC EXERCISES: CPT

## 2020-02-13 PROCEDURE — 97140 MANUAL THERAPY 1/> REGIONS: CPT

## 2020-02-13 NOTE — PROGRESS NOTES
Daily Note     Today's date: 2020  Patient name: Flavio Rose  : 1940  MRN: [id number]  Referring provider: Jensen Willis MD  Dx:   Encounter Diagnosis     ICD-10-CM    1  Oth intartic fracture of lower end of right radius, init S52 388B                   Subjective: "I am using my Left hand for a lot more "  Pt denied pain or difficulty with L hand  Objective: See treatment diary below  Tx focused on PIP/MCP flexion to inc/promote  to help pt with control of holding items when cooking and clenaing  Assessment: Tolerated treatment well  Patient would benefit from continued OT      Plan: Progress treatment as tolerated         Precautions: Universal       Manual     Wrist PROM 10 10' 10 5'  15' 10'  5'  5'  20'   Finger/digit PROM 5' 5' 5' 10' 10' 5'    5'  5'  5'   Scar/edema massage     5'                static flexion stretch of     10'    10'                                             Exercise Diary     Wrist AROM 2 x 15 fluido 10' and 2 x 10 (flex/ext) fluido 10' AROM 2 x 10 fluido 10' AROM 2 x 15 fluidp 10', AROM 2 x 20 fluido  12'  AROM 2 x 15 AROM 2 x 15  fluido 10'  2 x 15  2 x 20   Tendin glides 10x   5x 10x 10x 10x    5x  10x  10x   Finger opposition 10x  5x 10x 10x 10x      10x  10x   Wrist ext  leaning against table 5', AROM 2 x 10 prayer stretch   3 x 30 sec prayer stretch 2x 30 sec    prayer and table stretch 3x 30 sec  3 sets 30 prayer and table leans  3 x 30sec hold prayer   Wrist flex Twist bar 2 x 10, AROM 2 x 20                twist bar 2 x 20   Contrast bath                  15min     Digit ROM marble     joint block each digit/joint 10x    joint blocking 10x each             Wrist maze                   4x   Twist bar                      Table lean: wrist ext           5 sets 1 min 5 sets 30 sec         Table push: wrist flex           5 sets 1 min           Isometric contraction             Wrist flex/ext 10x 5 sec          gripping    dowel and putty 10x  there aputty squeezes, tennis ball and pennies 2x    hand gripper 2 x 15                                                                                                                                                                                           Modalities  12/2 1/13                   MP                       U  S    8'

## 2020-02-18 ENCOUNTER — OFFICE VISIT (OUTPATIENT)
Dept: OCCUPATIONAL THERAPY | Age: 80
End: 2020-02-18
Payer: COMMERCIAL

## 2020-02-18 DIAGNOSIS — S52.571A OTH INTARTIC FRACTURE OF LOWER END OF RIGHT RADIUS, INIT: Primary | ICD-10-CM

## 2020-02-18 PROCEDURE — 97530 THERAPEUTIC ACTIVITIES: CPT

## 2020-02-18 PROCEDURE — 97110 THERAPEUTIC EXERCISES: CPT

## 2020-02-18 PROCEDURE — 97140 MANUAL THERAPY 1/> REGIONS: CPT

## 2020-02-18 NOTE — PROGRESS NOTES
Daily Note     Today's date: 2020  Patient name: Zbigniew Lima  : 1940  MRN: [id number]  Referring provider: Sarthak Enriquez MD  Dx:   Encounter Diagnosis     ICD-10-CM    1  Oth intartic fracture of lower end of right radius, init S54 698E                   Subjective: "I can cut a steak with my hand "  Pt denies pain and reports able to wear ring on her R 4th digit because of the dec edema  Objective: See treatment diary below  Tx focused on dec edema and inc digit ROM and  motion/strength  Assessment: Tolerated treatment well  Patient exhibited good technique with therapeutic exercises and would benefit from continued OT      Plan: Progress treatment as tolerated  Precautions: Universal       Manual     Wrist PROM 10 10' 10 5'  5' 10'  5'  5'  20'   Finger/digit PROM 5' 5' 5' 10' 10' 5'    5'  5'  5'   Scar/edema massage     5'    5'            static flexion stretch of     10'    10'    5'                                         Exercise Diary     Wrist AROM 2 x 15 fluido 10' and 2 x 10 (flex/ext) fluido 10' AROM 2 x 10 fluido 10' AROM 2 x 15 fluidp 10', AROM 2 x 20 fluido  10'  AROM 2 x 15 AROM 2 x 15  fluido 10'  2 x 15  2 x 20   Tendin glides 10x   5x 10x 10x 10x    5x  10x  10x   Finger opposition 10x  5x 10x 10x 10x      10x  10x   Wrist ext  leaning against table 5', AROM 2 x 10 prayer stretch   3 x 30 sec     prayer and table stretch 3x 30 sec  3 sets 30 prayer and table leans  3 x 30sec hold prayer   Wrist flex Twist bar 2 x 10, AROM 2 x 20                twist bar 2 x 20   Contrast bath                  15min     Digit ROM marble     joint block each digit/joint 10x    joint blocking 10x each  peg    Joint blocking 5x           Wrist maze                   4x   Twist bar                      Table lean: wrist ext            5 sets 30 sec         Table push: wrist flex                      Isometric contraction             Wrist flex/ext 10x 5 sec          gripping    dowel and putty 10x  there aputty squeezes, tennis ball and pennies 2x    hand gripper 2 x 15  tennis ball and pennies 2x                                                                                                                                                                                         Modalities  12/2 1/13                   MP                       U  S    8'

## 2020-02-20 ENCOUNTER — EVALUATION (OUTPATIENT)
Dept: OCCUPATIONAL THERAPY | Age: 80
End: 2020-02-20
Payer: COMMERCIAL

## 2020-02-20 DIAGNOSIS — S52.571A OTH INTARTIC FRACTURE OF LOWER END OF RIGHT RADIUS, INIT: Primary | ICD-10-CM

## 2020-02-20 PROCEDURE — 97110 THERAPEUTIC EXERCISES: CPT

## 2020-02-20 PROCEDURE — 97168 OT RE-EVAL EST PLAN CARE: CPT

## 2020-02-20 PROCEDURE — 97140 MANUAL THERAPY 1/> REGIONS: CPT

## 2020-02-20 NOTE — PROGRESS NOTES
OT Re-Evaluation     Today's date: 2020  Patient name: William Adam  : 1940  MRN: [id number]  Referring provider: Priti Michaels MD  Dx:   Encounter Diagnosis     ICD-10-CM    1  Oth intartic fracture of lower end of right radius, init L12 018L                   Assessment  Assessment details: 2020- Pt has demo G progress with wrist and finger AROM  Pt now demo mild to no edema of fingers which has increased her  strength and composite fist to functional levels  Pt can now hold items better, for longer, and strength  Pt planned for d/c 2020, has 2 more sessions left to inc her strength and functional use of hand with HEP of strengthening education  2020- Pt has demo increased L wrist extension to 42 degrees from previous 30 degrees  Pt noted with moderate edema on hand/fingers fluctuating from moderate to mild  Pt noted with inc L hand  and pt reports able to hold more items for a longer period of time  Pt, however, still demo moderate wrist ROM impairments and composite  functional use  Pt has been educated on more aggressive HEP, which she is inconsistent with follow through as she is afraid to cause inc pain  Pt also educated to continue with contrast bath again at home for edema of fingers  Pt noted to move wrist and hand better after IASTM to extrinsic and intrinsic muscles, as stiffness/tightness till noted  Pt is overall making progress and should continue with skilled OT tx inorder for L hand to reach PLOF and independence  19- Pt has order for discontinuing her brace as it has made her wrist stiff  Pt continues to demo moderate edema of L hand/fingers  Pt continues to demo moderate/severe ROM limitations in L wrist  Pt has demo improvements in L wrist flex/ext AROM compared to IE, but still not a functional motion range to be independent with ADL tasks   Pt has reported decreased pain with exercises and trying to incorporate hand back into every day tasks  Pt has been hesitant and afraid in the past that she was going to re-injury wrist with her HEP  Pt educated and understands the importance of performing her exercises frequently throughout the day  It is medically necessary for pt to continue with skilled OT tx in order to inc wrist and finger AROM, dec edema, and inc functional use of hand to be independent  Pt requires 2x per week, 4-6x a week  Pt has demo improvements and has the potential and ability to increase more and achieve her goals  Pt is a 66 y/o female who presents to skilled OT tx with L wrist fx s/p fall and surgery 2 weeks ago  Pt did not have brace on when she came to treatment and therapist educated pt on the importance of wearing  Pt also reported she takes it off at night and therapist educated pt again on need of wearing at night  Pt demo moderate swelling in her hands and fingers which is decreasing her ROM as well  Pt reports no pain without movement but pain with movement of 8/10 at wrist and hand  Pt demo moderate to severe ROM impairments of wrist and fingers  Pt would benefit from skilled OT tx to inc ROM at digits and wrist with HEP and movement to dec edema in hand  Impairments: impaired physical strength and lacks appropriate home exercise program    Symptom irritability: lowBarriers to therapy: Language barrier; pt native language is [place] but can speak and understand some english  Understanding of Dx/Px/POC: good   Prognosis: good    Goals  STGs:  1  Pt will inc wrist flex/ext + 10 degrees (Met)  2  Pt will inc digit MCP and PIP flexion + 10 degrees (Met)  3  Pt will dec edema in L hand to moderate edema  (MET)  4  Pt will be able to perf opposition of thumb to pinky and thumb to 4th digit  (MET)  LTGs:  1  Pt will inc L wrist AROM to WFLs (NOT MET)  2  Pt will demo independence in scar mgmt and massage (Part Met)  3  Pt will be independent with ROM HEP (Part Met)  4   [facility] (NOT [facility]  5  Pt will report no pain during movement of L wrist  (MET)  6  Pt will demo no edema to L hand  (part met)    Plan  Patient would benefit from: skilled occupational therapy  Planned modality interventions: fluidotherapy, cryotherapy and thermotherapy: hydrocollator packs  Other planned modality interventions: IASTM  Planned therapy interventions: massage, manual therapy, patient education, stretching, therapeutic activities, therapeutic exercise, functional ROM exercises and home exercise program  Frequency: 2x week  Duration in visits: 2  Plan of Care beginning date: 2020  Plan of Care expiration date: 3/6/2020  Treatment plan discussed with: patient and caregiver        Subjective Evaluation    History of Present Illness  Date of onset: 2019  Date of surgery: 10/8/2019  Mechanism of injury: surgery and trauma  Mechanism of injury: Pt is a 68 y/o female who presents to skilled OT tx with L wrist fx s/p fall and surgery ORIF 2 weeks ago  Pt did not have brace on when she came to treatment and therapist educated pt on the importance of wearing  Pt also reported she takes it off at night and therapist educated pt again on need of wearing at night  Pt demo moderate swelling in her hands and fingers which is decreasing her ROM as well  Pt reports no pain without movement but pain with movement of 8/10 at wrist and hand  Pt demo moderate to severe ROM impairments of wrist and fingers  Pt would benefit from skilled OT tx to inc ROM at digits and wrist with HEP and movement to dec edema in hand            Not a recurrent problem   Quality of life: good    Pain  Current pain ratin  At best pain ratin  At worst pain rating: 3  Location: L wrist  Quality: burning, discomfort and radiating  Relieving factors: rest and support      Diagnostic Tests  X-ray: abnormal  Treatments  No previous or current treatments  Patient Goals  Patient goals for therapy: decreased edema, decreased pain, increased motion and independence with ADLs/IADLs          Objective     Active Range of Motion     Left Wrist   Wrist flexion: 45 degrees with pain  Wrist extension: 42 degrees with pain  Radial deviation: 20 degrees   Ulnar deviation: 20 degrees     Right Wrist   Normal active range of motion    Left Thumb   Flexion     MP: 30 degrees    DIP: 25 degrees  Palmar Abduction     CMC: 25 degrees    Left Digits    Flexion   Index     MCP: 55    PIP: 73    DIP: 60  Middle     MCP: 68    PIP: 83    DIP: 62  Ring     MCP: 60    PIP: 78    DIP: 60  Little     MCP: 60    PIP: 79    DIP: 70    Additional Active Range of Motion Details  1/13/2020- 12 degree inc for wrist ext (30* to 42*)  2/20/2020- Pt noted with G improvement in flexion of MCP/PIP/DIP 2* dec edema and inc movement      Strength/Myotome Testing     Left Wrist/Hand      (2nd hand position)     Trial 1: 20    Thumb Strength  Key/Lateral Pinch     Trail 1: 12  Tip/Two-Point Pinch     Trial 1: 6  Palmar/Three-Point Pinch     Trial 1: 12    Swelling     Left Wrist/Hand   Figure 8: 19 cm  Circumference MCP: 17 8 cm  Circumference wrist: 16 4 cm    Additional Swelling Details  Edema at MCP reduced by  4cm              Precautions: universal    Manual  1/30 2/4 2/6 2/11 2/13 2/18 2/20 1/13 1/20 1/23   Wrist PROM 10 10' 10 5'   5' 10'  5'  5'  20'   Finger/digit PROM 5' 5' 5' 10' 10' 5'  5'  5'  5'  5'   Scar/edema massage       5'    5'            static flexion stretch of     10'    10'    5'  5'                                       Exercise Diary  1/30 2/4 2/6 2/11 2/13 2/18 2/20 1/13 1/20 1/23   Wrist AROM 2 x 15 fluido 10' and 2 x 10 (flex/ext) fluido 10' AROM 2 x 10 fluido 10' AROM 2 x 15 fluidp 10', AROM 2 x 20 fluido  10'  AROM 2 x 15 AROM 2 x 15  fluido 10'  2 x 15  2 x 20   Tendin glides 10x    5x 10x 10x 10x  5x  5x  10x  10x   Finger opposition 10x   5x 10x 10x 10x      10x  10x   Wrist ext  leaning against table 5', AROM 2 x 10 prayer stretch     3 x 30 sec      prayer and table stretch 3x 30 sec  3 sets 30 prayer and table leans  3 x 30sec hold prayer   Wrist flex Twist bar 2 x 10, AROM 2 x 20                  twist bar 2 x 20   Contrast bath                  15min     Digit ROM marble     joint block each digit/joint 10x    joint blocking 10x each  peg   Joint blocking 5x  peg          Wrist maze                    4x   Twist bar                       Table lean: wrist ext             5 sets 30 sec         Table push: wrist flex                       Isometric contraction                       gripping    dowel and putty 10x  there aputty squeezes, tennis ball and pennies 2x    hand gripper 2 x 15  tennis ball and pennies 2x                                                                                                                                                                                         Modalities  12/2 1/13                   MP                       U  S    8'

## 2020-02-24 ENCOUNTER — APPOINTMENT (OUTPATIENT)
Dept: OCCUPATIONAL THERAPY | Age: 80
End: 2020-02-24
Payer: COMMERCIAL

## 2020-02-25 ENCOUNTER — OFFICE VISIT (OUTPATIENT)
Dept: OCCUPATIONAL THERAPY | Age: 80
End: 2020-02-25
Payer: COMMERCIAL

## 2020-02-25 DIAGNOSIS — S52.571A OTH INTARTIC FRACTURE OF LOWER END OF RIGHT RADIUS, INIT: Primary | ICD-10-CM

## 2020-02-25 PROCEDURE — 97110 THERAPEUTIC EXERCISES: CPT

## 2020-02-25 NOTE — PROGRESS NOTES
OT Discharge     Today's date: 2020  Patient name: William Adam  : 1940  MRN: [id number]  Referring provider: Priti Michaels MD  Dx:   Encounter Diagnosis     ICD-10-CM    1  Oth intartic fracture of lower end of right radius, init [id number] [id number]                   Assessment  Assessment details: 2020- Pt demo G improvements of L hand and wrist ROM and strength  Pt FOTO score on d/c approx 73  Pt reports independence with no or mild difficulty with all I/ADL tasks  Pt  has inc and now able to hold items  Pt d/c with instructions to continue to use hand and perf HEP     2020- Pt has demo G progress with wrist and finger AROM  Pt now demo mild to no edema of fingers which has increased her  strength and composite fist to functional levels  Pt can now hold items better, for longer, and strength  Pt planned for d/c 2020, has 2 more sessions left to inc her strength and functional use of hand with HEP of strengthening education  2020- Pt has demo increased L wrist extension to 42 degrees from previous 30 degrees  Pt noted with moderate edema on hand/fingers fluctuating from moderate to mild  Pt noted with inc L hand  and pt reports able to hold more items for a longer period of time  Pt, however, still demo moderate wrist ROM impairments and composite  functional use  Pt has been educated on more aggressive HEP, which she is inconsistent with follow through as she is afraid to cause inc pain  Pt also educated to continue with contrast bath again at home for edema of fingers  Pt noted to move wrist and hand better after IASTM to extrinsic and intrinsic muscles, as stiffness/tightness till noted  Pt is overall making progress and should continue with skilled OT tx inorder for L hand to reach PLOF and independence  19- Pt has order for discontinuing her brace as it has made her wrist stiff  Pt continues to demo moderate edema of L hand/fingers   Pt continues to demo moderate/severe ROM limitations in L wrist  Pt has demo improvements in L wrist flex/ext AROM compared to IE, but still not a functional motion range to be independent with ADL tasks  Pt has reported decreased pain with exercises and trying to incorporate hand back into every day tasks  Pt has been hesitant and afraid in the past that she was going to re-injury wrist with her HEP  Pt educated and understands the importance of performing her exercises frequently throughout the day  It is medically necessary for pt to continue with skilled OT tx in order to inc wrist and finger AROM, dec edema, and inc functional use of hand to be independent  Pt requires 2x per week, 4-6x a week  Pt has demo improvements and has the potential and ability to increase more and achieve her goals  Pt is a 66 y/o female who presents to skilled OT tx with L wrist fx s/p fall and surgery 2 weeks ago  Pt did not have brace on when she came to treatment and therapist educated pt on the importance of wearing  Pt also reported she takes it off at night and therapist educated pt again on need of wearing at night  Pt demo moderate swelling in her hands and fingers which is decreasing her ROM as well  Pt reports no pain without movement but pain with movement of 8/10 at wrist and hand  Pt demo moderate to severe ROM impairments of wrist and fingers  Pt would benefit from skilled OT tx to inc ROM at digits and wrist with HEP and movement to dec edema in hand  Impairments: impaired physical strength and lacks appropriate home exercise program    Symptom irritability: lowBarriers to therapy: Language barrier; pt native language is Russian but can speak and understand some english  Understanding of Dx/Px/POC: good   Prognosis: good    Goals  STGs:  1  Pt will inc wrist flex/ext + 10 degrees (Met)  2  Pt will inc digit MCP and PIP flexion + 10 degrees (Met)  3  Pt will dec edema in L hand to moderate edema  (MET)  4   Pt will be able to perf opposition of thumb to pinky and thumb to 4th digit  (MET)  LTGs:  1  Pt will inc L wrist AROM to WFLs (NOT MET)  2  Pt will demo independence in scar mgmt and massage ( Met)  3  Pt will be independent with ROM HEP (Met)  4  Pt will demo digit flex/ext to [facility] (MET)  5  Pt will report no pain during movement of L wrist  (MET)  6  Pt will demo no edema to L hand  (part met)    Plan  Patient would benefit from: skilled occupational therapy  Planned modality interventions: fluidotherapy, cryotherapy and thermotherapy: hydrocollator packs  Other planned modality interventions: IAS  Planned therapy interventions: massage, manual therapy, patient education, stretching, therapeutic activities, therapeutic exercise, functional ROM exercises and home exercise program  Frequency: 2x week  Treatment plan discussed with: patient and caregiver        Subjective Evaluation    History of Present Illness  Date of onset: 2019  Date of surgery: 10/8/2019  Mechanism of injury: surgery and trauma  Mechanism of injury: Pt is a 66 y/o female who presents to skilled OT tx with L wrist fx s/p fall and surgery ORIF 2 weeks ago  Pt did not have brace on when she came to treatment and therapist educated pt on the importance of wearing  Pt also reported she takes it off at night and therapist educated pt again on need of wearing at night  Pt demo moderate swelling in her hands and fingers which is decreasing her ROM as well  Pt reports no pain without movement but pain with movement of 8/10 at wrist and hand  Pt demo moderate to severe ROM impairments of wrist and fingers  Pt would benefit from skilled OT tx to inc ROM at digits and wrist with HEP and movement to dec edema in hand            Not a recurrent problem   Quality of life: good    Pain  Current pain ratin  At best pain ratin  At worst pain ratin  Location: L wrist  Quality: burning, discomfort and radiating  Relieving factors: rest and support      Diagnostic Tests  X-ray: abnormal  Treatments  No previous or current treatments  Patient Goals  Patient goals for therapy: decreased edema, decreased pain, increased motion and independence with ADLs/IADLs          Objective     Active Range of Motion     Left Wrist   Wrist flexion: 45 degrees with pain  Wrist extension: 42 degrees with pain  Radial deviation: 20 degrees   Ulnar deviation: 20 degrees     Right Wrist   Normal active range of motion    Left Thumb   Flexion     MP: 30 degrees    DIP: 25 degrees  Palmar Abduction     CMC: 25 degrees    Left Digits    Flexion   Index     MCP: 55    PIP: 73    DIP: 60  Middle     MCP: 68    PIP: 83    DIP: 62  Ring     MCP: 60    PIP: 78    DIP: 60  Little     MCP: 60    PIP: 79    DIP: 70    Additional Active Range of Motion Details  1/13/2020- 12 degree inc for wrist ext (30* to 42*)  2/20/2020- Pt noted with G improvement in flexion of MCP/PIP/DIP 2* dec edema and inc movement      Strength/Myotome Testing     Left Wrist/Hand      (2nd hand position)     Trial 1: 20    Thumb Strength  Key/Lateral Pinch     Trail 1: 12  Tip/Two-Point Pinch     Trial 1: 6  Palmar/Three-Point Pinch     Trial 1: 12    Swelling     Left Wrist/Hand   Figure 8: 19 cm  Circumference MCP: 17 8 cm  Circumference wrist: 16 4 cm    Additional Swelling Details  Edema at MCP reduced by  4cm              Precautions: universal    Manual  1/30 2/4 2/6 2/11 2/13 2/18 2/20 2/25 1/20 1/23   Wrist PROM 10 10' 10 5'   5' 10'  5'  5'  20'   Finger/digit PROM 5' 5' 5' 10' 10' 5'  5'  5'  5'  5'   Scar/edema massage       5'    5'            static flexion stretch of     10'    10'    5'  5'                                       Exercise Diary  1/30 2/4 2/6 2/11 2/13 2/18 2/20 2/25 1/20 1/23   Wrist AROM 2 x 15 fluido 10' and 2 x 10 (flex/ext) fluido 10' AROM 2 x 10 fluido 10' AROM 2 x 15 fluidp 10', AROM 2 x 20 fluido  10'  AROM 2 x 15 AROM 2 x 15  fluido 10'  2 x 15  2 x 20   Tendin glides 10x    5x 10x 10x 10x  5x  5x  10x  10x   Finger opposition 10x   5x 10x 10x 10x      10x  10x   Wrist ext  leaning against table 5', AROM 2 x 10 prayer stretch     3 x 30 sec        3 sets 30 prayer and table leans  3 x 30sec hold prayer   Wrist flex Twist bar 2 x 10, AROM 2 x 20                  twist bar 2 x 20   Contrast bath                  15min     Digit ROM marble     joint block each digit/joint 10x    joint blocking 10x each  peg   Joint blocking 5x  peg          Wrist maze                    4x   Twist bar                2 x 15       Table lean: wrist ext             5 sets 30 sec         Table push: wrist flex                       Isometric contraction                        gripping    dowel and putty 10x  there aputty squeezes, tennis ball and pennies 2x    hand gripper 2 x 15  tennis ball and pennies 2x    tennis ball x 2, blue power web 2 x 15, blue clothes pin 2 x 10                                                                                                                                                                                     Modalities  12/2 1/13                   MP                       U  S    8'

## 2020-02-27 ENCOUNTER — APPOINTMENT (OUTPATIENT)
Dept: OCCUPATIONAL THERAPY | Age: 80
End: 2020-02-27
Payer: COMMERCIAL

## 2020-03-02 DIAGNOSIS — I10 BENIGN ESSENTIAL HYPERTENSION: ICD-10-CM

## 2020-03-02 RX ORDER — LISINOPRIL 40 MG/1
TABLET ORAL
Qty: 90 TABLET | Refills: 1 | Status: SHIPPED | OUTPATIENT
Start: 2020-03-02 | End: 2020-09-02

## 2020-03-02 RX ORDER — LISINOPRIL 40 MG/1
TABLET ORAL
Qty: 90 TABLET | Refills: 1 | OUTPATIENT
Start: 2020-03-02

## 2020-03-05 DIAGNOSIS — E78.5 DYSLIPIDEMIA: ICD-10-CM

## 2020-03-06 RX ORDER — LOVASTATIN 40 MG/1
TABLET ORAL
Qty: 180 TABLET | Refills: 1 | Status: SHIPPED | OUTPATIENT
Start: 2020-03-06 | End: 2020-08-30

## 2020-03-07 DIAGNOSIS — I47.1 AVNRT (AV NODAL RE-ENTRY TACHYCARDIA) (HCC): ICD-10-CM

## 2020-03-07 RX ORDER — METOPROLOL TARTRATE 50 MG/1
TABLET, FILM COATED ORAL
Qty: 120 TABLET | Refills: 0 | Status: SHIPPED | OUTPATIENT
Start: 2020-03-07 | End: 2020-05-02

## 2020-03-16 ENCOUNTER — TELEPHONE (OUTPATIENT)
Dept: INTERNAL MEDICINE CLINIC | Facility: CLINIC | Age: 80
End: 2020-03-16

## 2020-03-16 DIAGNOSIS — R42 VERTIGO: ICD-10-CM

## 2020-03-16 RX ORDER — MECLIZINE HYDROCHLORIDE 25 MG/1
25 TABLET ORAL 3 TIMES DAILY PRN
Qty: 21 TABLET | Refills: 0 | Status: SHIPPED | OUTPATIENT
Start: 2020-03-16 | End: 2020-06-02 | Stop reason: ALTCHOICE

## 2020-03-16 NOTE — TELEPHONE ENCOUNTER
Patient was scheduled has a same day for Vertigo  Patient already was evaluated for her vertigo in Decemeber 2019 by KELLE Gould and was prescribed meclizine (ANTIVERT) 25 mg tablet for her vertigo  Called patient for triage and per patient she feel a little dizzy and she don't have more medication for her Vertigo  Per patient she was asking if a refill can be sent to her pharmacy because if she don't need to be seen is better for her because she don't want to come outside for precaution because of the COVID-19  Can a refill been sent to her pharmacy or she need to be evaluated  Please advise   Thanks

## 2020-03-30 DIAGNOSIS — E11.65 TYPE 2 DIABETES MELLITUS WITH HYPERGLYCEMIA, WITH LONG-TERM CURRENT USE OF INSULIN (HCC): ICD-10-CM

## 2020-03-30 DIAGNOSIS — Z79.4 TYPE 2 DIABETES MELLITUS WITH HYPERGLYCEMIA, WITH LONG-TERM CURRENT USE OF INSULIN (HCC): ICD-10-CM

## 2020-03-30 RX ORDER — INSULIN DETEMIR 100 [IU]/ML
48 INJECTION, SOLUTION SUBCUTANEOUS EVERY MORNING
Qty: 5 PEN | Refills: 2 | Status: SHIPPED | OUTPATIENT
Start: 2020-03-30 | End: 2020-06-02

## 2020-05-01 DIAGNOSIS — I47.1 AVNRT (AV NODAL RE-ENTRY TACHYCARDIA) (HCC): ICD-10-CM

## 2020-05-02 RX ORDER — METOPROLOL TARTRATE 50 MG/1
TABLET, FILM COATED ORAL
Qty: 120 TABLET | Refills: 0 | Status: SHIPPED | OUTPATIENT
Start: 2020-05-02 | End: 2020-07-03

## 2020-05-19 DIAGNOSIS — Z79.4 TYPE 2 DIABETES MELLITUS WITH HYPERGLYCEMIA, WITH LONG-TERM CURRENT USE OF INSULIN (HCC): ICD-10-CM

## 2020-05-19 DIAGNOSIS — E11.65 TYPE 2 DIABETES MELLITUS WITH HYPERGLYCEMIA, WITH LONG-TERM CURRENT USE OF INSULIN (HCC): ICD-10-CM

## 2020-05-27 ENCOUNTER — LAB (OUTPATIENT)
Dept: LAB | Facility: HOSPITAL | Age: 80
End: 2020-05-27
Payer: COMMERCIAL

## 2020-05-27 DIAGNOSIS — Z79.4 DIABETES MELLITUS TYPE 2, INSULIN DEPENDENT (HCC): Chronic | ICD-10-CM

## 2020-05-27 DIAGNOSIS — Z00.00 PREVENTATIVE HEALTH CARE: ICD-10-CM

## 2020-05-27 DIAGNOSIS — K63.5 POLYP OF COLON, UNSPECIFIED PART OF COLON, UNSPECIFIED TYPE: ICD-10-CM

## 2020-05-27 DIAGNOSIS — E11.9 DIABETES MELLITUS TYPE 2, INSULIN DEPENDENT (HCC): Chronic | ICD-10-CM

## 2020-05-27 LAB
ALBUMIN SERPL BCP-MCNC: 4 G/DL (ref 3.5–5)
ALP SERPL-CCNC: 63 U/L (ref 46–116)
ALT SERPL W P-5'-P-CCNC: 44 U/L (ref 12–78)
ANION GAP SERPL CALCULATED.3IONS-SCNC: 7 MMOL/L (ref 4–13)
AST SERPL W P-5'-P-CCNC: 30 U/L (ref 5–45)
BASOPHILS # BLD AUTO: 0.07 THOUSANDS/ΜL (ref 0–0.1)
BASOPHILS NFR BLD AUTO: 1 % (ref 0–1)
BILIRUB SERPL-MCNC: 0.49 MG/DL (ref 0.2–1)
BUN SERPL-MCNC: 16 MG/DL (ref 5–25)
CALCIUM SERPL-MCNC: 9.7 MG/DL (ref 8.3–10.1)
CHLORIDE SERPL-SCNC: 103 MMOL/L (ref 100–108)
CHOLEST SERPL-MCNC: 193 MG/DL (ref 50–200)
CO2 SERPL-SCNC: 26 MMOL/L (ref 21–32)
CREAT SERPL-MCNC: 0.92 MG/DL (ref 0.6–1.3)
CREAT UR-MCNC: 26.4 MG/DL
EOSINOPHIL # BLD AUTO: 0.12 THOUSAND/ΜL (ref 0–0.61)
EOSINOPHIL NFR BLD AUTO: 2 % (ref 0–6)
ERYTHROCYTE [DISTWIDTH] IN BLOOD BY AUTOMATED COUNT: 14.1 % (ref 11.6–15.1)
EST. AVERAGE GLUCOSE BLD GHB EST-MCNC: 217 MG/DL
FERRITIN SERPL-MCNC: 7 NG/ML (ref 8–388)
GFR SERPL CREATININE-BSD FRML MDRD: 59 ML/MIN/1.73SQ M
GLUCOSE P FAST SERPL-MCNC: 163 MG/DL (ref 65–99)
HBA1C MFR BLD: 9.2 %
HCT VFR BLD AUTO: 39.9 % (ref 34.8–46.1)
HCV AB SER QL: NORMAL
HDLC SERPL-MCNC: 39 MG/DL
HGB BLD-MCNC: 12.6 G/DL (ref 11.5–15.4)
IMM GRANULOCYTES # BLD AUTO: 0.02 THOUSAND/UL (ref 0–0.2)
IMM GRANULOCYTES NFR BLD AUTO: 0 % (ref 0–2)
IRON SATN MFR SERPL: 12 %
IRON SERPL-MCNC: 56 UG/DL (ref 50–170)
LDLC SERPL CALC-MCNC: 77 MG/DL (ref 0–100)
LYMPHOCYTES # BLD AUTO: 3.06 THOUSANDS/ΜL (ref 0.6–4.47)
LYMPHOCYTES NFR BLD AUTO: 43 % (ref 14–44)
MCH RBC QN AUTO: 27.3 PG (ref 26.8–34.3)
MCHC RBC AUTO-ENTMCNC: 31.6 G/DL (ref 31.4–37.4)
MCV RBC AUTO: 87 FL (ref 82–98)
MICROALBUMIN UR-MCNC: 7.2 MG/L (ref 0–20)
MICROALBUMIN/CREAT 24H UR: 27 MG/G CREATININE (ref 0–30)
MONOCYTES # BLD AUTO: 0.68 THOUSAND/ΜL (ref 0.17–1.22)
MONOCYTES NFR BLD AUTO: 10 % (ref 4–12)
NEUTROPHILS # BLD AUTO: 3.21 THOUSANDS/ΜL (ref 1.85–7.62)
NEUTS SEG NFR BLD AUTO: 44 % (ref 43–75)
NRBC BLD AUTO-RTO: 0 /100 WBCS
PLATELET # BLD AUTO: 289 THOUSANDS/UL (ref 149–390)
PMV BLD AUTO: 10.6 FL (ref 8.9–12.7)
POTASSIUM SERPL-SCNC: 3.7 MMOL/L (ref 3.5–5.3)
PROT SERPL-MCNC: 8.2 G/DL (ref 6.4–8.2)
RBC # BLD AUTO: 4.61 MILLION/UL (ref 3.81–5.12)
SODIUM SERPL-SCNC: 136 MMOL/L (ref 136–145)
TIBC SERPL-MCNC: 473 UG/DL (ref 250–450)
TRIGL SERPL-MCNC: 386 MG/DL
WBC # BLD AUTO: 7.16 THOUSAND/UL (ref 4.31–10.16)

## 2020-05-27 PROCEDURE — 82728 ASSAY OF FERRITIN: CPT

## 2020-05-27 PROCEDURE — 82043 UR ALBUMIN QUANTITATIVE: CPT

## 2020-05-27 PROCEDURE — 83550 IRON BINDING TEST: CPT

## 2020-05-27 PROCEDURE — 82570 ASSAY OF URINE CREATININE: CPT

## 2020-05-27 PROCEDURE — 85025 COMPLETE CBC W/AUTO DIFF WBC: CPT

## 2020-05-27 PROCEDURE — 86803 HEPATITIS C AB TEST: CPT

## 2020-05-27 PROCEDURE — 36415 COLL VENOUS BLD VENIPUNCTURE: CPT

## 2020-05-27 PROCEDURE — 83540 ASSAY OF IRON: CPT

## 2020-05-27 PROCEDURE — 83036 HEMOGLOBIN GLYCOSYLATED A1C: CPT

## 2020-05-27 PROCEDURE — 80053 COMPREHEN METABOLIC PANEL: CPT

## 2020-05-27 PROCEDURE — 80061 LIPID PANEL: CPT

## 2020-06-01 ENCOUNTER — PREP FOR PROCEDURE (OUTPATIENT)
Dept: GASTROENTEROLOGY | Facility: CLINIC | Age: 80
End: 2020-06-01

## 2020-06-01 ENCOUNTER — TELEPHONE (OUTPATIENT)
Dept: INTERNAL MEDICINE CLINIC | Facility: CLINIC | Age: 80
End: 2020-06-01

## 2020-06-01 DIAGNOSIS — D50.9 IRON DEFICIENCY ANEMIA, UNSPECIFIED IRON DEFICIENCY ANEMIA TYPE: Primary | ICD-10-CM

## 2020-06-01 DIAGNOSIS — K63.5 POLYP OF COLON, UNSPECIFIED PART OF COLON, UNSPECIFIED TYPE: ICD-10-CM

## 2020-06-02 ENCOUNTER — OFFICE VISIT (OUTPATIENT)
Dept: INTERNAL MEDICINE CLINIC | Facility: CLINIC | Age: 80
End: 2020-06-02

## 2020-06-02 VITALS
SYSTOLIC BLOOD PRESSURE: 164 MMHG | HEIGHT: 60 IN | BODY MASS INDEX: 27.27 KG/M2 | TEMPERATURE: 98 F | HEART RATE: 71 BPM | WEIGHT: 138.89 LBS | DIASTOLIC BLOOD PRESSURE: 68 MMHG | OXYGEN SATURATION: 99 %

## 2020-06-02 DIAGNOSIS — Z79.4 DIABETES MELLITUS TYPE 2, INSULIN DEPENDENT (HCC): Primary | Chronic | ICD-10-CM

## 2020-06-02 DIAGNOSIS — I10 BENIGN ESSENTIAL HYPERTENSION: ICD-10-CM

## 2020-06-02 DIAGNOSIS — Z79.4 TYPE 2 DIABETES MELLITUS TREATED WITH INSULIN (HCC): ICD-10-CM

## 2020-06-02 DIAGNOSIS — E11.65 TYPE 2 DIABETES MELLITUS WITH HYPERGLYCEMIA, WITH LONG-TERM CURRENT USE OF INSULIN (HCC): ICD-10-CM

## 2020-06-02 DIAGNOSIS — E11.9 TYPE 2 DIABETES MELLITUS TREATED WITH INSULIN (HCC): ICD-10-CM

## 2020-06-02 DIAGNOSIS — E11.9 DIABETES MELLITUS TYPE 2, INSULIN DEPENDENT (HCC): Primary | Chronic | ICD-10-CM

## 2020-06-02 DIAGNOSIS — Z79.4 TYPE 2 DIABETES MELLITUS WITH HYPERGLYCEMIA, WITH LONG-TERM CURRENT USE OF INSULIN (HCC): ICD-10-CM

## 2020-06-02 DIAGNOSIS — M81.0 POSTMENOPAUSAL OSTEOPOROSIS: ICD-10-CM

## 2020-06-02 PROBLEM — Z00.00 HEALTHCARE MAINTENANCE: Status: RESOLVED | Noted: 2019-03-07 | Resolved: 2020-06-02

## 2020-06-02 PROBLEM — Z01.818 PREOP EXAMINATION: Chronic | Status: RESOLVED | Noted: 2019-10-04 | Resolved: 2020-06-02

## 2020-06-02 PROCEDURE — 3046F HEMOGLOBIN A1C LEVEL >9.0%: CPT | Performed by: PHYSICIAN ASSISTANT

## 2020-06-02 PROCEDURE — 1100F PTFALLS ASSESS-DOCD GE2>/YR: CPT | Performed by: PHYSICIAN ASSISTANT

## 2020-06-02 PROCEDURE — 3288F FALL RISK ASSESSMENT DOCD: CPT | Performed by: PHYSICIAN ASSISTANT

## 2020-06-02 PROCEDURE — 1160F RVW MEDS BY RX/DR IN RCRD: CPT | Performed by: PHYSICIAN ASSISTANT

## 2020-06-02 PROCEDURE — 99214 OFFICE O/P EST MOD 30 MIN: CPT | Performed by: PHYSICIAN ASSISTANT

## 2020-06-02 PROCEDURE — 1036F TOBACCO NON-USER: CPT | Performed by: PHYSICIAN ASSISTANT

## 2020-06-02 PROCEDURE — 4010F ACE/ARB THERAPY RXD/TAKEN: CPT | Performed by: PHYSICIAN ASSISTANT

## 2020-06-02 PROCEDURE — 4040F PNEUMOC VAC/ADMIN/RCVD: CPT | Performed by: PHYSICIAN ASSISTANT

## 2020-06-02 PROCEDURE — 3077F SYST BP >= 140 MM HG: CPT | Performed by: PHYSICIAN ASSISTANT

## 2020-06-02 PROCEDURE — 3008F BODY MASS INDEX DOCD: CPT | Performed by: PHYSICIAN ASSISTANT

## 2020-06-02 PROCEDURE — 3078F DIAST BP <80 MM HG: CPT | Performed by: PHYSICIAN ASSISTANT

## 2020-06-02 RX ORDER — ALENDRONATE SODIUM 70 MG/1
70 TABLET ORAL
Qty: 12 TABLET | Refills: 3 | Status: SHIPPED | OUTPATIENT
Start: 2020-06-02

## 2020-06-02 RX ORDER — INSULIN DETEMIR 100 [IU]/ML
30 INJECTION, SOLUTION SUBCUTANEOUS EVERY 12 HOURS SCHEDULED
Qty: 5 PEN | Refills: 5 | Status: SHIPPED | OUTPATIENT
Start: 2020-06-02 | End: 2020-10-16

## 2020-06-02 RX ORDER — AMLODIPINE BESYLATE 10 MG/1
10 TABLET ORAL DAILY
Qty: 90 TABLET | Refills: 1 | Status: SHIPPED | OUTPATIENT
Start: 2020-06-02 | End: 2021-01-17

## 2020-06-29 DIAGNOSIS — I47.1 AVNRT (AV NODAL RE-ENTRY TACHYCARDIA) (HCC): ICD-10-CM

## 2020-06-30 RX ORDER — METOPROLOL TARTRATE 50 MG/1
TABLET, FILM COATED ORAL
Qty: 120 TABLET | Refills: 0 | OUTPATIENT
Start: 2020-06-30

## 2020-07-03 DIAGNOSIS — I47.1 AVNRT (AV NODAL RE-ENTRY TACHYCARDIA) (HCC): ICD-10-CM

## 2020-07-03 RX ORDER — METOPROLOL TARTRATE 50 MG/1
TABLET, FILM COATED ORAL
Qty: 120 TABLET | Refills: 0 | Status: SHIPPED | OUTPATIENT
Start: 2020-07-03 | End: 2020-08-27

## 2020-08-27 ENCOUNTER — HOSPITAL ENCOUNTER (OUTPATIENT)
Dept: RADIOLOGY | Age: 80
Discharge: HOME | End: 2020-08-27
Payer: COMMERCIAL

## 2020-08-27 VITALS — HEIGHT: 64 IN | BODY MASS INDEX: 24.75 KG/M2 | WEIGHT: 145 LBS

## 2020-08-27 DIAGNOSIS — Z12.31 ENCOUNTER FOR SCREENING MAMMOGRAM FOR MALIGNANT NEOPLASM OF BREAST: ICD-10-CM

## 2020-08-27 DIAGNOSIS — I47.1 AVNRT (AV NODAL RE-ENTRY TACHYCARDIA) (HCC): ICD-10-CM

## 2020-08-27 PROCEDURE — 77063 BREAST TOMOSYNTHESIS BI: CPT

## 2020-08-27 PROCEDURE — 77067 SCR MAMMO BI INCL CAD: CPT

## 2020-08-27 RX ORDER — METOPROLOL TARTRATE 50 MG/1
TABLET, FILM COATED ORAL
Qty: 120 TABLET | Refills: 0 | Status: SHIPPED | OUTPATIENT
Start: 2020-08-27 | End: 2020-10-20

## 2020-08-30 DIAGNOSIS — E78.5 DYSLIPIDEMIA: ICD-10-CM

## 2020-08-30 RX ORDER — LOVASTATIN 40 MG/1
TABLET ORAL
Qty: 180 TABLET | Refills: 1 | Status: SHIPPED | OUTPATIENT
Start: 2020-08-30 | End: 2021-03-05

## 2020-09-01 DIAGNOSIS — I10 BENIGN ESSENTIAL HYPERTENSION: ICD-10-CM

## 2020-09-02 RX ORDER — LISINOPRIL 40 MG/1
TABLET ORAL
Qty: 90 TABLET | Refills: 1 | Status: SHIPPED | OUTPATIENT
Start: 2020-09-02 | End: 2021-03-05

## 2020-09-05 ENCOUNTER — APPOINTMENT (OUTPATIENT)
Dept: LAB | Facility: HOSPITAL | Age: 80
End: 2020-09-05
Payer: COMMERCIAL

## 2020-09-05 DIAGNOSIS — Z79.4 DIABETES MELLITUS TYPE 2, INSULIN DEPENDENT (HCC): ICD-10-CM

## 2020-09-05 DIAGNOSIS — E11.9 DIABETES MELLITUS TYPE 2, INSULIN DEPENDENT (HCC): ICD-10-CM

## 2020-09-05 LAB
ALBUMIN SERPL BCP-MCNC: 4.3 G/DL (ref 3.5–5)
ALP SERPL-CCNC: 54 U/L (ref 46–116)
ALT SERPL W P-5'-P-CCNC: 63 U/L (ref 12–78)
ANION GAP SERPL CALCULATED.3IONS-SCNC: 8 MMOL/L (ref 4–13)
AST SERPL W P-5'-P-CCNC: 52 U/L (ref 5–45)
BILIRUB SERPL-MCNC: 0.5 MG/DL (ref 0.2–1)
BUN SERPL-MCNC: 12 MG/DL (ref 5–25)
CALCIUM SERPL-MCNC: 9.2 MG/DL (ref 8.3–10.1)
CHLORIDE SERPL-SCNC: 106 MMOL/L (ref 100–108)
CHOLEST SERPL-MCNC: 158 MG/DL (ref 50–200)
CO2 SERPL-SCNC: 27 MMOL/L (ref 21–32)
CREAT SERPL-MCNC: 0.88 MG/DL (ref 0.6–1.3)
EST. AVERAGE GLUCOSE BLD GHB EST-MCNC: 206 MG/DL
GFR SERPL CREATININE-BSD FRML MDRD: 63 ML/MIN/1.73SQ M
GLUCOSE P FAST SERPL-MCNC: 116 MG/DL (ref 65–99)
HBA1C MFR BLD: 8.8 %
HDLC SERPL-MCNC: 37 MG/DL
LDLC SERPL DIRECT ASSAY-MCNC: 83 MG/DL (ref 0–100)
POTASSIUM SERPL-SCNC: 3.8 MMOL/L (ref 3.5–5.3)
PROT SERPL-MCNC: 8.4 G/DL (ref 6.4–8.2)
SODIUM SERPL-SCNC: 141 MMOL/L (ref 136–145)
TRIGL SERPL-MCNC: 459 MG/DL

## 2020-09-05 PROCEDURE — 36415 COLL VENOUS BLD VENIPUNCTURE: CPT

## 2020-09-05 PROCEDURE — 83721 ASSAY OF BLOOD LIPOPROTEIN: CPT

## 2020-09-05 PROCEDURE — 80053 COMPREHEN METABOLIC PANEL: CPT

## 2020-09-05 PROCEDURE — 83036 HEMOGLOBIN GLYCOSYLATED A1C: CPT

## 2020-09-05 PROCEDURE — 80061 LIPID PANEL: CPT

## 2020-09-09 ENCOUNTER — OFFICE VISIT (OUTPATIENT)
Dept: INTERNAL MEDICINE CLINIC | Facility: CLINIC | Age: 80
End: 2020-09-09

## 2020-09-09 VITALS
DIASTOLIC BLOOD PRESSURE: 68 MMHG | WEIGHT: 137.57 LBS | SYSTOLIC BLOOD PRESSURE: 144 MMHG | TEMPERATURE: 97.8 F | HEART RATE: 70 BPM | HEIGHT: 64 IN | BODY MASS INDEX: 23.49 KG/M2

## 2020-09-09 DIAGNOSIS — Z79.4 TYPE 2 DIABETES MELLITUS TREATED WITH INSULIN (HCC): ICD-10-CM

## 2020-09-09 DIAGNOSIS — I10 BENIGN ESSENTIAL HYPERTENSION: ICD-10-CM

## 2020-09-09 DIAGNOSIS — E11.3293 MILD NONPROLIFERATIVE DIABETIC RETINOPATHY OF BOTH EYES WITHOUT MACULAR EDEMA ASSOCIATED WITH TYPE 2 DIABETES MELLITUS (HCC): ICD-10-CM

## 2020-09-09 DIAGNOSIS — E11.9 TYPE 2 DIABETES MELLITUS TREATED WITH INSULIN (HCC): ICD-10-CM

## 2020-09-09 DIAGNOSIS — E11.9 DIABETES MELLITUS TYPE 2, INSULIN DEPENDENT (HCC): Primary | Chronic | ICD-10-CM

## 2020-09-09 DIAGNOSIS — E78.5 DYSLIPIDEMIA: ICD-10-CM

## 2020-09-09 DIAGNOSIS — Z79.4 DIABETES MELLITUS TYPE 2, INSULIN DEPENDENT (HCC): Primary | Chronic | ICD-10-CM

## 2020-09-09 PROCEDURE — 99213 OFFICE O/P EST LOW 20 MIN: CPT | Performed by: PHYSICIAN ASSISTANT

## 2020-09-09 PROCEDURE — 1160F RVW MEDS BY RX/DR IN RCRD: CPT | Performed by: PHYSICIAN ASSISTANT

## 2020-09-09 PROCEDURE — 1036F TOBACCO NON-USER: CPT | Performed by: PHYSICIAN ASSISTANT

## 2020-09-09 RX ORDER — DULAGLUTIDE 0.75 MG/.5ML
0.75 INJECTION, SOLUTION SUBCUTANEOUS WEEKLY
Qty: 5 PEN | Refills: 1 | Status: SHIPPED | OUTPATIENT
Start: 2020-09-09 | End: 2020-10-16 | Stop reason: ALTCHOICE

## 2020-09-09 RX ORDER — DULAGLUTIDE 0.75 MG/.5ML
0.75 INJECTION, SOLUTION SUBCUTANEOUS WEEKLY
Status: CANCELLED | OUTPATIENT
Start: 2020-09-09

## 2020-09-09 RX ORDER — PEN NEEDLE, DIABETIC 31 GX5/16"
NEEDLE, DISPOSABLE MISCELLANEOUS
Qty: 200 EACH | Refills: 1 | Status: SHIPPED | OUTPATIENT
Start: 2020-09-09

## 2020-09-09 NOTE — PROGRESS NOTES
Assessment/Plan:  As we reviewed today your sugar did improve but still not at goal   Your A1c is now 8 8% goal is to get it below 8%  You will continue your Levemir and metformin and we are adding once per week Trulicity  This is a non insulin injection and as per discussion you plan to use this every Monday  We also reviewed importance of eating proper meals and not eating your dinner so late at night  New order was placed for your eye exam as you report no one contacted you and as discussed the eye doctor will contact you directly  Please call the GI office back as your appointment for colonoscopy had to be postponed secondary to treatment for your wrist fracture which has now healed  We also reviewed that your lisinopril was sent to her pharmacy on September 2nd but if not there to call us back  Sugar log being provided today please continue to check your sugars every morning and please remember to check your sugars in the evening two hours after your dinner  Please bring the log here in 1 month for review  Because the Trulicity is a new medication if you develop any new symptoms, rashes, abdominal pain to contact our office right away  We also discussed that if you have any significant changes in your sugar with any low numbers to contact our office immediately  Script provided for follow-up labs in 3 months but will discuss your sugar log after you drop it off in 1 month  Diabetic foot exam completed today  We also reviewed to please get your flu vaccine this fall and because of Medicare will need to receive this at your pharmacy  Please schedule your Medicare wellness visit  No problem-specific Assessment & Plan notes found for this encounter         Diagnoses and all orders for this visit:    Diabetes mellitus type 2, insulin dependent (HCC)  -     Dulaglutide (Trulicity) 7 09 BF/2 7DM SOPN; Inject 0 5 mL (0 75 mg total) under the skin once a week  -     Comprehensive metabolic panel; Future  -     Hemoglobin A1C; Future    Benign essential hypertension    Dyslipidemia    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)    Type 2 diabetes mellitus treated with insulin ([facility])  -     Insulin Pen Needle (B-D UF III MINI PEN NEEDLES) 31G X 5 MM MISC; Inject 30 units SC twice daily and Trulicity once weekly    Other orders  -     Cancel: Dulaglutide (Trulicity) 7 36 KH/4 8EB SOPN; Inject 0 5 mL (0 75 mg total) under the skin once a week          Subjective:      Patient ID: Frankie Vail is a 79 y o  female  Patient presents today for short-term follow-up of her diabetes  At last visit in June patient's A1c had increased to 9 2%  After extensive discussion patient was not agreeable to mealtime and basal insulin  She was however agreeable to increasing her Levemir to 30 units twice a day  Patient here today to review the follow-up A1c  Reviewed with patient that while it did slightly improve it is now 8 8% the goal for her at her age should be below 8%  Patient continues to admit that she is not always following a diabetic diet  Often she will only have coffee and bread for breakfast and reports frequently eating her evening meal at 8:00  At night and going to bed at 10:00 p m     As noted patient has only been checking her sugars once a day in the morning and even skips that at times  After discussion patient agreeable for trial of once weekly Trulicity  No personal or FH of pancreatitis  Will start patient on the starting lower dose to see how her sugars respond with this new medication  If improvement but still above normal can look to increase the dose at a future appointment  New sugar log with adjustments in medications provided today and will need to return in 1 month for review      States GI never called her back to schedule the colonoscopy was postponed due to L wrist fracture, has completed treatment and can now be scheduled    Patient was never contacted for DM eye exam that was ordered in June, will place order again    Checking sugar once a day only    Patient's blood pressure is elevated today however she reports that she ran out of her lisinopril almost 1 week ago  Patient's chart shows that her pharmacy gave her a 3 day supply but that we did send a refill on September 2nd  Patient will be contacting her pharmacy when she goes to  the new medication  The following portions of the patient's history were reviewed and updated as appropriate: allergies, current medications, past family history, past medical history, past social history, past surgical history and problem list     Review of Systems   HENT: Negative  Respiratory: Negative  Negative for cough and chest tightness  Cardiovascular: Negative  Negative for chest pain and palpitations  Gastrointestinal: Negative  Musculoskeletal: Negative  Neurological: Negative  Psychiatric/Behavioral: Negative  Objective:      /68   Pulse 70   Temp 97 8 °F (36 6 °C)   Ht 5' 4" (1 626 m)   Wt 62 4 kg (137 lb 9 1 oz)   BMI 23 61 kg/m²          Physical Exam  Vitals signs and nursing note reviewed  Constitutional:       Appearance: She is normal weight  HENT:      Head: Normocephalic  Neck:      Musculoskeletal: Neck supple  Cardiovascular:      Rate and Rhythm: Normal rate and regular rhythm  Pulses: Pulses are weak  Dorsalis pedis pulses are 1+ on the right side and 1+ on the left side  Heart sounds: Normal heart sounds  Pulmonary:      Effort: Pulmonary effort is normal       Breath sounds: Normal breath sounds  Abdominal:      General: Bowel sounds are normal       Palpations: Abdomen is soft  Tenderness: There is no abdominal tenderness  Musculoskeletal:      Right lower leg: No edema  Left lower leg: No edema     Feet:      Right foot:      Skin integrity: Dry skin present  No ulcer, skin breakdown, erythema, warmth or callus  Left foot:      Skin integrity: Dry skin present  No ulcer, skin breakdown, erythema, warmth or callus  Skin:     Findings: No rash  Neurological:      General: No focal deficit present  Mental Status: She is alert  Psychiatric:         Mood and Affect: Mood normal          Thought Content: Thought content normal            Patient's shoes and socks removed  Right Foot/Ankle   Right Foot Inspection  Skin Exam: skin normal, skin intact and dry skin no warmth, no callus, no erythema, no maceration, no abnormal color, no pre-ulcer, no ulcer and no callus                            Sensory   Vibration: intact    Monofilament testing: intact  Vascular    The right DP pulse is 1+  Left Foot/Ankle  Left Foot Inspection  Skin Exam: skin normal, skin intact and dry skinno warmth, no erythema, no maceration, normal color, no pre-ulcer, no ulcer and no callus                                         Sensory   Vibration: intact    Monofilament: intact  Vascular    The left DP pulse is 1+  Assign Risk Category:  No deformity present;  No loss of protective sensation; Weak pulses       Risk: 1

## 2020-09-09 NOTE — PATIENT INSTRUCTIONS
As we reviewed today your sugar did improve but still not at goal   Your A1c is now 8 8% goal is to get it below 8%  You will continue your Levemir and metformin and we are adding once per week Trulicity  This is a non insulin injection and as per discussion you plan to use this every Monday  We also reviewed importance of eating proper meals and not eating your dinner so late at night  New order was placed for your eye exam as you report no one contacted you and as discussed the eye doctor will contact you directly  Please call the GI office back as your appointment for colonoscopy had to be postponed secondary to treatment for your wrist fracture which has now healed  We also reviewed that your lisinopril was sent to her pharmacy on September 2nd but if not there to call us back  Sugar log being provided today please continue to check your sugars every morning and please remember to check your sugars in the evening two hours after your dinner  Please bring the log here in 1 month for review  Because the Trulicity is a new medication if you develop any new symptoms, rashes, abdominal pain to contact our office right away  Script provided for follow-up labs in 3 months but will discuss your sugar log after you drop it off in 1 month  Diabetic foot exam completed today  We also reviewed to please get your flu vaccine this fall and because of Medicare will need to receive this at your pharmacy  Please schedule your Medicare wellness visit

## 2020-10-06 ENCOUNTER — TELEPHONE (OUTPATIENT)
Dept: INTERNAL MEDICINE CLINIC | Facility: CLINIC | Age: 80
End: 2020-10-06

## 2020-10-16 ENCOUNTER — TELEPHONE (OUTPATIENT)
Dept: INTERNAL MEDICINE CLINIC | Facility: CLINIC | Age: 80
End: 2020-10-16

## 2020-10-16 DIAGNOSIS — Z79.4 TYPE 2 DIABETES MELLITUS WITH HYPERGLYCEMIA, WITH LONG-TERM CURRENT USE OF INSULIN (HCC): ICD-10-CM

## 2020-10-16 DIAGNOSIS — Z79.4 DIABETES MELLITUS TYPE 2, INSULIN DEPENDENT (HCC): Chronic | ICD-10-CM

## 2020-10-16 DIAGNOSIS — R42 VERTIGO: Primary | ICD-10-CM

## 2020-10-16 DIAGNOSIS — E11.9 DIABETES MELLITUS TYPE 2, INSULIN DEPENDENT (HCC): Chronic | ICD-10-CM

## 2020-10-16 DIAGNOSIS — E11.65 TYPE 2 DIABETES MELLITUS WITH HYPERGLYCEMIA, WITH LONG-TERM CURRENT USE OF INSULIN (HCC): ICD-10-CM

## 2020-10-16 RX ORDER — MECLIZINE HYDROCHLORIDE 25 MG/1
25 TABLET ORAL EVERY 8 HOURS PRN
Qty: 15 TABLET | Refills: 0 | Status: SHIPPED | OUTPATIENT
Start: 2020-10-16 | End: 2021-01-02 | Stop reason: HOSPADM

## 2020-10-16 RX ORDER — INSULIN DETEMIR 100 [IU]/ML
50 INJECTION, SOLUTION SUBCUTANEOUS EVERY MORNING
Qty: 5 PEN | Refills: 5 | Status: SHIPPED | OUTPATIENT
Start: 2020-10-16 | End: 2021-01-06 | Stop reason: SDUPTHER

## 2020-10-20 DIAGNOSIS — I47.1 AVNRT (AV NODAL RE-ENTRY TACHYCARDIA) (HCC): ICD-10-CM

## 2020-10-20 RX ORDER — METOPROLOL TARTRATE 50 MG/1
TABLET, FILM COATED ORAL
Qty: 120 TABLET | Refills: 0 | Status: SHIPPED | OUTPATIENT
Start: 2020-10-20 | End: 2020-12-16

## 2020-11-12 DIAGNOSIS — Z79.4 TYPE 2 DIABETES MELLITUS WITH HYPERGLYCEMIA, WITH LONG-TERM CURRENT USE OF INSULIN (HCC): ICD-10-CM

## 2020-11-12 DIAGNOSIS — E11.65 TYPE 2 DIABETES MELLITUS WITH HYPERGLYCEMIA, WITH LONG-TERM CURRENT USE OF INSULIN (HCC): ICD-10-CM

## 2020-12-10 ENCOUNTER — APPOINTMENT (OUTPATIENT)
Dept: LAB | Facility: HOSPITAL | Age: 80
End: 2020-12-10
Payer: COMMERCIAL

## 2020-12-10 DIAGNOSIS — E11.9 DIABETES MELLITUS TYPE 2, INSULIN DEPENDENT (HCC): Chronic | ICD-10-CM

## 2020-12-10 DIAGNOSIS — Z79.4 DIABETES MELLITUS TYPE 2, INSULIN DEPENDENT (HCC): Chronic | ICD-10-CM

## 2020-12-10 LAB
ALBUMIN SERPL BCP-MCNC: 3.9 G/DL (ref 3.5–5)
ALP SERPL-CCNC: 56 U/L (ref 46–116)
ALT SERPL W P-5'-P-CCNC: 40 U/L (ref 12–78)
ANION GAP SERPL CALCULATED.3IONS-SCNC: 7 MMOL/L (ref 4–13)
AST SERPL W P-5'-P-CCNC: 24 U/L (ref 5–45)
BILIRUB SERPL-MCNC: 0.54 MG/DL (ref 0.2–1)
BUN SERPL-MCNC: 16 MG/DL (ref 5–25)
CALCIUM SERPL-MCNC: 9.6 MG/DL (ref 8.3–10.1)
CHLORIDE SERPL-SCNC: 107 MMOL/L (ref 100–108)
CO2 SERPL-SCNC: 28 MMOL/L (ref 21–32)
CREAT SERPL-MCNC: 0.83 MG/DL (ref 0.6–1.3)
EST. AVERAGE GLUCOSE BLD GHB EST-MCNC: 203 MG/DL
GFR SERPL CREATININE-BSD FRML MDRD: 67 ML/MIN/1.73SQ M
GLUCOSE P FAST SERPL-MCNC: 110 MG/DL (ref 65–99)
HBA1C MFR BLD: 8.7 %
POTASSIUM SERPL-SCNC: 3.6 MMOL/L (ref 3.5–5.3)
PROT SERPL-MCNC: 8 G/DL (ref 6.4–8.2)
SODIUM SERPL-SCNC: 142 MMOL/L (ref 136–145)

## 2020-12-10 PROCEDURE — 80053 COMPREHEN METABOLIC PANEL: CPT

## 2020-12-10 PROCEDURE — 36415 COLL VENOUS BLD VENIPUNCTURE: CPT

## 2020-12-10 PROCEDURE — 83036 HEMOGLOBIN GLYCOSYLATED A1C: CPT

## 2020-12-12 DIAGNOSIS — I47.1 AVNRT (AV NODAL RE-ENTRY TACHYCARDIA) (HCC): ICD-10-CM

## 2020-12-16 RX ORDER — METOPROLOL TARTRATE 50 MG/1
TABLET, FILM COATED ORAL
Qty: 120 TABLET | Refills: 0 | Status: SHIPPED | OUTPATIENT
Start: 2020-12-16 | End: 2020-12-30

## 2020-12-30 DIAGNOSIS — I47.1 AVNRT (AV NODAL RE-ENTRY TACHYCARDIA) (HCC): ICD-10-CM

## 2020-12-30 RX ORDER — METOPROLOL TARTRATE 50 MG/1
TABLET, FILM COATED ORAL
Qty: 180 TABLET | Refills: 1 | Status: SHIPPED | OUTPATIENT
Start: 2020-12-30 | End: 2021-09-07

## 2020-12-31 ENCOUNTER — HOSPITAL ENCOUNTER (INPATIENT)
Facility: HOSPITAL | Age: 80
LOS: 1 days | Discharge: HOME | DRG: 179 | End: 2021-01-02
Attending: EMERGENCY MEDICINE | Admitting: INTERNAL MEDICINE
Payer: COMMERCIAL

## 2020-12-31 ENCOUNTER — APPOINTMENT (EMERGENCY)
Dept: RADIOLOGY | Facility: HOSPITAL | Age: 80
DRG: 179 | End: 2020-12-31
Payer: COMMERCIAL

## 2020-12-31 DIAGNOSIS — U07.1 COVID-19: Primary | ICD-10-CM

## 2020-12-31 DIAGNOSIS — H92.09 EAR ACHE: ICD-10-CM

## 2020-12-31 PROBLEM — Z20.822 SUSPECTED COVID-19 VIRUS INFECTION: Status: ACTIVE | Noted: 2020-12-31

## 2020-12-31 LAB
ALBUMIN SERPL BCP-MCNC: 3.4 G/DL (ref 3.5–5)
ALP SERPL-CCNC: 58 U/L (ref 46–116)
ALT SERPL W P-5'-P-CCNC: 29 U/L (ref 12–78)
ANION GAP SERPL CALCULATED.3IONS-SCNC: 8 MMOL/L (ref 4–13)
AST SERPL W P-5'-P-CCNC: 55 U/L (ref 5–45)
ATRIAL RATE: 98 BPM
BASOPHILS # BLD AUTO: 0.03 THOUSANDS/ΜL (ref 0–0.1)
BASOPHILS NFR BLD AUTO: 0 % (ref 0–1)
BILIRUB SERPL-MCNC: 0.53 MG/DL (ref 0.2–1)
BUN SERPL-MCNC: 10 MG/DL (ref 5–25)
CALCIUM ALBUM COR SERPL-MCNC: 10.1 MG/DL (ref 8.3–10.1)
CALCIUM SERPL-MCNC: 9.6 MG/DL (ref 8.3–10.1)
CHLORIDE SERPL-SCNC: 100 MMOL/L (ref 100–108)
CK SERPL-CCNC: 84 U/L (ref 26–192)
CO2 SERPL-SCNC: 26 MMOL/L (ref 21–32)
CREAT SERPL-MCNC: 0.77 MG/DL (ref 0.6–1.3)
CRP SERPL QL: 113 MG/L
D DIMER PPP FEU-MCNC: 0.55 UG/ML FEU
EOSINOPHIL # BLD AUTO: 0 THOUSAND/ΜL (ref 0–0.61)
EOSINOPHIL NFR BLD AUTO: 0 % (ref 0–6)
ERYTHROCYTE [DISTWIDTH] IN BLOOD BY AUTOMATED COUNT: 14.9 % (ref 11.6–15.1)
FERRITIN SERPL-MCNC: 73 NG/ML (ref 8–388)
FLUAV RNA RESP QL NAA+PROBE: NEGATIVE
FLUBV RNA RESP QL NAA+PROBE: NEGATIVE
GFR SERPL CREATININE-BSD FRML MDRD: 74 ML/MIN/1.73SQ M
GLUCOSE SERPL-MCNC: 116 MG/DL (ref 65–140)
GLUCOSE SERPL-MCNC: 237 MG/DL (ref 65–140)
HCT VFR BLD AUTO: 41.1 % (ref 34.8–46.1)
HGB BLD-MCNC: 12.9 G/DL (ref 11.5–15.4)
IMM GRANULOCYTES # BLD AUTO: 0.02 THOUSAND/UL (ref 0–0.2)
IMM GRANULOCYTES NFR BLD AUTO: 0 % (ref 0–2)
LYMPHOCYTES # BLD AUTO: 1.1 THOUSANDS/ΜL (ref 0.6–4.47)
LYMPHOCYTES NFR BLD AUTO: 15 % (ref 14–44)
MCH RBC QN AUTO: 26.7 PG (ref 26.8–34.3)
MCHC RBC AUTO-ENTMCNC: 31.4 G/DL (ref 31.4–37.4)
MCV RBC AUTO: 85 FL (ref 82–98)
MONOCYTES # BLD AUTO: 0.5 THOUSAND/ΜL (ref 0.17–1.22)
MONOCYTES NFR BLD AUTO: 7 % (ref 4–12)
NEUTROPHILS # BLD AUTO: 5.62 THOUSANDS/ΜL (ref 1.85–7.62)
NEUTS SEG NFR BLD AUTO: 78 % (ref 43–75)
NRBC BLD AUTO-RTO: 0 /100 WBCS
NT-PROBNP SERPL-MCNC: 47 PG/ML
P AXIS: 56 DEGREES
PLATELET # BLD AUTO: 252 THOUSANDS/UL (ref 149–390)
PMV BLD AUTO: 10.7 FL (ref 8.9–12.7)
POTASSIUM SERPL-SCNC: 5 MMOL/L (ref 3.5–5.3)
PR INTERVAL: 144 MS
PROCALCITONIN SERPL-MCNC: <0.05 NG/ML
PROT SERPL-MCNC: 8.5 G/DL (ref 6.4–8.2)
QRS AXIS: -67 DEGREES
QRSD INTERVAL: 72 MS
QT INTERVAL: 326 MS
QTC INTERVAL: 416 MS
RBC # BLD AUTO: 4.83 MILLION/UL (ref 3.81–5.12)
RSV RNA RESP QL NAA+PROBE: NEGATIVE
SARS-COV-2 RNA RESP QL NAA+PROBE: POSITIVE
SODIUM SERPL-SCNC: 134 MMOL/L (ref 136–145)
T WAVE AXIS: 55 DEGREES
TROPONIN I SERPL-MCNC: <0.02 NG/ML
VENTRICULAR RATE: 98 BPM
WBC # BLD AUTO: 7.27 THOUSAND/UL (ref 4.31–10.16)

## 2020-12-31 PROCEDURE — 93010 ELECTROCARDIOGRAM REPORT: CPT | Performed by: INTERNAL MEDICINE

## 2020-12-31 PROCEDURE — 82550 ASSAY OF CK (CPK): CPT | Performed by: STUDENT IN AN ORGANIZED HEALTH CARE EDUCATION/TRAINING PROGRAM

## 2020-12-31 PROCEDURE — 87637 SARSCOV2&INF A&B&RSV AMP PRB: CPT | Performed by: EMERGENCY MEDICINE

## 2020-12-31 PROCEDURE — 83880 ASSAY OF NATRIURETIC PEPTIDE: CPT | Performed by: STUDENT IN AN ORGANIZED HEALTH CARE EDUCATION/TRAINING PROGRAM

## 2020-12-31 PROCEDURE — 0241U HB NFCT DS VIR RESP RNA 4 TRGT: CPT | Performed by: EMERGENCY MEDICINE

## 2020-12-31 PROCEDURE — 84145 PROCALCITONIN (PCT): CPT | Performed by: STUDENT IN AN ORGANIZED HEALTH CARE EDUCATION/TRAINING PROGRAM

## 2020-12-31 PROCEDURE — 82728 ASSAY OF FERRITIN: CPT | Performed by: STUDENT IN AN ORGANIZED HEALTH CARE EDUCATION/TRAINING PROGRAM

## 2020-12-31 PROCEDURE — 82948 REAGENT STRIP/BLOOD GLUCOSE: CPT

## 2020-12-31 PROCEDURE — 80053 COMPREHEN METABOLIC PANEL: CPT | Performed by: EMERGENCY MEDICINE

## 2020-12-31 PROCEDURE — 85379 FIBRIN DEGRADATION QUANT: CPT | Performed by: STUDENT IN AN ORGANIZED HEALTH CARE EDUCATION/TRAINING PROGRAM

## 2020-12-31 PROCEDURE — 99285 EMERGENCY DEPT VISIT HI MDM: CPT | Performed by: EMERGENCY MEDICINE

## 2020-12-31 PROCEDURE — 85025 COMPLETE CBC W/AUTO DIFF WBC: CPT | Performed by: EMERGENCY MEDICINE

## 2020-12-31 PROCEDURE — XW033E5 INTRODUCTION OF REMDESIVIR ANTI-INFECTIVE INTO PERIPHERAL VEIN, PERCUTANEOUS APPROACH, NEW TECHNOLOGY GROUP 5: ICD-10-PCS | Performed by: INTERNAL MEDICINE

## 2020-12-31 PROCEDURE — 36415 COLL VENOUS BLD VENIPUNCTURE: CPT | Performed by: EMERGENCY MEDICINE

## 2020-12-31 PROCEDURE — 99284 EMERGENCY DEPT VISIT MOD MDM: CPT

## 2020-12-31 PROCEDURE — 71045 X-RAY EXAM CHEST 1 VIEW: CPT

## 2020-12-31 PROCEDURE — 93005 ELECTROCARDIOGRAM TRACING: CPT

## 2020-12-31 PROCEDURE — 84484 ASSAY OF TROPONIN QUANT: CPT | Performed by: EMERGENCY MEDICINE

## 2020-12-31 PROCEDURE — 86140 C-REACTIVE PROTEIN: CPT | Performed by: STUDENT IN AN ORGANIZED HEALTH CARE EDUCATION/TRAINING PROGRAM

## 2020-12-31 RX ORDER — MULTIVITAMIN/IRON/FOLIC ACID 18MG-0.4MG
1 TABLET ORAL DAILY
Status: DISCONTINUED | OUTPATIENT
Start: 2021-01-07 | End: 2021-01-02 | Stop reason: HOSPADM

## 2020-12-31 RX ORDER — ACETAMINOPHEN 325 MG/1
650 TABLET ORAL EVERY 6 HOURS PRN
Status: DISCONTINUED | OUTPATIENT
Start: 2020-12-31 | End: 2020-12-31

## 2020-12-31 RX ORDER — METOPROLOL TARTRATE 50 MG/1
50 TABLET, FILM COATED ORAL EVERY 12 HOURS SCHEDULED
Status: DISCONTINUED | OUTPATIENT
Start: 2020-12-31 | End: 2021-01-02 | Stop reason: HOSPADM

## 2020-12-31 RX ORDER — FLUTICASONE PROPIONATE 50 MCG
1 SPRAY, SUSPENSION (ML) NASAL DAILY
Status: DISCONTINUED | OUTPATIENT
Start: 2020-12-31 | End: 2020-12-31

## 2020-12-31 RX ORDER — ACETAMINOPHEN 325 MG/1
650 TABLET ORAL EVERY 6 HOURS PRN
Status: DISCONTINUED | OUTPATIENT
Start: 2020-12-31 | End: 2021-01-02 | Stop reason: HOSPADM

## 2020-12-31 RX ORDER — DEXAMETHASONE SODIUM PHOSPHATE 4 MG/ML
6 INJECTION, SOLUTION INTRA-ARTICULAR; INTRALESIONAL; INTRAMUSCULAR; INTRAVENOUS; SOFT TISSUE EVERY 24 HOURS
Status: DISCONTINUED | OUTPATIENT
Start: 2020-12-31 | End: 2021-01-02 | Stop reason: HOSPADM

## 2020-12-31 RX ORDER — AMLODIPINE BESYLATE 10 MG/1
10 TABLET ORAL DAILY
Status: DISCONTINUED | OUTPATIENT
Start: 2020-12-31 | End: 2021-01-02 | Stop reason: HOSPADM

## 2020-12-31 RX ORDER — PRAVASTATIN SODIUM 40 MG
40 TABLET ORAL
Status: DISCONTINUED | OUTPATIENT
Start: 2020-12-31 | End: 2021-01-02 | Stop reason: HOSPADM

## 2020-12-31 RX ORDER — ZINC SULFATE 50(220)MG
220 CAPSULE ORAL DAILY
Status: DISCONTINUED | OUTPATIENT
Start: 2020-12-31 | End: 2021-01-02 | Stop reason: HOSPADM

## 2020-12-31 RX ORDER — MELATONIN
2000 DAILY
Status: DISCONTINUED | OUTPATIENT
Start: 2020-12-31 | End: 2021-01-02 | Stop reason: HOSPADM

## 2020-12-31 RX ORDER — ASCORBIC ACID 500 MG
1000 TABLET ORAL EVERY 12 HOURS SCHEDULED
Status: DISCONTINUED | OUTPATIENT
Start: 2020-12-31 | End: 2021-01-02 | Stop reason: HOSPADM

## 2020-12-31 RX ORDER — LISINOPRIL 10 MG/1
40 TABLET ORAL DAILY
Status: DISCONTINUED | OUTPATIENT
Start: 2020-12-31 | End: 2021-01-02 | Stop reason: HOSPADM

## 2020-12-31 RX ORDER — INSULIN GLARGINE 100 [IU]/ML
30 INJECTION, SOLUTION SUBCUTANEOUS EVERY MORNING
Status: DISCONTINUED | OUTPATIENT
Start: 2021-01-01 | End: 2021-01-01

## 2020-12-31 RX ORDER — HEPARIN SODIUM 5000 [USP'U]/ML
5000 INJECTION, SOLUTION INTRAVENOUS; SUBCUTANEOUS EVERY 8 HOURS SCHEDULED
Status: DISCONTINUED | OUTPATIENT
Start: 2020-12-31 | End: 2021-01-02 | Stop reason: HOSPADM

## 2020-12-31 RX ADMIN — REMDESIVIR 200 MG: 100 INJECTION, POWDER, LYOPHILIZED, FOR SOLUTION INTRAVENOUS at 23:15

## 2020-12-31 RX ADMIN — HEPARIN SODIUM 5000 UNITS: 5000 INJECTION INTRAVENOUS; SUBCUTANEOUS at 21:24

## 2020-12-31 RX ADMIN — AMLODIPINE BESYLATE 10 MG: 10 TABLET ORAL at 16:50

## 2020-12-31 RX ADMIN — INSULIN LISPRO 2 UNITS: 100 INJECTION, SOLUTION INTRAVENOUS; SUBCUTANEOUS at 22:14

## 2020-12-31 RX ADMIN — ZINC SULFATE 220 MG (50 MG) CAPSULE 220 MG: CAPSULE at 16:50

## 2020-12-31 RX ADMIN — Medication 2000 UNITS: at 16:51

## 2020-12-31 RX ADMIN — DEXAMETHASONE SODIUM PHOSPHATE 6 MG: 4 INJECTION INTRA-ARTICULAR; INTRALESIONAL; INTRAMUSCULAR; INTRAVENOUS; SOFT TISSUE at 22:16

## 2020-12-31 RX ADMIN — OXYCODONE HYDROCHLORIDE AND ACETAMINOPHEN 1000 MG: 500 TABLET ORAL at 21:24

## 2020-12-31 RX ADMIN — PRAVASTATIN SODIUM 40 MG: 40 TABLET ORAL at 16:51

## 2020-12-31 RX ADMIN — METOPROLOL TARTRATE 50 MG: 50 TABLET, FILM COATED ORAL at 21:24

## 2020-12-31 RX ADMIN — ACETAMINOPHEN 650 MG: 325 TABLET, FILM COATED ORAL at 21:24

## 2020-12-31 RX ADMIN — FLUTICASONE PROPIONATE 1 SPRAY: 50 SPRAY, METERED NASAL at 13:27

## 2020-12-31 RX ADMIN — LISINOPRIL 40 MG: 10 TABLET ORAL at 16:50

## 2020-12-31 NOTE — ED ATTENDING ATTESTATION
12/31/2020  IAshlie MD, saw and evaluated the patient  I have discussed the patient with the resident/non-physician practitioner and agree with the resident's/non-physician practitioner's findings, Plan of Care, and MDM as documented in the resident's/non-physician practitioner's note, except where noted  All available labs and Radiology studies were reviewed  I was present for key portions of any procedure(s) performed by the resident/non-physician practitioner and I was immediately available to provide assistance  At this point I agree with the current assessment done in the Emergency Department  I have conducted an independent evaluation of this patient a history and physical is as follows:    OA: 80 y/o f p/w R ear pain x 1 week  Pt notes pain is worse at night, sharp, nonradiating and improves with tylenol  Denies headache, dizziness/neck pain/change in hearing  Denies cp/sob  Denies cough or congestion  On arrival pt borderline hypoxic and tachycardic  Asked multiple times in Ivorian and english if she has had covid contacts  Pt initially denied however later she stated that her son with whom she lives does in fact have covid  PE, NAD but tachycardic and with pulse ox of 92% on RA, NC/AT, MMM, clear sclera/conjunctiva, neck supple/fROM, RR, lungs with decreased effort, mild tachypnea, abd soft, +Bs, -r/g, - edema, - calf ttp, TM clear b/l, no pre or post auricular ttp/LN, neck supple/FROM, no ttp over pinna, no erythema, AAO  A/p concern for covid, given VS will initiate mild pathway workup  Treat accordingly  ED Course   Pt with tachycardia and pulse of 90% on ambulation  Will require admission         Critical Care Time  Procedures

## 2020-12-31 NOTE — ED NOTES
Dr Bridget Frankel made aware of IV access being loss  2nd RN attempted for IV  No success  Dr Bridget Frankel will have resident look for access        Harini Cartagena, RN  12/31/20 [id number]

## 2020-12-31 NOTE — H&P
INTERNAL MEDICINE RESIDENCY ADMISSION H&P     Name: Casey Rothman   Age & Sex: 79 y o  female   MRN: [id number]  Unit/Bed#: ED 18   Encounter: [id number]  Primary Care Provider: Soheila Hopkins PA-C    Code Status: Level 1 - Full Code  Admission Status: OBSERVATION  Disposition: Patient requires Med/Surg    Admit to team: SOD Team A    ASSESSMENT/PLAN     Principal Problem:    Suspected COVID-19 virus infection  Active Problems:    Benign essential hypertension    Dyslipidemia    Diabetes mellitus type 2, insulin dependent ([facility] )      Diabetes mellitus type 2, insulin dependent ([facility] )  Assessment & Plan  Lab Results   Component Value Date    HGBA1C 8 7 (H) 12/10/2020       No results for input(s): POCGLU in the last 72 hours  Blood Sugar Average: Last 72 hrs:     Last hemoglobin A1c 8 7%, hold home oral diabetic medications  Appears patient is on Lantus 50 units a m , will order 30 units a m  As patient has not been eating today  SSI coverage    Dyslipidemia  Assessment & Plan  Continue statin therapy, pravastatin 40 mg q day    Benign essential hypertension  Assessment & Plan  Continue amlodipine 10 mg q day, lisinopril 40 mg q day, metoprolol tartrate 50 mg b i d    Patient has not taken blood pressure medications this morning, will give oral antihypertensives now, monitor blood pressure    * Suspected COVID-19 virus infection  Assessment & Plan  Based on emergency room medical team's evaluation patient high suspicion for COVID-19   COVID-19 positive with symptoms  Mild pathway requiring no oxygen at this time  Obtain cardiac markers, inflammatory markers, routine labs, blood type  Vitamins including D3, C, zinc, multivitamin ordered  As patient is not requiring oxygen at this time will hold off on famotidine, dexamethasone, remdesivir  Heparin DVT prophylaxis  Out of bed as tolerated, mobilization encourage, PT/OT as appropriate  Observation, if patient is still doing well tomorrow morning can be discharged      VTE Pharmacologic Prophylaxis: Heparin  VTE Mechanical Prophylaxis: sequential compression device    CHIEF COMPLAINT     Chief Complaint   Patient presents with    Earache     Patient complains of right ear pain      HISTORY OF PRESENT ILLNESS     Patient is a 71-year-old female with significant history of type 2 diabetes on insulin, hypertension, hyperlipidemia presenting secondary to ear pain  This has completely resolved with Tylenol  Secondary to high suspicion for COVID-19 virus as  has tested positive and is developing symptoms, emergency room team would like patient to be admitted for observation overnight  COVID-19 PCR testing pending at this time, patient has had intermittent shortness of breath but at the time of exam no shortness of breath, maintaining oxygen saturation greater than 92% with ambulatory  Chest x-ray did show lung volumes are low, limited evaluation secondary to position, patient may need further imaging studies depending on symptoms  Patient denies all other review of systems  Will place patient on mild COVID-19 pathway, will hold off on famotidine, dexamethasone, remdesivir as patient is not requiring any oxygen at this time  REVIEW OF SYSTEMS     Review of Systems   Constitutional: Negative for chills, diaphoresis, fatigue, fever and unexpected weight change  HENT: Negative for congestion and sore throat  Respiratory: Negative for cough, shortness of breath and wheezing  Cardiovascular: Negative for chest pain, palpitations and leg swelling  Gastrointestinal: Negative for abdominal distention, constipation, diarrhea, nausea and vomiting  Genitourinary: Negative for difficulty urinating and frequency  Musculoskeletal: Negative for back pain and neck pain  Skin: Negative for color change and pallor  Neurological: Negative for dizziness, numbness and headaches     Psychiatric/Behavioral: Negative for agitation, behavioral problems and confusion  OBJECTIVE     Vitals:    20 1032 20 1038 20 1215 20 1315   BP: (!) 186/87      BP Location: Right arm      Pulse: (!) 116  98 100   Resp: 18  18 22   Temp:  98 6 °F (37 °C)     TempSrc:  Oral     SpO2: 92%  93% 92%   Weight: 62 4 kg (137 lb 9 1 oz)         Temperature:   Temp (24hrs), Av 6 °F (37 °C), Min:98 6 °F (37 °C), Max:98 6 °F (37 °C)    Temperature: 98 6 °F (37 °C)  Intake & Output:  I/O     None        Weights:   IBW: -92 5 kg    Body mass index is 23 61 kg/m²  Weight (last 2 days)     Date/Time   Weight    20 1032   62 4 (137 57)            Physical Exam  Constitutional:       General: She is not in acute distress  Appearance: She is normal weight  She is not toxic-appearing  HENT:      Head: Normocephalic and atraumatic  Eyes:      General: No scleral icterus  Pupils: Pupils are equal, round, and reactive to light  Neck:      Musculoskeletal: Normal range of motion  No neck rigidity  Cardiovascular:      Rate and Rhythm: Normal rate and regular rhythm  Pulses: Normal pulses  Pulmonary:      Effort: Pulmonary effort is normal  No respiratory distress  Abdominal:      General: Bowel sounds are normal  There is no distension  Musculoskeletal: Normal range of motion  General: No swelling  Skin:     General: Skin is warm and dry  Neurological:      General: No focal deficit present  Mental Status: She is alert and oriented to person, place, and time  Mental status is at baseline     Psychiatric:         Mood and Affect: Mood normal          Behavior: Behavior normal        PAST MEDICAL HISTORY     Past Medical History:   Diagnosis Date    Anemia     Chronic idiopathic constipation     Colon polyp     Diabetes mellitus (HCC)     Diverticulitis, colon     Esophageal reflux     Eustachian tube dysfunction     Gastrointestinal hemorrhage     Hypertension     Myocardial infarction ([address] )     Non-healing skin lesion     Palpitations     Rectal bleeding     Skin lesion of chest wall      PAST SURGICAL HISTORY     Past Surgical History:   Procedure Laterality Date     SECTION      CHOLECYSTECTOMY      COLONOSCOPY      ECTOPIC PREGNANCY SURGERY      MI COLONOSCOPY FLX DX W/COLLJ SPEC WHEN PFRMD N/A 2016    Procedure: COLONOSCOPY;  Surgeon: Zia Denise MD;  Location: [facility]; Service: Gastroenterology    MI OPEN RX DISTAL RADIUS FX, INTRA-ARTICULAR, 3+ FRAG Left 10/8/2019    Procedure: RADIUS/ULNA (WRIST) OPEN REDUCTION W/ INTERNAL FIXATION (ORIF); Surgeon: Corrine Soriano MD;  [facility];  Service: Orthopedics    MI REVISE MEDIAN N/CARPAL TUNNEL SURG Left 10/8/2019    Procedure: CARPAL TUNNEL RELEASE;  Surgeon: Corrine Soriano MD;  [facility];  Service: Orthopedics     SOCIAL & FAMILY HISTORY     Social History     Substance and Sexual Activity   Alcohol Use Never    Frequency: Never    Binge frequency: Never       Social History     Substance and Sexual Activity   Drug Use Never     Social History     Tobacco Use   Smoking Status Never Smoker   Smokeless Tobacco Never Used     Family History   Problem Relation Age of Onset    Heart disease Mother     Bleeding Disorder Sister     Breast cancer Neg Hx      LABORATORY DATA     Labs: I have personally reviewed pertinent reports      Results from last 7 days   Lab Units 20  1232   WBC Thousand/uL 7 27   HEMOGLOBIN g/dL 12 9   HEMATOCRIT % 41 1   PLATELETS Thousands/uL 252   NEUTROS PCT % 78*   MONOS PCT % 7      Results from last 7 days   Lab Units 20  1232   POTASSIUM mmol/L 5 0   CHLORIDE mmol/L 100   CO2 mmol/L 26   BUN mg/dL 10   CREATININE mg/dL 0 77   CALCIUM mg/dL 9 6   ALK PHOS U/L 58   ALT U/L 29   AST U/L 55*                      Results from last 7 days   Lab Units 20  1343   TROPONIN I ng/mL <0 02     Micro:  Lab Results   Component Value Date    URINECX >100,000 cfu/ml Escherichia coli 10/26/2016     IMAGING & DIAGNOSTIC TESTS     Imaging: I have personally reviewed pertinent reports  Xr Chest 1 View Portable    Result Date: 12/31/2020  Impression: Lung volumes are low and imaging was taken with large field-of-view, limited evaluation  Question minimal small patchy opacities at the bilateral lung bases, which could be related to subsegmental atelectasis and poor respiratory effort  Recommend follow-up PA and lateral chest radiographs for detailed evaluation to exclude underlying groundglass consolidations  The study was marked in [facility] for immediate notification  Workstation performed: [id number]     EKG, Pathology, and Other Studies: I have personally reviewed pertinent reports  ALLERGIES   No Known Allergies  MEDICATIONS PRIOR TO ARRIVAL     Prior to Admission medications    Medication Sig Start Date End Date Taking?  Authorizing Provider   alendronate (Fosamax) 70 mg tablet Take 1 tablet (70 mg total) by mouth every 7 days 6/2/20  Yes Radames Curry PA-C   amLODIPine (NORVASC) 10 mg tablet Take 1 tablet (10 mg total) by mouth daily 6/2/20 12/31/20 Yes Radames Curry PA-C   Levemir FlexTouch 100 units/mL injection pen Inject 50 Units under the skin every morning 10/16/20 4/14/21 Yes Radames Curry PA-C   lisinopril (ZESTRIL) 40 mg tablet TOME DIANE TABLETA (40 MG TOTAL) POR VIA ORAL A DIARIO 9/2/20  Yes Charli Bates PA-C   lovastatin (MEVACOR) 40 MG tablet TAKE 2 TABLETS TOGETHER EVERY NIGHT 8/30/20  Yes Charli Bates PA-C   metFORMIN (GLUCOPHAGE) 1000 MG tablet TAKE 1 TABLET (1,000 MG TOTAL) BY MOUTH 2 (TWO) TIMES A DAY  DAYS 11/12/20 5/11/21 Yes Radames Curry PA-C   metoprolol tartrate (LOPRESSOR) 50 mg tablet TOME DIANE TABLETA CADA 12 HORAS 12/30/20  Yes Ramona Caldwell MD   Multiple Vitamins-Minerals (MULTIVITAMIN ADULT PO) Take by mouth daily   Yes Historical Provider, MD   cholecalciferol (VITAMIN D3) 1,000 units tablet Take 1 tablet (1,000 Units total) by mouth daily for 180 days  Patient not taking: Reported on 12/31/2020 8/13/18   Amerika Curiel PA-C   Dulaglutide 1 5 MG/0 5ML SOPN Inject 0 5 mL (1 5 mg total) under the skin once a week  Patient not taking: Reported on 12/31/2020 10/16/20   Latanya Curiel PA-C   glucose blood (FREESTYLE LITE) test strip USE AS INSTRUCTED TO CHECK SUGAR UP TO 3 TIMES DAILY 5/3/19   Latanya Curiel PA-C   Insulin Pen Needle (B-D UF III MINI PEN NEEDLES) 31G X 5 MM MISC Inject 30 units SC twice daily and Trulicity once weekly 9/9/33   Amerika Curiel PA-C   Lancets (FREESTYLE) lancets Use as instructed to check sugar up to 3 times daily 3/7/19   Latanya Curiel PA-C   meclizine (ANTIVERT) 25 mg tablet Take 1 tablet (25 mg total) by mouth every 8 (eight) hours as needed for dizziness for up to 5 days 10/16/20 10/21/20  Latanya Curiel PA-C     MEDICATIONS ADMINISTERED IN LAST 24 HOURS     Medication Administration - last 24 hours from 12/30/2020 1534 to 12/31/2020 1534       Date/Time Order Dose Route Action Action by     12/31/2020 1327 fluticasone (FLONASE) 50 mcg/act nasal spray 1 spray 1 spray Each Nare Given Ju Osorio RN        CURRENT MEDICATIONS     Current Facility-Administered Medications   Medication Dose Route Frequency Provider Last Rate    acetaminophen  650 mg Oral Q6H PRN Nitish DO Delfina      amLODIPine  10 mg Oral Daily Santi Vollaro, DO      ascorbic acid  1,000 mg Oral Q12H [address] Santi Ministerio DO      cholecalciferol  2,000 Units Oral Daily Santi Vollaro, DO      heparin (porcine)  5,000 Units Subcutaneous Q8H [address] Santi Volsindy DO      [START ON 1/1/2021] insulin glargine  30 Units Subcutaneous QAM Santianabela Mandel DO      insulin lispro  1-5 Units Subcutaneous TID AC Santianabela Mandel DO      insulin lispro  1-5 Units Subcutaneous HS Santi Vollaro, DO      lisinopril  40 mg Oral Daily Santi Mandel, DO      metoprolol tartrate  50 mg Oral Q12H [address] Santi Mandel, DO      zinc sulfate  220 mg Oral Daily Santi Mandel DO      Followed by   Jose R Back ON 1/7/2021] multivitamin-minerals  1 tablet Oral Daily Santi Mandel DO      pravastatin  40 mg Oral Daily With [employer], DO          acetaminophen, 650 mg, Q6H PRN        Admission Time  I spent 45 minutes admitting the patient  This involved direct patient contact where I performed a full history and physical, reviewing previous records, and reviewing laboratory and other diagnostic studies  Portions of the record may have been created with voice recognition software  Occasional wrong word or "sound a like" substitutions may have occurred due to the inherent limitations of voice recognition software    Read the chart carefully and recognize, using context, where substitutions have occurred     ==  Johny Banks, [address] [facility]  Internal Medicine Residency PGY-2

## 2020-12-31 NOTE — ASSESSMENT & PLAN NOTE
Continue amlodipine 10 mg q day, lisinopril 40 mg q day, metoprolol tartrate 50 mg b i d   F/u with PCP

## 2020-12-31 NOTE — ED PROVIDER NOTES
History  Chief Complaint   Patient presents with    Earache     Patient complains of right ear pain     HPI  Patient is a 67-year-old female history of hypertension, diabetes presenting for evaluation of right-sided earache, cough  Patient states that she has been having any earache for the past 2 weeks, states that it is all anterior to the ear with radiation internally, denies decreased hearing, ear discharge, trauma to the ear, mastoid pain or tenderness, fevers, chills, constitutional symptoms  Patient states that she has had a minor cough for the past few days, denies significant dyspnea  Patient's  complaining of severe cough and dyspnea, currently being evaluated in this emergency department for Claudine  Prior to Admission Medications   Prescriptions Last Dose Informant Patient Reported? Taking?    Dulaglutide 1 5 MG/0 5ML SOPN Not Taking at Unknown time  No No   Sig: Inject 0 5 mL (1 5 mg total) under the skin once a week   Patient not taking: Reported on 12/31/2020   Insulin Pen Needle (B-D UF III MINI PEN NEEDLES) 31G X 5 MM MISC   No No   Sig: Inject 30 units SC twice daily and Trulicity once weekly   Lancets (FREESTYLE) lancets  Self No No   Sig: Use as instructed to check sugar up to 3 times daily   Levemir FlexTouch 100 units/mL injection pen   No Yes   Sig: Inject 50 Units under the skin every morning   Multiple Vitamins-Minerals (MULTIVITAMIN ADULT PO)  Self Yes Yes   Sig: Take by mouth daily   alendronate (Fosamax) 70 mg tablet   No Yes   Sig: Take 1 tablet (70 mg total) by mouth every 7 days   amLODIPine (NORVASC) 10 mg tablet   No Yes   Sig: Take 1 tablet (10 mg total) by mouth daily   cholecalciferol (VITAMIN D3) 1,000 units tablet Not Taking at Unknown time Self No No   Sig: Take 1 tablet (1,000 Units total) by mouth daily for 180 days   Patient not taking: Reported on 12/31/2020   glucose blood (FREESTYLE LITE) test strip  Self No No   Sig: USE AS INSTRUCTED TO CHECK SUGAR UP TO 3 TIMES DAILY   lisinopril (ZESTRIL) 40 mg tablet   No Yes   Sig: TOME DIANE TABLETA (40 MG TOTAL) POR VIA ORAL A DIARIO   lovastatin (MEVACOR) 40 MG tablet   No Yes   Sig: TAKE 2 TABLETS TOGETHER EVERY NIGHT   meclizine (ANTIVERT) 25 mg tablet   No No   Sig: Take 1 tablet (25 mg total) by mouth every 8 (eight) hours as needed for dizziness for up to 5 days   metFORMIN (GLUCOPHAGE) 1000 MG tablet   No Yes   Sig: TAKE 1 TABLET (1,000 MG TOTAL) BY MOUTH 2 (TWO) TIMES A DAY  DAYS   metoprolol tartrate (LOPRESSOR) 50 mg tablet   No Yes   Sig: TOME DIANE TABLETA CADA 12 HORAS      Facility-Administered Medications: None       Past Medical History:   Diagnosis Date    Anemia     Chronic idiopathic constipation     Colon polyp     Diabetes mellitus (HCC)     Diverticulitis, colon     Esophageal reflux     Eustachian tube dysfunction     Gastrointestinal hemorrhage     Hypertension     Myocardial infarction (HCC)     Non-healing skin lesion     Palpitations     Rectal bleeding     Skin lesion of chest wall        Past Surgical History:   Procedure Laterality Date     SECTION      CHOLECYSTECTOMY      COLONOSCOPY      ECTOPIC PREGNANCY SURGERY      DE COLONOSCOPY FLX DX W/COLLJ SPEC WHEN PFRMD N/A 2016    Procedure: COLONOSCOPY;  Surgeon: Latonya Johnson MD;  Location: [facility]; Service: Gastroenterology    DE OPEN RX DISTAL RADIUS FX, INTRA-ARTICULAR, 3+ FRAG Left 10/8/2019    Procedure: RADIUS/ULNA (WRIST) OPEN REDUCTION W/ INTERNAL FIXATION (ORIF);   Surgeon: Bobbi Rodas MD;  [facility];  Service: Orthopedics    DE REVISE MEDIAN N/CARPAL TUNNEL SURG Left 10/8/2019    Procedure: CARPAL TUNNEL RELEASE;  Surgeon: Bobbi Rodas MD;  [facility];  Service: Orthopedics       Family History   Problem Relation Age of Onset    Heart disease Mother     Bleeding Disorder Sister     Breast cancer Neg Hx      I have reviewed and agree with the history as documented  E-Cigarette/Vaping    E-Cigarette Use Never User      E-Cigarette/Vaping Substances    Nicotine No     THC No     CBD No     Flavoring No     Other No     Unknown No      Social History     Tobacco Use    Smoking status: Never Smoker    Smokeless tobacco: Never Used   Substance Use Topics    Alcohol use: Never     Frequency: Never     Binge frequency: Never    Drug use: Never        Review of Systems   Constitutional: Negative for chills, fatigue and fever  HENT: Positive for ear pain  Negative for hearing loss  Eyes: Negative for visual disturbance  Respiratory: Positive for cough  Negative for chest tightness and shortness of breath  Cardiovascular: Negative for chest pain  Gastrointestinal: Negative for abdominal distention, abdominal pain, constipation, diarrhea, nausea and vomiting  Endocrine: Negative for polydipsia and polyuria  Genitourinary: Negative for dysuria and hematuria  Musculoskeletal: Negative for arthralgias and myalgias  Skin: Negative for color change and rash  Neurological: Negative for dizziness and headaches  Psychiatric/Behavioral: Negative for confusion  Physical Exam  ED Triage Vitals   Temperature Pulse Respirations Blood Pressure SpO2   12/31/20 1038 12/31/20 1032 12/31/20 1032 12/31/20 1032 12/31/20 1032   98 6 °F (37 °C) (!) 116 18 (!) 186/87 92 %      Temp Source Heart Rate Source Patient Position - Orthostatic VS BP Location FiO2 (%)   12/31/20 1038 12/31/20 1032 12/31/20 1032 12/31/20 1032 --   Oral Monitor Lying Right arm       Pain Score       12/31/20 1032       8             Orthostatic Vital Signs  Vitals:    12/31/20 1032 12/31/20 1215 12/31/20 1315 12/31/20 1630   BP: (!) 186/87   154/79   Pulse: (!) 116 98 100 (!) 116   Patient Position - Orthostatic VS: Lying Lying  Lying       Physical Exam  Vitals signs reviewed  Constitutional:       General: She is not in acute distress  Appearance: She is well-developed   She is not diaphoretic  Comments: Well-appearing, nondistressed   HENT:      Head: Normocephalic and atraumatic  Comments: Normal appearing right-sided tympanic membrane  No mastoid erythema or tenderness  No facial tenderness  Pain not reproducible with light palpation  Right Ear: External ear normal       Left Ear: External ear normal       Nose: Nose normal       Mouth/Throat:      Pharynx: No oropharyngeal exudate  Eyes:      Pupils: Pupils are equal, round, and reactive to light  Neck:      Musculoskeletal: Normal range of motion  Cardiovascular:      Rate and Rhythm: Normal rate and regular rhythm  Heart sounds: Normal heart sounds  No murmur  No friction rub  No gallop  Comments: Sinus tachycardia to a rate of 110  Increasing to about 140 with ambulation  Pulmonary:      Effort: Pulmonary effort is normal  No respiratory distress  Breath sounds: Normal breath sounds  No wheezing  Comments: Lungs clear to auscultation bilaterally  Chest:      Chest wall: No tenderness  Comments: No increased work of breathing  Patient initially satting around 95% on room air, on re-examination satting 92-93%, desatting than 90% with ambulation  Abdominal:      General: Bowel sounds are normal  There is no distension  Palpations: Abdomen is soft  There is no mass  Tenderness: There is no abdominal tenderness  There is no guarding  Musculoskeletal: Normal range of motion  General: No deformity  Lymphadenopathy:      Cervical: No cervical adenopathy  Skin:     General: Skin is warm and dry  Capillary Refill: Capillary refill takes less than 2 seconds  Neurological:      Mental Status: She is alert and oriented to person, place, and time           ED Medications  Medications   amLODIPine (NORVASC) tablet 10 mg (10 mg Oral Given 12/31/20 1650)   lisinopril (ZESTRIL) tablet 40 mg (40 mg Oral Given 12/31/20 1650)   pravastatin (PRAVACHOL) tablet 40 mg (40 mg Oral Given 12/31/20 1651)   metoprolol tartrate (LOPRESSOR) tablet 50 mg (has no administration in time range)   heparin (porcine) subcutaneous injection 5,000 Units (5,000 Units Subcutaneous Not Given 12/31/20 1641)   insulin lispro (HumaLOG) 100 units/mL subcutaneous injection 1-5 Units (1 Units Subcutaneous Not Given 12/31/20 1650)   insulin lispro (HumaLOG) 100 units/mL subcutaneous injection 1-5 Units (has no administration in time range)   insulin glargine (LANTUS) subcutaneous injection 30 Units 0 3 mL (has no administration in time range)   cholecalciferol (VITAMIN D3) tablet 2,000 Units (2,000 Units Oral Given 12/31/20 1651)   ascorbic acid (VITAMIN C) tablet 1,000 mg (has no administration in time range)   zinc sulfate (ZINCATE) capsule 220 mg (220 mg Oral Given 12/31/20 1650)     Followed by   multivitamin-minerals (CENTRUM ADULTS) tablet 1 tablet (has no administration in time range)   acetaminophen (TYLENOL) tablet 650 mg (has no administration in time range)       Diagnostic Studies  Results Reviewed     Procedure Component Value Units Date/Time    COVID19, Influenza A/B, RSV PCR, [facility] [[id number]] Collected: 12/31/20 1640    Lab Status: In process Specimen: Nares from Nasopharyngeal Swab Updated: 12/31/20 1647    Procalcitonin with AM Reflex [[id number]] Collected: 12/31/20 1620    Lab Status: In process Specimen: Blood from Arm, Left Updated: 12/31/20 1626    D-dimer, quantitative [[id number]] Collected: 12/31/20 1620    Lab Status:  In process Specimen: Blood from Arm, Left Updated: 12/31/20 1626    C-reactive protein [[id number]]  (Abnormal) Collected: 12/31/20 1232    Lab Status: Final result Specimen: Blood from Arm, Left Updated: 12/31/20 1614      0 mg/L     Ferritin [[id number]]  (Normal) Collected: 12/31/20 1232    Lab Status: Final result Specimen: Blood from Arm, Left Updated: 12/31/20 1614     Ferritin 73 ng/mL     NT-BNP PRO [[id number]]  (Normal) Collected: 12/31/20 1232    Lab Status: Final result Specimen: Blood from Arm, Left Updated: 12/31/20 1614     NT-proBNP 47 pg/mL     CK (with reflex to MB) [[id number]]  (Normal) Collected: 12/31/20 1232    Lab Status: Final result Specimen: Blood from Arm, Left Updated: 12/31/20 1614     Total CK 84 U/L     Troponin I [[id number]]  (Normal) Collected: 12/31/20 1343    Lab Status: Final result Specimen: Blood from Hand, Right Updated: 12/31/20 1432     Troponin I <0 02 ng/mL     Comprehensive metabolic panel [[id number]]  (Abnormal) Collected: 12/31/20 1232    Lab Status: Final result Specimen: Blood from Arm, Left Updated: 12/31/20 1259     Sodium 134 mmol/L      Potassium 5 0 mmol/L      Chloride 100 mmol/L      CO2 26 mmol/L      ANION GAP 8 mmol/L      BUN 10 mg/dL      Creatinine 0 77 mg/dL      Glucose 116 mg/dL      Calcium 9 6 mg/dL      Corrected Calcium 10 1 mg/dL      AST 55 U/L      ALT 29 U/L      Alkaline Phosphatase 58 U/L      Total Protein 8 5 g/dL      Albumin 3 4 g/dL      Total Bilirubin 0 53 mg/dL      eGFR 74 ml/min/1 73sq m     Narrative:      [place] guidelines for Chronic Kidney Disease (CKD):     Stage 1 with normal or high GFR (GFR > 90 mL/min/1 73 square meters)    Stage 2 Mild CKD (GFR = 60-89 mL/min/1 73 square meters)    Stage 3A Moderate CKD (GFR = 45-59 mL/min/1 73 square meters)    Stage 3B Moderate CKD (GFR = 30-44 mL/min/1 73 square meters)    Stage 4 Severe CKD (GFR = 15-29 mL/min/1 73 square meters)    Stage 5 End Stage CKD (GFR <15 mL/min/1 73 square meters)  Note: GFR calculation is accurate only with a steady state creatinine    CBC and differential [[id number]]  (Abnormal) Collected: 12/31/20 1232    Lab Status: Final result Specimen: Blood from Arm, Left Updated: 12/31/20 1252     WBC 7 27 Thousand/uL      RBC 4 83 Million/uL      Hemoglobin 12 9 g/dL      Hematocrit 41 1 %      MCV 85 fL      MCH 26 7 pg      MCHC 31 4 g/dL      RDW 14 9 %      MPV 10 7 fL      Platelets 130 Thousands/uL      nRBC 0 /100 WBCs      Neutrophils Relative 78 %      Immat GRANS % 0 %      Lymphocytes Relative 15 %      Monocytes Relative 7 %      Eosinophils Relative 0 %      Basophils Relative 0 %      Neutrophils Absolute 5 62 Thousands/µL      Immature Grans Absolute 0 02 Thousand/uL      Lymphocytes Absolute 1 10 Thousands/µL      Monocytes Absolute 0 50 Thousand/µL      Eosinophils Absolute 0 00 Thousand/µL      Basophils Absolute 0 03 Thousands/µL     Novel Coronavirus 2019(COVID-19), Influenza A/B, RSV [facility] [[id number]] Collected: 12/31/20 1236    Lab Status: In process Specimen: Nasopharyngeal Swab Updated: 12/31/20 1241                 XR chest 1 view portable   Final Result by Bertha Jc DO (12/31 5408)      Lung volumes are low and imaging was taken with large field-of-view, limited evaluation  Question minimal small patchy opacities at the bilateral lung bases, which could be related to subsegmental atelectasis and poor respiratory effort  Recommend follow-up PA and lateral chest radiographs for detailed evaluation to exclude underlying    groundglass consolidations  The study was marked in [facility] for immediate notification  Workstation performed: [id number]               Procedures  Procedures      ED Course               Identification of Seniors at Risk      Most Recent Value   (ISAR) Identification of Seniors at Risk   Before the illness or injury that brought you to the Emergency, did you need someone to help you on a regular basis? 0 Filed at: 12/31/2020 1034   In the last 24 hours, have you needed more help than usual?  0 Filed at: 12/31/2020 1034   Have you been hospitalized for one or more nights during the past 6 months? 0 Filed at: 12/31/2020 1034   In general, do you see well?  0 Filed at: 12/31/2020 1034   In general, do you have serious problems with your memory?   0 Filed at: 12/31/2020 1034   Do you take more than three different medications every day? 1 Filed at: 12/31/2020 1034   ISAR Score  1 Filed at: 12/31/2020 1034                    SBIRT 20yo+      Most Recent Value   SBIRT (23 yo +)   In order to provide better care to our patients, we are screening all of our patients for alcohol and drug use  Would it be okay to ask you these screening questions? Yes Filed at: 12/31/2020 1039   Initial Alcohol Screen: US AUDIT-C    1  How often do you have a drink containing alcohol?  0 Filed at: 12/31/2020 1039   2  How many drinks containing alcohol do you have on a typical day you are drinking? 0 Filed at: 12/31/2020 1039   3a  Male UNDER 65: How often do you have five or more drinks on one occasion? 0 Filed at: 12/31/2020 1039   3b  FEMALE Any Age, or MALE 65+: How often do you have 4 or more drinks on one occassion? 0 Filed at: 12/31/2020 1039   Audit-C Score  0 Filed at: 12/31/2020 1039   MANISHA: How many times in the past year have you    Used an illegal drug or used a prescription medication for non-medical reasons? Never Filed at: 12/31/2020 1039                MDM  Number of Diagnoses or Management Options  COVID-19:   Ear ache:   Diagnosis management comments: 78-year-old female presenting for ear pain, cough,  with symptoms consistent with COVID and ultimately testing COVID positive while patient currently being evaluated in emergency department  Patient well-appearing without increased work of breathing but persistently tachycardic to 110-115, desatting 90% on ambulatory pulse ox with increase of heart rate to 130  Basic screening labs including CBC, CMP, troponin all unremarkable, sinus tachycardia on EKG, chest x-ray unremarkable however low lung volumes so difficult to fully assess  Given patient's desaturation and tachycardia and persistent abnormal vital signs, plan to admit for likely COVID-19 infection         Amount and/or Complexity of Data Reviewed  Clinical lab tests: ordered and reviewed  Tests in the radiology section of CPT®: ordered and reviewed  Tests in the medicine section of CPT®: reviewed and ordered  Decide to obtain previous medical records or to obtain history from someone other than the patient: yes  Review and summarize past medical records: yes  Independent visualization of images, tracings, or specimens: yes    Risk of Complications, Morbidity, and/or Mortality  Presenting problems: moderate  Diagnostic procedures: low  Management options: low    Patient Progress  Patient progress: stable      Disposition  Final diagnoses:   COVID-19   Ear ache     Time reflects when diagnosis was documented in both MDM as applicable and the Disposition within this note     Time User Action Codes Description Comment    12/31/2020  3:05 PM Tobi Fernández [U07 1] COVID-19     12/31/2020  3:05 PM Tobi Fernández [H92 09] Ear ache       ED Disposition     ED Disposition Condition Date/Time Comment    Admit Stable Thu Dec 31, 2020  3:05 PM Case was discussed with SOD and the patient's admission status was agreed to be Admission Status: observation status to the service of Dr Marion Seen   Follow-up Information    None         Patient's Medications   Discharge Prescriptions    No medications on file     No discharge procedures on file  PDMP Review       Value Time User    PDMP Reviewed  Yes 10/8/2019  1:36 PM Clemente Connelly PA-C           ED Provider  Attending physically available and evaluated Shirin Payne  I managed the patient along with the ED Attending      Electronically Signed by         Charla Baldwin MD  12/31/20 7604

## 2020-12-31 NOTE — ASSESSMENT & PLAN NOTE
Patient is a 71-year-old female with past medical history significant for hypertension, dyslipidemia, type 2 diabetes   COVID-19 positive with symptoms  Patient tested positive for COVID-19 and initially did not require any oxygen  On presentation patient only had ear pain on the right side but denied any other symptoms  Continue Vitamins including D3, C, zinc to complete 10 day course   Start multivitamin thereafter  Prednisone to complete 10 day steroid course  Portable O2  F/u with PCP  Isolate for 10 days since positive covid19 test AND 24 hours after resolution of fevers without the need for fever-reducing medications

## 2020-12-31 NOTE — ED NOTES
Dr Patrick Gomes aware patient did not have IV and going to have resident attempt with ultrasound     Collins Carmichael, RN  12/31/20 6091

## 2020-12-31 NOTE — ASSESSMENT & PLAN NOTE
Lab Results   Component Value Date    HGBA1C 8 7 (H) 12/10/2020       Recent Labs     01/02/21  0624 01/02/21  0747 01/02/21  1132 01/02/21  1642   POCGLU 350* 355* 323* 320*       Blood Sugar Average: Last 72 hrs:  (P) 303 3   Last hemoglobin A1c 8 7%, hold home oral diabetic medications  Appears patient is on Lantus 50 units a m  At home  Patient on Lantus 30 units while inpatient given limited p o  Intake  Blood sugar elevated likely secondary to recent start of steroids along with lower insulin regimen      Plan:  · Continue lantus 50 units in a m   · Monitor blood sugar  · F/u with PCP

## 2021-01-01 PROBLEM — U07.1 COVID-19: Status: ACTIVE | Noted: 2020-12-31

## 2021-01-01 LAB
ALBUMIN SERPL BCP-MCNC: 3.1 G/DL (ref 3.5–5)
ALP SERPL-CCNC: 58 U/L (ref 46–116)
ALT SERPL W P-5'-P-CCNC: 26 U/L (ref 12–78)
ANION GAP SERPL CALCULATED.3IONS-SCNC: 12 MMOL/L (ref 4–13)
AST SERPL W P-5'-P-CCNC: 35 U/L (ref 5–45)
BASOPHILS # BLD AUTO: 0.02 THOUSANDS/ΜL (ref 0–0.1)
BASOPHILS NFR BLD AUTO: 0 % (ref 0–1)
BILIRUB SERPL-MCNC: 0.36 MG/DL (ref 0.2–1)
BUN SERPL-MCNC: 14 MG/DL (ref 5–25)
CALCIUM ALBUM COR SERPL-MCNC: 10.1 MG/DL (ref 8.3–10.1)
CALCIUM SERPL-MCNC: 9.4 MG/DL (ref 8.3–10.1)
CHLORIDE SERPL-SCNC: 102 MMOL/L (ref 100–108)
CO2 SERPL-SCNC: 21 MMOL/L (ref 21–32)
CREAT SERPL-MCNC: 0.89 MG/DL (ref 0.6–1.3)
EOSINOPHIL # BLD AUTO: 0 THOUSAND/ΜL (ref 0–0.61)
EOSINOPHIL NFR BLD AUTO: 0 % (ref 0–6)
ERYTHROCYTE [DISTWIDTH] IN BLOOD BY AUTOMATED COUNT: 14.9 % (ref 11.6–15.1)
FLUAV RNA NPH QL NAA+PROBE: NOT DETECTED
FLUBV RNA NPH QL NAA+PROBE: NOT DETECTED
GFR SERPL CREATININE-BSD FRML MDRD: 62 ML/MIN/1.73SQ M
GLUCOSE SERPL-MCNC: 128 MG/DL (ref 65–140)
GLUCOSE SERPL-MCNC: 279 MG/DL (ref 65–140)
GLUCOSE SERPL-MCNC: 291 MG/DL (ref 65–140)
GLUCOSE SERPL-MCNC: 297 MG/DL (ref 65–140)
GLUCOSE SERPL-MCNC: 350 MG/DL (ref 65–140)
GLUCOSE SERPL-MCNC: 400 MG/DL (ref 65–140)
HCT VFR BLD AUTO: 39.7 % (ref 34.8–46.1)
HGB BLD-MCNC: 12.6 G/DL (ref 11.5–15.4)
IMM GRANULOCYTES # BLD AUTO: 0.03 THOUSAND/UL (ref 0–0.2)
IMM GRANULOCYTES NFR BLD AUTO: 0 % (ref 0–2)
LYMPHOCYTES # BLD AUTO: 1.46 THOUSANDS/ΜL (ref 0.6–4.47)
LYMPHOCYTES NFR BLD AUTO: 20 % (ref 14–44)
MCH RBC QN AUTO: 26.9 PG (ref 26.8–34.3)
MCHC RBC AUTO-ENTMCNC: 31.7 G/DL (ref 31.4–37.4)
MCV RBC AUTO: 85 FL (ref 82–98)
MONOCYTES # BLD AUTO: 0.24 THOUSAND/ΜL (ref 0.17–1.22)
MONOCYTES NFR BLD AUTO: 3 % (ref 4–12)
NEUTROPHILS # BLD AUTO: 5.44 THOUSANDS/ΜL (ref 1.85–7.62)
NEUTS SEG NFR BLD AUTO: 77 % (ref 43–75)
NRBC BLD AUTO-RTO: 0 /100 WBCS
PLATELET # BLD AUTO: 282 THOUSANDS/UL (ref 149–390)
PMV BLD AUTO: 10.8 FL (ref 8.9–12.7)
POTASSIUM SERPL-SCNC: 4.3 MMOL/L (ref 3.5–5.3)
PROCALCITONIN SERPL-MCNC: <0.05 NG/ML
PROT SERPL-MCNC: 8.2 G/DL (ref 6.4–8.2)
RBC # BLD AUTO: 4.68 MILLION/UL (ref 3.81–5.12)
RSV RNA NPH QL NAA+PROBE: NOT DETECTED
SARS-COV-2 RNA NPH QL NAA+PROBE: DETECTED
SODIUM SERPL-SCNC: 135 MMOL/L (ref 136–145)
WBC # BLD AUTO: 7.19 THOUSAND/UL (ref 4.31–10.16)

## 2021-01-01 PROCEDURE — 94761 N-INVAS EAR/PLS OXIMETRY MLT: CPT

## 2021-01-01 PROCEDURE — 84145 PROCALCITONIN (PCT): CPT | Performed by: STUDENT IN AN ORGANIZED HEALTH CARE EDUCATION/TRAINING PROGRAM

## 2021-01-01 PROCEDURE — NC001 PR NO CHARGE: Performed by: INTERNAL MEDICINE

## 2021-01-01 PROCEDURE — 82948 REAGENT STRIP/BLOOD GLUCOSE: CPT

## 2021-01-01 PROCEDURE — 85025 COMPLETE CBC W/AUTO DIFF WBC: CPT | Performed by: STUDENT IN AN ORGANIZED HEALTH CARE EDUCATION/TRAINING PROGRAM

## 2021-01-01 PROCEDURE — 80053 COMPREHEN METABOLIC PANEL: CPT | Performed by: STUDENT IN AN ORGANIZED HEALTH CARE EDUCATION/TRAINING PROGRAM

## 2021-01-01 PROCEDURE — 99222 1ST HOSP IP/OBS MODERATE 55: CPT | Performed by: INTERNAL MEDICINE

## 2021-01-01 RX ORDER — INSULIN GLARGINE 100 [IU]/ML
10 INJECTION, SOLUTION SUBCUTANEOUS ONCE
Status: COMPLETED | OUTPATIENT
Start: 2021-01-01 | End: 2021-01-01

## 2021-01-01 RX ORDER — INSULIN GLARGINE 100 [IU]/ML
50 INJECTION, SOLUTION SUBCUTANEOUS EVERY MORNING
Status: DISCONTINUED | OUTPATIENT
Start: 2021-01-02 | End: 2021-01-02 | Stop reason: HOSPADM

## 2021-01-01 RX ADMIN — LISINOPRIL 40 MG: 10 TABLET ORAL at 08:46

## 2021-01-01 RX ADMIN — AMLODIPINE BESYLATE 10 MG: 10 TABLET ORAL at 08:49

## 2021-01-01 RX ADMIN — HEPARIN SODIUM 5000 UNITS: 5000 INJECTION INTRAVENOUS; SUBCUTANEOUS at 15:05

## 2021-01-01 RX ADMIN — INSULIN GLARGINE 30 UNITS: 100 INJECTION, SOLUTION SUBCUTANEOUS at 08:50

## 2021-01-01 RX ADMIN — METOPROLOL TARTRATE 50 MG: 50 TABLET, FILM COATED ORAL at 21:42

## 2021-01-01 RX ADMIN — HEPARIN SODIUM 5000 UNITS: 5000 INJECTION INTRAVENOUS; SUBCUTANEOUS at 06:00

## 2021-01-01 RX ADMIN — OXYCODONE HYDROCHLORIDE AND ACETAMINOPHEN 1000 MG: 500 TABLET ORAL at 21:42

## 2021-01-01 RX ADMIN — ZINC SULFATE 220 MG (50 MG) CAPSULE 220 MG: CAPSULE at 08:47

## 2021-01-01 RX ADMIN — INSULIN GLARGINE 10 UNITS: 100 INJECTION, SOLUTION SUBCUTANEOUS at 15:05

## 2021-01-01 RX ADMIN — DEXAMETHASONE SODIUM PHOSPHATE 6 MG: 4 INJECTION INTRA-ARTICULAR; INTRALESIONAL; INTRAMUSCULAR; INTRAVENOUS; SOFT TISSUE at 21:42

## 2021-01-01 RX ADMIN — INSULIN LISPRO 3 UNITS: 100 INJECTION, SOLUTION INTRAVENOUS; SUBCUTANEOUS at 17:10

## 2021-01-01 RX ADMIN — METOPROLOL TARTRATE 50 MG: 50 TABLET, FILM COATED ORAL at 08:48

## 2021-01-01 RX ADMIN — HEPARIN SODIUM 5000 UNITS: 5000 INJECTION INTRAVENOUS; SUBCUTANEOUS at 21:42

## 2021-01-01 RX ADMIN — INSULIN LISPRO 3 UNITS: 100 INJECTION, SOLUTION INTRAVENOUS; SUBCUTANEOUS at 06:11

## 2021-01-01 RX ADMIN — INSULIN LISPRO 4 UNITS: 100 INJECTION, SOLUTION INTRAVENOUS; SUBCUTANEOUS at 12:02

## 2021-01-01 RX ADMIN — INSULIN LISPRO 5 UNITS: 100 INJECTION, SOLUTION INTRAVENOUS; SUBCUTANEOUS at 21:41

## 2021-01-01 RX ADMIN — PRAVASTATIN SODIUM 40 MG: 40 TABLET ORAL at 17:11

## 2021-01-01 RX ADMIN — Medication 2000 UNITS: at 08:47

## 2021-01-01 RX ADMIN — OXYCODONE HYDROCHLORIDE AND ACETAMINOPHEN 1000 MG: 500 TABLET ORAL at 08:49

## 2021-01-01 NOTE — CASE MANAGEMENT
CM informed by Dr Daniella Tian pt will need Home O2 for d/c  Order placed and respiratory completed home Oxygen Qualifying Test     TC to pt room ext [phone]  Pt is COVID+  Pt aware she will need Home O2 for d/c  Agreeable to referral to [facility]  Referral in [place]  Dr Daniella Tian aware CM will need to confirm delivery for tomorrow prior to d/c

## 2021-01-01 NOTE — PROGRESS NOTES
INTERNAL MEDICINE RESIDENCY PROGRESS NOTE     Name: Saad Metz   Age & Sex: 79 y o  female   MRN: [id number]  Unit/Bed#: CW2 218-02   Encounter: [id number]  Team: SOD Team A    PATIENT INFORMATION     Name: Saad Metz   Age & Sex: 79 y o  female   MRN: [id number]  Hospital Stay Days: 0    ASSESSMENT/PLAN     Principal Problem:    COVID-19  Active Problems:    Benign essential hypertension    Dyslipidemia    Diabetes mellitus type 2, insulin dependent ([facility] )      Diabetes mellitus type 2, insulin dependent [facility])  Assessment & Plan  Lab Results   Component Value Date    HGBA1C 8 7 (H) 12/10/2020       Recent Labs     12/31/20  1649 12/31/20  2133 01/01/21  0610 01/01/21  1050   POCGLU 128 237* 279* 350*       Blood Sugar Average: Last 72 hrs:  (P) 248 5   Last hemoglobin A1c 8 7%, hold home oral diabetic medications  Appears patient is on Lantus 50 units a m  At home  Patient was started on Lantus 30 units in the morning given limited p o  Intake  Blood sugar elevated this morning likely secondary to recent start of Decadron  Plan:  · Will give patient additional dose of 10 units Lantus due to elevated blood sugars this morning  · Will restart patient on home dosing Lantus 50 units in a m  starting tomorrow    Dyslipidemia  Assessment & Plan  Continue statin therapy, pravastatin 40 mg q day    Benign essential hypertension  Assessment & Plan  Continue amlodipine 10 mg q day, lisinopril 40 mg q day, metoprolol tartrate 50 mg b i d  Patient has not taken blood pressure medications this morning, will give oral antihypertensives now, monitor blood pressure    * COVID-19  Assessment & Plan  Patient is a 71-year-old female with past medical history significant for hypertension, dyslipidemia, type 2 diabetes   COVID-19 positive with symptoms  Patient tested positive for COVID-19 and initially did not require any oxygen    On presentation patient only had ear pain on the right side but denied any other symptoms  Patient had an elevated temperature yesterday of 102 2  And is now currently on 2 L nasal cannula  Continue Vitamins including D3, C, zinc, multivitamin   Continue Remdesivir  Continue IV Decadron  Heparin DVT prophylaxis  Out of bed as tolerated, mobilization encourage, PT/OT as appropriate  Will reach O2 respiratory therapy for home O2 evaluation      Disposition:  Currently inpatient for COVID-19 mild pathway  Pending respiratory therapy evaluation for home oxygen  SUBJECTIVE     Patient seen and examined  No acute events overnight  Patient was resting comfortably without nasal cannula saturating at 90%  Her oxygen saturation went up to 93% after placing 2 L nasal cannula  Patient denies any complaints this morning  Patient had temp 102 2° at 8:00 p m  On 2020  This morning, patient denies any fevers, chills, shortness of breath, coughing, chest pain, abdominal pain, nausea, vomiting, headache, ear pain  OBJECTIVE     Vitals:    21 1105 21 1110 21 1115 21 1120   BP:       BP Location:       Pulse: 77 74 77 83   Resp:       Temp:       TempSrc:       SpO2: 91% (!) 89% 92% (!) 89%   Weight:          Temperature:   Temp (24hrs), Av 1 °F (37 3 °C), Min:97 5 °F (36 4 °C), Max:102 2 °F (39 °C)    Temperature: 97 5 °F (36 4 °C)  Intake & Output:  I/O       701 -  0700  07 -  0700  07 -  0700    IV Piggyback  250     Total Intake(mL/kg)  250 (4)     Net  +250                Weights:   IBW: -92 5 kg    Body mass index is 23 61 kg/m²  Weight (last 2 days)     Date/Time   Weight    20 1032   62 4 (137 57)            Physical Exam  LABORATORY DATA     Labs: I have personally reviewed pertinent reports    Results from last 7 days   Lab Units 21  0512 20  1232   WBC Thousand/uL 7 19 7 27   HEMOGLOBIN g/dL 12 6 12 9   HEMATOCRIT % 39 7 41 1   PLATELETS Thousands/uL 282 252   NEUTROS PCT % 77* 78*   MONOS PCT % 3* 7      Results from last 7 days   Lab Units 01/01/21  0512 12/31/20  1232   POTASSIUM mmol/L 4 3 5 0   CHLORIDE mmol/L 102 100   CO2 mmol/L 21 26   BUN mg/dL 14 10   CREATININE mg/dL 0 89 0 77   CALCIUM mg/dL 9 4 9 6   ALK PHOS U/L 58 58   ALT U/L 26 29   AST U/L 35 55*                      Results from last 7 days   Lab Units 12/31/20  1343   TROPONIN I ng/mL <0 02       IMAGING & DIAGNOSTIC TESTING     Radiology Results: I have personally reviewed pertinent reports  Xr Chest 1 View Portable    Result Date: 12/31/2020  Impression: Lung volumes are low and imaging was taken with large field-of-view, limited evaluation  Question minimal small patchy opacities at the bilateral lung bases, which could be related to subsegmental atelectasis and poor respiratory effort  Recommend follow-up PA and lateral chest radiographs for detailed evaluation to exclude underlying groundglass consolidations  The study was marked in [facility] for immediate notification  Workstation performed: [id number]     Other Diagnostic Testing: I have personally reviewed pertinent reports      ACTIVE MEDICATIONS     Current Facility-Administered Medications   Medication Dose Route Frequency    acetaminophen (TYLENOL) tablet 650 mg  650 mg Oral Q6H PRN    amLODIPine (NORVASC) tablet 10 mg  10 mg Oral Daily    ascorbic acid (VITAMIN C) tablet 1,000 mg  1,000 mg Oral Q12H [facility]    cholecalciferol (VITAMIN D3) tablet 2,000 Units  2,000 Units Oral Daily    dexamethasone (DECADRON) injection 6 mg  6 mg Intravenous Q24H    heparin (porcine) subcutaneous injection 5,000 Units  5,000 Units Subcutaneous Q8H [facility]    insulin glargine (LANTUS) subcutaneous injection 10 Units 0 1 mL  10 Units Subcutaneous Once    [START ON 1/2/2021] insulin glargine (LANTUS) subcutaneous injection 50 Units 0 5 mL  50 Units Subcutaneous QAM    insulin lispro (HumaLOG) 100 units/mL subcutaneous injection 1-5 Units  1-5 Units Subcutaneous TID AC    insulin lispro (HumaLOG) 100 units/mL subcutaneous injection 1-5 Units  1-5 Units Subcutaneous HS    lisinopril (ZESTRIL) tablet 40 mg  40 mg Oral Daily    metoprolol tartrate (LOPRESSOR) tablet 50 mg  50 mg Oral Q12H HUGH    zinc sulfate (ZINCATE) capsule 220 mg  220 mg Oral Daily    Followed by   Claudene Durham ON 1/7/2021] multivitamin-minerals (CENTRUM ADULTS) tablet 1 tablet  1 tablet Oral Daily    pravastatin (PRAVACHOL) tablet 40 mg  40 mg Oral Daily With Dinner    remdesivir Elvera Bald) 100 mg in sodium chloride 0 9 % 250 mL IVPB  100 mg Intravenous Q24H       VTE Pharmacologic Prophylaxis: Heparin  VTE Mechanical Prophylaxis: sequential compression device    Portions of the record may have been created with voice recognition software  Occasional wrong word or "sound a like" substitutions may have occurred due to the inherent limitations of voice recognition software    Read the chart carefully and recognize, using context, where substitutions have occurred   ==  Devin Tracy, [address] [facility]  Internal Medicine Residency PGY-1

## 2021-01-01 NOTE — UTILIZATION REVIEW
Initial Clinical Review    OBS 12/31 @ 1505 UPGRADED TO INPATIENT 01/01/21 FOR CONTINUED TX OF COVID-19     01/01/21 1609  Inpatient Admission Once     Transfer Service: General Medicine       Question Answer Comment   Admitting Physician MOSES SIMPSON    Level of Care Med Surg    Estimated length of stay More than 2 Midnights    Certification I certify that inpatient services are medically necessary for this patient for a duration of greater than two midnights  See H&P and MD Progress Notes for additional information about the patient's course of treatment  01/01/21 1609         Admission: Date/Time/Statement:   Admission Orders (From admission, onward)     Ordered        12/31/20 1505  Place in Observation  Once                   Orders Placed This Encounter   Procedures    Place in Observation     Standing Status:   Standing     Number of Occurrences:   1     Order Specific Question:   Admitting Physician     Answer:   [place] [[id number]]     Order Specific Question:   Level of Care     Answer:   Med Surg [[id number]]     ED Arrival Information     Expected Arrival Acuity Means of Arrival Escorted By Service Admission Type    - 12/31/2020 10:24 Less Urgent Walk-In Self General Medicine Urgent    Arrival Complaint    Earache/COVID Exp        Chief Complaint   Patient presents with    Earache     Patient complains of right ear pain     Assessment/Plan: 78 y/o female with PMHx of T2 DM on insulin, HTN, HLD presents to ED from home with c/o ear pain  Currently resolved with Tylenol  Suspect 2/2  to COVID-19 virus as  has tested positive and is developing symptoms  In ED COVID-19 testing done and pending at this time, patient has had intermittent sob, currently maintaining O2 sat >92% on with ambulation  CXR with low lung volumes, limited evaluation 2/2 to position, patient may need further imaging studies depending on symptoms      Admit observation to M/S/Tele unit -- place on mild COVID-19 pathway, will hold off on famotidine, dexamethasone, remdesivir as patient is not requiring any O2 at this time  Obtain cardiac markers, inflammatory markers, routine labs, blood type  Vit D, C and zinc  OOB as guero  SCD's/Hep sq for DVT ppx  Continue statin  Fingerstick glucose checks w/ ssi  1/1 -- O2 sat 90% at rest on R, increased to 93% with 2L nc  Had temp of 102 2° yesterday  Continue Vit D3, C, zinc, MVI  Continue remdesivir, IV decadron  Hep sc/SCD's  OOB as guero  encouarage mobilization  PT/OT evals  May need home O2  Continue statin   Blood sugar elevated this AM  Restart lantus in AM      ED Triage Vitals   Temperature Pulse Respirations Blood Pressure SpO2   12/31/20 1038 12/31/20 1032 12/31/20 1032 12/31/20 1032 12/31/20 1032   98 6 °F (37 °C) (!) 116 18 (!) 186/87 92 %      Temp Source Heart Rate Source Patient Position - Orthostatic VS BP Location FiO2 (%)   12/31/20 1038 12/31/20 1032 12/31/20 1032 12/31/20 1032 --   Oral Monitor Lying Right arm       Pain Score       12/31/20 1032       8          Wt Readings from Last 1 Encounters:   12/31/20 62 4 kg (137 lb 9 1 oz)     Additional Vital Signs:   Date/Time  Temp  Pulse  Resp  BP  MAP (mmHg)  SpO2  Calculated FIO2 (%) - Nasal Cannula  Nasal Cannula O2 Flow Rate (L/min)  O2 Device   01/01/21 1040  --  69  --  --  --  90 %  --  --  None (Room air)   01/01/21 1000  --  78  --  --  --  89 %Abnormal   --  --  --   01/01/21 0950  --  77  --  --  --  89 %Abnormal   --  --  --   01/01/21 0940  --  81  --  --  --  89 %Abnormal   --  --  --   01/01/21 0930  --  85  --  --  --  91 %  --  --  --   01/01/21 0920  --  108Abnormal   --  --  --  90 %  --  --  --   01/01/21 0820  --  98  --  133/67  89  93 %  --  --  --   01/01/21 08:11:24  --  87 18 133/67  89  91 %  --  --  --   01/01/21 0810  97 5 °F (36 4 °C)  87 18 133/67  89  97 %  --  --  --   01/01/21 0800  --  78  --  --  --  94 %  --  --  --   01/01/21 0700  --  76  --  --  --  89 %Abnormal   --  --  -- 12/31/20 23:42:18  97 6 °F (36 4 °C)  77  20  118/61  80  93 %  28  2 L/min  Nasal cannula   12/31/20 2300  --  --  --  --  --  94 %  --  --  --   12/31/20 22:08:50  --  89  --  --  --  93 %  28  2 L/min  Nasal cannula   12/31/20 2100  --  --  --  --  --  89 %Abnormal   --  --  --   12/31/20 20:20:12  102 2 °F (39 °C)Abnormal   115Abnormal   20  150/70  --  91 %  --  --  --   12/31/20 1630  --  116Abnormal   20  154/79  108  91 %  --  --  None (Room air)   12/31/20 1315  --  100  22  --  --  92 %  --  --  None (Room air)   12/31/20 1215  --  98  18  --  --  93 %  --  --  None (Room air)   12/31/20 1038  98 6 °F (37 °C)  --  --  --  --  --  --  --  --   12/31/20 1032  --  116Abnormal   18  186/87Abnormal   --  92 %  --  --  None (Room air)       Pertinent Labs/Diagnostic Test Results:   CXR 12/31 -- Lung volumes are low and imaging was taken with large field-of-view, limited evaluation  Question minimal small patchy opacities at the bilateral lung bases, which could be related to subsegmental atelectasis and poor respiratory effort   Recommend follow-up PA and lateral chest radiographs for detailed evaluation to exclude underlying groundglass consolidations      Results from last 7 days   Lab Units 12/31/20  1640   SARS-COV-2  Positive*     Results from last 7 days   Lab Units 01/01/21  0512 12/31/20  1232   WBC Thousand/uL 7 19 7 27   HEMOGLOBIN g/dL 12 6 12 9   HEMATOCRIT % 39 7 41 1   PLATELETS Thousands/uL 282 252   NEUTROS ABS Thousands/µL 5 44 5 62     Results from last 7 days   Lab Units 01/01/21  0512 12/31/20  1232   SODIUM mmol/L 135* 134*   POTASSIUM mmol/L 4 3 5 0   CHLORIDE mmol/L 102 100   CO2 mmol/L 21 26   ANION GAP mmol/L 12 8   BUN mg/dL 14 10   CREATININE mg/dL 0 89 0 77   EGFR ml/min/1 73sq m 62 74   CALCIUM mg/dL 9 4 9 6     Results from last 7 days   Lab Units 01/01/21  0512 12/31/20  1232   AST U/L 35 55*   ALT U/L 26 29   ALK PHOS U/L 58 58   TOTAL PROTEIN g/dL 8 2 8 5*   ALBUMIN g/dL 3 1* 3 4*   TOTAL BILIRUBIN mg/dL 0 36 0 53     Results from last 7 days   Lab Units 01/01/21  0610 12/31/20  2133 12/31/20  1649   POC GLUCOSE mg/dl 279* 237* 128     Results from last 7 days   Lab Units 01/01/21  0512 12/31/20  1232   GLUCOSE RANDOM mg/dL 297* 116     Results from last 7 days   Lab Units 12/31/20  1232   CK TOTAL U/L 84     Results from last 7 days   Lab Units 12/31/20  1343   TROPONIN I ng/mL <0 02     Results from last 7 days   Lab Units 12/31/20  1620   D-DIMER QUANTITATIVE ug/ml FEU 0 55*     Results from last 7 days   Lab Units 01/01/21  0516 12/31/20  1620   PROCALCITONIN ng/ml <0 05 <0 05     Results from last 7 days   Lab Units 12/31/20  1232   NT-PRO BNP pg/mL 47     Results from last 7 days   Lab Units 12/31/20  1232   FERRITIN ng/mL 73     Results from last 7 days   Lab Units 12/31/20  1232   CRP mg/L 113 0*     Results from last 7 days   Lab Units 12/31/20  1640   INFLUENZA A PCR  Negative   INFLUENZA B PCR  Negative   RSV PCR  Negative     ED Treatment:   Medication Administration from 12/31/2020 1024 to 12/31/2020 1852       Date/Time Order Dose Route Action     12/31/2020 1327 fluticasone (FLONASE) 50 mcg/act nasal spray 1 spray 1 spray Each Nare Given     12/31/2020 1650 amLODIPine (NORVASC) tablet 10 mg 10 mg Oral Given     12/31/2020 1650 lisinopril (ZESTRIL) tablet 40 mg 40 mg Oral Given     12/31/2020 1651 pravastatin (PRAVACHOL) tablet 40 mg 40 mg Oral Given     12/31/2020 1651 cholecalciferol (VITAMIN D3) tablet 2,000 Units 2,000 Units Oral Given     12/31/2020 1650 zinc sulfate (ZINCATE) capsule 220 mg 220 mg Oral Given     Past Medical History:   Diagnosis Date    Anemia     Chronic idiopathic constipation     Colon polyp     Diabetes mellitus ([facility] [address] )     Diverticulitis, colon     Esophageal reflux     Eustachian tube dysfunction     Gastrointestinal hemorrhage     Hypertension     Myocardial infarction (HCC)     Non-healing skin lesion     Palpitations     Rectal bleeding     Skin lesion of chest wall      Present on Admission:   Benign essential hypertension   Dyslipidemia   Suspected COVID-19 virus infection   (Resolved) Ear pain      Admitting Diagnosis: Ear ache [H92 09]  COVID toes [U07 1, R23 8]  COVID-19 [U07 1]  Age/Sex: 79 y o  female  Admission Orders:  Scheduled Medications:  amLODIPine, 10 mg, Oral, Daily  ascorbic acid, 1,000 mg, Oral, Q12H HUGH  cholecalciferol, 2,000 Units, Oral, Daily  dexamethasone, 6 mg, Intravenous, Q24H  heparin (porcine), 5,000 Units, Subcutaneous, Q8H HUGH  insulin glargine, 30 Units, Subcutaneous, QAM  insulin lispro, 1-5 Units, Subcutaneous, TID AC  insulin lispro, 1-5 Units, Subcutaneous, HS  lisinopril, 40 mg, Oral, Daily  metoprolol tartrate, 50 mg, Oral, Q12H HUGH  zinc sulfate, 220 mg, Oral, Daily    Followed by  Lissette Lopez ON 1/7/2021] multivitamin-minerals, 1 tablet, Oral, Daily  pravastatin, 40 mg, Oral, Daily With Dinner  remdesivir, 100 mg, Intravenous, Q24H       PRN Meds:  acetaminophen, 650 mg, Oral, Q6H PRN        Network Utilization Review Department  ATTENTION: Please call with any questions or concerns to [phone] and carefully listen to the prompts so that you are directed to the right person  All voicemails are confidential   Darleen Jitendra all requests for admission clinical reviews, approved or denied determinations and any other requests to dedicated fax number below belonging to the campus where the patient is receiving treatment   List of dedicated fax numbers for the Facilities:  [address] DENIALS (Administrative/Medical Necessity) [phone]   [address] (Maternity/NICU/Pediatrics) 261 [facility],[address] Floor [address] Trent Yanez 66[address] (Page Lawman) [phone]     [address] [facility] 28 Caroline Villanueva 1481 [phone]   [address]51 [phone]

## 2021-01-01 NOTE — PLAN OF CARE
Problem: PAIN - ADULT  Goal: Verbalizes/displays adequate comfort level or baseline comfort level  Description: Interventions:  - Encourage patient to monitor pain and request assistance  - Assess pain using appropriate pain scale  - Administer analgesics based on type and severity of pain and evaluate response  - Implement non-pharmacological measures as appropriate and evaluate response  - Consider cultural and social influences on pain and pain management  - Notify physician/advanced practitioner if interventions unsuccessful or patient reports new pain  Outcome: Progressing     Problem: INFECTION - ADULT  Goal: Absence or prevention of progression during hospitalization  Description: INTERVENTIONS:  - Assess and monitor for signs and symptoms of infection  - Monitor lab/diagnostic results  - Monitor all insertion sites, i e  indwelling lines, tubes, and drains  - Monitor endotracheal if appropriate and nasal secretions for changes in amount and color  - [place] appropriate cooling/warming therapies per order  - Administer medications as ordered  - Instruct and encourage patient and family to use good hand hygiene technique  - Identify and instruct in appropriate isolation precautions for identified infection/condition  Outcome: Progressing  Goal: Absence of fever/infection during neutropenic period  Description: INTERVENTIONS:  - Monitor WBC    Outcome: Progressing     Problem: SAFETY ADULT  Goal: Patient will remain free of falls  Description: INTERVENTIONS:  - Assess patient frequently for physical needs  -  Identify cognitive and physical deficits and behaviors that affect risk of falls    -  [place] fall precautions as indicated by assessment   - Educate patient/family on patient safety including physical limitations  - Instruct patient to call for assistance with activity based on assessment  - Modify environment to reduce risk of injury  - Consider OT/PT consult to assist with strengthening/mobility  Outcome: Progressing  Goal: Maintain or return to baseline ADL function  Description: INTERVENTIONS:  -  Assess patient's ability to carry out ADLs; assess patient's baseline for ADL function and identify physical deficits which impact ability to perform ADLs (bathing, care of mouth/teeth, toileting, grooming, dressing, etc )  - Assess/evaluate cause of self-care deficits   - Assess range of motion  - Assess patient's mobility; develop plan if impaired  - Assess patient's need for assistive devices and provide as appropriate  - Encourage maximum independence but intervene and supervise when necessary  - Involve family in performance of ADLs  - Assess for home care needs following discharge   - Consider OT consult to assist with ADL evaluation and planning for discharge  - Provide patient education as appropriate  Outcome: Progressing  Goal: Maintain or return mobility status to optimal level  Description: INTERVENTIONS:  - Assess patient's baseline mobility status (ambulation, transfers, stairs, etc )    - Identify cognitive and physical deficits and behaviors that affect mobility  - Identify mobility aids required to assist with transfers and/or ambulation (gait belt, sit-to-stand, lift, walker, cane, etc )  - [place] fall precautions as indicated by assessment  - Record patient progress and toleration of activity level on Mobility SBAR; progress patient to next Phase/Stage  - Instruct patient to call for assistance with activity based on assessment  - Consider rehabilitation consult to assist with strengthening/weightbearing, etc   Outcome: Progressing     Problem: DISCHARGE PLANNING  Goal: Discharge to home or other facility with appropriate resources  Description: INTERVENTIONS:  - Identify barriers to discharge w/patient and caregiver  - Arrange for needed discharge resources and transportation as appropriate  - Identify discharge learning needs (meds, wound care, etc )  - Arrange for interpretive services to assist at discharge as needed  - Refer to Case Management Department for coordinating discharge planning if the patient needs post-hospital services based on physician/advanced practitioner order or complex needs related to functional status, cognitive ability, or social support system  Outcome: Progressing     Problem: Knowledge Deficit  Goal: Patient/family/caregiver demonstrates understanding of disease process, treatment plan, medications, and discharge instructions  Description: Complete learning assessment and assess knowledge base    Interventions:  - Provide teaching at level of understanding  - Provide teaching via preferred learning methods  Outcome: Progressing

## 2021-01-01 NOTE — PROGRESS NOTES
RN noted pts O2 saturation to be 89-90% on room air at rest  Pt put on 2L nasal cannula, pt has no complaints of SOB or trouble breathing  Pt O2 sats now 94-95% on 2L  SOD made aware  Remdesivir and Decadron ordered  Will continue to monitor

## 2021-01-01 NOTE — RESPIRATORY THERAPY NOTE
Home Oxygen Qualifying Test       Patient name: Eleuterio Doan        : 1940   Date of Test:  2021  Diagnosis:      Home Oxygen Test:    **Medicare Guidelines require item(s) 1-5 on all ambulatory patients or 1 and 2 on non-ambulatory patients  1   Baseline SPO2 on Room Air at rest 90 %  2   SPO2 during exercise on Room Air 86%  During exercise monitor SpO2  If SPO2 increases >=89% with ambulation do not add supplemental             oxygen  If <= 88% on room air add O2 via NC and titrate patient  Patient must be ambulated with O2 and titrated to > 88% with exertion  3   SPO2 on Oxygen at rest 93% 2 lpm     4   SPO2 during exercise on Oxygen  90% a liter flow of 2lpm     5   Exercise performed:          walking, duration 6 (min)          [x]  Supplemental Home Oxygen is indicated  []  Client does not qualify for home oxygen  Respiratory Additional Notes- pt placed on room air  SpO2 = 90%  Pt walked for 4 minutes on room air SpO2 decreased to 86% pt placed on 2 L n/c at rest SpO2 increased to 93%  SpO2 dropped to 90% while ambulating   Pt qualifies for Home O 2 at this time     Yuliya Chiu, RT

## 2021-01-02 VITALS
HEIGHT: 65 IN | RESPIRATION RATE: 18 BRPM | TEMPERATURE: 97.5 F | WEIGHT: 137.57 LBS | SYSTOLIC BLOOD PRESSURE: 130 MMHG | HEART RATE: 79 BPM | OXYGEN SATURATION: 93 % | DIASTOLIC BLOOD PRESSURE: 61 MMHG | BODY MASS INDEX: 22.92 KG/M2

## 2021-01-02 LAB
GLUCOSE SERPL-MCNC: 320 MG/DL (ref 65–140)
GLUCOSE SERPL-MCNC: 323 MG/DL (ref 65–140)
GLUCOSE SERPL-MCNC: 350 MG/DL (ref 65–140)
GLUCOSE SERPL-MCNC: 355 MG/DL (ref 65–140)

## 2021-01-02 PROCEDURE — 99232 SBSQ HOSP IP/OBS MODERATE 35: CPT | Performed by: INTERNAL MEDICINE

## 2021-01-02 PROCEDURE — NC001 PR NO CHARGE: Performed by: INTERNAL MEDICINE

## 2021-01-02 PROCEDURE — 82948 REAGENT STRIP/BLOOD GLUCOSE: CPT

## 2021-01-02 RX ORDER — PREDNISONE 20 MG/1
40 TABLET ORAL DAILY
Qty: 16 TABLET | Refills: 0 | Status: SHIPPED | OUTPATIENT
Start: 2021-01-02 | End: 2021-01-10

## 2021-01-02 RX ORDER — ZINC SULFATE 50(220)MG
220 CAPSULE ORAL DAILY
Qty: 4 CAPSULE | Refills: 0 | Status: SHIPPED | OUTPATIENT
Start: 2021-01-02 | End: 2021-07-09 | Stop reason: ALTCHOICE

## 2021-01-02 RX ORDER — MULTIVITAMIN/IRON/FOLIC ACID 18MG-0.4MG
1 TABLET ORAL DAILY
Qty: 30 TABLET | Refills: 1 | Status: SHIPPED | OUTPATIENT
Start: 2021-01-07

## 2021-01-02 RX ORDER — ACETAMINOPHEN 325 MG/1
650 TABLET ORAL EVERY 6 HOURS PRN
Qty: 1 BOTTLE | Refills: 0 | Status: SHIPPED | OUTPATIENT
Start: 2021-01-02

## 2021-01-02 RX ORDER — FAMOTIDINE 20 MG/1
20 TABLET, FILM COATED ORAL DAILY
Qty: 8 TABLET | Refills: 0 | Status: SHIPPED | OUTPATIENT
Start: 2021-01-02 | End: 2021-07-09 | Stop reason: ALTCHOICE

## 2021-01-02 RX ADMIN — OXYCODONE HYDROCHLORIDE AND ACETAMINOPHEN 1000 MG: 500 TABLET ORAL at 07:51

## 2021-01-02 RX ADMIN — INSULIN LISPRO 4 UNITS: 100 INJECTION, SOLUTION INTRAVENOUS; SUBCUTANEOUS at 07:52

## 2021-01-02 RX ADMIN — REMDESIVIR 100 MG: 100 INJECTION, POWDER, LYOPHILIZED, FOR SOLUTION INTRAVENOUS at 00:53

## 2021-01-02 RX ADMIN — PRAVASTATIN SODIUM 40 MG: 40 TABLET ORAL at 16:36

## 2021-01-02 RX ADMIN — INSULIN LISPRO 5 UNITS: 100 INJECTION, SOLUTION INTRAVENOUS; SUBCUTANEOUS at 16:37

## 2021-01-02 RX ADMIN — METOPROLOL TARTRATE 50 MG: 50 TABLET, FILM COATED ORAL at 07:51

## 2021-01-02 RX ADMIN — ZINC SULFATE 220 MG (50 MG) CAPSULE 220 MG: CAPSULE at 07:50

## 2021-01-02 RX ADMIN — INSULIN LISPRO 3 UNITS: 100 INJECTION, SOLUTION INTRAVENOUS; SUBCUTANEOUS at 11:36

## 2021-01-02 RX ADMIN — LISINOPRIL 40 MG: 10 TABLET ORAL at 07:52

## 2021-01-02 RX ADMIN — HEPARIN SODIUM 5000 UNITS: 5000 INJECTION INTRAVENOUS; SUBCUTANEOUS at 07:00

## 2021-01-02 RX ADMIN — INSULIN GLARGINE 50 UNITS: 100 INJECTION, SOLUTION SUBCUTANEOUS at 07:58

## 2021-01-02 RX ADMIN — Medication 2000 UNITS: at 07:51

## 2021-01-02 RX ADMIN — AMLODIPINE BESYLATE 10 MG: 10 TABLET ORAL at 07:51

## 2021-01-02 NOTE — DISCHARGE INSTR - AVS FIRST PAGE
Please follow up with PCP within 1 week  Please take all medications as prescribed  Isolate for 10 days since positive COVID test on 12/31/2020 AND 24 hours after fever-free without need for fever-reducing medications

## 2021-01-02 NOTE — DISCHARGE INSTRUCTIONS
Please  Take all medications as prescribed  Please utilize oxygen supplementation throughout the day (even during daily activities)  Please follow up with primary care doctor within 1-2 weeks post discharge  Please continue to isolate yourself through January 10th 2021  Continue face mask, continue social distance thing practices and hand hygiene  Please continue checking blood glucose 3 times daily  Please take an extra 5-10 units of Lantus at bedtime while still on oral prednisone  Please discontinue this practice once oral prednisone is completed  If you have any questions regarding this please call your primary care doctor clinic  COVID-19 (Enfermedad por coronavirus 2019)   LO QUE NECESITA SABER:   COVID-19 es la enfermedad causada por el nuevo coronavirus descubierto por primera vez en diciembre de 2019  Los coronavirus generalmente causan infecciones de las vías respiratorias superiores (nariz, garganta y pulmones), maureen un resfriado  El nuevo virus también puede causar afecciones respiratorias inferiores graves, maureen la neumonía o el síndrome de dificultad respiratoria aguda (SDRA)  Cualquier persona puede desarrollar problemas graves a causa del nuevo virus, katie el riesgo es mayor si tiene 72 años o [place]  Un sistema inmunitario débil, la diabetes o salvatore enfermedad cardíaca o pulmonar también pueden aumentar el riesgo  INSTRUCCIONES SOBRE EL ESTELITA HOSPITALARIA:   Si lakshmi que usted o alguien que conoce puede estar infectado: Satish lo siguiente para proteger a otras personas:  · Si se requiere atención de emergencia, avise al operador de la posible infección, o llame antes y avise al servicio de urgencias  · Llame a un médico para recibir instrucciones si los síntomas son leves  Cualquier persona que pueda estar infectada no  debe llegar sin llamar juan  El médico deberá proteger a los miembros del personal y a otros pacientes      · La persona que puede estar infectada debe usar un tapabocas mientras reciben [facility]  [place] ayudará a reducir el riesgo de infectar a otras personas  Nadie que sea kenneth de 2 años, que tenga problemas respiratorios o que no pueda quitárselo debe usar un tapabocas  El médico puede darle instrucciones para cualquier persona que no pueda usar un tapabocas  Llame al [place] local de emergencias (911 en los [place]) o pídale a alguien que lo lleve al departamento de emergencias si:  · Usted tiene dificultad para respirar o falta de aliento mientras descansa  · Usted siente presión o dolor en el pecho que dura más de 5 minutos  · Usted tiene confusión o es difícil despertarlo  · Svetlana labios o kristopher están azules  · Usted tiene fiebre de 104 ºF (40 °C) o más  Llame a asher médico si:  · No  tiene síntomas de COVID-19 katie tuvo contacto físico cercano dentro de los 14 días con alguien que salinas positivo  · Usted tiene preguntas o inquietudes acerca de asher condición o cuidado  Medicamentos: Es posible que usted necesite alguno de los siguientes:  · Los descongestionantes ayudan a reducir la congestión nasal y [facility] a respirar más fácilmente  Si opal pastillas descongestionantes, pueden causarle agitación o problemas para dormir  No use aerosol descongestionante por más de unos cuantos días  · Los jarabes para la tos ayudan a reducir la tos  Pregúntele a asher médico cuál tipo de medicamento para la tos es mejor para usted  · Acetaminofén blanca el dolor y baja la fiebre  Está disponible sin receta médica  Pregunte la cantidad y la frecuencia con que debe tomarlos  [address]  Mary las etiquetas de todos los demás medicamentos que esté usando para saber si también contienen acetaminofén, o pregunte a asher médico o farmacéutico  El acetaminofén puede causar daño en el hígado cuando no se opal de forma correcta  No use más de 4 gramos (4000 miligramos) en total de acetaminofeno en un día      · [place], maureen el [facility], [facility] a Dewey Diaz inflamación, el dolor y la [place]  Los ZIGGY pueden causar sangrado estomacal o problemas renales en ciertas personas  Si usted opal un medicamento anticoagulante, siempre pregúntele a asher médico si los ZIGGY son seguros para usted  Siempre ric la etiqueta de consuelo medicamento y [place] instrucciones  · [place] lyubov medicamentos maureen se le haya indicado  Consulte con asher médico si usted lakshmi que asher medicamento no le está ayudando o si presenta efectos secundarios  Infórmele si es alérgico a cualquier medicamento  Mantenga salvatore lista actualizada de los [place], las vitaminas y los productos herbales que opal  Incluya los siguientes datos de los medicamentos: cantidad, frecuencia y motivo de administración  Traiga con usted la lista o los envases de las píldoras a lyubov citas de seguimiento  Lleve la lista de los medicamentos con usted en gustavo de salvatore emergencia  Cómo se propaga el coronavirus 2019: El virus se propaga rápida y fácilmente  Puede infectarse si está en contacto con salvatore gran cantidad del virus, incluso paresh poco tiempo  También puede infectarse por estar cerca de salvatore pequeña cantidad del virus paresh mucho tiempo  A continuación se indican las formas en que se lakshmi que se propaga el virus, katie es posible que surja más información:  · Las gotitas son la forma más común de propagación de todos los coronavirus  El virus puede viajar en gotitas que se katina cuando salvatore persona habla, tose o estornuda  Cualquiera que respire las gotitas o que las gotitas se le metan en los ojos puede infectarse con el virus  Se lakshmi que el contacto personal cercano con salvatore persona infectada es la principal forma de propagación del virus  El contacto personal cercano significa estar a menos de 6 pies (2 metros) de otra persona  · El contacto de persona a persona puede propagar el virus  Por ejemplo, salvatore persona con el virus en lyubov phylicia puede propagarlo al darle la mano a alguien   En consuelo momento, no parece que el virus pueda transmitirse a un bebé paresh el embarazo o el [address]  El bebé puede infectarse después de nacer por contacto de persona a persona  El virus tampoco parece propagarse por la [place]  Si está embarazada o amamantando, hable con asher médico u obstetra sobre cualquier preocupación que tenga  · El virus puede permanecer en objetos y superficies  Salvatore persona puede contraer el virus en lyubov phylicia al tocar el objeto o la superficie  La infección se produce si la persona se toca los ojos o la boca sin antes lavarse las phylicia  Aún no se sabe cuánto tiempo puede permanecer el virus en un objeto o superficie  Por eso es importante limpiar todas las superficies que se usan regularmente  · Un animal infectado puede ser [place] de infectar a salvatore persona que lo toque  [place] puede ocurrir en [employer] vivos o en salvatore nolan  Cómo todo el jeannine puede reducir el riesgo de COVID-19: La mejor manera de prevenir la infección es evitar a cualquiera que esté infectado, katie esto puede ser difícil de lograr  Salvatore persona infectada puede propagar el virus antes de que aparezcan los signos o síntomas, o incluso si los signos o síntomas nunca se desarrollan  Lo siguiente puede ayudar a reducir el riesgo de infección:      · [employer] las phylicia con frecuencia paresh el día  Utilice agua y [place]  Frótese las phylicia enjabonadas, [facility] dedos  Lávese el frente y el dorso de cada [place], y [place] dedos  Use los dedos de salvatore mano para restregar debajo de las uñas de la [place]  Lávese paresh al menos 20 segundos  Enjuague con agua corriente caliente paresh varios segundos  Luego séquese las phylicia con salvatore toalla limpia o salvatore toalla de papel  Puede usar un desinfectante para phylicia que contenga alcohol, si no hay agua y jabón disponibles  No se toque los ojos, la nariz o la boca sin antes [employer]  Enseñe a los niños a lavarse las phylicia y a usar el desinfectante de [address]  · Cúbrase al toser o estornudar   [place] rik que las gotitas viajen de usted a los demás  Gire la kristopher y cúbrase la boca y la Denis Harp con un pañuelo  Deseche el pañuelo  Use el ángulo del brazo si no tiene un pañuelo disponible  Luego lávese las phylicia con agua y [place] o use un desinfectante de phylicia  Gire la nancie y cúbrase si está cerca de alguien que está estornudando o tosiendo  Enséñeles a los niños a cubrirse al toser o estornudar  · [address] distanciamiento social a nivel local, nacional y [place]  El distanciamiento social significa que las personas evitan el contacto físico cercano para que el virus no se propague de Evern Naas persona a otra  Mantenga al menos 6 pies (2 metros) de distancia entre usted y los demás  También mantenga esta distancia de cualquiera que venga a asher casa, maureen alguien que satish salvatore entrega  · Acostúmbrese a no tocarse la kristopher  No se sabe cuánto tiempo puede permanecer el virus en los objetos y las superficies  Si tiene el virus [employer], puede transferirlo a los ojos, la nariz o la boca e infectarse  También puede transferirlo a los objetos, las [address]  Tenga cuidado con lo que toca [employer]  Por ejemplo, los pasamanos y botones de ascensor  Intente no tocar nada con las phylicia descubiertas a menos que sea necesario  [employer] las phylicia antes de salir de asher casa y cuando regresa  · Limpie y desinfecte a menudo los objetos y las superficies de alto contacto  Use salvatore solución o toallitas desinfectantes  Puede hacer salvatore solución diluyendo 4 cucharaditas de lejía en 1 cuarto de galón (4 tazas) de agua  Limpie y desinfecte aunque piense que nadie que viva o haya entrado en asher casa esté infectado con el virus  Puede limpiar los objetos con un paño desinfectante antes de llevarlos a asher casa  [employer] las phylicia después de manipular cualquier cosa que traiga a asher casa  · Satish que asher sistema inmunitario esté lo más saludable posible   Un sistema inmunitario debilitado lo hace más vulnerable al nuevo coronavirus  No hay ninguna vacuna contra la COVID-19 disponible todavía  Las vacunas, maureen la vacuna contra la gripe y la neumonía, pueden ayudar al sistema inmunitario  Asher médico le indicará qué vacunas debe recibir y cuándo aplicárselas  Mantenga asher sistema inmunitario lo más beverley posible  No fume  Consuma alimentos saludables, tj ejercicio regularmente e intente controlar el estrés  Acuéstese y levántese a la misma hora todos los días  Pautas de distanciamiento social: Las normas de distanciamiento social nacionales y locales varían  Las reglas pueden cambiar con el tiempo a medida que se levantan las restricciones  Las restricciones pueden volver a aplicarse si se produce un brote en el lugar donde usted vive  Es importante conocer y seguir todas las reglas de distanciamiento social actuales en asher Stephie Silvano  Las siguientes son reglas generales al respecto:  · Limite los viajes fuera de asher casa  Es posible que se le entreguen alimentos, medicinas y otros suministros  Si es posible, tj que dejen los [employer] entregan en asher avinash o en [place]  Intente que nadie le entregue un [employer]  Estará tan cerca de la persona que el virus puede propagarse entre ustedes  · No tenga contacto físico cercano con nadie que no viva en asher casa  No le dé la mano, abrace o bese a salvatore persona maureen saludo  Párese o camine lo más lejos posible de los demás  Si tiene que usar el transporte público ([address]), intente sentarse o pararse lejos de los demás  Puede mantenerse conectado de forma sales con los demás a través de llamadas telefónicas, mensajes de correo electrónico, sitios web de [employer] y videochats  Verifique cómo están las personas que pueden tener dificultades para distanciarse socialmente, o que viven solas   Pregunte a los administradores de los asilos de ancianos o de las instalaciones de cuidados a giacomo plazo cómo puede comunicarse con seguridad con alguien que vive allí  · Use un tapabocas de janie cuando esté cerca de otras personas que no viven en asher casa  Los tapabocas ayudan evitar que el virus se propague a otras personas en las gotitas  Puede usar un tapabocas transparente si alguien necesita leer lyubov labios  Gala es un tapabocas con un plástico sobre el área de la boca para que se puedan becka los labios  No utilice tapabocas que tengan válvulas de respiración o respiraderos  El virus puede salir por la válvula o el respiradero y contagiar a otros  No se quite el tapabocas para hablar, toser o estornudar  No utilice tapabocas en niños menores de 2 años ni en personas que tengan problemas respiratorios o no puedan quitárselo  · Permita que solo los profesionales médicos u otros profesionales ingresen a asher casa  Use el tapabocas y recuérdeles a los profesionales que usen un tapabocas  Recuérdeles que se laven las phylicia cuando lleguen y antes de irse  No  deje entrar a nadie que no viva en asher casa, aunque no esté enfermo  [place] persona puede contagiar el virus a otros antes de que comiencen los síntomas de COVID-19  Algunas personas ni siquiera desarrollan síntomas  Los niños suelen tener síntomas leves o ningún síntoma  Puede ser difícil decirle a un harinder que no lo abrace ni lo bese  Explíquele que así es maureen puede ayudarlo a mantenerse saludable  · No vaya a la casa de [place] persona, a menos que sea necesario  No vaya de visita, aunque la persona esté jr  Vaya solo si necesita ayudarla  Asegúrese de que ambos usen un tapabocas mientras esté allí  · Evite las grandes reuniones y las multitudes  Las reuniones o multitudes de 10 o más individuos pueden hacer que el virus se propague  Por ejemplo, las reuniones incluyen fiestas, eventos deportivos, servicios religiosos y conferencias  Se pueden formar multitudes en [address], los parques y las atracciones turísticas  Protéjase manteniéndose alejado de las grandes reuniones y multitudes      · Pregunte a asher médico de qué otra forma puede [employer]  Es posible que pueda tener citas sin tener que ir al consultorio del médico  Algunos médicos ofrecen citas por teléfono, video u otros tipos de citas  También puede obtener recetas de lyubov medicamentos para varios meses de salvatore vez  · Manténgase a juan francisco si debe que salir a trabajar  Es posible que tenga un trabajo que solo se puede hacer fuera de asher casa  Mantenga la distancia física entre usted y los demás trabajadores tanto maureen sea posible  Siga las reglas de asher empleador para que todos estén a juan francisco  Si tiene COVID-19 y se está recuperando en casa: [employer] darán instrucciones específicas que debe seguir  Las siguientes son pautas generales para recordarle cómo mantener a los demás a juan francisco hasta que usted esté sam:  · Lávese las phylicia frecuentemente  Use agua y jabón tanto maureen sea posible  Puede usar un desinfectante para phylicia que contenga alcohol, si no hay agua y jabón disponibles  No comparta toallas con nadie  Si [place] toallas de papel, deséchelas en un cubo de basura recubierto que se guarda en asher habitación o área  Use un cubo de basura cubierto, si es posible  · No salga de asher casa a menos que sea necesario  Es posible que tenga que ir al [place] de asher médico para hacerse chequeos o para resurtir salvatore [place] An[place]  No llegue al consultorio del médico sin salvatore elder  Los médicos tienen que hacer que lyubov consultorios apollo seguros para el personal y [de-identified] pacientes  · No entre en contacto físico cercano con nadie, juan francisco que sea necesario  Solo tenga un contacto físico cercano con salvatore persona que lo cuide directamente, o con un bebé o harinder que deba cuidar  Los miembros de la trudi y los amigos no deben visitarlo  Si es posible, quédese en un área o habitación separada de asher casa si vive con [employer]  Nadie debe entrar en el área o en la habitación excepto para brindarle cuidados   Puede visitar a los demás por teléfono, videochat, correo electrónico o sistemas similares  Es importante mantenerse conectado con los demás en asher kellen mientras se recupera  · Use un tapabocas mientras haya otras personas cerca de usted  [place] puede ayudar a [employer] propaguen el virus cuando usted [place], estornuda o tose  Póngase el tapabocas antes de que la persona entre en asher habitación o [place]  Recuérdele a la persona que se cubra la nariz y la boca antes de entrar a brindarle cuidados  · No comparta artículos  No comparta platos, toallas ni otros artículos con nadie  Los artículos deben ser lavados después de usarlos  · Claretha Lapine a asher bebé  Lávese las phylicia con agua y jabón con frecuencia paresh todo el día  Use un tapabocas mientras amamanta o mientras se extrae o se saca la [address]  Si es posible, pídale a alguien que esté sam que cuide de asher bebé  Puede poner la [address] en biberones para que la persona la use, si es necesario  Hable con asher médico si tiene preguntas o inquietudes acerca de cómo cuidar o vincularse con asher bebé  También le dirá cuándo debe traer a asher bebé para los chequeos y las vacunas  También le dirá qué hacer si lakshmi que asher bebé está infectado con el nuevo virus  · No manipule animales vivos  Hasta que se sepa más, es mejor no tocar, jugar o manipular animales vivos  Algunos animales, incluyendo las [place], smith sido infectados con el nuevo coronavirus  No manipule ni cuide animales hasta que esté sam  El cuidado incluye alimentar, acariciar y Jerlyn Andrea a asher mascota  No deje que asher mascota lo lama o comparta asher comida  Pídale a alguien que no esté infectado que cuide de [address], si es posible  Si debe cuidar a [employer], [place] un tapabocas  Lávese las phylicia antes y después de cuidar a asher mascota  · Siga las instrucciones de asher médico para estar cerca de los demás después de recuperarse  Deberá esperar al menos 10 días después de la aparición de los síntomas   Entonces deberá pasar 24 horas sin fiebre sin recibir medicamentos para la fiebre, y sin otros síntomas  La pérdida del sentido del gusto o el olfato puede continuar paresh varios meses  Se considera que está sam estar cerca de otros si consuelo es el único síntoma  No se sabe con certeza si salvatore persona recuperada puede transmitir el virus a otros, ni por cuánto tiempo  Asher médico puede darle instrucciones, maureen continuar con el distanciamiento social o usar un tapabocas cuando esté cerca de otras personas  Cómo cuidar de alguien que tiene COVID-19: Si la persona vive en otro hogar, coordine un tiempo para brindar cuidados  Recuerde llevar algunos pares de guantes desechables y un tapabocas  [place] son las pautas generales para ayudarle a cuidar de forma sales a cualquier persona que tenga COVID-19:  · Lávese las phylicia frecuentemente  Lávese antes y después de entrar en la casa, área o habitación de la persona  [address] papel en un cubo de basura recubierto que tenga salvatore tapa, si es posible  · No permita que otros se acerquen a la persona  Nadie debe ingresar a la casa de la persona a menos que sea necesario  De ser posible, la persona debe estar en un área o habitación separada si vive con [employer]  Mantenga la avinash de la habitación cerrada a menos que necesite entrar o salir  Satish que otras personas llamen, charlen por video o envíen un correo electrónico a la persona si se siente lo suficientemente sam  La persona puede sentirse jr si se la mantiene separada paresh un giacomo período de [place]  La comunicación sales puede ayudar a esta persona a mantenerse en contacto con asher trudi y amigos  · Asegúrese de que la habitación de la persona tenga un buen flujo de [place]  Puede abrir la ventana si el clima lo permite  También se puede encender el aire acondicionado para ayudar a que el aire se [place]  · Comuníquese con la persona antes de entrar para brindarle cuidados  Asegúrese de que la persona use un tapabocas  Recuérdele que se lave las phylicia con agua y [place]  Puede usar un desinfectante para phylicia que contenga alcohol, si no hay agua y jabón disponibles  Colóquese el tapabocas antes de entrar al lugar a brindar cuidados  · Use guantes mientras eric cuidados y limpia  Limpie los [employer] persona [place] a menudo  Limpie las encimeras, las superficies de cocción y los frentes y el interior del microondas y el refrigerador  Limpie la ducha, el sanitario, el área alrededor del sanitario, el lavabo, el área alrededor del lavabo y los grifos  Junte la ropa sucia o la ropa de [employer]  Lavtom y seque los artículos con el agua más caliente que permita la janie  Lave los platos y utensilios usados en [place] y Shruthi o en un lavavajillas  · Todo lo que deseche debe ir a un cubo de basura recubierto  Cuando necesite vaciar la basura, cierre el extremo abierto de la [place] y [place]  [place] ayuda a evitar que los artículos en los que está el virus se salgan de la basura  Quítese los guantes y deséchelos  Lávese las [place]  Acuda a la consulta de control con asher médico según las indicaciones: Anote lyubov preguntas para que se acuerde de hacerlas paresh lyubov visitas  Para más información:  · Centers for Disease Control and Prevention  [address] , [address]  Phone: [phone]  Web Address: [email or web address] [place] [address] [place] [address] Information is for End User's use only and may not be sold, redistributed or otherwise used for commercial purposes  All illustrations and images included in CareNotes® are the copyrighted property of A D A Muzeek , [address] es sólo para uso en educación  Asher intención no es darle un consejo médico sobre enfermedades o tratamientos  Colsulte con asher Gregary Dings farmacéutico antes de seguir cualquier régimen médico para saber si es seguro y efectivo para usted        Uso del oxígeno en el hogar   LO QUE NECESITA SABER:   Es posible que usted requiera oxígeno adicional si no puede respirar suficiente oxígeno por sí mismo  Usted necesita salvatore orden del médico para recibir terapia de oxígeno  La orden incluirá la cantidad que necesita y la frecuencia  Use oxígeno maureen se le indique  INSTRUCCIONES SOBRE EL ESTELITA HOSPITALARIA:   Llame al número de emergencias local (911 en los [place]) o pídale a alguien más que llame si:  · Asher respiración se vuelve rápida o le duele al inhalar  · Tiene dolor repentino en el pecho  · Usted está cansado, confundido, no puede pensar con claridad o se desmaya  Busque atención médica de inmediato si:  · Tiene dolor de [place], el corazón está latiendo muy acelerado y está temblando  · Asher respiración es muy superficial o lenta, o si tiene mas dificultad de lo normal     · Se siente ansioso o no puede quedarse quieto  · Svetlana uñas o labios se ponen azules  Llame a asher médico si:  · Las sondas del oxígeno le producen llagas en la piel, o le hacen sangrar  · Usted tiene dificultad para dormir porque no puede respirar sam  · Usted tiene preguntas o inquietudes acerca de asher condición o cuidado  Tipos de sistemas de suministro de oxígeno: El médico elegirá el suministro de oxígeno basándose en la cantidad de oxígeno que usted necesita y en lo activo que se encuentre  El oxígeno puede suministrarse de las siguientes 3 formas:  · El oxígeno comprimido mantiene el oxígeno bajo presión en un cilindro (tanque) de metal  El tanque puede ser programado para dispensar sólo la cantidad de oxígeno que usted necesita mientras respira  Los tanques de oxígeno comprimido son pesados y están diseñados para usarse principalmente en un sólo lugar  Es posible que necesite ayuda para moverlo o asegurarlo  Hay tanques mas pequeños disponibles con transportador de tiffanie para facilitar la movilización, o para cuando usted viaja  · El oxígeno líquido se mantiene frío dentro de un tanque pequeño e insulado  El líquido se tibia y se convierte en un gas que se respira cuando usted necesita inhalar  Los tanques de oxígeno líquido son mas pequeños y fáciles de llevar consigo  Usted puede recargar el tanque de oxígeno líquido de un tanque [place] que se conserva en el hogar  El servicio de suministro de oxígeno llenará el tanque [place] cada 1 o 2 semanas  · Un concentrador de oxígeno es un aparato eléctrico que almacena oxígeno del aire  Esta máquina es pesada y podría venir con un transportador de tiffanie para ayudarlo a transportarlo de habitación a habitación  Tipos de dispositivos de respiración de oxígeno: Cada dispositivo está conectado al aporte de oxígeno por medio de un tubo  El tubo debe ser lo suficientemente giacomo para permitirle la movilización dentro del hogar  Usted podría necesitar un humidificador para humedecer el oxígeno  [place] podría evitar la sequedad en la [place], boca y garganta  Pregunte al médico si necesita un humidificador y maureen se conecta al suministro de oxígeno  · Salvatore cánula nasal es un dispositivo plástico de 2 sondas que se colocan en las fosas nasales  Coloque cada sonda en las fosas nasales  Enrrolle los tubos alrededor de las orejas o adjúntelo a los anteojos para mantenerlos en asher sitio  Asegúrese de que la cánula le quede sam y Zero Manjinder  · Kendra Slipper de oxígeno va conectada a un tubo plástico y le cubre la boca y Ashley [place]ater  Generalmente, la máscara se [place] en asher lugar mediante salvatore ewing elástica que pasa por detrás de la nancie  Usted puede usar salvatore máscara de oxígeno si necesita salvatore cantidad kostas de oxígeno  El médico le podría recomendar que use salvatore cánula nasal paresh el día y Kendra Slipper por la noche  La máscara podría ayudar si la nariz se reseca o se congestiona  · El oxígeno transtraqueal se suministra a través de un catéter pequeño y flexible que se introduce en la tráquea  Un collar sostiene al catéter en asher lugar      Uso del oxígeno en forma sales:  · No use oxígeno cerca de algo caliente o de salvatore llama  El oxígeno comprimido puede prenderse con el shayan  Mantenga el tanque de oxígeno a 5 pies de distancia de las lynch o de los calefactores, maureen las del o calentadores con [place]  No use nada inflamable, maureen líquidos de limpieza, gasolina o aerosoles cerca del oxígeno  Mantenga un extintor de shayan y un teléfono cerca en gustavo de un incendio  Informe al departamento de bomberos que tiene un oxígeno en el hogar si necesita llamarlos para pedir asistencia  · No fume mientras está usando oxígeno  No permita que otras personas fumen a asher alrededor  · No cambie el flujo del oxígeno a menos que el médico se lo indique  [place] el tanque o el concentrador de oxígeno cuando no esté usando el oxígeno  · No ingiera alcohol ni tome sedantes mientras use oxígeno  Estos podrían disminuirle la respiración  · Coloque signos en todas las ashok del hogar para hacer saber a los visitantes y paramédicos que está utilizando oxígeno  Informe a la compañía de electricidad que usted tiene un equipo médico eléctrico  Jayson John lo pondrán en salvatore lista de prioridad para arreglar la energía rápidamente si se va  · [address] y mantenimiento para el equipo de oxígeno  Mantenga los tanques de oxígeno asegurados en salvatore posición vertical  Los tanques de oxígeno podrían llegar a dañarse si se caen  Un tanque de oxígeno podría provocar lesiones graves si se quiebra  Limpie los suministros de oxígeno:  · Lave o reemplace las partes del equipo según las indicaciones  Lave las sondas nasales con jabón y [place] [address] veces por semana  Reemplace las sondas nasales cada 2 semanas  Reemplace los tubos cada 2 meses o cuando se pongan rígidos  Cambie los tubos si aparece humedad adentro de los tubos  La humedad puede producir bacteria y provocar infecciones  Cambie la cánula y los tubos después de un resfrío o gripe      · Pregunte al médico cómo limpiar la máscara de oxígeno o el catéter transtraqueal  Reemplace la máscara de oxígeno cada 2 semanas  · Desinfecte los botones y la parte exterior del concentrador de oxígeno  Limpie los filtros de aire por lo menos salvatore vez a la semana con [place] y [place]  Permita que se seque con el aire  Reemplace el filtro al menos salvatore vez a la semana  Pida a la compañía de suministro de oxígeno que provea servicio al [employer] al menos salvatore vez al [place]  Si tiene preguntas acerca de cómo limpiar el filtro del aire, pregúntele al médico     · Lave el envase del humidificador con jabón y agua tibia cada vez que se Dread Fudge a llenar  [place] y deje secar la botella al aire antes de rellenarla con agua destilada  No use agua de la llave  Desinfecte la parte externa y la tapa de la botella salvatore vez que lave la parte interior  Consejos generales para el uso del oxígeno:  · Mantenga un tanque de oxígeno de reserva en gustavo de emergencia  Siempre mantenga un tanque de oxígeno que no sea eléctrico de reserva en gustavo de un apagón  El oxígeno podría tener un escape en el tanque  Pregunte al médico si asher suministro tiene un dispositivo para reducir la pérdida de oxígeno  · Use salvatore gasa o un lubricante a base de agua para suavizar la piel  El oxígeno podría resecar la piel, boca o garganta  Coloque salvatore gasa alrededor [employer] oídos o bajo los tubos de las mejillas si se siente adolorido  Use un lubricante a base de agua en los labios y fosas nasales si se resecan o le duelen  No use lubricantes a base de aceite  Podrían ser inflamables  · Ordene oxígeno nuevo antes de que se le termine el que está utilizando actualmente  Es posible que la compañía proveedora de oxígeno no tj entregas en sheldon festivos  Cuando viaje, pídale ayuda al médico para planear el suministro de oxígeno que necesita  · Mantenga al alcance el teléfono de la compañía proveedora del oxígeno  Colóquelo en un lugar donde usted lo pueda becka todos los días, maureen el refrigerador   Comuníquese con Cyndra Huge si tiene cualquier problema con el suministro  Acuda a la consulta de control con asher médico según las indicaciones: Anote lyubov preguntas para que se acuerde de hacerlas paresh lyubov visitas  © Copyright [address] Information is for End User's use only and may not be sold, redistributed or otherwise used for commercial purposes  All illustrations and images included in CareNotes® are the copyrighted property of A D A M , Inc  or [facility] es sólo para uso en educación  Asher intención no es darle un consejo médico sobre enfermedades o tratamientos  Colsulte con asher Sekou Older farmacéutico antes de seguir cualquier régimen médico para saber si es seguro y efectivo para usted  Oxímetro de Pulso   LO QUE NECESITA SABER:   El oxímetro de pulso mide el porcentaje de oxígeno en asher micaela  Proporciona la medida más cercana sin tener que extraerle Vidhi  Un oxímetro de pulso puede usarse continuamente o periódicamente  INSTRUCCIONES SOBRE EL ESTELITA HOSPITALARIA:   Importancia de los niveles de oxígeno en micaela: Cada célula en asher micaela necesita oxígeno para funcionar correctamente  Es posible que usted necesite oxígeno adicional si asher medida es baja  El oxímetro de pulso ayuda a asher médico a decidir si usted necesita oxígeno adicional  También puede indicar qué cantidad de oxígeno adicional necesita, y cuándo necesita usarlo  Usted podría necesitar oxígeno adicional solamente cuando está dormido  Es posible que necesite más oxígeno cuando realice actividades  Cómo funciona un oxímetro de pulso:  · El oxímetro de pulso podría colocarse en asher dedo de la mano, dedo del pie o en el lóbulo de asher oreja  Spero Kelly es transmitida a través del oxímetro de pulso a asher micaela  El oxímetro de pulso calcula el porcentaje de micaela que transporta oxígeno  Al menos 89% de asher micaela debería transportar oxígeno   También mide el ritmo cardíaco y le proporciona la lectura tanto del porcentaje maureen del ritmo cardíaco     · Usted obtendrá las mejores mediciones cuando asher mano esté cálida, [place] y al nivel de asher corazón  Asegúrese de remover todo el esmalte de uñas  No fume, ya que asher porcentaje no será correcto  El dispositivo no distingue la diferencia entre el monóxido de carbono del humo y del oxígeno  Pregunte a asher médico cuál debería ser asher porcentaje si fuma  Si usted fuma, nunca es demasiado tarde para dejar de hacerlo  Asher propio oxímetro de pulso: Asher médico le prescribirá un oxímetro de pulso si es que necesita Anthony  Pregúntele cuál porcentaje es adecuado para usted  Erica Marily son ocasiones en las que usted podría necesitar supervisar lyubov niveles de oxígeno:  · A usted le prescriben oxígeno  · Usted está haciendo ejercicio o acaba de terminar  · Usted está volando en un avión o está visitando lugares de altitud elevada  © Copyright [address] Information is for End User's use only and may not be sold, redistributed or otherwise used for commercial purposes  All illustrations and images included in CareNotes® are the copyrighted property of A D A M , Inc  or [address] [place] [address] Avenue es sólo para uso en educación  Asher intención no es darle un consejo médico sobre enfermedades o tratamientos  Colsulte con asher Audrey Luciano farmacéutico antes de seguir cualquier régimen médico para saber si es seguro y efectivo para usted  Control de la diabetes tipo 2 en los adultos   LO QUE NECESITA SABER:   La diabetes tipo 2 es salvatore enfermedad que afecta el modo en que asher organismo utiliza la glucosa (azúcar)  Generalmente, cuando el nivel de azúcar en la micaela Harvey, el páncreas produce más insulina  La insulina ayuda al cuerpo a extraer el azúcar de la micaela con el fin de usarla maureen karoline de [place]  La diabetes mellitus tipo 2 se desarrolla ya sea porque el cuerpo no puede producir suficiente insulina, o es incapaz de usarla adecuadamente   La diabetes tipo 2 puede ser controlada para evitar daño a asher Marcela Flurry y otros órganos  INSTRUCCIONES SOBRE EL ESTELITA HOSPITALARIA:   Qué puede hacer para manejar lyubov niveles de azúcar en la micaela:  · Elija opciones de alimentos saludables  Los alimentos sanos pueden darle energía para aprender y permanecer activo  Los alimentos saludables pueden ayudar a mantener asher glucosa estable y controlar o bajar de peso de salvatore forma Yaneth Holiday  Consulte con asher dietista para crear un plan de comidas que funcione para usted y lyubov horarios  Un dietista puede ayudarlo para que aprenda cómo alimentarse sam con la cantidad [place] de carbohidratos paresh las comidas y [place]  Los carbohidratos pueden subir asher azúcar en la micaela si usted come demasiado a la vez  Algunos alimentos que contienen carbohidratos incluyen panes, cereales, arroz, pasta y dulces  · Elija opciones de alimentos saludables  Summer agua  Disminuya la cantidad de bebidas con sustitutos del azúcar que tenga, maureen los refrescos dietéticos  Evite las bebidas endulzadas con azúcar, maureen las gaseosas regulares y los jugos de frutas  · Realice actividad física regularmente  La actividad física ayuda a bajar lyubov niveles de azúcar en la micaela  También le ayuda a controlar asher peso  Satish al menos 150 minutos de [place] física [place] de moderada a vigorosa cada semana  No deje de realizarla paresh más de 2 días seguidos  No permanezca sentada por más de 30 minutos cada vez  Asher médico puede ayudarle a crear un plan de actividades  El plan puede incluir las mejores actividades para usted y puede ayudarlo a desarrollar asher fuerza y Barryona Phil  · Mantenga un peso saludable  Consulte con asher médico cuánto debería pesar  P[place]dale que lo ayude a crear un plan seguro para bajar de peso si tiene sobrepeso  Al bajar de peso lyubov niveles de azúcar en la micaela pueden mejorar  · Revise asher nivel de azúcar en la micaela según las indicaciones y según sea necesario  Pregúntele a asher médico cuáles deben ser lyubov niveles de azúcar en la micaela  ? Revise asher horario y tj un plan para saber cuándo revisar lyubov niveles de azúcar en la micaela paresh el día  ? Revise los niveles con más frecuencia si lakshmi que la glucosa está muy kostas o muy baja  [place] le permitirá cuidar de lyubov niveles altos o bajos de azúcar en la micaela para que no interfieran con lyubov actividades  ? Alterne los lugares donde se hace las punciones en el dedo  [place] ayudará que las revisiones duelan menos y hará que el lugar donde se hace las punciones se note menos  ? Anote lyubov niveles de azúcar en la micaela para que se los pueda mostrar a asher médico paresh lyubov citas  Consulte con asher médico si no le es posible mantener asher azúcar en la micaela en los niveles recomendados  · Tómese asher medicamento para la diabetes o la insulina según las indicaciones  Es posible que necesite medicamento para la diabetes, insulina o ambos para controlar lyubov niveles de glucosa en micaela  Asher médico le indicará cómo y cuándo se debe ramesh asher medicamento de diabetes o la insulina  Lo que usted necesita saber sobre los niveles altos de azúcar en la micaela: El azúcar alto en la micaela puede no causar ningún síntoma  Es posible que le produzca más sed de la usual o que orine con más frecuencia de lo acostumbrado  Con el tiempo, los niveles altos de azúcar en la micaela pueden dañar lyubov nervios, vasos sanguíneos, tejidos y [place]  · Las porciones grandes de comida o grandes cantidades de carbohidratos a la vez pueden elevar asehr nivel de azúcar en la micaela  · Kinjal disminución en asher actividad física puede elevar asher azúcar en la micaela  Por ejemplo, asher azúcar en la micaela puede subir si usted eugenia de jugar un deporte o eugenia de practicar lyubov actividades físicas regulares  No permanezca sentada por más de 90 minutos cada vez  · El estrés puede subir asher azúcar en la micaela   Pídales a asher médico ayuda si tiene dificultad para manejar asher estrés  · Kinjal enfermedad puede elevar el nivel de azúcar en la micaela  [place] puede pasar aunque usted coma menos de lo usual mientras esté enfermo  Colabore con asher médico para desarrollar un plan para los días de enfermedad  [place] plan le ayudaría a usted a manejar lyubov niveles de azúcar en la micaela mientras está enfermo  · Kinjal dosis baja del medicamento o de [place], o kinjal dosis tardía, puede elevar asher nivel de azúcar en la micaela  No hay suficiente tiempo para que asher medicamento o la insulina funcionen maureen deberían si se la opal tarde  Cuando se opal kinjal dosis kenneth, no hay el suficiente medicamento o la suficiente insulina que se requiere para bajar asher nivel de azúcar en micaela  Lo que usted necesita saber [employer] niveles bajos de azúcar en la micaela: Usted puede prevenir síntomas maureen temblores, mareos, irritabilidad o confusión asegurándose de que asher azúcar en la micaela no baje demasiado  · Trate un bajón de azúcar inmediatamente  Coma 15 gramos de carbohidratos, maureen 4 onzas de [place] o 1 tubo de gel de glucosa  Revise nuevamente asher glucosa en 10 a 15 minutos  Cuando el nivel de azúcar en asher micaela regrese a asher nivel normal, tj kinjal comida o merienda para prevenir otro bajón de azúcar en la micaela  ·          · Asher azúcar en la micaela puede bajar demasiado si opal un medicamento para la diabetes o la insulina y no consume suficientes alimentos  También puede suceder si usted eugenia pasar kinjal comida o merienda  · Aumentar la actividad física puede causar niveles bajos de azúcar en [place]  Si [place] insulina, verifique asher nivel de azúcar en la micaela antes de hacer ejercicio  Si asher nivel de azúcar es inferior a 100 mg/dL, coma 15 gramos de carbohidratos  Si tiene planeado hacer ejercicio por más de 1 hora, revise asher azúcar cada 30 minutos   Es posible que deba ajustar la insulina antes del ejercicio   Puede que necesite kinjal merienda de carbohidrato antes, [address] ejercicios  Otras cosas que puede hacer para controlar asher diabetes:  · Use un brazalete o collar de alerta médica o lleve consigo salvatore tarjeta que indique que tiene diabetes  Pregunte en dónde puede conseguir estos artículos  · Tenga cuidado cuando maneje  Revise asher nivel de azúcar en la micaela antes de conducir si [place] insulina, y piensa que asher azúcar está muy baja  Si asher azúcar está baja, coma 15 gramos de carbohidratos y espere a que asher azúcar se normalice  Tenga a mano en asher automóvil, comidas para refrigerio que contengan carbohidratos  Si siente que asher nivel de azúcar está bajo mientras está [place], deténgase al lado de la carretera y revise asher nivel de azúcar en la micaela  Si es necesario, trate el bajón de azúcar antes de empezar a conducir  · Conozca los riesgos si decide beber alcohol  El alcohol puede causar que lyubov niveles de azúcar en la micaela estén bajos si [place] insulina  El alcohol puede causar [employer] de azúcar en la micaela y [place] de peso si jenna demasiado  Las mujeres deberían limitar el consumo de alcohol a 1 bebida por día  Los hombres deberían limitar el consumo de alcohol a 2 tragos al día  Un trago equivale a 12 onzas de cerveza, 5 onzas de vino o 1 onza y ½ de licor  · No fume  La nicotina puede dañar los vasos sanguíneos e impedirle controlar asher diabetes  No use cigarrillos electrónicos o tabaco sin humo en vez de cigarrillos o para tratar de dejar de fumar  Todos estos aún contienen nicotina  Pida a asher médico información si usted fuma actualmente y [place] para dejar de hacerlo  · Hacerse exámenes para las complicaciones de la diabetes y [de-identified] condiciones que ocurren con la diabetes  Necesitará ser examinado para detectar problemas de riñón, colesterol alto, presión arterial kostas, problemas vasculares, problemas oculares y trastornos alimenticios   Algunos exámenes pueden comenzar inmediatamente y otros pueden ocurrir dentro de los primeros [address] diagnóstico  Tendrá que continuar con las pruebas de detección paresh toda asher kellen  Asista a lyubov citas de seguimiento con todos los médicos  · [facility] vacunas  Usted corre un mayor riesgo de presentar enfermedades graves si usted contrae la gripe, neumonía o hepatitis  Pregúntele a asher médico si usted debe ponerse la vacuna contra la gripe, neumonía o hepatitis B y cuándo debe vacunarse  Acuda a lyubov consultas de control con asher médico según le indicaron  Es posible que deba regresar para que le realicen el examen de L6U cada 3 meses  El examen de A1c muestra la cantidad promedio de azúcar en asher micaela paresh los últimos 2 a 3 meses  Asher médico le dirá cuál debe ser asher nivel A1c  © [address] Information is for End User's use only and may not be sold, redistributed or otherwise used for commercial purposes  All illustrations and images included in CareNotes® are the copyrighted property of [employer]  or [address] [place] [address] es sólo para uso en educación  Asher intención no es darle un consejo médico sobre enfermedades o tratamientos  Colsulte con asher Silvia Piru farmacéutico antes de seguir cualquier régimen médico para saber si es seguro y efectivo para usted

## 2021-01-02 NOTE — PROGRESS NOTES
INTERNAL MEDICINE RESIDENCY PROGRESS NOTE     Name: Saad Metz   Age & Sex: 79 y o  female   MRN: [id number]  Unit/Bed#: CW2 218-02   Encounter: [id number]  Team: SOD Team A    PATIENT INFORMATION     Name: Saad Metz   Age & Sex: 79 y o  female   MRN: [id number]  Hospital Stay Days: 1    ASSESSMENT/PLAN     Principal Problem:    COVID-19  Active Problems:    Diabetes mellitus type 2, insulin dependent ([address] )    Benign essential hypertension    Dyslipidemia      * COVID-19  Assessment & Plan  Patient is a 78-year-old female with past medical history significant for hypertension, dyslipidemia, type 2 diabetes   COVID-19 positive with symptoms  Patient tested positive for COVID-19 and initially did not require any oxygen  On presentation patient only had ear pain on the right side but denied any other symptoms  Currently on 2 L nasal cannula  Continue Vitamins including D3, C, zinc, multivitamin   Continue Remdesivir  Continue IV Decadron, transition to prednisone at discharge  Heparin DVT prophylaxis  Out of bed as tolerated, mobilization encourage, PT/OT as appropriate  O2 for home    Diabetes mellitus type 2, insulin dependent [facility])  Assessment & Plan  Lab Results   Component Value Date    HGBA1C 8 7 (H) 12/10/2020       Recent Labs     01/01/21  1654 01/01/21  2130 01/02/21  0624 01/02/21  0747   POCGLU 291* 400* 350* 355*       Blood Sugar Average: Last 72 hrs:  (P) 298 75   Last hemoglobin A1c 8 7%, hold home oral diabetic medications  Appears patient is on Lantus 50 units a m  At home  Patient was started on Lantus 30 units in the morning given limited p o  Intake  Blood sugar elevated this morning likely secondary to recent start of Decadron  Plan:  · Lantus 50 units in a m    · Consider uptitration due to steroid use inpatient    Benign essential hypertension  Assessment & Plan  Continue amlodipine 10 mg q day, lisinopril 40 mg q day, metoprolol tartrate 50 mg b i d  Dyslipidemia  Assessment & Plan  Continue statin therapy, pravastatin 40 mg q day      Disposition: Medically stable, awaiting home O2 delivery    SUBJECTIVE     Patient seen and examined  No acute events overnight  Patient states she slept well  Denies dyspnea at rest, endorses dyspnea with ambulation  States that she feels better with nasal cannula  She denies chest pain, headache, abdominal pain nausea, or dysuria  Last bowel movement this morning, described as normal, denies diarrhea  Endorses a good appetite  She asks me when she should get the COVID19 vaccine, I informed her that current recommendations are to get the vaccine 90 days after COVID19 infection  She expresses understanding  No further questions by patient at this time      OBJECTIVE       HR:  [] 79  Resp:  [16-18] 18  BP: (123-130)/(61-63) 130/61    Patient Vitals for the past 24 hrs:   BP Pulse Resp SpO2   01/02/21 0816 130/61 79 18 93 %   01/02/21 0008 123/62 71 16 93 %   01/01/21 1607 124/63 84 18 92 %   01/01/21 1120 -- 83 -- (!) 89 %   01/01/21 1115 -- 77 -- 92 %   01/01/21 1110 -- 74 -- (!) 89 %   01/01/21 1105 -- 77 -- 91 %   01/01/21 1100 -- 77 -- 91 %   01/01/21 1055 -- 86 -- 90 %   01/01/21 1050 -- 69 -- 92 %   01/01/21 1045 -- 73 -- 90 %   01/01/21 1040 -- 69 -- 90 %   01/01/21 1035 -- 73 -- 90 %   01/01/21 1030 -- 75 -- 92 %   01/01/21 1025 -- 73 -- (!) 88 %   01/01/21 1020 -- 75 -- 90 %   01/01/21 1015 -- 76 -- 90 %   01/01/21 1010 -- 75 -- 90 %   01/01/21 1005 -- 77 -- (!) 89 %   01/01/21 1000 -- 78 -- (!) 89 %   01/01/21 0955 -- 77 -- (!) 89 %   01/01/21 0950 -- 77 -- (!) 89 %   01/01/21 0945 -- 81 -- 90 %   01/01/21 0940 -- 81 -- (!) 89 %   01/01/21 0935 -- 85 -- (!) 88 %   01/01/21 0930 -- 85 -- 91 %   01/01/21 0925 -- 95 -- (!) 88 %   01/01/21 0920 -- (!) 108 -- 90 %   01/01/21 0915 -- (!) 106 -- (!) 88 %   01/01/21 0910 -- 100 -- 92 %   01/01/21 0905 -- 84 -- 91 %   01/01/21 0900 -- 86 -- 91 %    Body mass index is 23 61 kg/m²  Temperature:   No data recorded  Temperature: 97 5 °F (36 4 °C)    Intake & Output:  I/O       12/31 0701 - 01/01 0700 01/01 0701 - 01/02 0700 01/02 0701 - 01/03 0700    P  O   300     IV Piggyback 250 250     Total Intake(mL/kg) 250 (4) 550 (8 8)     Net +250 +550                  Weights:   IBW: -92 5 kg    Body mass index is 23 61 kg/m²  Weight (last 2 days)     Date/Time   Weight    12/31/20 1032   62 4 (137 57)              Physical Exam  Vitals signs and nursing note reviewed  Constitutional:       General: She is not in acute distress  Appearance: She is well-developed  She is not toxic-appearing  Cardiovascular:      Rate and Rhythm: Normal rate and regular rhythm  Pulses: Normal pulses  Heart sounds: Normal heart sounds  Pulmonary:      Effort: Pulmonary effort is normal  No respiratory distress  Comments: 2L NC O2  Diminished breath sounds, limited by PAPR  Abdominal:      General: Bowel sounds are normal  There is no distension  Palpations: Abdomen is soft  Tenderness: There is no abdominal tenderness  There is no guarding or rebound  Musculoskeletal: Normal range of motion  Right lower leg: No edema  Left lower leg: No edema  Skin:     General: Skin is warm and dry  Neurological:      Mental Status: She is alert and oriented to person, place, and time  Mental status is at baseline  Psychiatric:         Mood and Affect: Mood normal          LABORATORY DATA     Labs: I have personally reviewed pertinent reports    Results from last 7 days   Lab Units 01/01/21  0512 12/31/20  1232   WBC Thousand/uL 7 19 7 27   HEMOGLOBIN g/dL 12 6 12 9   HEMATOCRIT % 39 7 41 1   PLATELETS Thousands/uL 282 252   NEUTROS PCT % 77* 78*   MONOS PCT % 3* 7     Results from last 7 days   Lab Units 01/01/21  0512 12/31/20  1232   SODIUM mmol/L 135* 134*   POTASSIUM mmol/L 4 3 5 0   CHLORIDE mmol/L 102 100   CO2 mmol/L 21 26   ANION GAP mmol/L 12 8 BUN mg/dL 14 10   CREATININE mg/dL 0 89 0 77   CALCIUM mg/dL 9 4 9 6   GLUCOSE RANDOM mg/dL 297* 116   ALT U/L 26 29   AST U/L 35 55*   ALK PHOS U/L 58 58   ALBUMIN g/dL 3 1* 3 4*   TOTAL BILIRUBIN mg/dL 0 36 0 53               Results from last 7 days   Lab Units 12/31/20  1343   TROPONIN I ng/mL <0 02         ABG:    VBG:    Results from last 7 days   Lab Units 01/01/21  0516 12/31/20  1620   PROCALCITONIN ng/ml <0 05 <0 05         Micro          IMAGING & DIAGNOSTIC TESTING     Radiology Results: I have personally reviewed pertinent reports  Xr Chest 1 View Portable    Result Date: 12/31/2020  Impression: Lung volumes are low and imaging was taken with large field-of-view, limited evaluation  Question minimal small patchy opacities at the bilateral lung bases, which could be related to subsegmental atelectasis and poor respiratory effort  Recommend follow-up PA and lateral chest radiographs for detailed evaluation to exclude underlying groundglass consolidations  The study was marked in [facility] for immediate notification  Workstation performed: [id number]     Other Diagnostic Testing: I have personally reviewed pertinent reports      ACTIVE MEDICATIONS     Scheduled Meds:  Current Facility-Administered Medications   Medication Dose Route Frequency Provider Last Rate    acetaminophen  650 mg Oral Q6H PRN Dequan Olivas MD      amLODIPine  10 mg Oral Daily Santi Mandel DO      ascorbic acid  1,000 mg Oral Q12H [facility] Henna Pina DO      cholecalciferol  2,000 Units Oral Daily Santi Mandel DO      dexamethasone  6 mg Intravenous Q24H Karol Luna MD      heparin (porcine)  5,000 Units Subcutaneous [facility] Santi Mandel DO      insulin glargine  50 Units Subcutaneous QAM Vlad Shelton DO      insulin lispro  1-5 Units Subcutaneous TID AC Santi Mandel DO      insulin lispro  1-5 Units Subcutaneous HS Santi Mandel DO      lisinopril  40 mg Oral Daily Henna Pina DO      metoprolol tartrate  50 mg Oral Q12H [facility] Santi Mandel DO      zinc sulfate  220 mg Oral Daily Santi Mandel DO      Followed by   Jordyn Lima ON 1/7/2021] multivitamin-minerals  1 tablet Oral Daily Santi Mandel DO      pravastatin  40 mg Oral Daily With [employer] tom      remdesivir  100 mg Intravenous Q24H Amina Issa MD Stopped (01/02/21 0200)     Continuous Infusions:     PRN Meds:   acetaminophen, 650 mg, Q6H PRN        VTE Pharmacologic Prophylaxis: Heparin  VTE Mechanical Prophylaxis: sequential compression device    Portions of the record may have been created with voice recognition software  Occasional wrong word or "sound a like" substitutions may have occurred due to the inherent limitations of voice recognition software    Read the chart carefully and recognize, using context, where substitutions have occurred   ==  Milton Manzanares MD  [address]  Internal Medicine Residency PGY-1

## 2021-01-02 NOTE — CASE MANAGEMENT
CM followed up with maritza pts son [phone] oxygen was delivered by [facility] provider made aware

## 2021-01-02 NOTE — NURSING NOTE
Demonstrated to patient on how to use the portable oxygen tank,pulse ox   reviewed discharge instructions in detail to patient

## 2021-01-02 NOTE — CASE MANAGEMENT
[facility] provider made CM aware pt is medically stable for dc    [phone] Susan, she will page  to call with oxygen delivery confirmation time  Awaiting  return call

## 2021-01-02 NOTE — NURSING NOTE
Per patient the home oxygen has been delivered & set up  Reaching out to the attending for discharge instructions

## 2021-01-02 NOTE — DISCHARGE SUMMARY
INTERNAL MEDICINE RESIDENCY DISCHARGE SUMMARY     Linda Mage   79 y o  female  MRN: [id number]  Room/Bed: [facility] 218/[facility] 218-02     [address] [place] [address]    Encounter: [id number]    Principal Problem:    COVID-19  Active Problems:    Diabetes mellitus type 2, insulin dependent ([address] )    Benign essential hypertension    Dyslipidemia      * COVID-19  Assessment & Plan  Patient is a 76-year-old female with past medical history significant for hypertension, dyslipidemia, type 2 diabetes   COVID-19 positive with symptoms  Patient tested positive for COVID-19 and initially did not require any oxygen  On presentation patient only had ear pain on the right side but denied any other symptoms  Continue Vitamins including D3, C, zinc to complete 10 day course  Start multivitamin thereafter  Prednisone to complete 10 day steroid course  Portable O2  F/u with PCP  Isolate for 10 days since positive covid19 test AND 24 hours after resolution of fevers without the need for fever-reducing medications    Diabetes mellitus type 2, insulin dependent [facility])  Assessment & Plan  Lab Results   Component Value Date    HGBA1C 8 7 (H) 12/10/2020       Recent Labs     01/02/21  0624 01/02/21  0747 01/02/21  1132 01/02/21  1642   POCGLU 350* 355* 323* 320*       Blood Sugar Average: Last 72 hrs:  (P) 303 3   Last hemoglobin A1c 8 7%, hold home oral diabetic medications  Appears patient is on Lantus 50 units a m  At home  Patient on Lantus 30 units while inpatient given limited p o  Intake  Blood sugar elevated likely secondary to recent start of steroids along with lower insulin regimen      Plan:  · Continue lantus 50 units in a m   · Monitor blood sugar  · F/u with PCP    Benign essential hypertension  Assessment & Plan  Continue amlodipine 10 mg q day, lisinopril 40 mg q day, metoprolol tartrate 50 mg b i d   F/u with PCP    Dyslipidemia  Assessment & Plan  Continue statin therapy  F/u with [facility] COURSE     Per H&P: "Patient is a 69-year-old female with significant history of type 2 diabetes on insulin, hypertension, hyperlipidemia presenting secondary to ear pain  This has completely resolved with Tylenol  Secondary to high suspicion for COVID-19 virus as  has tested positive and is developing symptoms, emergency room team would like patient to be admitted for observation overnight  COVID-19 PCR testing pending at this time, patient has had intermittent shortness of breath but at the time of exam no shortness of breath, maintaining oxygen saturation greater than 92% with ambulatory  Chest x-ray did show lung volumes are low, limited evaluation secondary to position, patient may need further imaging studies depending on symptoms  Patient denies all other review of systems "    Patient was placed on mild COVID pathway  She received vitamin C, vitamin D, dexamethasone, zinc, and remdesivir while inpatient  Patient was noted to be hyperglycemic during hospitalization, likely in the setting of steroid use and lower insulin administration while hospitalized given decreased PO intake  She was noted to desaturate with ambulation and required oxygen at discharge  Patient was discharged with portable oxygen on 1/2/2021  The patient was instructed to follow-up with PCP within 1 week  The patient was instructed to isolate for 10 days since positive COVID test AND 24 hours fever-free without the use of fever-reducing medications  The patient was counseled on the importance of taking all medications as prescribed  Strict return instructions given  The patient verbalized understanding of hospital course and importance of making all follow-up appointments  All questions answered      DISCHARGE INFORMATION     PCP at Discharge: Irene Yoon    Admitting Provider: Raulito Mera MD  Admission Date: 12/31/2020    Discharge Provider: No att  providers found  Discharge Date: 11/2/2021    Discharge Disposition: Home/Self Care  Discharge Condition: good  Discharge with Lines: no    Discharge Diet: diabetic diet  Activity Restrictions: isolation for 10 days from positive COVID test AND 24 hours fever free  Test Results Pending at Discharge: none    Discharge Diagnoses:  Principal Problem:    COVID-19  Active Problems:    Diabetes mellitus type 2, insulin dependent ([address] )    Benign essential hypertension    Dyslipidemia  Resolved Problems:    Ear pain      Consulting Providers:  none    Diagnostic & Therapeutic Procedures Performed:  Xr Chest 1 View Portable    Result Date: 12/31/2020  Impression: Lung volumes are low and imaging was taken with large field-of-view, limited evaluation  Question minimal small patchy opacities at the bilateral lung bases, which could be related to subsegmental atelectasis and poor respiratory effort  Recommend follow-up PA and lateral chest radiographs for detailed evaluation to exclude underlying groundglass consolidations  The study was marked in [facility] for immediate notification   Workstation performed: [id number]       Code Status: Prior  Advance Directive & Living Will: <no information>  Power of :    POLST:      Medications:  Current Discharge Medication List        Current Discharge Medication List        Current Discharge Medication List      CONTINUE these medications which have NOT CHANGED    Details   alendronate (Fosamax) 70 mg tablet Take 1 tablet (70 mg total) by mouth every 7 days  Qty: 12 tablet, Refills: 3    Associated Diagnoses: Postmenopausal osteoporosis      amLODIPine (NORVASC) 10 mg tablet Take 1 tablet (10 mg total) by mouth daily  Qty: 90 tablet, Refills: 1    Associated Diagnoses: Benign essential hypertension      Levemir FlexTouch 100 units/mL injection pen Inject 50 Units under the skin every morning  Qty: 5 pen, Refills: 5    Associated Diagnoses: Type 2 diabetes mellitus with hyperglycemia, with long-term current use of insulin ([facility])      lisinopril (ZESTRIL) 40 mg tablet TOME DIANE TABLETA (40 MG TOTAL) POR VIA ORAL A DIARIO  Qty: 90 tablet, Refills: 1    Comments: DX Code Needed  EMERGENCY HOLDOVER PROVIDED: II#[id number]  [facility] GAVE 3 LISINOPRIL 40 MG TABLET  MD CONTACTED FOR AUTHORIZATION ON 09/01/2020  Associated Diagnoses: Benign essential hypertension      lovastatin (MEVACOR) 40 MG tablet TAKE 2 TABLETS TOGETHER EVERY NIGHT  Qty: 180 tablet, Refills: 1    Associated Diagnoses: Dyslipidemia      metFORMIN (GLUCOPHAGE) 1000 MG tablet TAKE 1 TABLET (1,000 MG TOTAL) BY MOUTH 2 (TWO) TIMES A DAY  DAYS  Qty: 180 tablet, Refills: 1    Associated Diagnoses: Type 2 diabetes mellitus with hyperglycemia, with long-term current use of insulin ([facility])      metoprolol tartrate (LOPRESSOR) 50 mg tablet TOME DIANE TABLETA CADA 12 HORAS  Qty: 180 tablet, Refills: 1    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) ([facility])      Multiple Vitamins-Minerals (MULTIVITAMIN ADULT PO) Take by mouth daily      cholecalciferol (VITAMIN D3) 1,000 units tablet Take 1 tablet (1,000 Units total) by mouth daily for 180 days  Qty: 90 tablet, Refills: 1    Comments: This prescription was filled on 2/18/2018  Any refills authorized will be placed on file    Associated Diagnoses: Vitamin D deficiency      Dulaglutide 1 5 MG/0 5ML SOPN Inject 0 5 mL (1 5 mg total) under the skin once a week  Qty: 5 pen, Refills: 1    Associated Diagnoses: Type 2 diabetes mellitus with hyperglycemia, with long-term current use of insulin ([facility])      glucose blood (FREESTYLE LITE) test strip USE AS INSTRUCTED TO CHECK SUGAR UP TO 3 TIMES DAILY  Qty: 300 each, Refills: 5    Associated Diagnoses: Type 2 diabetes mellitus with hyperglycemia, with long-term current use of insulin ([facility])      Insulin Pen Needle (B-D UF III MINI PEN NEEDLES) 31G X 5 MM MISC Inject 30 units SC twice daily and Trulicity once weekly  Qty: 200 each, Refills: 1    Associated Diagnoses: Type 2 diabetes mellitus treated with insulin ([facility])      Lancets (FREESTYLE) lancets Use as instructed to check sugar up to 3 times daily  Qty: 300 each, Refills: 1    Comments: This prescription was filled on 5/18/2018  Any refills authorized will be placed on file  Associated Diagnoses: Type 2 diabetes mellitus with hyperglycemia, with long-term current use of insulin ([facility])      meclizine (ANTIVERT) 25 mg tablet Take 1 tablet (25 mg total) by mouth every 8 (eight) hours as needed for dizziness for up to 5 days  Qty: 15 tablet, Refills: 0    Associated Diagnoses: Vertigo             Allergies:  No Known Allergies    FOLLOW-UP     PCP Outpatient Follow-up:  yes      Follow up: Nicolette Caban  Follow up within next: 1 week    Consulting Providers Follow-up:  none required     Active Issues Requiring Follow-up:   yes     Issue: COVID19, chronic conditions  Responsible Individual: PCP  What is Needed: f/u and management  Follow-up Appointments Arranged: Yes       Discharge Statement:   I spent 45 minutes minutes discharging the patient  This time was spent on the day of discharge  I had direct contact with the patient on the day of discharge  Additional documentation is required if more than 30 minutes were spent on discharge  Portions of the record may have been created with voice recognition software  Occasional wrong word or "sound a like" substitutions may have occurred due to the inherent limitations of voice recognition software    Read the chart carefully and recognize, using context, where substitutions have occurred     ==  Neisha Barnes MD  [address]  Internal Medicine Resident PGY-1

## 2021-01-02 NOTE — NURSING NOTE
Delivered the portable oxygen tank & pulse ox into patients room after Kevin Martínez CM, had provided me the instructions for the papers to get signed    The patient signed the delivery ticket & the yellow copy was given to patient

## 2021-01-02 NOTE — CASE MANAGEMENT
CM delivered portable oxygen tank and Pulse ox to Primary Nurse Cas Olsen  CM left instructions pt will need to call number on card before leaving for instructions  Pt will sign copy sticker placed to delivery ticket, yellow copy to remain with pt and yellow and white copy nurse will hold and call CM to    Cm department to continue to follow

## 2021-01-02 NOTE — CASE MANAGEMENT
CM called pt in her room, pt gave me the number to her maritza [phone]  Maritza paid copay for 02 and is awaiting delivery  Cm called [facility] and delivery time between 2-5pm  Instructed pt son to call my number and make cm aware as soon as oxygen has been delivered   Cm department to continue to follow

## 2021-01-05 ENCOUNTER — TRANSITIONAL CARE MANAGEMENT (OUTPATIENT)
Dept: INTERNAL MEDICINE CLINIC | Facility: CLINIC | Age: 81
End: 2021-01-05

## 2021-01-05 NOTE — UTILIZATION REVIEW
Notification of Inpatient Admission/Inpatient Authorization Request   This is a Notification of Inpatient Admission for [address]  Be advised that this patient was admitted to our facility under Inpatient Status  Contact Edith Dumont at [phone] for additional admission information  [facility]  DEDICATED [phone]  Patient Name:   Emanuel Martinez   YOB: 1940       [address]   [address]:   [facility]  Tax ID: [id number]  NPI: [id number] Attending Provider/NPI:  Phone:  Address: [address], [address] [[id number]]  [phone]  Same as [facility] of Service Code: 24 Place of Service Name:  [address] [place] [address]   Start Date: 1/1/21 1609 Discharge Date & Time: 1/2/2021  6:53 PM    Type of Admission: Inpatient Status Discharge Disposition (if discharged): Home/Self Care   Patient Diagnoses: Ear ache [H92 09]  COVID toes [U07 1, R23 8]  COVID-19 [U07 1]     Orders: Admission Orders (From admission, onward)     Ordered        01/01/21 1609  Inpatient Admission  Once         12/31/20 1505  Place in Observation  Once                    Assigned Utilization Review Contact: Edith Dumont  Utilization   Network Utilization Review Department  Phone: [phone]; Fax [phone]  Email: [email or web address]   ATTENTION PAYERS: Please call the assigned Utilization  directly with any questions or concerns ALL voicemails in the department are confidential  Send all requests for admission clinical reviews, approved or denied determinations and any other requests to dedicated fax number belonging to the campus where the patient is receiving treatment

## 2021-01-06 ENCOUNTER — TELEMEDICINE (OUTPATIENT)
Dept: INTERNAL MEDICINE CLINIC | Facility: CLINIC | Age: 81
End: 2021-01-06

## 2021-01-06 VITALS — OXYGEN SATURATION: 95 % | HEART RATE: 114 BPM

## 2021-01-06 DIAGNOSIS — Z79.4 TYPE 2 DIABETES MELLITUS WITH HYPERGLYCEMIA, WITH LONG-TERM CURRENT USE OF INSULIN (HCC): ICD-10-CM

## 2021-01-06 DIAGNOSIS — U07.1 COVID-19 VIRUS INFECTION: Primary | ICD-10-CM

## 2021-01-06 DIAGNOSIS — R19.5 LOOSE STOOLS: ICD-10-CM

## 2021-01-06 DIAGNOSIS — R43.0 ANOSMIA: ICD-10-CM

## 2021-01-06 DIAGNOSIS — E11.65 TYPE 2 DIABETES MELLITUS WITH HYPERGLYCEMIA, WITH LONG-TERM CURRENT USE OF INSULIN (HCC): ICD-10-CM

## 2021-01-06 PROCEDURE — 1160F RVW MEDS BY RX/DR IN RCRD: CPT | Performed by: PHYSICIAN ASSISTANT

## 2021-01-06 PROCEDURE — 99214 OFFICE O/P EST MOD 30 MIN: CPT | Performed by: PHYSICIAN ASSISTANT

## 2021-01-06 PROCEDURE — 1036F TOBACCO NON-USER: CPT | Performed by: PHYSICIAN ASSISTANT

## 2021-01-06 RX ORDER — INSULIN DETEMIR 100 [IU]/ML
50 INJECTION, SOLUTION SUBCUTANEOUS EVERY MORNING
Qty: 5 PEN | Refills: 5 | Status: SHIPPED | OUTPATIENT
Start: 2021-01-06 | End: 2021-07-09 | Stop reason: SDUPTHER

## 2021-01-06 NOTE — PROGRESS NOTES
COVID-19 Virtual Visit     Assessment/Plan:    Problem List Items Addressed This Visit     None      Visit Diagnoses     COVID-19 virus infection    -  Primary    Type 2 diabetes mellitus with hyperglycemia, with long-term current use of insulin (HCC)        Relevant Medications    Levemir FlexTouch 100 units/mL injection pen    Anosmia        Loose stools             Disposition:     I recommended continued isolation until at least 24 hours have passed since recovery defined as resolution of fever without the use of fever-reducing medications and improvement in respiratory symptoms (e g , cough, shortness of breath) AND 10 days have passed since onset of symptoms  On her visit today we reviewed your recent hospitalization and confirm diagnosis of COVID-19  You do admit to feeling significantly better  We reviewed no fever no cough mild loose stool  We reviewed your oxygen levels which are good with and without oxygen and you deny any difficulty breathing, shortness of breath or chest tightness  We reviewed you can discontinue wearing the oxygen all the time but have it if you need it when you get up and walk across your home  Also reviewed that you confirm taking the prednisone and will complete that on Sunday January 10th  We also reviewed that your a m  Sugars have been slightly below normal because of not eating much  You are aware the prednisone can also slightly increase your sugar levels  For now I have asked you to decrease your Levemir to 45 units daily but once you resume eating regular meals and or your a m  Sugars are greater than 110-120 you may resume your usual 50 unit dose  We will get you scheduled for a follow-up visit in a few days to continue to monitor your progress at home but also advised if you have any new or worsening of symptoms to call us right away  Reviewed appropriate isolation and quarantine precautions    Discussed this includes your son and grandson that they are not to leave the home and must also remain under quarantine if they do not get tested once you and your  are both determined to be well  I have spent 18 minutes directly with the patient  Greater than 50% of this time was spent in counseling/coordination of care regarding: diagnostic results, risks and benefits of treatment options, instructions for management, importance of treatment compliance, risk factor reductions and impressions  Encounter provider Ling Costa PA-C    Provider located at [address] [place] [id number] [phone]    Recent Visits  No visits were found meeting these conditions  Showing recent visits within past 7 days and meeting all other requirements     Today's Visits  Date Type Provider Dept   01/06/21 Telemedicine Ling Costa PA-C [address] today's visits and meeting all other requirements     Future Appointments  No visits were found meeting these conditions  Showing future appointments within next 150 days and meeting all other requirements        Patient agrees to participate in a virtual check in via telephone or video visit instead of presenting to the office to address urgent/immediate medical needs  Patient is aware this is a billable service  After connecting through Telephone, the patient was identified by name and date of birth  Janes Beckham was informed that this was a telemedicine visit and that the exam was being conducted confidentially over secure lines  My office door was closed  The patient was notified the following individuals were present in the room: In office   Janes Beckham acknowledged consent and understanding of privacy and security of the telemedicine visit  I informed the patient that I have reviewed her record in Epic and presented the opportunity for her to ask any questions regarding the visit today   The patient agreed to participate  It was my intent to perform this visit via video technology but the patient was not able to do a video connection so the visit was completed via audio telephone only  Subjective:   Tatiana Sterling is a 79 y o  female who has been screened for COVID-19  Symptom change since last report: improving  Patient's symptoms include anosmia, loss of taste and diarrhea  Patient denies fever, chills, cough, shortness of breath, chest tightness, abdominal pain, nausea, vomiting, myalgias and headaches  Patient had presented to the emergency room on December 31st initially with chief complaint of ear pain however she also reported  was positive for COVID and she was starting to feel more unwell  Secondary to this patient was admitted test returned positive for COVID initially did not require any supplemental oxygen however was noted E have desaturation of oxygen level with ambulation  Patient was then placed on oxygen  Patient was started on vitamin-C vitamin-D zinc dexamethasone and remdesivir  Elevated sugars were noted however attributed to steroid as well as decreased insulin secondary to decreased p o  Intake  On January 2nd patient was deemed appropriate for recovery at home and discharged with portable oxygen  Patient also to complete prednisone for a 10 day course  Is using the home oxygen, in daytime using every other hour at night 3-4 hours then off to sleep  Has pulse ox at home  Sitting with O2 97%  Sitting without O2 same 97%  Same with walking    States has been walking around home without any difficulty denies any SOB, no chest tightness  This am loose stool last episode  Drinking well but not eating much  Confirms taking the 40 mg prednisone but really not eating much and sugar was 78 this am, will temp decrease her Levimir to 45 units until eating better and sugars increasing again  Patient reports that she and her  are staying   Her son and grandson are in the home and that they are feeling well  She does admit however that her son has left the home  Reviewed with patient appropriate isolation and quarantine precautions and that household members should be tested but at minimum need to quarantine as well  She does admit that they are wearing masks any time she leaves her bedroom  Lab Results   Component Value Date    SARSCOV2 Positive (A) 2020     Past Medical History:   Diagnosis Date    Anemia     Chronic idiopathic constipation     Colon polyp     Diabetes mellitus ([address] )     Diverticulitis, colon     Esophageal reflux     Eustachian tube dysfunction     Gastrointestinal hemorrhage     Hypertension     Myocardial infarction (HCC)     Non-healing skin lesion     Palpitations     Rectal bleeding     Skin lesion of chest wall      Past Surgical History:   Procedure Laterality Date     SECTION      CHOLECYSTECTOMY      COLONOSCOPY      ECTOPIC PREGNANCY SURGERY      MI COLONOSCOPY FLX DX W/COLLJ SPEC WHEN PFRMD N/A 2016    Procedure: COLONOSCOPY;  Surgeon: Ladonna Mendoza MD;  Location: [facility]; Service: Gastroenterology    MI OPEN RX DISTAL RADIUS FX, INTRA-ARTICULAR, 3+ FRAG Left 10/8/2019    Procedure: RADIUS/ULNA (WRIST) OPEN REDUCTION W/ INTERNAL FIXATION (ORIF);   Surgeon: Real Gurrola MD;  [facility];  Service: Orthopedics    MI REVISE MEDIAN N/CARPAL TUNNEL SURG Left 10/8/2019    Procedure: CARPAL TUNNEL RELEASE;  Surgeon: Real Gurrola MD;  [facility];  Service: Orthopedics     Current Outpatient Medications   Medication Sig Dispense Refill    acetaminophen (TYLENOL) 325 mg tablet Take 2 tablets (650 mg total) by mouth every 6 (six) hours as needed for mild pain 1 Bottle 0    alendronate (Fosamax) 70 mg tablet Take 1 tablet (70 mg total) by mouth every 7 days 12 tablet 3    amLODIPine (NORVASC) 10 mg tablet Take 1 tablet (10 mg total) by mouth daily 90 tablet 1    ascorbic acid (VITAMIN C) 1000 MG tablet Take 1 tablet (1,000 mg total) by mouth every 12 (twelve) hours for 10 doses 10 tablet 0    cholecalciferol (VITAMIN D3) 1,000 units tablet Take 1 tablet (1,000 Units total) by mouth daily for 180 days 90 tablet 1    famotidine (PEPCID) 20 mg tablet Take 1 tablet (20 mg total) by mouth daily for 8 days 8 tablet 0    glucose blood (FREESTYLE LITE) test strip USE AS INSTRUCTED TO CHECK SUGAR UP TO 3 TIMES DAILY 300 each 5    Insulin Pen Needle (B-D UF III MINI PEN NEEDLES) 31G X 5 MM MISC Inject 30 units SC twice daily and Trulicity once weekly 803 each 1    Lancets (FREESTYLE) lancets Use as instructed to check sugar up to 3 times daily 300 each 1    Levemir FlexTouch 100 units/mL injection pen Inject 50 Units under the skin every morning 5 pen 5    lisinopril (ZESTRIL) 40 mg tablet TOME DIANE TABLETA (40 MG TOTAL) POR VIA ORAL A DIARIO 90 tablet 1    lovastatin (MEVACOR) 40 MG tablet TAKE 2 TABLETS TOGETHER EVERY NIGHT 180 tablet 1    metFORMIN (GLUCOPHAGE) 1000 MG tablet TAKE 1 TABLET (1,000 MG TOTAL) BY MOUTH 2 (TWO) TIMES A DAY  DAYS 180 tablet 1    metoprolol tartrate (LOPRESSOR) 50 mg tablet TOME DIANE TABLETA CADA 12 HORAS 180 tablet 1    Multiple Vitamins-Minerals (MULTIVITAMIN ADULT PO) Take by mouth daily      [START ON 1/7/2021] multivitamin-minerals (CENTRUM ADULTS) tablet Take 1 tablet by mouth daily 30 tablet 1    predniSONE 20 mg tablet Take 2 tablets (40 mg total) by mouth daily for 8 days 16 tablet 0    zinc sulfate (ZINCATE) 220 mg capsule Take 1 capsule (220 mg total) by mouth daily for 4 doses 4 capsule 0     No current facility-administered medications for this visit  No Known Allergies    Review of Systems   Constitutional: Negative for chills and fever  HENT: Negative  Respiratory: Negative  Negative for cough, chest tightness and shortness of breath  Gastrointestinal: Positive for diarrhea  Negative for abdominal pain, nausea and vomiting  Musculoskeletal: Negative for myalgias  Neurological: Negative for dizziness, light-headedness and headaches  Objective:    Vitals:    01/06/21 1138   Pulse: (!) 114   SpO2: 95%       Physical Exam  Vitals signs and nursing note reviewed  Constitutional:       Comments: Patient does not have any video capabilities and therefore visit was completed via telephone with in office   Patient states she is already feeling significantly better although she does admit still not having a great appetite but her sense of taste and smell are starting to return  Denies any fever cough  Patient was able to speak in full sentences without difficulty  No coughing patient was not gasping for any air  As noted in conversation patient was checking her oxygen level and 97% with and without oxygen and denies any shortness of breath or difficulty breathing when she has been walking around her house without the oxygen   Pulmonary:      Effort: No respiratory distress  VIRTUAL VISIT DISCLAIMER    Gordo  acknowledges that she has consented to an online visit or consultation  She understands that the online visit is based solely on information provided by her, and that, in the absence of a face-to-face physical evaluation by the physician, the diagnosis she receives is both limited and provisional in terms of accuracy and completeness  This is not intended to replace a full medical face-to-face evaluation by the physician  Gordo Adamson understands and accepts these terms

## 2021-01-16 DIAGNOSIS — I10 BENIGN ESSENTIAL HYPERTENSION: ICD-10-CM

## 2021-01-17 RX ORDER — AMLODIPINE BESYLATE 10 MG/1
10 TABLET ORAL DAILY
Qty: 90 TABLET | Refills: 1 | Status: SHIPPED | OUTPATIENT
Start: 2021-01-17 | End: 2021-07-12

## 2021-01-18 DIAGNOSIS — I10 BENIGN ESSENTIAL HYPERTENSION: ICD-10-CM

## 2021-01-18 RX ORDER — AMLODIPINE BESYLATE 10 MG/1
10 TABLET ORAL DAILY
Qty: 90 TABLET | Refills: 1 | Status: CANCELLED | OUTPATIENT
Start: 2021-01-18 | End: 2021-07-17

## 2021-03-05 DIAGNOSIS — I10 BENIGN ESSENTIAL HYPERTENSION: ICD-10-CM

## 2021-03-05 DIAGNOSIS — E11.9 DIABETES MELLITUS TYPE 2, INSULIN DEPENDENT (HCC): Primary | Chronic | ICD-10-CM

## 2021-03-05 DIAGNOSIS — E78.5 DYSLIPIDEMIA: ICD-10-CM

## 2021-03-05 DIAGNOSIS — Z79.4 DIABETES MELLITUS TYPE 2, INSULIN DEPENDENT (HCC): Primary | Chronic | ICD-10-CM

## 2021-03-05 RX ORDER — LOVASTATIN 40 MG/1
TABLET ORAL
Qty: 180 TABLET | Refills: 1 | Status: SHIPPED | OUTPATIENT
Start: 2021-03-05 | End: 2021-09-03

## 2021-03-05 RX ORDER — LISINOPRIL 40 MG/1
40 TABLET ORAL DAILY
Qty: 90 TABLET | Refills: 1 | Status: SHIPPED | OUTPATIENT
Start: 2021-03-05 | End: 2021-09-03

## 2021-03-10 ENCOUNTER — APPOINTMENT (OUTPATIENT)
Dept: LAB | Facility: HOSPITAL | Age: 81
End: 2021-03-10
Payer: COMMERCIAL

## 2021-03-10 LAB
ALBUMIN SERPL BCP-MCNC: 4.1 G/DL (ref 3.5–5)
ALP SERPL-CCNC: 60 U/L (ref 46–116)
ALT SERPL W P-5'-P-CCNC: 46 U/L (ref 12–78)
ANION GAP SERPL CALCULATED.3IONS-SCNC: 5 MMOL/L (ref 4–13)
AST SERPL W P-5'-P-CCNC: 39 U/L (ref 5–45)
BILIRUB SERPL-MCNC: 0.57 MG/DL (ref 0.2–1)
BUN SERPL-MCNC: 12 MG/DL (ref 5–25)
CALCIUM SERPL-MCNC: 9.9 MG/DL (ref 8.3–10.1)
CHLORIDE SERPL-SCNC: 104 MMOL/L (ref 100–108)
CO2 SERPL-SCNC: 27 MMOL/L (ref 21–32)
CREAT SERPL-MCNC: 0.88 MG/DL (ref 0.6–1.3)
CREAT UR-MCNC: 31.9 MG/DL
EST. AVERAGE GLUCOSE BLD GHB EST-MCNC: 214 MG/DL
GFR SERPL CREATININE-BSD FRML MDRD: 62 ML/MIN/1.73SQ M
GLUCOSE P FAST SERPL-MCNC: 205 MG/DL (ref 65–99)
HBA1C MFR BLD: 9.1 %
MICROALBUMIN UR-MCNC: 8.3 MG/L (ref 0–20)
MICROALBUMIN/CREAT 24H UR: 26 MG/G CREATININE (ref 0–30)
POTASSIUM SERPL-SCNC: 4.2 MMOL/L (ref 3.5–5.3)
PROT SERPL-MCNC: 8.1 G/DL (ref 6.4–8.2)
SODIUM SERPL-SCNC: 136 MMOL/L (ref 136–145)

## 2021-03-10 PROCEDURE — 82570 ASSAY OF URINE CREATININE: CPT | Performed by: PHYSICIAN ASSISTANT

## 2021-03-10 PROCEDURE — 82043 UR ALBUMIN QUANTITATIVE: CPT | Performed by: PHYSICIAN ASSISTANT

## 2021-03-10 PROCEDURE — 83036 HEMOGLOBIN GLYCOSYLATED A1C: CPT | Performed by: PHYSICIAN ASSISTANT

## 2021-03-10 PROCEDURE — 80053 COMPREHEN METABOLIC PANEL: CPT | Performed by: PHYSICIAN ASSISTANT

## 2021-03-10 PROCEDURE — 36415 COLL VENOUS BLD VENIPUNCTURE: CPT | Performed by: PHYSICIAN ASSISTANT

## 2021-03-22 ENCOUNTER — OFFICE VISIT (OUTPATIENT)
Dept: INTERNAL MEDICINE CLINIC | Facility: CLINIC | Age: 81
End: 2021-03-22

## 2021-03-22 VITALS
HEIGHT: 59 IN | WEIGHT: 135 LBS | TEMPERATURE: 97.6 F | OXYGEN SATURATION: 99 % | DIASTOLIC BLOOD PRESSURE: 76 MMHG | SYSTOLIC BLOOD PRESSURE: 146 MMHG | HEART RATE: 76 BPM | BODY MASS INDEX: 27.21 KG/M2

## 2021-03-22 DIAGNOSIS — E11.3293 MILD NONPROLIFERATIVE DIABETIC RETINOPATHY OF BOTH EYES WITHOUT MACULAR EDEMA ASSOCIATED WITH TYPE 2 DIABETES MELLITUS (HCC): ICD-10-CM

## 2021-03-22 DIAGNOSIS — Z12.31 VISIT FOR SCREENING MAMMOGRAM: ICD-10-CM

## 2021-03-22 DIAGNOSIS — Z79.4 DIABETES MELLITUS TYPE 2, INSULIN DEPENDENT (HCC): Primary | Chronic | ICD-10-CM

## 2021-03-22 DIAGNOSIS — Z12.11 COLON CANCER SCREENING: ICD-10-CM

## 2021-03-22 DIAGNOSIS — E66.3 OVERWEIGHT (BMI 25.0-29.9): Chronic | ICD-10-CM

## 2021-03-22 DIAGNOSIS — Z86.16 HISTORY OF COVID-19: ICD-10-CM

## 2021-03-22 DIAGNOSIS — E78.5 DYSLIPIDEMIA: ICD-10-CM

## 2021-03-22 DIAGNOSIS — I10 BENIGN ESSENTIAL HYPERTENSION: ICD-10-CM

## 2021-03-22 DIAGNOSIS — E11.9 DIABETES MELLITUS TYPE 2, INSULIN DEPENDENT (HCC): Primary | Chronic | ICD-10-CM

## 2021-03-22 PROBLEM — U07.1 COVID-19: Status: RESOLVED | Noted: 2020-12-31 | Resolved: 2021-03-22

## 2021-03-22 PROCEDURE — 3077F SYST BP >= 140 MM HG: CPT | Performed by: PHYSICIAN ASSISTANT

## 2021-03-22 PROCEDURE — 1160F RVW MEDS BY RX/DR IN RCRD: CPT | Performed by: PHYSICIAN ASSISTANT

## 2021-03-22 PROCEDURE — 3288F FALL RISK ASSESSMENT DOCD: CPT | Performed by: PHYSICIAN ASSISTANT

## 2021-03-22 PROCEDURE — 99213 OFFICE O/P EST LOW 20 MIN: CPT | Performed by: PHYSICIAN ASSISTANT

## 2021-03-22 PROCEDURE — 1036F TOBACCO NON-USER: CPT | Performed by: PHYSICIAN ASSISTANT

## 2021-03-22 PROCEDURE — 3078F DIAST BP <80 MM HG: CPT | Performed by: PHYSICIAN ASSISTANT

## 2021-03-22 PROCEDURE — 3725F SCREEN DEPRESSION PERFORMED: CPT | Performed by: PHYSICIAN ASSISTANT

## 2021-03-22 PROCEDURE — 1101F PT FALLS ASSESS-DOCD LE1/YR: CPT | Performed by: PHYSICIAN ASSISTANT

## 2021-03-22 NOTE — PATIENT INSTRUCTIONS
On your visit today we discussed that overall you are feeling well especially since recovery from COVID-19 earlier this year  We did review however that your A1c has increased once again and is 9 1%  You are aware your goal is to get it below 8%  You will continue your metformin and Levemir and after discussion you are agreeable to start on Jardiance 10 mg 1 tablet daily  You are aware as this is a new medication if you develop any rash or itching to stop taking contact our office  Reviewed importance of remaining well hydrated while taking this medication  If you develop any symptoms of urinary or vaginal infections to contact our office  Script provided for follow-up labs as dated in 3 months  We also reviewed you admit you were eating more sweet foods such as ice cream in juices and will resume your diabetic meal planning  Mammogram due August 2021  Referral to GI to get scheduled for your follow-up colonoscopy due to colon polyps provided today  You confirm you have the information to get registered for your COVID vaccine  Script being faxed to the pharmacy to discontinue your oxygen after recovery from [place]

## 2021-03-22 NOTE — PROGRESS NOTES
Assessment/Plan: On your visit today we discussed that overall you are feeling well especially since recovery from COVID-19 earlier this year  We did review however that your A1c has increased once again and is 9 1%  You are aware your goal is to get it below 8%  You will continue your metformin and Levemir and after discussion you are agreeable to start on Jardiance 10 mg 1 tablet daily  You are aware as this is a new medication if you develop any rash or itching to stop taking contact our office  Reviewed importance of remaining well hydrated while taking this medication  If you develop any symptoms of urinary or vaginal infections to contact our office  Script provided for follow-up labs as dated in 3 months  We also reviewed you admit you were eating more sweet foods such as ice cream in juices and will resume your diabetic meal planning  Mammogram due August 2021  Referral to GI to get scheduled for your follow-up colonoscopy due to colon polyps provided today  You confirm you have the information to get registered for your COVID vaccine  Script being faxed to the pharmacy to discontinue your oxygen after recovery from [place]  No problem-specific Assessment & Plan notes found for this encounter  Diagnoses and all orders for this visit:    Diabetes mellitus type 2, insulin dependent (HCC)  -     Empagliflozin 10 MG TABS; Take 1 tablet (10 mg total) by mouth every morning  -     Ambulatory referral to Ophthalmology; Future  -     Comprehensive metabolic panel; Future  -     Hemoglobin A1C; Future    Benign essential hypertension    Overweight (BMI 25 0-29  9)    Dyslipidemia  -     Lipid Panel with Direct LDL reflex; Future    Colon cancer screening  -     Ambulatory referral to Gastroenterology;  Future    Mild nonproliferative diabetic retinopathy of both eyes without macular edema associated with type 2 diabetes mellitus ([facility] [address] )    History of COVID-19  -     Discontinue home oxygen    Visit for screening mammogram          Subjective:      Patient ID: William Adam is a [de-identified] y o  female  Patient presents today for follow-up of chronic medical conditions and lab review  Patient followed for type 2 diabetes on insulin, hypertension and hyperlipidemia  Patient has been followed for her poorly controlled diabetes  Labs show A1c is actually increased once again she is now at 9 1%  Patient is currently on metformin and Graciela Palomo was having more ice cream and juices    After discussion agreeable to start Jardiance daily aware potential side effects  You are aware if you develop any side effects from this medication to stop taking and contact our office  Patient is aware based on her age goal is to get her A1c below 8  Patient is also due for diabetic eye exam and referral to Ophthalmology provided today  Patient is also overdue for her colon cancer screening  Initially it was delayed secondary to healing after a wrist fracture  Patient was also positive and hospitalized for COVID in December to early January  New order provided today to schedule with GI for her colonoscopy  Has paper to register for her COVID vaccine    Patient continues to report she recovered well after her COVID infection earlier this year  Patient does need to script faxed to [facility] to discontinue her oxygen and this will be completed  Patient otherwise reports feeling well and offers no new medical concerns today  Mammogram due August 2021  The following portions of the patient's history were reviewed and updated as appropriate: allergies, current medications, past family history, past medical history, past social history, past surgical history and problem list     Review of Systems   Constitutional: Negative  Negative for chills, fever and unexpected weight change  Cardiovascular: Negative  Negative for chest pain, palpitations and leg swelling  Gastrointestinal: Negative  Negative for abdominal pain  Endocrine: Negative  Negative for polydipsia, polyphagia and polyuria  Musculoskeletal: Negative  Skin: Negative  Neurological: Negative for dizziness, light-headedness and headaches  Objective:      /76 (BP Location: Left arm, Patient Position: Sitting, Cuff Size: Standard)   Pulse 76   Temp 97 6 °F (36 4 °C) (Temporal)   Ht 4' 11" (1 499 m)   Wt 61 2 kg (135 lb)   SpO2 99%   BMI 27 27 kg/m²          Physical Exam  Vitals signs and nursing note reviewed  Constitutional:       General: She is not in acute distress  Appearance: She is not ill-appearing  HENT:      Head: Normocephalic  Eyes:      Conjunctiva/sclera: Conjunctivae normal    Neck:      Musculoskeletal: Neck supple  Vascular: No carotid bruit  Cardiovascular:      Rate and Rhythm: Normal rate and regular rhythm  Heart sounds: Normal heart sounds  Pulmonary:      Effort: Pulmonary effort is normal       Breath sounds: Normal breath sounds  Abdominal:      General: Bowel sounds are normal    Musculoskeletal:      Right lower leg: No edema  Left lower leg: No edema  Neurological:      Mental Status: She is alert  Psychiatric:         Mood and Affect: Mood normal          Thought Content: Thought content normal            PHQ-9 Depression Screening    PHQ-9:   Frequency of the following problems over the past two weeks:      Little interest or pleasure in doing things: 0 - not at all  Feeling down, depressed, or hopeless: 0 - not at all  PHQ-2 Score: 0       BMI Counseling: Body mass index is 27 27 kg/m²  The BMI is above normal  Nutrition recommendations include reducing portion sizes, decreasing overall calorie intake, 3-5 servings of fruits/vegetables daily, decreasing soda and/or juice intake, moderation in carbohydrate intake, reducing intake of saturated fat and trans fat and reducing intake of cholesterol   Exercise recommendations include exercising 3-5 times per week

## 2021-03-30 ENCOUNTER — IMMUNIZATIONS (OUTPATIENT)
Dept: FAMILY MEDICINE CLINIC | Facility: HOSPITAL | Age: 81
End: 2021-03-30

## 2021-03-30 DIAGNOSIS — Z23 ENCOUNTER FOR IMMUNIZATION: Primary | ICD-10-CM

## 2021-03-30 PROCEDURE — 0001A SARS-COV-2 / COVID-19 MRNA VACCINE (PFIZER-BIONTECH) 30 MCG: CPT

## 2021-03-30 PROCEDURE — 91300 SARS-COV-2 / COVID-19 MRNA VACCINE (PFIZER-BIONTECH) 30 MCG: CPT

## 2021-04-16 ENCOUNTER — APPOINTMENT (EMERGENCY)
Dept: RADIOLOGY | Facility: HOSPITAL | Age: 81
End: 2021-04-16
Payer: COMMERCIAL

## 2021-04-16 ENCOUNTER — HOSPITAL ENCOUNTER (EMERGENCY)
Facility: HOSPITAL | Age: 81
Discharge: HOME | End: 2021-04-17
Attending: EMERGENCY MEDICINE | Admitting: EMERGENCY MEDICINE
Payer: COMMERCIAL

## 2021-04-16 VITALS
RESPIRATION RATE: 20 BRPM | SYSTOLIC BLOOD PRESSURE: 163 MMHG | TEMPERATURE: 98.3 F | OXYGEN SATURATION: 97 % | HEART RATE: 85 BPM | DIASTOLIC BLOOD PRESSURE: 84 MMHG

## 2021-04-16 DIAGNOSIS — R51.9 HEADACHE: Primary | ICD-10-CM

## 2021-04-16 PROCEDURE — 71045 X-RAY EXAM CHEST 1 VIEW: CPT

## 2021-04-16 PROCEDURE — 99284 EMERGENCY DEPT VISIT MOD MDM: CPT

## 2021-04-16 PROCEDURE — 99284 EMERGENCY DEPT VISIT MOD MDM: CPT | Performed by: EMERGENCY MEDICINE

## 2021-04-16 PROCEDURE — 96372 THER/PROPH/DIAG INJ SC/IM: CPT

## 2021-04-16 RX ORDER — KETOROLAC TROMETHAMINE 30 MG/ML
15 INJECTION, SOLUTION INTRAMUSCULAR; INTRAVENOUS ONCE
Status: COMPLETED | OUTPATIENT
Start: 2021-04-16 | End: 2021-04-16

## 2021-04-16 RX ADMIN — KETOROLAC TROMETHAMINE 15 MG: 30 INJECTION, SOLUTION INTRAMUSCULAR at 23:46

## 2021-04-17 NOTE — ED PROVIDER NOTES
History  Chief Complaint   Patient presents with    Headache     head on left side goes left shoulder and breast Pt denies SOB/CP/VOmitting,diarrhea  Pt reports nausea     28-year-old female history of hypertension and diabetes, presenting due to headache  Patient states she has had an intermittent left-sided headache that started yesterday which she describes as a throbbing sensation  States she feels it on the left side of her head and moves into her left neck to her trap and left chest   States that the pain is worse with certain positioning of her head  Pain improved following Motrin  Denies any fevers, visual change or hearing changes, chest pain, dyspnea, cough, sick contacts, nausea or vomiting, weakness numbness or tingling extremities  Prior to Admission Medications   Prescriptions Last Dose Informant Patient Reported? Taking?    Empagliflozin 10 MG TABS   No No   Sig: Take 1 tablet (10 mg total) by mouth every morning   Insulin Pen Needle (B-D UF III MINI PEN NEEDLES) 31G X 5 MM MISC   No No   Sig: Inject 30 units SC twice daily and Trulicity once weekly   Lancets (FREESTYLE) lancets  Self No No   Sig: Use as instructed to check sugar up to 3 times daily   Levemir FlexTouch 100 units/mL injection pen   No No   Sig: Inject 50 Units under the skin every morning   Multiple Vitamins-Minerals (MULTIVITAMIN ADULT PO)  Self Yes No   Sig: Take by mouth daily   acetaminophen (TYLENOL) 325 mg tablet   No No   Sig: Take 2 tablets (650 mg total) by mouth every 6 (six) hours as needed for mild pain   alendronate (Fosamax) 70 mg tablet   No No   Sig: Take 1 tablet (70 mg total) by mouth every 7 days   amLODIPine (NORVASC) 10 mg tablet   No No   Sig: Take 1 tablet (10 mg total) by mouth daily   ascorbic acid (VITAMIN C) 1000 MG tablet   No No   Sig: Take 1 tablet (1,000 mg total) by mouth every 12 (twelve) hours for 10 doses   cholecalciferol (VITAMIN D3) 1,000 units tablet  Self No No   Sig: Take 1 tablet (1,000 Units total) by mouth daily for 180 days   famotidine (PEPCID) 20 mg tablet   No No   Sig: Take 1 tablet (20 mg total) by mouth daily for 8 days   glucose blood (FREESTYLE LITE) test strip  Self No No   Sig: USE AS INSTRUCTED TO CHECK SUGAR UP TO 3 TIMES DAILY   lisinopril (ZESTRIL) 40 mg tablet   No No   Sig: Take 1 tablet (40 mg total) by mouth daily   lovastatin (MEVACOR) 40 MG tablet   No No   Sig: TAKE 2 TABLETS TOGETHER EVERY NIGHT   metFORMIN (GLUCOPHAGE) 1000 MG tablet   No No   Sig: TAKE 1 TABLET (1,000 MG TOTAL) BY MOUTH 2 (TWO) TIMES A DAY  DAYS   metoprolol tartrate (LOPRESSOR) 50 mg tablet   No No   Sig: TOME DIANE TABLETA CADA 12 HORAS   multivitamin-minerals (CENTRUM ADULTS) tablet   No No   Sig: Take 1 tablet by mouth daily   zinc sulfate (ZINCATE) 220 mg capsule   No No   Sig: Take 1 capsule (220 mg total) by mouth daily for 4 doses      Facility-Administered Medications: None       Past Medical History:   Diagnosis Date    Anemia     Chronic idiopathic constipation     Colon polyp     Diabetes mellitus (HCC)     Diverticulitis, colon     Esophageal reflux     Eustachian tube dysfunction     Gastrointestinal hemorrhage     Hypertension     Myocardial infarction (HCC)     Non-healing skin lesion     Palpitations     Rectal bleeding     Skin lesion of chest wall        Past Surgical History:   Procedure Laterality Date     SECTION      CHOLECYSTECTOMY      COLONOSCOPY      ECTOPIC PREGNANCY SURGERY      SD COLONOSCOPY FLX DX W/COLLJ SPEC WHEN PFRMD N/A 2016    Procedure: COLONOSCOPY;  Surgeon: Tres Titus MD;  Location: [facility]; Service: Gastroenterology    SD OPEN RX DISTAL RADIUS FX, INTRA-ARTICULAR, 3+ FRAG Left 10/8/2019    Procedure: RADIUS/ULNA (WRIST) OPEN REDUCTION W/ INTERNAL FIXATION (ORIF);   Surgeon: Bryan Saenz MD;  [facility];  Service: Orthopedics    SD REVISE MEDIAN N/CARPAL TUNNEL SURG Left 10/8/2019    Procedure: CARPAL TUNNEL RELEASE;  Surgeon: Carlos Flaherty MD;  [facility];  Service: Orthopedics       Family History   Problem Relation Age of Onset    Heart disease Mother     Bleeding Disorder Sister     Breast cancer Neg Hx      I have reviewed and agree with the history as documented  E-Cigarette/Vaping    E-Cigarette Use Never User      E-Cigarette/Vaping Substances    Nicotine No     THC No     CBD No     Flavoring No     Other No     Unknown No      Social History     Tobacco Use    Smoking status: Never Smoker    Smokeless tobacco: Never Used   Substance Use Topics    Alcohol use: Never     Frequency: Never     Binge frequency: Never    Drug use: Never        Review of Systems   Constitutional: Negative for chills and fever  HENT: Negative for ear pain and sore throat  Eyes: Negative for visual disturbance  Respiratory: Negative for cough and shortness of breath  Cardiovascular: Negative for chest pain and palpitations  Gastrointestinal: Negative for abdominal pain and vomiting  Genitourinary: Negative for flank pain  Musculoskeletal: Positive for neck pain  Negative for arthralgias and back pain  Skin: Negative for color change and rash  Neurological: Positive for headaches  Negative for syncope  All other systems reviewed and are negative  Physical Exam  ED Triage Vitals [04/16/21 2209]   Temperature Pulse Respirations Blood Pressure SpO2   98 3 °F (36 8 °C) 85 20 163/84 97 %      Temp Source Heart Rate Source Patient Position - Orthostatic VS BP Location FiO2 (%)   Oral Monitor -- Left arm --      Pain Score       8             Orthostatic Vital Signs  Vitals:    04/16/21 2209   BP: 163/84   Pulse: 85       Physical Exam  Vitals signs and nursing note reviewed  Constitutional:       General: She is not in acute distress  Appearance: She is well-developed  HENT:      Head: Normocephalic and atraumatic     Eyes:      Conjunctiva/sclera: Conjunctivae normal    Neck:      Musculoskeletal: Neck supple  Cardiovascular:      Rate and Rhythm: Normal rate and regular rhythm  Heart sounds: No murmur  Pulmonary:      Effort: Pulmonary effort is normal  No respiratory distress  Breath sounds: Normal breath sounds  Abdominal:      Palpations: Abdomen is soft  Tenderness: There is no abdominal tenderness  Musculoskeletal: Normal range of motion  Comments: Positive Spurling test, left-sided     Skin:     General: Skin is warm and dry  Neurological:      Mental Status: She is alert and oriented to person, place, and time  Psychiatric:         Mood and Affect: Mood normal          Behavior: Behavior normal          Thought Content: Thought content normal          Judgment: Judgment normal          ED Medications  Medications   ketorolac (TORADOL) injection 15 mg (15 mg Intramuscular Given 4/16/21 2346)       Diagnostic Studies  Results Reviewed     None                 XR chest 1 view portable    (Results Pending)         Procedures  Procedures      ED Course               Identification of Seniors at Risk      Most Recent Value   (ISAR) Identification of Seniors at Risk   Before the illness or injury that brought you to the Emergency, did you need someone to help you on a regular basis? 0 Filed at: 04/16/2021 2246   In the last 24 hours, have you needed more help than usual?  0 Filed at: 04/16/2021 2246   Have you been hospitalized for one or more nights during the past 6 months? 1 Filed at: 04/16/2021 2246   In general, do you see well?  0 Filed at: 04/16/2021 2246   In general, do you have serious problems with your memory?   0 Filed at: 04/16/2021 2246   Do you take more than three different medications every day?  0 Filed at: 04/16/2021 2246   ISAR Score  1 Filed at: 04/16/2021 2246                              MDM  Number of Diagnoses or Management Options  Headache:   Diagnosis management comments: Pain exacerbated by leftward rotation of her neck, positive Spurling test   Concern for cervical radiculopathy  Patient was provided Toradol which she states improved her pain  Provided orthopedic contact information and encouraged follow-up  Discharged with return precautions      Disposition  Final diagnoses:   Headache     Time reflects when diagnosis was documented in both MDM as applicable and the Disposition within this note     Time User Action Codes Description Comment    4/17/2021  1:07 AM Lisseth Logan Add [R51 9] Headache       ED Disposition     ED Disposition Condition Date/Time Comment    Discharge Stable Sat Apr 17, 2021  1:07 AM Khoa Ross discharge to home/self care              Follow-up Information     Follow up With Specialties Details Why Contact Info Additional Information     [facility]   [address] [id number]  [phone] [address] [place] [address], [place], [place], [address]  [facility]          Discharge Medication List as of 4/17/2021  1:08 AM      CONTINUE these medications which have NOT CHANGED    Details   acetaminophen (TYLENOL) 325 mg tablet Take 2 tablets (650 mg total) by mouth every 6 (six) hours as needed for mild pain, Starting Sat 1/2/2021, Normal      alendronate (Fosamax) 70 mg tablet Take 1 tablet (70 mg total) by mouth every 7 days, Starting Tue 6/2/2020, Normal      amLODIPine (NORVASC) 10 mg tablet Take 1 tablet (10 mg total) by mouth daily, Starting Sun 1/17/2021, Until Fri 7/16/2021, Normal      ascorbic acid (VITAMIN C) 1000 MG tablet Take 1 tablet (1,000 mg total) by mouth every 12 (twelve) hours for 10 doses, Starting Sat 1/2/2021, Until Mon 3/22/2021, Normal      cholecalciferol (VITAMIN D3) 1,000 units tablet Take 1 tablet (1,000 Units total) by mouth daily for 180 days, Starting Mon 8/13/2018, Normal      Empagliflozin 10 MG TABS Take 1 tablet (10 mg total) by mouth every morning, Starting Mon 3/22/2021, Until Sat 9/18/2021, Normal      famotidine (PEPCID) 20 mg tablet Take 1 tablet (20 mg total) by mouth daily for 8 days, Starting Sat 1/2/2021, Until Mon 3/22/2021, Normal      glucose blood (FREESTYLE LITE) test strip USE AS INSTRUCTED TO CHECK SUGAR UP TO 3 TIMES DAILY, Normal      Insulin Pen Needle (B-D UF III MINI PEN NEEDLES) 31G X 5 MM MISC Inject 30 units SC twice daily and Trulicity once weekly, Normal      Lancets (FREESTYLE) lancets Use as instructed to check sugar up to 3 times daily, Normal      Levemir FlexTouch 100 units/mL injection pen Inject 50 Units under the skin every morning, Starting Wed 1/6/2021, Until Mon 7/5/2021, Normal      lisinopril (ZESTRIL) 40 mg tablet Take 1 tablet (40 mg total) by mouth daily, Starting Fri 3/5/2021, Until Wed 9/1/2021, Normal      lovastatin (MEVACOR) 40 MG tablet TAKE 2 TABLETS TOGETHER EVERY NIGHT, Normal      metFORMIN (GLUCOPHAGE) 1000 MG tablet TAKE 1 TABLET (1,000 MG TOTAL) BY MOUTH 2 (TWO) TIMES A DAY  DAYS, Starting Thu 11/12/2020, Until Tue 5/11/2021, Normal      metoprolol tartrate (LOPRESSOR) 50 mg tablet TOME DIANE TABLETA CADA 12 HORAS, Normal      Multiple Vitamins-Minerals (MULTIVITAMIN ADULT PO) Take by mouth daily, Historical Med      multivitamin-minerals (CENTRUM ADULTS) tablet Take 1 tablet by mouth daily, Starting Thu 1/7/2021, Normal      zinc sulfate (ZINCATE) 220 mg capsule Take 1 capsule (220 mg total) by mouth daily for 4 doses, Starting Sat 1/2/2021, Until Mon 3/22/2021, Normal           No discharge procedures on file  PDMP Review       Value Time User    PDMP Reviewed  Yes 10/8/2019  1:36 PM Catarina Fall PA-C           ED Provider  Attending physically available and evaluated Meryle Nares  NEELIMA managed the patient along with the ED Attending      Electronically Signed by         Robyn Goetz DO  04/17/21 7080

## 2021-04-17 NOTE — ED ATTENDING ATTESTATION
4/16/2021  I, Jesusita Medrano MD, saw and evaluated the patient  I have discussed the patient with the resident/non-physician practitioner and agree with the resident's/non-physician practitioner's findings, Plan of Care, and MDM as documented in the resident's/non-physician practitioner's note, except where noted  All available labs and Radiology studies were reviewed  I was present for key portions of any procedure(s) performed by the resident/non-physician practitioner and I was immediately available to provide assistance  At this point I agree with the current assessment done in the Emergency Department  I have conducted an independent evaluation of this patient a history and physical is as follows:    HTN, Dm, L sided HA since yesterday, throbbing, worse with position changes, radiated to neck arm and chest at times, but poorly described  Doesn't have fank CP dyspnea  No COVID contacts, no fevers or chills  Tried Motrin, did help  VS and nursing notes reviewed  General: Appears in NAD, awake, alert, speaking normally in full sentences  Well-nourished, well-developed  Appears stated age  Head: Normocephalic, atraumatic  Eyes: EOMI  Vision grossly normal  No subconjunctival hemorrhages or occular discharge noted  Symmetrical lids  ENT: Atraumatic external nose and ears  No stridor  Normal phonation  No drooling  Normal swallowing  Neck: No JVD  FROM  No goiter  +L spurlings, TTP on L trapezius  CV: No pallor  Normal rate  Lungs: No tachypnea  No respiratory distress  MSK: Moving all extremities equally, no peripheral edema  Skin: Dry, intact  No cyanosis  Neuro: Awake, alert, GCS15  CN II-XII grossly intact  Grossly normal gait  Psychiatric/Behavioral: Appropriate mood and affect  A/P: This is a [de-identified] y o  female who presents to the ED for evaluation of HA, neck pain  Appears MSK  CXR  Pain control  OP follow up      ED Course         Critical Care Time  Procedures

## 2021-04-23 ENCOUNTER — IMMUNIZATIONS (OUTPATIENT)
Dept: FAMILY MEDICINE CLINIC | Facility: HOSPITAL | Age: 81
End: 2021-04-23

## 2021-04-23 DIAGNOSIS — Z23 ENCOUNTER FOR IMMUNIZATION: Primary | ICD-10-CM

## 2021-04-23 PROCEDURE — 91300 SARS-COV-2 / COVID-19 MRNA VACCINE (PFIZER-BIONTECH) 30 MCG: CPT

## 2021-04-23 PROCEDURE — 0002A SARS-COV-2 / COVID-19 MRNA VACCINE (PFIZER-BIONTECH) 30 MCG: CPT

## 2021-05-29 DIAGNOSIS — Z79.4 TYPE 2 DIABETES MELLITUS WITH HYPERGLYCEMIA, WITH LONG-TERM CURRENT USE OF INSULIN (HCC): ICD-10-CM

## 2021-05-29 DIAGNOSIS — E11.65 TYPE 2 DIABETES MELLITUS WITH HYPERGLYCEMIA, WITH LONG-TERM CURRENT USE OF INSULIN (HCC): ICD-10-CM

## 2021-06-14 ENCOUNTER — RA CDI HCC (OUTPATIENT)
Dept: OTHER | Facility: HOSPITAL | Age: 81
End: 2021-06-14

## 2021-06-14 NOTE — PROGRESS NOTES
[facility] Giving Assistant  coding opportunities             Chart reviewed, (number of) suggestions sent to provider: 1                  Patients insurance company: [employer] (Medicare Advantage and Commercial)     Visit status: Patient canceled the appointment     Provider never responded to [facility] Giving Assistant  coding request     [facility] Giving Assistant  coding opportunities             Chart reviewed, (number of) suggestions sent to provider: 1   DX:  E11 66- Type 2 diabetes mellitus with [id number] 1, fasting glucose-205                 Patients insurance company: [employer] ([employer])

## 2021-06-22 ENCOUNTER — APPOINTMENT (OUTPATIENT)
Dept: LAB | Facility: HOSPITAL | Age: 81
End: 2021-06-22
Payer: COMMERCIAL

## 2021-06-22 DIAGNOSIS — E78.5 DYSLIPIDEMIA: ICD-10-CM

## 2021-06-22 DIAGNOSIS — E11.9 DIABETES MELLITUS TYPE 2, INSULIN DEPENDENT (HCC): Chronic | ICD-10-CM

## 2021-06-22 DIAGNOSIS — Z79.4 DIABETES MELLITUS TYPE 2, INSULIN DEPENDENT (HCC): Chronic | ICD-10-CM

## 2021-06-22 LAB
ALBUMIN SERPL BCP-MCNC: 3.9 G/DL (ref 3.5–5)
ALP SERPL-CCNC: 60 U/L (ref 46–116)
ALT SERPL W P-5'-P-CCNC: 28 U/L (ref 12–78)
ANION GAP SERPL CALCULATED.3IONS-SCNC: 7 MMOL/L (ref 4–13)
AST SERPL W P-5'-P-CCNC: 22 U/L (ref 5–45)
BILIRUB SERPL-MCNC: 0.45 MG/DL (ref 0.2–1)
BUN SERPL-MCNC: 14 MG/DL (ref 5–25)
CALCIUM SERPL-MCNC: 9.5 MG/DL (ref 8.3–10.1)
CHLORIDE SERPL-SCNC: 107 MMOL/L (ref 100–108)
CHOLEST SERPL-MCNC: 155 MG/DL (ref 50–200)
CO2 SERPL-SCNC: 25 MMOL/L (ref 21–32)
CREAT SERPL-MCNC: 1.11 MG/DL (ref 0.6–1.3)
EST. AVERAGE GLUCOSE BLD GHB EST-MCNC: 209 MG/DL
GFR SERPL CREATININE-BSD FRML MDRD: 47 ML/MIN/1.73SQ M
GLUCOSE P FAST SERPL-MCNC: 160 MG/DL (ref 65–99)
HBA1C MFR BLD: 8.9 %
HDLC SERPL-MCNC: 45 MG/DL
LDLC SERPL CALC-MCNC: 50 MG/DL (ref 0–100)
POTASSIUM SERPL-SCNC: 4.2 MMOL/L (ref 3.5–5.3)
PROT SERPL-MCNC: 8.1 G/DL (ref 6.4–8.2)
SODIUM SERPL-SCNC: 139 MMOL/L (ref 136–145)
TRIGL SERPL-MCNC: 298 MG/DL

## 2021-06-22 PROCEDURE — 36415 COLL VENOUS BLD VENIPUNCTURE: CPT

## 2021-06-22 PROCEDURE — 80053 COMPREHEN METABOLIC PANEL: CPT

## 2021-06-22 PROCEDURE — 83036 HEMOGLOBIN GLYCOSYLATED A1C: CPT

## 2021-06-22 PROCEDURE — 80061 LIPID PANEL: CPT

## 2021-06-23 ENCOUNTER — RA CDI HCC (OUTPATIENT)
Dept: OTHER | Facility: HOSPITAL | Age: 81
End: 2021-06-23

## 2021-06-23 NOTE — PROGRESS NOTES
[place] [id number]  coding opportunities             Chart reviewed, (number of) suggestions sent to provider: 1                  Patients insurance company: [employer] (Medicare Advantage and Commercial)     Visit status: Patient canceled the appointment        [place] [id number]  coding opportunities             Chart reviewed, (number of) suggestions sent to provider: 1    DX:  E11 66- Type 2 diabetes mellitus with [id number] 1, fasting glucose-205               Patients insurance company: [employer] ([employer])

## 2021-07-01 ENCOUNTER — RA CDI HCC (OUTPATIENT)
Dept: OTHER | Facility: HOSPITAL | Age: 81
End: 2021-07-01

## 2021-07-01 NOTE — PROGRESS NOTES
Department of Anesthesiology  Postprocedure Note    Patient: Ole Byrne  MRN: [id number]  YOB: 1968  Date of evaluation: 3/26/2018  Time:  12:25 PM     Procedure Summary     Date:  03/26/18 Room / Location:  [facility] OR 03 / [address] [facility]    Anesthesia Start:  5858 Anesthesia Stop:  7668    Procedure:  BILATERAL L-FACET MBB @ L4-5 and L5-S1, (Bilateral Back) Diagnosis:  (lumbar spondylosis)    Surgeon:  Vinnie Marcos MD Responsible Provider:  Pramod Martinez MD    Anesthesia Type:  MAC ASA Status:  2          Anesthesia Type: MAC    Angelina Phase I:      Angelina Phase II: Angelina Score: 9    Last vitals: Reviewed and per EMR flowsheets.        Anesthesia Post Evaluation    Patient location during evaluation: PACU  Patient participation: complete - patient participated  Level of consciousness: awake and alert  Airway patency: patent  Nausea & Vomiting: no nausea  Complications: no  Cardiovascular status: blood pressure returned to baseline and hemodynamically stable  Respiratory status: acceptable and spontaneous ventilation  Hydration status: euvolemic [facility] [employer]  coding opportunities             Chart reviewed, (number of) suggestions sent to provider: 1            Number of suggestions actually used: 1      Number of suggestions NOT actually used: 0     Patients insurance company: [employer] (Medicare Advantage and Commercial)     Visit status: Patient arrived for their scheduled appointment   dx is on the bill  Provider never responded to [facility] [employer]  coding request     [facility] [employer]  coding opportunities             Chart reviewed, (number of) suggestions sent to provider: 1   DX:  E11 66- Type 2 diabetes mellitus with [id number] 9, 9 1, fasting glucose-160, 205                 Patients insurance company: [employer] ([employer])

## 2021-07-09 ENCOUNTER — OFFICE VISIT (OUTPATIENT)
Dept: INTERNAL MEDICINE CLINIC | Facility: CLINIC | Age: 81
End: 2021-07-09

## 2021-07-09 VITALS
HEART RATE: 76 BPM | SYSTOLIC BLOOD PRESSURE: 135 MMHG | OXYGEN SATURATION: 98 % | BODY MASS INDEX: 26.61 KG/M2 | DIASTOLIC BLOOD PRESSURE: 73 MMHG | WEIGHT: 132 LBS | TEMPERATURE: 97.6 F | HEIGHT: 59 IN

## 2021-07-09 DIAGNOSIS — E78.2 MIXED HYPERLIPIDEMIA: ICD-10-CM

## 2021-07-09 DIAGNOSIS — Z92.29 COVID-19 VACCINE SERIES COMPLETED: ICD-10-CM

## 2021-07-09 DIAGNOSIS — E66.3 OVERWEIGHT (BMI 25.0-29.9): Chronic | ICD-10-CM

## 2021-07-09 DIAGNOSIS — E11.3293 MILD NONPROLIFERATIVE DIABETIC RETINOPATHY OF BOTH EYES WITHOUT MACULAR EDEMA ASSOCIATED WITH TYPE 2 DIABETES MELLITUS (HCC): ICD-10-CM

## 2021-07-09 DIAGNOSIS — E11.65 TYPE 2 DIABETES MELLITUS WITH HYPERGLYCEMIA, WITH LONG-TERM CURRENT USE OF INSULIN (HCC): ICD-10-CM

## 2021-07-09 DIAGNOSIS — I10 BENIGN ESSENTIAL HYPERTENSION: ICD-10-CM

## 2021-07-09 DIAGNOSIS — E11.9 DIABETES MELLITUS TYPE 2, INSULIN DEPENDENT (HCC): Primary | Chronic | ICD-10-CM

## 2021-07-09 DIAGNOSIS — Z79.4 DIABETES MELLITUS TYPE 2, INSULIN DEPENDENT (HCC): Primary | Chronic | ICD-10-CM

## 2021-07-09 DIAGNOSIS — Z79.4 TYPE 2 DIABETES MELLITUS WITH HYPERGLYCEMIA, WITH LONG-TERM CURRENT USE OF INSULIN (HCC): ICD-10-CM

## 2021-07-09 PROBLEM — Z12.31 VISIT FOR SCREENING MAMMOGRAM: Status: RESOLVED | Noted: 2019-03-07 | Resolved: 2021-07-09

## 2021-07-09 PROCEDURE — 99213 OFFICE O/P EST LOW 20 MIN: CPT | Performed by: PHYSICIAN ASSISTANT

## 2021-07-09 RX ORDER — EMPAGLIFLOZIN 25 MG/1
25 TABLET, FILM COATED ORAL EVERY MORNING
Qty: 90 TABLET | Refills: 1 | Status: SHIPPED | OUTPATIENT
Start: 2021-07-09 | End: 2022-02-08

## 2021-07-09 RX ORDER — INSULIN DETEMIR 100 [IU]/ML
50 INJECTION, SOLUTION SUBCUTANEOUS EVERY MORNING
Qty: 45 ML | Refills: 1 | Status: SHIPPED | OUTPATIENT
Start: 2021-07-09 | End: 2021-10-20

## 2021-07-09 NOTE — PATIENT INSTRUCTIONS
On your visit today we did discuss that while your A1c did slightly improve it was not a significant improvement  You confirm that you have been taking your Levemir 50 units daily metformin twice daily and the Jardiance that was started at your last visit  We also confirm that you are tolerating the medication with no side effects  No symptoms of urinary infections or frequency  We discussed other options to help you get your diabetes under better control  You do not wish to have more than 1 injection daily and therefore will need to keep your Levemir at the 50 units, you will continue your metformin twice daily  At this time you are not interested in additional injectables including once weekly  Will increase your Jardiance which will now be 25 mg 1 tablet daily  Encouraged as always to continue to follow diabetic healthy diet and continue your walking for exercise  Diabetic foot exam completed today and normal keep up the good work with her excellent foot care  You report you had a diabetic eye exam last month and will bring your report to the office  Order for GI a regarding colon cancer screening with history of polyps reprinted today so you may call to schedule that appointment  Script provided to recheck your labs as dated in 3 months

## 2021-07-09 NOTE — PROGRESS NOTES
Assessment/Plan: On your visit today we did discuss that while your A1c did slightly improve it was not a significant improvement  You confirm that you have been taking your Levemir 50 units daily metformin twice daily and the Jardiance that was started at your last visit  We also confirm that you are tolerating the medication with no side effects  No symptoms of urinary infections or frequency  We discussed other options to help you get your diabetes under better control  You do not wish to have more than 1 injection daily and therefore will need to keep your Levemir at the 50 units, you will continue your metformin twice daily  At this time you are not interested in additional injectables including once weekly  Will increase your Jardiance which will now be 25 mg 1 tablet daily  Encouraged as always to continue to follow diabetic healthy diet and continue your walking for exercise  Diabetic foot exam completed today and normal keep up the good work with her excellent foot care  You report you had a diabetic eye exam last month and will bring your report to the office  Order for GI a regarding colon cancer screening with history of polyps reprinted today so you may call to schedule that appointment  Script provided to recheck your labs as dated in 3 months  No problem-specific Assessment & Plan notes found for this encounter  Diagnoses and all orders for this visit:    Diabetes mellitus type 2, insulin dependent (HCC)  -     Empagliflozin (Jardiance) 25 MG TABS; Take 1 tablet (25 mg total) by mouth every morning  -     Comprehensive metabolic panel; Future  -     Hemoglobin A1C; Future    Benign essential hypertension    Type 2 diabetes mellitus with hyperglycemia, with long-term current use of insulin (HCC)  -     Levemir FlexTouch 100 units/mL injection pen;  Inject 50 Units under the skin every morning    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)    Overweight (BMI 25 0-29  9)    Mixed hyperlipidemia    COVID-19 vaccine series completed          Subjective:      Patient ID: Carmen Ellis is a [de-identified] y o  female  Patient presents today for follow-up chronic medical conditions and lab review  Unfortunately patient's A1c has only minimally improved it was 9 1% now 8 9%  At her age goal should be between 9 and 6  At last visit in March patient was continued on her metformin and Levemir and Jardiance was added  Patient is taking without side effects but states has to pay $45 for 30 day supply  patient reports otherwise she has been feeling well and offers no new medical concerns today  Discussed with patient other options for getting her diabetes under better control  Patient does not want to inject herself twice a day and therefore has been maintained on her Levemir 50 units only  Patient already on maximum dose of her metformin  We did discuss possibility of adding once weekly non insulin injectables such as Trulicity however patient declines this medication she states she has read information about it and does not like the potential side effects  Patient does admit there are additional things in her diet she can make improvements on and after discussion she is agreeable to increasing her Jardiance to higher dose  Advised patient I will however have staff also contact her insurance to see if either [employer] or Yessy Gallegos are covered at a better price  Blood pressure remains well controlled no need to adjust medications  Patient has been able to tolerate 40 mg of lovastatin but was unable to tolerate other statins secondary to myalgias  Patient states saw eye Dr last month states was a DM exam and will bring her report  Patient did not schedule with GI regarding colon cancer screening was referred at last visit order repeated today      The reason is because while she has essentially age doubt she does have known polyps and it was recommended that she have follow-up  This was reprinted and patient will schedule  Patient will be due to schedule her DEXA scan for her osteoporosis at her follow-up visit  Patient has been on Fosamax and her last DEXA scan had reported as significant improvement in bone density on same medication  The following portions of the patient's history were reviewed and updated as appropriate: allergies, current medications, past family history, past medical history, past social history, past surgical history and problem list     Review of Systems   Constitutional: Negative  Negative for chills and fever  Respiratory: Negative  Negative for cough  Cardiovascular: Negative  Negative for chest pain and palpitations  Gastrointestinal: Negative for abdominal pain, blood in stool and diarrhea  Endocrine: Negative for polydipsia, polyphagia and polyuria  Genitourinary: Negative for urgency  Neurological: Negative for tremors, weakness and numbness  Patient denies any pain, numbness or tingling to hands or feet  Psychiatric/Behavioral: Negative  Objective:      /73 (BP Location: Left arm, Patient Position: Sitting, Cuff Size: Standard)   Pulse 76   Temp 97 6 °F (36 4 °C) (Temporal)   Ht 4' 11" (1 499 m)   Wt 59 9 kg (132 lb)   SpO2 98%   BMI 26 66 kg/m²          Physical Exam  Vitals and nursing note reviewed  HENT:      Head: Normocephalic  Cardiovascular:      Rate and Rhythm: Normal rate and regular rhythm  Pulses: no weak pulses          Dorsalis pedis pulses are 2+ on the right side and 2+ on the left side  Heart sounds: Normal heart sounds  Pulmonary:      Effort: Pulmonary effort is normal       Breath sounds: Normal breath sounds  Abdominal:      General: Bowel sounds are normal    Musculoskeletal:         General: No swelling        Right lower leg: No edema  Feet:      Right foot:      Skin integrity: No ulcer, skin breakdown, erythema, warmth, callus or dry skin  Left foot:      Skin integrity: No ulcer, skin breakdown, erythema, warmth, callus or dry skin  Skin:     Findings: No rash  Neurological:      General: No focal deficit present  Mental Status: She is alert  Psychiatric:         Mood and Affect: Mood normal            Patient's shoes and socks removed  Right Foot/Ankle   Right Foot Inspection  Skin Exam: skin normal and skin intact no dry skin, no warmth, no callus, no erythema, no maceration, no abnormal color, no pre-ulcer, no ulcer and no callus                            Sensory     Proprioception: intact   Monofilament testing: intact  Vascular    The right DP pulse is 2+  Left Foot/Ankle  Left Foot Inspection  Skin Exam: skin normal and skin intactno dry skin, no warmth, no erythema, no maceration, normal color, no pre-ulcer, no ulcer and no callus                                         Sensory     Proprioception: intact  Monofilament: intact  Vascular    The left DP pulse is 2+  Assign Risk Category:  No deformity present; No loss of protective sensation;  No weak pulses       Risk: 0

## 2021-07-12 DIAGNOSIS — I10 BENIGN ESSENTIAL HYPERTENSION: ICD-10-CM

## 2021-07-12 RX ORDER — AMLODIPINE BESYLATE 10 MG/1
TABLET ORAL
Qty: 90 TABLET | Refills: 1 | Status: SHIPPED | OUTPATIENT
Start: 2021-07-12 | End: 2022-01-04

## 2021-08-31 ENCOUNTER — HOSPITAL ENCOUNTER (OUTPATIENT)
Dept: RADIOLOGY | Age: 81
Discharge: HOME | End: 2021-08-31
Payer: COMMERCIAL

## 2021-08-31 VITALS — WEIGHT: 135 LBS | HEIGHT: 59 IN | BODY MASS INDEX: 27.21 KG/M2

## 2021-08-31 DIAGNOSIS — Z12.31 ENCOUNTER FOR SCREENING MAMMOGRAM FOR MALIGNANT NEOPLASM OF BREAST: ICD-10-CM

## 2021-08-31 PROCEDURE — 77067 SCR MAMMO BI INCL CAD: CPT

## 2021-08-31 PROCEDURE — 77063 BREAST TOMOSYNTHESIS BI: CPT

## 2021-10-09 ENCOUNTER — APPOINTMENT (OUTPATIENT)
Dept: LAB | Facility: HOSPITAL | Age: 81
End: 2021-10-09
Payer: COMMERCIAL

## 2021-10-09 DIAGNOSIS — Z79.4 DIABETES MELLITUS TYPE 2, INSULIN DEPENDENT (HCC): Chronic | ICD-10-CM

## 2021-10-09 DIAGNOSIS — E11.9 DIABETES MELLITUS TYPE 2, INSULIN DEPENDENT (HCC): Chronic | ICD-10-CM

## 2021-10-09 LAB
ALBUMIN SERPL BCP-MCNC: 4 G/DL (ref 3.5–5)
ALP SERPL-CCNC: 62 U/L (ref 46–116)
ALT SERPL W P-5'-P-CCNC: 48 U/L (ref 12–78)
ANION GAP SERPL CALCULATED.3IONS-SCNC: 5 MMOL/L (ref 4–13)
AST SERPL W P-5'-P-CCNC: 37 U/L (ref 5–45)
BILIRUB SERPL-MCNC: 0.4 MG/DL (ref 0.2–1)
BUN SERPL-MCNC: 17 MG/DL (ref 5–25)
CALCIUM SERPL-MCNC: 9.8 MG/DL (ref 8.3–10.1)
CHLORIDE SERPL-SCNC: 103 MMOL/L (ref 100–108)
CO2 SERPL-SCNC: 27 MMOL/L (ref 21–32)
CREAT SERPL-MCNC: 0.91 MG/DL (ref 0.6–1.3)
EST. AVERAGE GLUCOSE BLD GHB EST-MCNC: 237 MG/DL
GFR SERPL CREATININE-BSD FRML MDRD: 60 ML/MIN/1.73SQ M
GLUCOSE P FAST SERPL-MCNC: 193 MG/DL (ref 65–99)
HBA1C MFR BLD: 9.9 %
POTASSIUM SERPL-SCNC: 4.3 MMOL/L (ref 3.5–5.3)
PROT SERPL-MCNC: 8.1 G/DL (ref 6.4–8.2)
SODIUM SERPL-SCNC: 135 MMOL/L (ref 136–145)

## 2021-10-09 PROCEDURE — 80053 COMPREHEN METABOLIC PANEL: CPT

## 2021-10-09 PROCEDURE — 36415 COLL VENOUS BLD VENIPUNCTURE: CPT

## 2021-10-09 PROCEDURE — 83036 HEMOGLOBIN GLYCOSYLATED A1C: CPT

## 2021-10-20 ENCOUNTER — OFFICE VISIT (OUTPATIENT)
Dept: INTERNAL MEDICINE CLINIC | Facility: CLINIC | Age: 81
End: 2021-10-20

## 2021-10-20 VITALS
BODY MASS INDEX: 26.81 KG/M2 | TEMPERATURE: 98.1 F | OXYGEN SATURATION: 99 % | WEIGHT: 133 LBS | DIASTOLIC BLOOD PRESSURE: 74 MMHG | HEART RATE: 70 BPM | HEIGHT: 59 IN | SYSTOLIC BLOOD PRESSURE: 158 MMHG

## 2021-10-20 DIAGNOSIS — Z79.4 TYPE 2 DIABETES MELLITUS WITH HYPERGLYCEMIA, WITH LONG-TERM CURRENT USE OF INSULIN (HCC): Primary | ICD-10-CM

## 2021-10-20 DIAGNOSIS — E11.65 TYPE 2 DIABETES MELLITUS WITH HYPERGLYCEMIA, WITH LONG-TERM CURRENT USE OF INSULIN (HCC): Primary | ICD-10-CM

## 2021-10-20 DIAGNOSIS — Z23 NEED FOR INFLUENZA VACCINATION: ICD-10-CM

## 2021-10-20 DIAGNOSIS — E66.3 OVERWEIGHT (BMI 25.0-29.9): Chronic | ICD-10-CM

## 2021-10-20 LAB
LEFT EYE DIABETIC RETINOPATHY: NORMAL
LEFT EYE IMAGE QUALITY: NORMAL
LEFT EYE MACULAR EDEMA: NORMAL
LEFT EYE OTHER RETINOPATHY: NORMAL
RIGHT EYE DIABETIC RETINOPATHY: NORMAL
RIGHT EYE IMAGE QUALITY: NORMAL
RIGHT EYE MACULAR EDEMA: NORMAL
RIGHT EYE OTHER RETINOPATHY: NORMAL
SEVERITY (EYE EXAM): NORMAL

## 2021-10-20 PROCEDURE — 99213 OFFICE O/P EST LOW 20 MIN: CPT | Performed by: PHYSICIAN ASSISTANT

## 2021-10-20 PROCEDURE — 90662 IIV NO PRSV INCREASED AG IM: CPT | Performed by: PHYSICIAN ASSISTANT

## 2021-10-20 PROCEDURE — 1160F RVW MEDS BY RX/DR IN RCRD: CPT | Performed by: PHYSICIAN ASSISTANT

## 2021-10-20 PROCEDURE — 3078F DIAST BP <80 MM HG: CPT | Performed by: PHYSICIAN ASSISTANT

## 2021-10-20 PROCEDURE — G0008 ADMIN INFLUENZA VIRUS VAC: HCPCS | Performed by: PHYSICIAN ASSISTANT

## 2021-10-20 PROCEDURE — 1036F TOBACCO NON-USER: CPT | Performed by: PHYSICIAN ASSISTANT

## 2021-10-20 PROCEDURE — 3077F SYST BP >= 140 MM HG: CPT | Performed by: PHYSICIAN ASSISTANT

## 2021-10-20 RX ORDER — INSULIN DETEMIR 100 [IU]/ML
40 INJECTION, SOLUTION SUBCUTANEOUS EVERY 12 HOURS SCHEDULED
Qty: 45 ML | Refills: 1 | Status: SHIPPED | OUTPATIENT
Start: 2021-10-20 | End: 2022-02-08 | Stop reason: SDUPTHER

## 2022-02-01 ENCOUNTER — APPOINTMENT (OUTPATIENT)
Dept: LAB | Facility: CLINIC | Age: 82
End: 2022-02-01
Payer: COMMERCIAL

## 2022-02-01 DIAGNOSIS — E11.65 TYPE 2 DIABETES MELLITUS WITH HYPERGLYCEMIA, WITH LONG-TERM CURRENT USE OF INSULIN (HCC): ICD-10-CM

## 2022-02-01 DIAGNOSIS — Z79.4 TYPE 2 DIABETES MELLITUS WITH HYPERGLYCEMIA, WITH LONG-TERM CURRENT USE OF INSULIN (HCC): ICD-10-CM

## 2022-02-01 LAB
EST. AVERAGE GLUCOSE BLD GHB EST-MCNC: 197 MG/DL
HBA1C MFR BLD: 8.5 %

## 2022-02-01 PROCEDURE — 36415 COLL VENOUS BLD VENIPUNCTURE: CPT

## 2022-02-01 PROCEDURE — 83036 HEMOGLOBIN GLYCOSYLATED A1C: CPT

## 2022-02-08 ENCOUNTER — OFFICE VISIT (OUTPATIENT)
Dept: INTERNAL MEDICINE CLINIC | Facility: CLINIC | Age: 82
End: 2022-02-08

## 2022-02-08 VITALS
SYSTOLIC BLOOD PRESSURE: 146 MMHG | HEART RATE: 69 BPM | BODY MASS INDEX: 27.53 KG/M2 | WEIGHT: 134 LBS | TEMPERATURE: 97.7 F | DIASTOLIC BLOOD PRESSURE: 75 MMHG

## 2022-02-08 DIAGNOSIS — E11.9 DIABETES MELLITUS TYPE 2, INSULIN DEPENDENT (HCC): Chronic | ICD-10-CM

## 2022-02-08 DIAGNOSIS — E78.2 MIXED HYPERLIPIDEMIA: ICD-10-CM

## 2022-02-08 DIAGNOSIS — Z79.4 TYPE 2 DIABETES MELLITUS WITH HYPERGLYCEMIA, WITH LONG-TERM CURRENT USE OF INSULIN (HCC): Primary | ICD-10-CM

## 2022-02-08 DIAGNOSIS — Z79.4 DIABETES MELLITUS TYPE 2, INSULIN DEPENDENT (HCC): Chronic | ICD-10-CM

## 2022-02-08 DIAGNOSIS — I10 BENIGN ESSENTIAL HYPERTENSION: ICD-10-CM

## 2022-02-08 DIAGNOSIS — E11.65 TYPE 2 DIABETES MELLITUS WITH HYPERGLYCEMIA, WITH LONG-TERM CURRENT USE OF INSULIN (HCC): Primary | ICD-10-CM

## 2022-02-08 PROCEDURE — 1160F RVW MEDS BY RX/DR IN RCRD: CPT | Performed by: HOSPITALIST

## 2022-02-08 PROCEDURE — 1036F TOBACCO NON-USER: CPT | Performed by: HOSPITALIST

## 2022-02-08 PROCEDURE — 99213 OFFICE O/P EST LOW 20 MIN: CPT | Performed by: HOSPITALIST

## 2022-02-08 PROCEDURE — 3077F SYST BP >= 140 MM HG: CPT | Performed by: HOSPITALIST

## 2022-02-08 PROCEDURE — 3078F DIAST BP <80 MM HG: CPT | Performed by: HOSPITALIST

## 2022-02-08 RX ORDER — INSULIN DETEMIR 100 [IU]/ML
30 INJECTION, SOLUTION SUBCUTANEOUS EVERY 12 HOURS SCHEDULED
Qty: 45 ML | Refills: 2 | Status: SHIPPED | OUTPATIENT
Start: 2022-02-08 | End: 2022-06-08

## 2022-02-08 NOTE — PROGRESS NOTES
Assessment/Plan:      Diagnoses and all orders for this visit:    Type 2 diabetes mellitus with hyperglycemia, with long-term current use of insulin (HCC)  -     Levemir FlexTouch 100 units/mL injection pen; Inject 30 Units under the skin every 12 (twelve) hours    Diabetes mellitus type 2, insulin dependent (HCC)  -     Comprehensive metabolic panel; Future  -     Hemoglobin A1C; Future    Benign essential hypertension  -     Comprehensive metabolic panel; Future    Mixed hyperlipidemia  -     Lipid panel; Future  -     Comprehensive metabolic panel; Future      60-year-old female presenting today for routine follow-up of her chronic conditions  Regarding her diabetes, her A1c has improved previously 9 9, now 8 5  However, based on former PCP is last office visit note from October she increased her Levemir from 40 units once a day to 40 units twice a day and was advised to continue her metformin and Jardiance accordingly  However patient states that she has been using 40 units once every morning and thought it was just switched to the morning and not in the evening  Patient's A1c has improved however she needs better control  She is agreeable to increasing the Levemir  She does note that the site is tender and burns so I will have her divide the insulin up into b i d  dosing and have her use 30 units in the morning and 30 units in the evening to total 60 units  She will continue metformin and Jardiance  Her systolic blood pressure initially today elevated at 151/69  Recheck 146/75  She reports compliance on lisinopril 40 mg, amlodipine 10 mg and metoprolol tartrate 50 mg b i d  Vanessa Campbell Low-sodium diet stressed as well as regular exercise, healthy eating habits  We should continue to monitor, may need to consider adding 4th agent depending  Patient to continue lovastatin 40 mg with directions 2 tabs before bed        Patient given new script for fasting blood work to be completed in May and we will have her follow-up in about 3-4 months following the blood work to reassess her diabetes, BP and review the blood work results  I did not address DEXA scan today but does appear patient is due for 1  She will continue alendronate  I did encourage patient to get her COVID booster  Health maintenance will also need to be addressed in more detail at next follow-up  Chief Complaint   Patient presents with    Follow-up     review labs         Subjective:     Patient ID: Malachi Leary is a 81 y o  female  80y/o female here today  Here for diabetes follow up former Vi Tabor patient  Patient reports feeling very well overall with her medications  According to Florencio Lights B last note from October, she was increased on insulin to 40units 2 times a day  She reports however she is only using it 50u once a day in AM - used to take it before before  Not sure why she is using it, she though that is what was said to do  A1C in oct 2021 - 9 9  A1C now 8 5    She does try to follow diabetic diet  She confirms using jardiance and metformin for her diabetes  Compliance on lisinopril/metoprolol/amlodipine for HTN and lovastatin for hyperlipidemia  Review of Systems   Constitutional: Negative  Respiratory: Negative  Cardiovascular: Negative  Gastrointestinal: Negative  Endocrine: Negative  Genitourinary: Negative  Neurological: Negative  Psychiatric/Behavioral: Negative  The following portions of the patient's history were reviewed and updated as appropriate: allergies, current medications, past family history, past medical history, past social history, past surgical history and problem list       Objective:     Physical Exam  Vitals reviewed  Constitutional:       General: She is not in acute distress  Appearance: Normal appearance  She is not ill-appearing or toxic-appearing  HENT:      Head: Normocephalic and atraumatic     Neck:      Thyroid: No thyroid tenderness  Vascular: Normal carotid pulses  No carotid bruit  Cardiovascular:      Rate and Rhythm: Normal rate and regular rhythm  Pulses:           Carotid pulses are 2+ on the right side and 2+ on the left side  Radial pulses are 2+ on the right side and 2+ on the left side  Heart sounds: Normal heart sounds  Pulmonary:      Effort: Pulmonary effort is normal       Breath sounds: Normal breath sounds  Abdominal:      Tenderness: There is no abdominal tenderness  Musculoskeletal:      Cervical back: Neck supple  Right lower leg: No edema  Left lower leg: No edema  Neurological:      Mental Status: She is alert and oriented to person, place, and time  Psychiatric:         Mood and Affect: Mood normal          Speech: Speech normal          Behavior: Behavior normal  Behavior is cooperative             Vitals:    02/08/22 1137 02/08/22 1206   BP: 151/69 146/75   BP Location: Left arm    Patient Position: Sitting    Cuff Size: Standard    Pulse: 69    Temp: 97 7 °F (36 5 °C)    TempSrc: Temporal    Weight: 60 8 kg (134 lb)

## 2022-05-09 ENCOUNTER — APPOINTMENT (OUTPATIENT)
Dept: LAB | Facility: CLINIC | Age: 82
End: 2022-05-09
Payer: COMMERCIAL

## 2022-05-09 DIAGNOSIS — E78.2 MIXED HYPERLIPIDEMIA: ICD-10-CM

## 2022-05-09 DIAGNOSIS — I10 BENIGN ESSENTIAL HYPERTENSION: ICD-10-CM

## 2022-05-09 DIAGNOSIS — Z79.4 DIABETES MELLITUS TYPE 2, INSULIN DEPENDENT (HCC): Chronic | ICD-10-CM

## 2022-05-09 DIAGNOSIS — E11.9 DIABETES MELLITUS TYPE 2, INSULIN DEPENDENT (HCC): Chronic | ICD-10-CM

## 2022-05-09 LAB
ALBUMIN SERPL BCP-MCNC: 4 G/DL (ref 3.5–5)
ALP SERPL-CCNC: 85 U/L (ref 46–116)
ALT SERPL W P-5'-P-CCNC: 47 U/L (ref 12–78)
ANION GAP SERPL CALCULATED.3IONS-SCNC: 6 MMOL/L (ref 4–13)
AST SERPL W P-5'-P-CCNC: 35 U/L (ref 5–45)
BILIRUB SERPL-MCNC: 0.53 MG/DL (ref 0.2–1)
BUN SERPL-MCNC: 11 MG/DL (ref 5–25)
CALCIUM SERPL-MCNC: 9.7 MG/DL (ref 8.3–10.1)
CHLORIDE SERPL-SCNC: 101 MMOL/L (ref 100–108)
CHOLEST SERPL-MCNC: 170 MG/DL
CO2 SERPL-SCNC: 27 MMOL/L (ref 21–32)
CREAT SERPL-MCNC: 1.05 MG/DL (ref 0.6–1.3)
EST. AVERAGE GLUCOSE BLD GHB EST-MCNC: 232 MG/DL
GFR SERPL CREATININE-BSD FRML MDRD: 49 ML/MIN/1.73SQ M
GLUCOSE P FAST SERPL-MCNC: 256 MG/DL (ref 65–99)
HBA1C MFR BLD: 9.7 %
HDLC SERPL-MCNC: 38 MG/DL
NONHDLC SERPL-MCNC: 132 MG/DL
POTASSIUM SERPL-SCNC: 4.6 MMOL/L (ref 3.5–5.3)
PROT SERPL-MCNC: 8.1 G/DL (ref 6.4–8.2)
SODIUM SERPL-SCNC: 134 MMOL/L (ref 136–145)
TRIGL SERPL-MCNC: 425 MG/DL

## 2022-05-09 PROCEDURE — 80061 LIPID PANEL: CPT

## 2022-05-09 PROCEDURE — 36415 COLL VENOUS BLD VENIPUNCTURE: CPT

## 2022-05-09 PROCEDURE — 83036 HEMOGLOBIN GLYCOSYLATED A1C: CPT

## 2022-05-09 PROCEDURE — 80053 COMPREHEN METABOLIC PANEL: CPT

## 2022-09-13 ENCOUNTER — OFFICE VISIT (OUTPATIENT)
Dept: INTERNAL MEDICINE CLINIC | Facility: CLINIC | Age: 82
End: 2022-09-13

## 2022-09-13 VITALS
HEIGHT: 59 IN | DIASTOLIC BLOOD PRESSURE: 74 MMHG | BODY MASS INDEX: 26.61 KG/M2 | SYSTOLIC BLOOD PRESSURE: 140 MMHG | TEMPERATURE: 97.2 F | WEIGHT: 132 LBS | HEART RATE: 75 BPM

## 2022-09-13 DIAGNOSIS — Z12.31 ENCOUNTER FOR SCREENING MAMMOGRAM FOR MALIGNANT NEOPLASM OF BREAST: ICD-10-CM

## 2022-09-13 DIAGNOSIS — N28.9 DECREASED RENAL FUNCTION: ICD-10-CM

## 2022-09-13 DIAGNOSIS — Z12.11 SCREENING FOR MALIGNANT NEOPLASM OF COLON: ICD-10-CM

## 2022-09-13 DIAGNOSIS — E78.2 MIXED HYPERLIPIDEMIA: ICD-10-CM

## 2022-09-13 DIAGNOSIS — Z79.4 TYPE 2 DIABETES MELLITUS WITH HYPERGLYCEMIA, WITH LONG-TERM CURRENT USE OF INSULIN (HCC): Primary | ICD-10-CM

## 2022-09-13 DIAGNOSIS — I10 BENIGN ESSENTIAL HYPERTENSION: ICD-10-CM

## 2022-09-13 DIAGNOSIS — E11.65 TYPE 2 DIABETES MELLITUS WITH HYPERGLYCEMIA, WITH LONG-TERM CURRENT USE OF INSULIN (HCC): Primary | ICD-10-CM

## 2022-09-13 PROBLEM — Z92.29 COVID-19 VACCINE SERIES COMPLETED: Status: RESOLVED | Noted: 2021-07-09 | Resolved: 2022-09-13

## 2022-09-13 LAB — SL AMB POCT HEMOGLOBIN AIC: 10 (ref ?–6.5)

## 2022-09-13 PROCEDURE — 83036 HEMOGLOBIN GLYCOSYLATED A1C: CPT | Performed by: PHYSICIAN ASSISTANT

## 2022-09-13 PROCEDURE — 3725F SCREEN DEPRESSION PERFORMED: CPT | Performed by: PHYSICIAN ASSISTANT

## 2022-09-13 PROCEDURE — 1160F RVW MEDS BY RX/DR IN RCRD: CPT | Performed by: PHYSICIAN ASSISTANT

## 2022-09-13 PROCEDURE — 3288F FALL RISK ASSESSMENT DOCD: CPT | Performed by: PHYSICIAN ASSISTANT

## 2022-09-13 PROCEDURE — 1101F PT FALLS ASSESS-DOCD LE1/YR: CPT | Performed by: PHYSICIAN ASSISTANT

## 2022-09-13 PROCEDURE — 3077F SYST BP >= 140 MM HG: CPT | Performed by: PHYSICIAN ASSISTANT

## 2022-09-13 PROCEDURE — 3078F DIAST BP <80 MM HG: CPT | Performed by: PHYSICIAN ASSISTANT

## 2022-09-13 PROCEDURE — 99214 OFFICE O/P EST MOD 30 MIN: CPT | Performed by: PHYSICIAN ASSISTANT

## 2022-09-13 NOTE — ASSESSMENT & PLAN NOTE
Lab Results   Component Value Date     09/08/2015    SODIUM 134 (L) 05/09/2022    K 4 6 05/09/2022     05/09/2022    CO2 27 05/09/2022    ANIONGAP 7 09/08/2015    AGAP 6 05/09/2022    BUN 11 05/09/2022    CREATININE 1 05 05/09/2022    GLUC 297 (H) 01/01/2021    GLUF 256 (H) 05/09/2022    CALCIUM 9 7 05/09/2022    AST 35 05/09/2022    ALT 47 05/09/2022    ALKPHOS 85 05/09/2022    PROT 7 7 09/08/2015    TP 8 1 05/09/2022    BILITOT 0 26 09/08/2015    TBILI 0 53 05/09/2022    EGFR 49 05/09/2022       Decreased eGFR compared to baseline with last labs  Will recheck BMP

## 2022-09-13 NOTE — ASSESSMENT & PLAN NOTE
Lab Results   Component Value Date    HGBA1C 10 0 (A) 09/13/2022     Slight increase from 9 7  Patient never adjusted her insulin, patient was unaware  Discussed at her last visit her insulin was increased to 30 units of levemir twice daily  Patient understood  Continue metformin 1000 mg twice daily  Continue Jardiance 25 mg daily  Referral to ophthamology for diabetic eye exam  History of retinopathy  Unremarkable foot exam today  See BMI counseling for nutrition guidance given  Follow up in 3 months  Report any hypoglycemic events

## 2022-09-13 NOTE — PROGRESS NOTES
Patient's shoes and socks removed      Right Foot/Ankle   Right Foot Inspection      Sensory   Monofilament testing: intact    Left Foot/Ankle  Left Foot Inspection      Sensory   Monofilament testing: intact

## 2022-09-13 NOTE — PROGRESS NOTES
Name: Chris Islas      : 1940      MRN: [id number]  Encounter Provider: Cammy Felix PA-C  Encounter Date: 2022   Encounter department: [address] [place] [address]    Assessment & Plan     1  Type 2 diabetes mellitus with hyperglycemia, with long-term current use of insulin (HCC)  -     POCT hemoglobin A1c  -     Ambulatory Referral to Ophthalmology; Future    2  Mixed hyperlipidemia  Assessment & Plan:  Lab Results   Component Value Date    CHOLESTEROL 170 2022    CHOLESTEROL 155 2021    CHOLESTEROL 158 2020     Lab Results   Component Value Date    HDL 38 (L) 2022    HDL 45 2021    HDL 37 (L) 2020     Lab Results   Component Value Date    TRIG 425 (H) 2022    TRIG 298 (H) 2021    TRIG 459 (H) 2020     Lab Results   Component Value Date    [place] 132 2022     Lab Results   Component Value Date    LDLCALC  2022      Comment:      Calculated LDL invalid, triglycerides >400 mg/dl  This screening LDL is a calculated result  It does not have the accuracy of the Direct Measured LDL in the monitoring of patients with hyperlipidemia and/or statin therapy  Direct Measure LDL (QMH357) must be ordered separately in these patients  Due for repeat lipid panel  Continue lovastatin 40 mg daily  Pending results, may switch to Lipitor or Crestor  Orders:  -     Lipid Panel with Direct LDL reflex; Future    3  Benign essential hypertension  Assessment & Plan:  Controlled  Continue lisinopril 40 mg daily and amlodipine 10 mg daily  Limit sodium/salt in take  Avoid alcohol and caffeine         4  Decreased renal function  Assessment & Plan:  Lab Results   Component Value Date     2015    SODIUM 134 (L) 2022    K 4 6 2022     2022    CO2 27 2022    ANIONGAP 7 2015    AGAP 6 2022    BUN 11 2022    CREATININE 1 05 2022    GLUC 297 (H) 2021    GLUF 256 (H) 05/09/2022    CALCIUM 9 7 05/09/2022    AST 35 05/09/2022    ALT 47 05/09/2022    ALKPHOS 85 05/09/2022    PROT 7 7 09/08/2015    TP 8 1 05/09/2022    BILITOT 0 26 09/08/2015    TBILI 0 53 05/09/2022    EGFR 49 05/09/2022       Decreased eGFR compared to baseline with last labs  Will recheck BMP  Orders:  -     Basic metabolic panel; Future    5  Screening for malignant neoplasm of colon  -     Ambulatory referral for colonoscopy; Future    6  Encounter for screening mammogram for malignant neoplasm of breast  -     Mammo screening bilateral w 3d & cad; Future; Expected date: 09/13/2022    BMI Counseling: Body mass index is 26 66 kg/m²  The BMI is above normal  Nutrition recommendations include decreasing portion sizes, encouraging healthy choices of fruits and vegetables, decreasing fast food intake, consuming healthier snacks, limiting drinks that contain sugar, moderation in carbohydrate intake, increasing intake of lean protein, reducing intake of saturated and trans fat and reducing intake of cholesterol  Exercise recommendations include moderate physical activity 150 minutes/week, exercising 3-5 times per week and strength training exercises  No pharmacotherapy was ordered  Rationale for BMI follow-up plan is due to patient being overweight or obese  Depression Screening and Follow-up Plan: Patient was screened for depression during today's encounter  They screened negative with a PHQ-2 score of 0  Subjective      Patient is a 81year old female with a PMH of T2DM, HTN, and HLD presenting for chronic condition follow up  She states she is feeling well overall and offers no complaints  States she has been compliant with her mediations  Denies any medication side effects  She is checking her glucose 1-2 times daily  Lately her sugar has been in the 200s  Denies any hypoglycemic or hyperglycemic events  She reports she is injecting 50 units of levemir daily   Also taking metformin twice daily  Denies any visual changes  No changes in appetite or weight  Denies numbness, tingling, or weakness  Denies polyuria, polyphagia, and polydipsia  No ER visits or hospitalizations  Review of Systems   Constitutional: Negative for activity change, appetite change, chills, diaphoresis, fatigue, fever and unexpected weight change  HENT: Negative for congestion, hearing loss, postnasal drip, rhinorrhea, sore throat, tinnitus and trouble swallowing  Eyes: Negative for visual disturbance  Respiratory: Negative for cough, chest tightness, shortness of breath and wheezing  Cardiovascular: Negative for chest pain, palpitations and leg swelling  Gastrointestinal: Negative for abdominal pain, blood in stool, constipation, diarrhea, nausea and vomiting  Endocrine: Negative for cold intolerance, heat intolerance, polydipsia, polyphagia and polyuria  Genitourinary: Negative for decreased urine volume, difficulty urinating, dysuria, frequency, menstrual problem, pelvic pain and urgency  Musculoskeletal: Negative for arthralgias, back pain, gait problem, joint swelling and myalgias  Skin: Negative for color change, pallor, rash and wound  Neurological: Negative for dizziness, tremors, syncope, weakness, light-headedness, numbness and headaches  Hematological: Negative for adenopathy  Psychiatric/Behavioral: Negative for dysphoric mood, self-injury, sleep disturbance and suicidal ideas  The patient is not nervous/anxious          Current Outpatient Medications on File Prior to Visit   Medication Sig    acetaminophen (TYLENOL) 325 mg tablet Take 2 tablets (650 mg total) by mouth every 6 (six) hours as needed for mild pain    alendronate (Fosamax) 70 mg tablet Take 1 tablet (70 mg total) by mouth every 7 days    amLODIPine (NORVASC) 10 mg tablet TOME DIANE TABLETA TODOS LOS HANEY    cholecalciferol (VITAMIN D3) 1,000 units tablet Take 1 tablet (1,000 Units total) by mouth daily for 180 days    Empagliflozin (Jardiance) 25 MG TABS Take 1 tablet (25 mg total) by mouth every morning    glucose blood (FREESTYLE LITE) test strip USE AS INSTRUCTED TO CHECK SUGAR UP TO 3 TIMES DAILY    Insulin Pen Needle (B-D UF III MINI PEN NEEDLES) 31G X 5 MM MISC Inject 30 units SC twice daily and Trulicity once weekly    Lancets (FREESTYLE) lancets Use as instructed to check sugar up to 3 times daily    Levemir FlexTouch 100 units/mL injection pen Inject 30 Units under the skin every 12 (twelve) hours    lisinopril (ZESTRIL) 40 mg tablet TOME DIANE TABLETA TODOS LOS HANEY    lovastatin (MEVACOR) 40 MG tablet TAKE 2 TABLETS TOGETHER EVERY NIGHT    metFORMIN (GLUCOPHAGE) 1000 MG tablet TOME DIANE TABLETA DOS VECES AL BRENDAN CON LAS COMIDAS    metoprolol tartrate (LOPRESSOR) 50 mg tablet TAKE 1 TABLET BY MOUTH EVERY 12 HOURS HOLD FOR HEART RATE LESS THAN 50 BEATS PER MINUTE   multivitamin-minerals (CENTRUM ADULTS) tablet Take 1 tablet by mouth daily       Objective     /74 (BP Location: Left arm, Patient Position: Sitting, Cuff Size: Adult)   Pulse 75   Temp (!) 97 2 °F (36 2 °C) (Temporal)   Ht 4' 11" (1 499 m)   Wt 59 9 kg (132 lb)   BMI 26 66 kg/m²     Physical Exam  Vitals and nursing note reviewed  Constitutional:       General: She is awake  She is not in acute distress  Appearance: Normal appearance  She is well-developed, well-groomed and overweight  She is not ill-appearing  HENT:      Head: Normocephalic and atraumatic  Mouth/Throat:      Mouth: Mucous membranes are moist       Pharynx: Oropharynx is clear  No oropharyngeal exudate or posterior oropharyngeal erythema  Eyes:      General: No scleral icterus  Conjunctiva/sclera: Conjunctivae normal    Cardiovascular:      Rate and Rhythm: Normal rate and regular rhythm  Pulses: Normal pulses  no weak pulses          Dorsalis pedis pulses are 2+ on the right side and 2+ on the left side          Posterior tibial pulses are 2+ on the right side and 2+ on the left side  Heart sounds: Normal heart sounds  No murmur heard  Pulmonary:      Effort: Pulmonary effort is normal  No respiratory distress  Breath sounds: Normal breath sounds  No stridor or decreased air movement  No decreased breath sounds, wheezing, rhonchi or rales  Musculoskeletal:      Cervical back: Full passive range of motion without pain, normal range of motion and neck supple  Right lower leg: No edema  Left lower leg: No edema  Feet:      Right foot:      Skin integrity: No ulcer, skin breakdown, erythema, warmth, callus or dry skin  Left foot:      Skin integrity: No ulcer, skin breakdown, erythema, warmth, callus or dry skin  Lymphadenopathy:      Cervical: No cervical adenopathy  Skin:     General: Skin is warm  Capillary Refill: Capillary refill takes less than 2 seconds  Coloration: Skin is not jaundiced  Findings: No rash  Neurological:      General: No focal deficit present  Mental Status: She is alert and oriented to person, place, and time  Mental status is at baseline  Sensory: Sensation is intact  Motor: Motor function is intact  Coordination: Coordination is intact  Gait: Gait is intact  Psychiatric:         Attention and Perception: Attention and perception normal          Mood and Affect: Mood and affect normal          Speech: Speech normal          Behavior: Behavior normal  Behavior is cooperative  Thought Content: Thought content normal          Cognition and Memory: Cognition and memory normal          Judgment: Judgment normal           Patient's shoes and socks removed  Right Foot/Ankle   Right Foot Inspection  Skin Exam: skin normal and skin intact  No dry skin, no warmth, no callus, no erythema, no maceration, no abnormal color, no pre-ulcer, no ulcer and no callus  Toe Exam: ROM and strength within normal limits       Sensory   Proprioception: intact  Monofilament testing: intact    Vascular  Capillary refills: < 3 seconds  The right DP pulse is 2+  The right PT pulse is 2+  Left Foot/Ankle  Left Foot Inspection  Skin Exam: skin normal and skin intact  No dry skin, no warmth, no erythema, no maceration, normal color, no pre-ulcer, no ulcer and no callus  Toe Exam: ROM and strength within normal limits  Sensory   Proprioception: intact  Monofilament testing: intact    Vascular  Capillary refills: < 3 seconds  The left DP pulse is 2+  The left PT pulse is 2+       Assign Risk Category  No deformity present  No loss of protective sensation  No weak pulses  Risk: Kalyani Serrano 399, PA-C

## 2022-09-13 NOTE — PATIENT INSTRUCTIONS
Please call gastro to schedule colonoscopy  [phone]    Start 30 units of levemir twice a day (insulin)

## 2022-09-13 NOTE — ASSESSMENT & PLAN NOTE
Lab Results   Component Value Date    CHOLESTEROL 170 05/09/2022    CHOLESTEROL 155 06/22/2021    CHOLESTEROL 158 09/05/2020     Lab Results   Component Value Date    HDL 38 (L) 05/09/2022    HDL 45 06/22/2021    HDL 37 (L) 09/05/2020     Lab Results   Component Value Date    TRIG 425 (H) 05/09/2022    TRIG 298 (H) 06/22/2021    TRIG 459 (H) 09/05/2020     Lab Results   Component Value Date    [place] 132 05/09/2022     Lab Results   Component Value Date    LDLCALC  05/09/2022      Comment:      Calculated LDL invalid, triglycerides >400 mg/dl  This screening LDL is a calculated result  It does not have the accuracy of the Direct Measured LDL in the monitoring of patients with hyperlipidemia and/or statin therapy  Direct Measure LDL (OTG770) must be ordered separately in these patients  Due for repeat lipid panel  Continue lovastatin 40 mg daily  Pending results, may switch to Lipitor or Crestor

## 2022-09-13 NOTE — ASSESSMENT & PLAN NOTE
Controlled  Continue lisinopril 40 mg daily and amlodipine 10 mg daily  Limit sodium/salt in take  Avoid alcohol and caffeine

## 2022-11-08 ENCOUNTER — OFFICE VISIT (OUTPATIENT)
Dept: GASTROENTEROLOGY | Facility: CLINIC | Age: 82
End: 2022-11-08

## 2022-11-08 VITALS
HEIGHT: 59 IN | WEIGHT: 130 LBS | TEMPERATURE: 98.9 F | SYSTOLIC BLOOD PRESSURE: 152 MMHG | BODY MASS INDEX: 26.21 KG/M2 | DIASTOLIC BLOOD PRESSURE: 82 MMHG

## 2022-11-08 DIAGNOSIS — E61.1 IRON DEFICIENCY: ICD-10-CM

## 2022-11-08 DIAGNOSIS — K63.5 POLYP OF COLON, UNSPECIFIED PART OF COLON, UNSPECIFIED TYPE: Primary | ICD-10-CM

## 2022-11-08 RX ORDER — SODIUM PICOSULFATE, MAGNESIUM OXIDE, AND ANHYDROUS CITRIC ACID 10; 3.5; 12 MG/160ML; G/160ML; G/160ML
LIQUID ORAL
Qty: 320 ML | Refills: 0 | Status: SHIPPED | OUTPATIENT
Start: 2022-11-08 | End: 2022-11-08

## 2022-11-08 NOTE — PATIENT INSTRUCTIONS
Scheduled date of colonoscopy/egd  (as of today): 1/4/23  Physician performing colonoscopy: Dr Bobbi Rouse  Location of colonoscopy: [facility]   Bowel prep reviewed with patient: Selena Arenas  Instructions reviewed with patient by: Rema Lay  Clearances:   na

## 2022-11-08 NOTE — PROGRESS NOTES
[facility]  Tushar Moreno 81 y o  female MRN: [id number]  Encounter: [id number]    Assessment and Plan    1  Iron deficiency   2  Personal history of colon polyps   The patient has a history of large polyp removal  Last colonoscopy 4/2016 with one polyp removed and previous polypectomy site seen with tattoo placed with mild erythema that was biopsied revealing no concerns, repeat was recommended 3 years later  At the time of her last visit she also had iron deficiency with a ferritin of 7 and it was recommended she also get an EGD  Both EGD and colonoscopy were scheduled but due to an accident patient had to cancel  She presents today to rescheduled  She is doing well with no GI complaints or symptoms  - discussed both EGD and colonoscopy in detail, although patient is over typical screening age she has a history of large polyp and is in good health so she would like to proceed  - schedule EGD and colonoscopy  - obtain iron panel, if iron deficiency has resolved will proceed with just colonoscopy    Follow up as needed after scopes     ______________________________________________________________________    History of Present Illness  Tushar Moreno is a 81 y o  female with HTN, HLD, DM2 here for consultation of colon cancer screening  The patient has a history of large polyp removal  Last colonoscopy 4/2016 with one polyp removed and previous polypectomy site seen with tattoo placed with mild erythema that was biopsied revealing no concerns, repeat was recommended 3 years later  At the time of her last visit she also had iron deficiency with a ferritin of 7 and it was recommended she also get an EGD  Both EGD and colonoscopy were scheduled but due to an accident patient had to cancel  She presents today doing well with no GI complaints or symptoms       [id number]      Review of Systems   Constitutional: Negative for activity change, appetite change, chills, fatigue, fever and unexpected weight change  Gastrointestinal: Negative for abdominal distention, abdominal pain, anal bleeding, blood in stool, constipation, diarrhea, nausea, rectal pain and vomiting  Past Medical History  Past Medical History:   Diagnosis Date   • Anemia    • Chronic idiopathic constipation    • Colon polyp    • Diabetes mellitus ([facility] [address] )    • Diverticulitis, colon    • Esophageal reflux    • Eustachian tube dysfunction    • Gastrointestinal hemorrhage    • Hypertension    • Myocardial infarction [facility])    • Non-healing skin lesion    • Palpitations    • Rectal bleeding    • Skin lesion of chest wall        Past Social history  Past Surgical History:   Procedure Laterality Date   •  SECTION     • CHOLECYSTECTOMY     • COLONOSCOPY     • ECTOPIC PREGNANCY SURGERY     • OK COLONOSCOPY FLX DX W/COLLJ SPEC WHEN PFRMD N/A 2016    Procedure: COLONOSCOPY;  Surgeon: Dar Sheriff MD;  [facility]; Service: Gastroenterology   • OK OPEN RX DISTAL RADIUS FX, INTRA-ARTICULAR, 3+ FRAG Left 10/8/2019    Procedure: RADIUS/ULNA (WRIST) OPEN REDUCTION W/ INTERNAL FIXATION (ORIF);   Surgeon: Marcela Wolf MD;  [facility];  Service: Orthopedics   • OK REVISE MEDIAN N/CARPAL TUNNEL SURG Left 10/8/2019    Procedure: CARPAL TUNNEL RELEASE;  Surgeon: Marcela Wolf MD;  [facility];  Service: Orthopedics     Social History     Socioeconomic History   • Marital status: /Civil Union     Spouse name: Not on file   • Number of children: Not on file   • Years of education: Not on file   • Highest education level: Not on file   Occupational History   • Occupation: packaging   Tobacco Use   • Smoking status: Never Smoker   • Smokeless tobacco: Never Used   Vaping Use   • Vaping Use: Never used   Substance and Sexual Activity   • Alcohol use: Never   • Drug use: Never   • Sexual activity: Not Currently   Other Topics Concern   • Not on file   Social History Narrative    Caffeine use    Denied exercising     Social Determinants of Health     Financial Resource Strain: Low Risk    • Difficulty of Paying Living Expenses: Not hard at all   Food Insecurity: No Food Insecurity   • Worried About Running Out of Food in the Last Year: Never true   • Ran Out of Food in the Last Year: Never true   Transportation Needs: No Transportation Needs   • Lack of Transportation (Medical): No   • Lack of Transportation (Non-Medical):  No   Physical Activity: Not on file   Stress: Not on file   Social Connections: Not on file   Intimate Partner Violence: Not on file   Housing Stability: Not on file     Social History     Substance and Sexual Activity   Alcohol Use Never     Social History     Substance and Sexual Activity   Drug Use Never     Social History     Tobacco Use   Smoking Status Never Smoker   Smokeless Tobacco Never Used       Past Family History  Family History   Problem Relation Age of Onset   • Heart disease Mother    • Bleeding Disorder Sister    • No Known Problems Father    • No Known Problems Daughter    • No Known Problems Maternal Grandmother    • No Known Problems Maternal Grandfather    • No Known Problems Paternal Grandmother    • No Known Problems Paternal Grandfather    • No Known Problems Sister    • Breast cancer Neg Hx        Current Medications  Current Outpatient Medications   Medication Sig Dispense Refill   • acetaminophen (TYLENOL) 325 mg tablet Take 2 tablets (650 mg total) by mouth every 6 (six) hours as needed for mild pain 1 Bottle 0   • alendronate (Fosamax) 70 mg tablet Take 1 tablet (70 mg total) by mouth every 7 days 12 tablet 3   • amLODIPine (NORVASC) 10 mg tablet TOME DIANE TABLETA TODOS LOS HANEY 90 tablet 3   • cholecalciferol (VITAMIN D3) 1,000 units tablet Take 1 tablet (1,000 Units total) by mouth daily for 180 days 90 tablet 1   • glucose blood (FREESTYLE LITE) test strip USE AS INSTRUCTED TO CHECK SUGAR UP TO 3 TIMES DAILY 300 each 5   • Insulin Pen Needle (B-D UF III MINI PEN NEEDLES) 31G X 5 MM MISC Inject 30 units SC twice daily and Trulicity once weekly 498 each 1   • Lancets (FREESTYLE) lancets Use as instructed to check sugar up to 3 times daily 300 each 1   • lisinopril (ZESTRIL) 40 mg tablet TOME DIANE TABLETA TODOS LOS HANEY 90 tablet 3   • lovastatin (MEVACOR) 40 MG tablet TAKE 2 TABLETS TOGETHER EVERY NIGHT 180 tablet 3   • metFORMIN (GLUCOPHAGE) 1000 MG tablet TOME DIANE TABLETA DOS VECES AL BRENDAN CON LAS COMIDAS 180 tablet 3   • metoprolol tartrate (LOPRESSOR) 50 mg tablet TAKE 1 TABLET BY MOUTH EVERY 12 HOURS HOLD FOR HEART RATE LESS THAN 50 BEATS PER MINUTE  180 tablet 3   • multivitamin-minerals (CENTRUM ADULTS) tablet Take 1 tablet by mouth daily 30 tablet 1   • Empagliflozin (Jardiance) 25 MG TABS Take 1 tablet (25 mg total) by mouth every morning 90 tablet 1   • Levemir FlexTouch 100 units/mL injection pen Inject 30 Units under the skin every 12 (twelve) hours 45 mL 2     No current facility-administered medications for this visit  Allergies  No Known Allergies      The following portions of the patient's history were reviewed and updated as appropriate: allergies, current medications, past medical history, past social history, past surgical history and problem list       Vitals  Vitals:    11/08/22 0847   BP: 152/82   BP Location: Left arm   Patient Position: Sitting   Cuff Size: Adult   Temp: 98 9 °F (37 2 °C)   TempSrc: Tympanic   Weight: 59 kg (130 lb)   Height: 4' 11" (1 499 m)         Physical Exam  Constitutional   General appearance: Patient is seated and in no acute distress, well appearing and well nourished  Head and Face   Head and face: Normal     Eyes   Conjunctiva and lids: No erythema, swelling or discharge  Anicteric  Ears, Nose, Mouth, and Throat   Hearing: Normal     Neck: Supple, trachea midline    Pulmonary   Respiratory effort: No increased work of breathing or signs of respiratory distress  Lungs: Clear to ascultation, no wheezes, rhonchi, or rales  Cardiovascular   Heart: Regular rate and rhythm, no murmurs gallops or rubs   Examination of extremities for edema and/or varicosities: Normal     Musculoskeletal   Gait and station: Normal     Skin   Skin and subcutaneous tissue: Warm, dry, and intact  No visible jaundice, lesions or rashes  Psychiatric   Judgment and insight: Normal  Recent and remote memory:  Normal  Mood and affect: Normal      Results  No visits with results within 1 Day(s) from this visit  Latest known visit with results is:   Office Visit on 09/13/2022   Component Date Value   • Hemoglobin A1C 09/13/2022 10 0 (A)       Radiology Results  No results found  Orders  No orders of the defined types were placed in this encounter

## 2022-12-13 ENCOUNTER — HOSPITAL ENCOUNTER (OUTPATIENT)
Dept: RADIOLOGY | Age: 82
Discharge: HOME | End: 2022-12-13

## 2022-12-13 VITALS — BODY MASS INDEX: 26.21 KG/M2 | WEIGHT: 130 LBS | HEIGHT: 59 IN

## 2022-12-13 DIAGNOSIS — Z12.31 ENCOUNTER FOR SCREENING MAMMOGRAM FOR MALIGNANT NEOPLASM OF BREAST: ICD-10-CM

## 2023-01-02 DIAGNOSIS — I10 BENIGN ESSENTIAL HYPERTENSION: ICD-10-CM

## 2023-01-03 RX ORDER — AMLODIPINE BESYLATE 10 MG/1
TABLET ORAL
Qty: 90 TABLET | Refills: 3 | Status: SHIPPED | OUTPATIENT
Start: 2023-01-03

## 2023-01-04 ENCOUNTER — HOSPITAL ENCOUNTER (OUTPATIENT)
Dept: GASTROENTEROLOGY | Facility: HOSPITAL | Age: 83
Setting detail: OUTPATIENT SURGERY
Discharge: HOME | End: 2023-01-04
Attending: INTERNAL MEDICINE

## 2023-01-04 ENCOUNTER — ANESTHESIA EVENT (OUTPATIENT)
Dept: GASTROENTEROLOGY | Facility: HOSPITAL | Age: 83
End: 2023-01-04

## 2023-01-04 ENCOUNTER — ANESTHESIA (OUTPATIENT)
Dept: GASTROENTEROLOGY | Facility: HOSPITAL | Age: 83
End: 2023-01-04

## 2023-01-04 VITALS
HEART RATE: 96 BPM | BODY MASS INDEX: 26.21 KG/M2 | TEMPERATURE: 96.5 F | DIASTOLIC BLOOD PRESSURE: 69 MMHG | WEIGHT: 130 LBS | HEIGHT: 59 IN | RESPIRATION RATE: 18 BRPM | SYSTOLIC BLOOD PRESSURE: 143 MMHG | OXYGEN SATURATION: 97 %

## 2023-01-04 DIAGNOSIS — E61.1 IRON DEFICIENCY: ICD-10-CM

## 2023-01-04 DIAGNOSIS — K63.5 POLYP OF COLON, UNSPECIFIED PART OF COLON, UNSPECIFIED TYPE: ICD-10-CM

## 2023-01-04 LAB — GLUCOSE SERPL-MCNC: 222 MG/DL (ref 65–140)

## 2023-01-04 RX ORDER — PROPOFOL 10 MG/ML
INJECTION, EMULSION INTRAVENOUS AS NEEDED
Status: DISCONTINUED | OUTPATIENT
Start: 2023-01-04 | End: 2023-01-04

## 2023-01-04 RX ORDER — LIDOCAINE HYDROCHLORIDE 10 MG/ML
INJECTION, SOLUTION EPIDURAL; INFILTRATION; INTRACAUDAL; PERINEURAL AS NEEDED
Status: DISCONTINUED | OUTPATIENT
Start: 2023-01-04 | End: 2023-01-04

## 2023-01-04 RX ORDER — PROPOFOL 10 MG/ML
INJECTION, EMULSION INTRAVENOUS CONTINUOUS PRN
Status: DISCONTINUED | OUTPATIENT
Start: 2023-01-04 | End: 2023-01-04

## 2023-01-04 RX ORDER — SODIUM CHLORIDE 9 MG/ML
INJECTION, SOLUTION INTRAVENOUS CONTINUOUS PRN
Status: DISCONTINUED | OUTPATIENT
Start: 2023-01-04 | End: 2023-01-04

## 2023-01-04 RX ADMIN — PROPOFOL 100 MG: 10 INJECTION, EMULSION INTRAVENOUS at 11:35

## 2023-01-04 RX ADMIN — PROPOFOL 100 MCG/KG/MIN: 10 INJECTION, EMULSION INTRAVENOUS at 11:35

## 2023-01-04 RX ADMIN — LIDOCAINE HYDROCHLORIDE 60 MG: 10 INJECTION, SOLUTION EPIDURAL; INFILTRATION; INTRACAUDAL; PERINEURAL at 11:35

## 2023-01-04 RX ADMIN — SODIUM CHLORIDE: 9 INJECTION, SOLUTION INTRAVENOUS at 11:28

## 2023-01-04 NOTE — H&P
History and Physical - [facility]  Tammy Lala 81 y o  female MRN: [id number]    HPI: Tammy Lala is a 81y o  year old female who presents for EGD and colonoscopy for evaluation of iron deficiency anemia and personal history of colon polyps      Review of Systems    Historical Information   Past Medical History:   Diagnosis Date   • Anemia    • Chronic idiopathic constipation    • Colon polyp    • Diabetes mellitus ([address] )    • Diverticulitis, colon    • Esophageal reflux    • Eustachian tube dysfunction    • Gastrointestinal hemorrhage    • Hypertension    • Myocardial infarction [facility])    • Non-healing skin lesion    • Palpitations    • Rectal bleeding    • Skin lesion of chest wall      Past Surgical History:   Procedure Laterality Date   •  SECTION     • CHOLECYSTECTOMY     • COLONOSCOPY     • ECTOPIC PREGNANCY SURGERY     • MT COLONOSCOPY FLX DX W/COLLJ SPEC WHEN PFRMD N/A 2016    Procedure: COLONOSCOPY;  Surgeon: Angela Barnes MD;  [facility]; Service: Gastroenterology   • MT NEUROPLASTY &/TRANSPOS MEDIAN NRV CARPAL TUNNE Left 10/8/2019    Procedure: CARPAL TUNNEL RELEASE;  Surgeon: Jacobo Robin MD;  [facility];  Service: Orthopedics   • MT OPTX DSTL RADL I-ARTIC FX/EPIPHYSL SEP 3 FRAG Left 10/8/2019    Procedure: RADIUS/ULNA (WRIST) OPEN REDUCTION W/ INTERNAL FIXATION (ORIF);   Surgeon: Jacobo Robin MD;  [facility];  Service: Orthopedics     Social History   Social History     Substance and Sexual Activity   Alcohol Use Never     Social History     Substance and Sexual Activity   Drug Use Never     Social History     Tobacco Use   Smoking Status Never   Smokeless Tobacco Never     Family History   Problem Relation Age of Onset   • Heart disease Mother    • Bleeding Disorder Sister    • No Known Problems Father    • No Known Problems Daughter    • No Known Problems Maternal Grandmother    • No Known Problems Maternal Grandfather    • No Known Problems Paternal Grandmother    • No Known Problems Paternal Grandfather    • No Known Problems Sister    • Breast cancer Neg Hx        Meds/Allergies     (Not in a hospital admission)      No Known Allergies    Objective     BP (!) 175/80   Pulse 105   Temp 97 7 °F (36 5 °C) (Tympanic)   Resp 18   Ht 4' 11" (1 499 m)   Wt 59 kg (130 lb)   SpO2 98%   BMI 26 26 kg/m²       PHYSICAL EXAM    Gen: NAD  CV: RRR  CHEST: Clear  ABD: soft, NT/ND  EXT: no edema  Neuro: AAO      ASSESSMENT/PLAN:  This is a 81y o  year old female here for EGD and colonoscopy with biopsy and possible polypectomy    PLAN:   Procedure: EGD and colonoscopy

## 2023-01-04 NOTE — ANESTHESIA POSTPROCEDURE EVALUATION
Post-Op Assessment Note    CV Status:  Stable    Pain management: adequate     Mental Status:  Alert and awake   Hydration Status:  Euvolemic   PONV Controlled:  Controlled   Airway Patency:  Patent      Post Op Vitals Reviewed: Yes      Staff: Anesthesiologist, CRNA         No notable events documented      /62 (01/04/23 1203)    Temp (!) 96 5 °F (35 8 °C) (01/04/23 1203)    Pulse 104 (01/04/23 1203)   Resp 16 (01/04/23 1203)    SpO2 98 % (01/04/23 1203)

## 2023-01-04 NOTE — ANESTHESIA PREPROCEDURE EVALUATION
Procedure:  COLONOSCOPY  EGD    Relevant Problems   CARDIO   (+) Benign essential hypertension   (+) Mixed hyperlipidemia      ENDO   (+) Diabetes mellitus type 2, insulin dependent (HCC)      NEURO/PSYCH   (+) History of COVID-19        Physical Exam    Airway    Mallampati score: III  TM Distance: >3 FB  Neck ROM: full     Dental   No notable dental hx     Cardiovascular  Cardiovascular exam normal    Pulmonary  Pulmonary exam normal     Other Findings    A1c 10  Denies any DM complications  SUMMARY     LEFT VENTRICLE:  Systolic function was normal  Ejection fraction was estimated to be 55 %  There were no regional wall motion abnormalities  Wall thickness was mildly increased  There was mild concentric hypertrophy  Doppler parameters were consistent with abnormal left ventricular relaxation (grade 1 diastolic dysfunction)      MITRAL VALVE:  There was trace regurgitation      AORTIC VALVE:  There was trace regurgitation  Anesthesia Plan  ASA Score- 3     Anesthesia Type- IV sedation with anesthesia with ASA Monitors  Additional Monitors:   Airway Plan:           Plan Factors-Exercise tolerance (METS): >4 METS  Chart reviewed  Patient summary reviewed  Patient is not a current smoker  Induction- intravenous  Postoperative Plan-     Informed Consent- Anesthetic plan and risks discussed with patient

## 2023-02-28 ENCOUNTER — OFFICE VISIT (OUTPATIENT)
Dept: INTERNAL MEDICINE CLINIC | Facility: CLINIC | Age: 83
End: 2023-02-28

## 2023-02-28 VITALS
DIASTOLIC BLOOD PRESSURE: 65 MMHG | OXYGEN SATURATION: 97 % | WEIGHT: 134 LBS | TEMPERATURE: 97.3 F | HEART RATE: 77 BPM | SYSTOLIC BLOOD PRESSURE: 144 MMHG | HEIGHT: 59 IN | BODY MASS INDEX: 27.01 KG/M2

## 2023-02-28 DIAGNOSIS — Z23 NEEDS FLU SHOT: ICD-10-CM

## 2023-02-28 DIAGNOSIS — K02.9 DENTAL CAVITY: ICD-10-CM

## 2023-02-28 DIAGNOSIS — E11.9 DIABETES MELLITUS TYPE 2, INSULIN DEPENDENT (HCC): Chronic | ICD-10-CM

## 2023-02-28 DIAGNOSIS — E11.65 TYPE 2 DIABETES MELLITUS WITH HYPERGLYCEMIA, WITH LONG-TERM CURRENT USE OF INSULIN (HCC): ICD-10-CM

## 2023-02-28 DIAGNOSIS — N18.31 STAGE 3A CHRONIC KIDNEY DISEASE (CKD) (HCC): ICD-10-CM

## 2023-02-28 DIAGNOSIS — Z00.00 MEDICARE ANNUAL WELLNESS VISIT, INITIAL: Primary | ICD-10-CM

## 2023-02-28 DIAGNOSIS — E11.65 UNCONTROLLED DIABETES MELLITUS WITH HYPERGLYCEMIA, WITH LONG-TERM CURRENT USE OF INSULIN (HCC): ICD-10-CM

## 2023-02-28 DIAGNOSIS — Z79.4 UNCONTROLLED DIABETES MELLITUS WITH HYPERGLYCEMIA, WITH LONG-TERM CURRENT USE OF INSULIN (HCC): ICD-10-CM

## 2023-02-28 DIAGNOSIS — Z79.4 DIABETES MELLITUS TYPE 2, INSULIN DEPENDENT (HCC): Chronic | ICD-10-CM

## 2023-02-28 DIAGNOSIS — Z79.4 TYPE 2 DIABETES MELLITUS WITH HYPERGLYCEMIA, WITH LONG-TERM CURRENT USE OF INSULIN (HCC): ICD-10-CM

## 2023-02-28 DIAGNOSIS — Z23 NEED FOR COVID-19 VACCINE: ICD-10-CM

## 2023-02-28 DIAGNOSIS — E78.1 HYPERTRIGLYCERIDEMIA: ICD-10-CM

## 2023-02-28 DIAGNOSIS — M80.00XD AGE-RELATED OSTEOPOROSIS WITH CURRENT PATHOLOGICAL FRACTURE WITH ROUTINE HEALING, SUBSEQUENT ENCOUNTER: ICD-10-CM

## 2023-02-28 RX ORDER — FLASH GLUCOSE SENSOR
1 KIT MISCELLANEOUS
Qty: 4 EACH | Refills: 1 | Status: SHIPPED | OUTPATIENT
Start: 2023-02-28

## 2023-02-28 RX ORDER — FLASH GLUCOSE SENSOR
1 KIT MISCELLANEOUS
Qty: 4 EACH | Refills: 1 | Status: SHIPPED | OUTPATIENT
Start: 2023-02-28 | End: 2023-02-28

## 2023-02-28 RX ORDER — INSULIN DETEMIR 100 [IU]/ML
50 INJECTION, SOLUTION SUBCUTANEOUS DAILY
Qty: 60 ML | Refills: 1 | Status: SHIPPED | OUTPATIENT
Start: 2023-02-28 | End: 2023-03-01 | Stop reason: ALTCHOICE

## 2023-02-28 RX ORDER — ICOSAPENT ETHYL 1000 MG/1
1 CAPSULE ORAL DAILY
Qty: 90 CAPSULE | Refills: 1 | Status: SHIPPED | OUTPATIENT
Start: 2023-02-28

## 2023-02-28 NOTE — PATIENT INSTRUCTIONS
Medicare Preventive Visit Patient Instructions  Thank you for completing your Welcome to Medicare Visit or Medicare Annual Wellness Visit today  Your next wellness visit will be due in one year (2/29/2024)  The screening/preventive services that you may require over the next 5-10 years are detailed below  Some tests may not apply to you based off risk factors and/or age  Screening tests ordered at today's visit but not completed yet may show as past due  Also, please note that scanned in results may not display below  Preventive Screenings:  Service Recommendations Previous Testing/Comments   Colorectal Cancer Screening  * Colonoscopy    * Fecal Occult Blood Test (FOBT)/Fecal Immunochemical Test (FIT)  * Fecal DNA/Cologuard Test  * Flexible Sigmoidoscopy Age: 39-70 years old   Colonoscopy: every 10 years (may be performed more frequently if at higher risk)  OR  FOBT/FIT: every 1 year  OR  Cologuard: every 3 years  OR  Sigmoidoscopy: every 5 years  Screening may be recommended earlier than age 39 if at higher risk for colorectal cancer  Also, an individualized decision between you and your healthcare provider will decide whether screening between the ages of 74-80 would be appropriate  Colonoscopy: 01/04/2023  FOBT/FIT: Not on file  Cologuard: Not on file  Sigmoidoscopy: Not on file    Screening Current     Breast Cancer Screening Age: 36 years old  Frequency: every 1-2 years  Not required if history of left and right mastectomy Mammogram: 12/13/2022    Screening Current   Cervical Cancer Screening Between the ages of 21-29, pap smear recommended once every 3 years  Between the ages of 33-67, can perform pap smear with HPV co-testing every 5 years     Recommendations may differ for women with a history of total hysterectomy, cervical cancer, or abnormal pap smears in past  Pap Smear: Not on file    Screening Not Indicated   Hepatitis C Screening Once for adults born between 1945 and 1965  More frequently in patients at high risk for Hepatitis C Hep C Antibody: 05/27/2020    Screening Current   Diabetes Screening 1-2 times per year if you're at risk for diabetes or have pre-diabetes Fasting glucose: 256 mg/dL (5/9/2022)  A1C: 10 0 (9/13/2022)  Screening Not Indicated  History Diabetes   Cholesterol Screening Once every 5 years if you don't have a lipid disorder  May order more often based on risk factors  Lipid panel: 05/09/2022    Screening Not Indicated  History Lipid Disorder     Other Preventive Screenings Covered by Medicare:  1  Abdominal Aortic Aneurysm (AAA) Screening: covered once if your at risk  You're considered to be at risk if you have a family history of AAA  2  Lung Cancer Screening: covers low dose CT scan once per year if you meet all of the following conditions: (1) Age 50-69; (2) No signs or symptoms of lung cancer; (3) Current smoker or have quit smoking within the last 15 years; (4) You have a tobacco smoking history of at least 20 pack years (packs per day multiplied by number of years you smoked); (5) You get a written order from a healthcare provider  3  Glaucoma Screening: covered annually if you're considered high risk: (1) You have diabetes OR (2) Family history of glaucoma OR (3)  aged 48 and older OR (3)  American aged 72 and older  3  Osteoporosis Screening: covered every 2 years if you meet one of the following conditions: (1) You're estrogen deficient and at risk for osteoporosis based off medical history and other findings; (2) Have a vertebral abnormality; (3) On glucocorticoid therapy for more than 3 months; (4) Have primary hyperparathyroidism; (5) On osteoporosis medications and need to assess response to drug therapy  · Last bone density test (DXA Scan): 04/10/2019   5  HIV Screening: covered annually if you're between the age of 15-65  Also covered annually if you are younger than 13 and older than 72 with risk factors for HIV infection   For pregnant patients, it is covered up to 3 times per pregnancy  Immunizations:  Immunization Recommendations   Influenza Vaccine Annual influenza vaccination during flu season is recommended for all persons aged >= 6 months who do not have contraindications   Pneumococcal Vaccine   * Pneumococcal conjugate vaccine = PCV13 (Prevnar 13), PCV15 (Vaxneuvance), PCV20 (Prevnar 20)  * Pneumococcal polysaccharide vaccine = PPSV23 (Pneumovax) Adults 25-60 years old: 1-3 doses may be recommended based on certain risk factors  Adults 72 years old: 1-2 doses may be recommended based off what pneumonia vaccine you previously received   Hepatitis B Vaccine 3 dose series if at intermediate or high risk (ex: diabetes, end stage renal disease, liver disease)   Tetanus (Td) Vaccine - COST NOT COVERED BY MEDICARE PART B Following completion of primary series, a booster dose should be given every 10 years to maintain immunity against tetanus  Td may also be given as tetanus wound prophylaxis  Tdap Vaccine - COST NOT COVERED BY MEDICARE PART B Recommended at least once for all adults  For pregnant patients, recommended with each pregnancy  Shingles Vaccine (Shingrix) - COST NOT COVERED BY MEDICARE PART B  2 shot series recommended in those aged 48 and above     Health Maintenance Due:      Topic Date Due   • Hepatitis C Screening  Completed   • Colorectal Cancer Screening  Discontinued     Immunizations Due:      Topic Date Due   • COVID-19 Vaccine (3 - Booster for Pfizer series) 06/18/2021   • Influenza Vaccine (1) 09/01/2022     Advance Directives   What are advance directives? Advance directives are legal documents that state your wishes and plans for medical care  These plans are made ahead of time in case you lose your ability to make decisions for yourself  Advance directives can apply to any medical decision, such as the treatments you want, and if you want to donate organs  What are the types of advance directives?   There are many types of advance directives, and each state has rules about how to use them  You may choose a combination of any of the following:  · Living will: This is a written record of the treatment you want  You can also choose which treatments you do not want, which to limit, and which to stop at a certain time  This includes surgery, medicine, IV fluid, and tube feedings  · Durable power of  for healthcare [facility]): This is a written record that states who you want to make healthcare choices for you when you are unable to make them for yourself  This person, called a proxy, is usually a family member or a friend  You may choose more than 1 proxy  · Do not resuscitate (DNR) order:  A DNR order is used in case your heart stops beating or you stop breathing  It is a request not to have certain forms of treatment, such as CPR  A DNR order may be included in other types of advance directives  · Medical directive: This covers the care that you want if you are in a coma, near death, or unable to make decisions for yourself  You can list the treatments you want for each condition  Treatment may include pain medicine, surgery, blood transfusions, dialysis, IV or tube feedings, and a ventilator (breathing machine)  · Values history: This document has questions about your views, beliefs, and how you feel and think about life  This information can help others choose the care that you would choose  Why are advance directives important? An advance directive helps you control your care  Although spoken wishes may be used, it is better to have your wishes written down  Spoken wishes can be misunderstood, or not followed  Treatments may be given even if you do not want them  An advance directive may make it easier for your family to make difficult choices about your care     Weight Management   Why it is important to manage your weight:  Being overweight increases your risk of health conditions such as heart disease, high blood pressure, type 2 diabetes, and certain types of cancer  It can also increase your risk for osteoarthritis, sleep apnea, and other respiratory problems  Aim for a slow, steady weight loss  Even a small amount of weight loss can lower your risk of health problems  How to lose weight safely:  A safe and healthy way to lose weight is to eat fewer calories and get regular exercise  You can lose up about 1 pound a week by decreasing the number of calories you eat by 500 calories each day  Healthy meal plan for weight management:  A healthy meal plan includes a variety of foods, contains fewer calories, and helps you stay healthy  A healthy meal plan includes the following:  · Eat whole-grain foods more often  A healthy meal plan should contain fiber  Fiber is the part of grains, fruits, and vegetables that is not broken down by your body  Whole-grain foods are healthy and provide extra fiber in your diet  Some examples of whole-grain foods are whole-wheat breads and pastas, oatmeal, brown rice, and bulgur  · Eat a variety of vegetables every day  Include dark, leafy greens such as spinach, kale, isac greens, and mustard greens  Eat yellow and orange vegetables such as carrots, sweet potatoes, and winter squash  · Eat a variety of fruits every day  Choose fresh or canned fruit (canned in its own juice or light syrup) instead of juice  Fruit juice has very little or no fiber  · Eat low-fat dairy foods  Drink fat-free (skim) milk or 1% milk  Eat fat-free yogurt and low-fat cottage cheese  Try low-fat cheeses such as mozzarella and other reduced-fat cheeses  · Choose meat and other protein foods that are low in fat  Choose beans or other legumes such as split peas or lentils  Choose fish, skinless poultry (chicken or turkey), or lean cuts of red meat (beef or pork)  Before you cook meat or poultry, cut off any visible fat  · Use less fat and oil  Try baking foods instead of frying them   Add less fat, such as margarine, sour cream, regular salad dressing and mayonnaise to foods  Eat fewer high-fat foods  Some examples of high-fat foods include french fries, doughnuts, ice cream, and cakes  · Eat fewer sweets  Limit foods and drinks that are high in sugar  This includes candy, cookies, regular soda, and sweetened drinks  Exercise:  Exercise at least 30 minutes per day on most days of the week  Some examples of exercise include walking, biking, dancing, and swimming  You can also fit in more physical activity by taking the stairs instead of the elevator or parking farther away from stores  Ask your healthcare provider about the best exercise plan for you  © Copyright [employer] 2018 Information is for End User's use only and may not be sold, redistributed or otherwise used for commercial purposes   All illustrations and images included in CareNotes® are the copyrighted property of [employer]  or [address] [place] [address]

## 2023-02-28 NOTE — PROGRESS NOTES
Assessment and Plan:     Problem List Items Addressed This Visit        Genitourinary    Stage 3a chronic kidney disease (CKD) ([facility] [address] )       Other    Hypertriglyceridemia    Relevant Medications    Icosapent Ethyl 1 g CAPS   Other Visit Diagnoses     Medicare annual wellness visit, initial    -  Primary    Type 2 diabetes mellitus with hyperglycemia, with long-term current use of insulin ([facility] [address] )        Uncontrolled due to non adherence with low carb diet ; patient confirm adherence with medication on Levemir 50 units daily and Metformin 1 g BID and Jardiance 25 mg QD ;   A1C today elevated 9 4 ; patient agrees on more diet changes with current regiment; will refer to dietician for eduation     Relevant Medications    Continuous Blood Gluc Sensor (FreeStyle Juani 14 Day Sensor) MISC    Other Relevant Orders    Microalbumin / creatinine urine ratio    Ambulatory Referral to Nutrition Services    Need for COVID-19 vaccine      Needs flu shot   Got 2 doses in 2021 , need 3rd & 4th explained, patient agreeable , will arrange at pharmacy     Dental cavity      1st molar (left lower jaw) with large cavity ; explained need for dental visit , patient agreeable and will scheduled ; was addressed before (few years with recs to extract but was not done due to insurance limit/coverage that time)    Osteoporosis   On Fosamax since 09/17/2015 ; will repeate Dexa Scan and discuss next visit if appropriate for holiday         Preventive health issues were discussed with patient, and age appropriate screening tests were ordered as noted in patient's After Visit Summary  Personalized health advice and appropriate referrals for health education or preventive services given if needed, as noted in patient's After Visit Summary       History of Present Illness:     Patient presents for a Medicare Wellness Visit     Stanley Trujillo is a pleasant 82 yo Female w PMH remarkable of DM-2 insulin dependent, uncontrolled, HTN , Osteoprosis, Hyperlipidemia on statin with Triglycerides still >400 , CKD 3A presents today for Annual Wellness Visit     See above A&P for additional health issues and managements addressed today, briefly patient to arrange a dental visit, on Fosamax > 5 years, to repeate Dexa for bone density revaluation and potential bisphosphonate holiday, added Icosapentyl to statin, agreeable to diabetic education, will work more on low carb diet, currently noted eats a lot of candy and high carb diet, will repeate A1C again next visit and revaluate; had no blood glucose logs today, agreeable to try Juani 2 CGM, ordered  Lives with  and son, never smoker, rare alcohol use, beer once in a while, no recreational drug  Reviewed recent EGD and colonoscopy with the patient, no further colonoscopy screening indicated  Patient to arrange Flu and COVID boosters at pharmacy ; recheck next visit if not done can offer Flu shot at office   Will obtain Microalbumin/Creatinine     Depression Screen PHQ 2 negative     HPI   Patient Care Team:  Antelmo Likes, DO as PCP - General (Internal Medicine)  MD Alysia Romero MD as Endoscopist     Review of Systems:     Review of Systems  - GENERAL: Negative for any nausea, vomiting, fevers, chills, or weight loss  - HEENT: Negative for any head/Neck trauma, pain, double/blurry vision, sinusitis, rhinitis, nose bleeding   - CARDIAC: Negative for any chest pain, palpitation, Dyspnea on exertion, peripheral edema  - PULMONARY: Negative for any SOB, cough, wheezing    - GASTROINTESTINAL: Negative for any abdominal pain, N/V/D/C, blood in stool    - GENITOURINARY: Negative for any dysuria, hematuria, incontinence   - NEUROLOGIC: Negative for any muscle weakness, numbness/tingling, memory changes  - MUSCULOSKELETAL: Negative for any joint pains/swelling, limited ROM     - INTEGUMENTARY: Negative for any rashes, cuts/ lesions   - HEMATOLOGIC: Negative for any abnormal bruising, frequent infections or bleeding  Problem List:     Patient Active Problem List   Diagnosis   • Benign essential hypertension   • Colon polyp   • Mild nonproliferative diabetic retinopathy of both eyes without macular edema associated with type 2 diabetes mellitus ([facility] )   • Postmenopausal osteoporosis   • Diabetes mellitus type 2, insulin dependent ([facility] )   • Overweight (BMI 25 0-29  9)   • Closed fracture of left distal radius   • History of COVID-19   • Hypertriglyceridemia   • Decreased renal function   • Stage 3a chronic kidney disease (CKD) (HCC)      Past Medical and Surgical History:     Past Medical History:   Diagnosis Date   • Anemia    • Chronic idiopathic constipation    • Colon polyp    • Diabetes mellitus ([facility] )    • Diverticulitis, colon    • Esophageal reflux    • Eustachian tube dysfunction    • Gastrointestinal hemorrhage    • Hypertension    • Myocardial infarction [facility])    • Non-healing skin lesion    • Palpitations    • Rectal bleeding    • Skin lesion of chest wall      Past Surgical History:   Procedure Laterality Date   •  SECTION     • CHOLECYSTECTOMY     • COLONOSCOPY     • ECTOPIC PREGNANCY SURGERY     • DC COLONOSCOPY FLX DX W/COLLJ SPEC WHEN PFRMD N/A 2016    Procedure: COLONOSCOPY;  Surgeon: Michael Mejia MD;  Location: [facility]; Service: Gastroenterology   • DC NEUROPLASTY &/TRANSPOS MEDIAN NRV CARPAL TUNNE Left 10/8/2019    Procedure: CARPAL TUNNEL RELEASE;  Surgeon: Erickson Tinajero MD;  [facility];  Service: Orthopedics   • DC OPTX DSTL RADL I-ARTIC FX/EPIPHYSL SEP 3 FRAG Left 10/8/2019    Procedure: RADIUS/ULNA (WRIST) OPEN REDUCTION W/ INTERNAL FIXATION (ORIF);   Surgeon: Erickson Tinajero MD;  [facility];  Service: Orthopedics      Family History:     Family History   Problem Relation Age of Onset   • Heart disease Mother    • Bleeding Disorder Sister    • No Known Problems Father    • No Known Problems Daughter    • No Known Problems Maternal Grandmother    • No Known Problems Maternal Grandfather    • No Known Problems Paternal Grandmother    • No Known Problems Paternal Grandfather    • No Known Problems Sister    • Breast cancer Neg Hx       Social History:     Social History     Socioeconomic History   • Marital status: /Civil Union     Spouse name: None   • Number of children: None   • Years of education: None   • Highest education level: None   Occupational History   • Occupation: packaging   Tobacco Use   • Smoking status: Never   • Smokeless tobacco: Never   Vaping Use   • Vaping Use: Never used   Substance and Sexual Activity   • Alcohol use: Never   • Drug use: Never   • Sexual activity: Not Currently   Other Topics Concern   • None   Social History Narrative    Caffeine use    Denied exercising     Social Determinants of Health     Financial Resource Strain: Low Risk    • Difficulty of Paying Living Expenses: Not very hard   Food Insecurity: No Food Insecurity   • Worried About Running Out of Food in the Last Year: Never true   • Ran Out of Food in the Last Year: Never true   Transportation Needs: No Transportation Needs   • Lack of Transportation (Medical): No   • Lack of Transportation (Non-Medical):  No   Physical Activity: Not on file   Stress: Not on file   Social Connections: Not on file   Intimate Partner Violence: Not on file   Housing Stability: Not on file      Medications and Allergies:     Current Outpatient Medications   Medication Sig Dispense Refill   • acetaminophen (TYLENOL) 325 mg tablet Take 2 tablets (650 mg total) by mouth every 6 (six) hours as needed for mild pain 1 Bottle 0   • alendronate (Fosamax) 70 mg tablet Take 1 tablet (70 mg total) by mouth every 7 days 12 tablet 3   • amLODIPine (NORVASC) 10 mg tablet TOME DIANE TABLETA TODOS LOS HANEY 90 tablet 3   • cholecalciferol (VITAMIN D3) 1,000 units tablet Take 1 tablet (1,000 Units total) by mouth daily for 180 days 90 tablet 1   • Continuous Blood Gluc Sensor (FreeStyle Juani 14 Day Sensor) MISC Use 1 Device every 14 (fourteen) days 4 each 1   • Empagliflozin (Jardiance) 25 MG TABS Take 1 tablet (25 mg total) by mouth every morning 90 tablet 1   • glucose blood (FREESTYLE LITE) test strip USE AS INSTRUCTED TO CHECK SUGAR UP TO 3 TIMES DAILY 300 each 5   • Icosapent Ethyl 1 g CAPS Take 1 capsule (1 g total) by mouth in the morning 90 capsule 1   • Insulin Pen Needle (B-D UF III MINI PEN NEEDLES) 31G X 5 MM MISC Inject 30 units SC twice daily and Trulicity once weekly 250 each 1   • Lancets (FREESTYLE) lancets Use as instructed to check sugar up to 3 times daily 300 each 1   • Levemir FlexTouch 100 units/mL injection pen Inject 30 Units under the skin every 12 (twelve) hours (Patient taking differently: Inject 50 Units under the skin daily) 45 mL 2   • lisinopril (ZESTRIL) 40 mg tablet TOME DIANE TABLETA TODOS LOS HANEY 90 tablet 3   • lovastatin (MEVACOR) 40 MG tablet TAKE 2 TABLETS TOGETHER EVERY NIGHT 180 tablet 3   • metFORMIN (GLUCOPHAGE) 1000 MG tablet TOME DIANE TABLETA DOS VECES AL BRENDAN CON LAS COMIDAS 180 tablet 3   • metoprolol tartrate (LOPRESSOR) 50 mg tablet TAKE 1 TABLET BY MOUTH EVERY 12 HOURS HOLD FOR HEART RATE LESS THAN 50 BEATS PER MINUTE  180 tablet 3   • multivitamin-minerals (CENTRUM ADULTS) tablet Take 1 tablet by mouth daily 30 tablet 1   • bisacodyl (DULCOLAX) 5 mg EC tablet Take as directed as per written office instructions (Patient not taking: Reported on 2/28/2023) 2 tablet 0   • polyethylene glycol (GLYCOLAX) 17 GM/SCOOP powder Take as directed as per written office instructions (Patient not taking: Reported on 2/28/2023) 238 g 0     No current facility-administered medications for this visit       No Known Allergies   Immunizations:     Immunization History   Administered Date(s) Administered   • COVID-19 PFIZER VACCINE 0 3 ML IM 03/30/2021, 04/23/2021   • INFLUENZA 11/24/2004, 11/01/2005, 12/18/2006, 10/30/2007, 10/22/2010, 02/27/2015, 11/06/2015, 09/22/2016, 10/09/2017   • Influenza Quadrivalent Preservative Free 3 years and older IM 09/22/2016   • Influenza Quadrivalent, 6-35 Months IM 10/09/2017   • Influenza Split High Dose Preservative Free IM 09/17/2012, 10/23/2013   • Influenza, high dose seasonal 0 7 mL 11/20/2018, 09/23/2019, 10/20/2021   • Influenza, injectable, quadrivalent, preservative free 0 5 mL 10/21/2020   • Influenza, seasonal, injectable 12/13/2011, 11/06/2015   • Pneumococcal Conjugate 13-Valent 11/20/2018   • Pneumococcal Polysaccharide PPV23 08/20/2004, 12/13/2011   • Td (adult), Unspecified 08/20/2003   • Tdap 05/09/2016      Health Maintenance:         Topic Date Due   • Hepatitis C Screening  Completed   • Colorectal Cancer Screening  Discontinued         Topic Date Due   • COVID-19 Vaccine (3 - Booster for Pfizer series) 06/18/2021   • Influenza Vaccine (1) 09/01/2022      Medicare Screening Tests and Risk Assessments:     Ynes Childs is here for her Initial Wellness visit  Health Risk Assessment:   Patient rates overall health as good  Patient feels that their physical health rating is same  Patient is very satisfied with their life  Eyesight was rated as same  Hearing was rated as same  Patient feels that their emotional and mental health rating is same  Patients states they are often angry  Patient states they are sometimes unusually tired/fatigued  Pain experienced in the last 7 days has been none  Patient states that she has experienced no weight loss or gain in last 6 months  Fall Risk Screening: In the past year, patient has experienced: no history of falling in past year      Urinary Incontinence Screening:   Patient has not leaked urine accidently in the last six months  Home Safety:  Patient does not have trouble with stairs inside or outside of their home  Patient has working smoke alarms and has working carbon monoxide detector  Home safety hazards include: none  Nutrition:   Current diet is Regular  Medications:   Patient is not currently taking any over-the-counter supplements  Patient is able to manage medications  Activities of Daily Living (ADLs)/Instrumental Activities of Daily Living (IADLs):   Walk and transfer into and out of bed and chair?: Yes  Dress and groom yourself?: Yes    Bathe or shower yourself?: Yes    Feed yourself? Yes  Do your laundry/housekeeping?: Yes  Manage your money, pay your bills and track your expenses?: Yes  Make your own meals?: Yes    Do your own shopping?: Yes    PREVENTIVE SCREENINGS      Cardiovascular Screening:    General: Screening Not Indicated and History Lipid Disorder      Diabetes Screening:     General: Screening Not Indicated and History Diabetes      Colorectal Cancer Screening:     General: Screening Current      Breast Cancer Screening:     General: Screening Current      Cervical Cancer Screening:    General: Screening Not Indicated      Osteoporosis Screening:    General: Screening Not Indicated and History Osteoporosis      Lung Cancer Screening:     General: Screening Not Indicated      Hepatitis C Screening:    General: Screening Current    Screening, Brief Intervention, and Referral to Treatment (SBIRT)    Screening  Typical number of drinks in a day: 0  Typical number of drinks in a week: 0  Interpretation: Low risk drinking behavior  Single Item Drug Screening:  How often have you used an illegal drug (including marijuana) or a prescription medication for non-medical reasons in the past year? never    Single Item Drug Screen Score: 0  Interpretation: Negative screen for possible drug use disorder    No results found       Physical Exam:     /65 (BP Location: Left arm, Patient Position: Sitting, Cuff Size: Large)   Pulse 77   Temp (!) 97 3 °F (36 3 °C) (Temporal)   Ht 4' 11" (1 499 m)   Wt 60 8 kg (134 lb)   SpO2 97%   BMI 27 06 kg/m²     Physical Exam   - GEN: Appears well, alert and oriented x 3, pleasant and cooperative, in no acute distress  - HEENT: wear prescription glasses; had left lower 2nd molar cavity; Anicteric, mucous membranes moist, PERRL and EOMI   - NECK: No lymphadenopathy, JVD or carotid bruits   - HEART: RRR, normal S1 and S2, no murmurs, clicks, gallops or rubs   - LUNGS: Clear to auscultation bilaterally; no wheezes, rales, or rhonchi  - ABDOMEN: Normal bowel sounds, soft, no tenderness, no distention, no organomegaly or masses felt on exam    - EXTREMITIES: Peripheral pulses normal; no clubbing, cyanosis, or edema  - NEURO: No focal findings, CN II-XII are grossly intact  - Musculoskeletal: 5/5 strength, normal ROM, no swollen or erythematous joints     - SKIN: Normal without suspicious lesions on exposed skin    Jonathon Herbert DO

## 2023-03-01 DIAGNOSIS — Z79.4 TYPE 2 DIABETES MELLITUS WITH HYPERGLYCEMIA, WITH LONG-TERM CURRENT USE OF INSULIN (HCC): Primary | ICD-10-CM

## 2023-03-01 DIAGNOSIS — E11.65 TYPE 2 DIABETES MELLITUS WITH HYPERGLYCEMIA, WITH LONG-TERM CURRENT USE OF INSULIN (HCC): Primary | ICD-10-CM

## 2023-03-01 RX ORDER — INSULIN GLARGINE 100 [IU]/ML
50 INJECTION, SOLUTION SUBCUTANEOUS DAILY
Qty: 45 ML | Refills: 1 | Status: SHIPPED | OUTPATIENT
Start: 2023-03-01

## 2023-03-09 ENCOUNTER — TELEPHONE (OUTPATIENT)
Dept: DIABETES SERVICES | Facility: OTHER | Age: 83
End: 2023-03-09

## 2023-03-20 DIAGNOSIS — I47.1 AVNRT (AV NODAL RE-ENTRY TACHYCARDIA) (HCC): ICD-10-CM

## 2023-03-20 DIAGNOSIS — E78.5 DYSLIPIDEMIA: ICD-10-CM

## 2023-03-20 DIAGNOSIS — I10 BENIGN ESSENTIAL HYPERTENSION: ICD-10-CM

## 2023-03-20 RX ORDER — METOPROLOL TARTRATE 50 MG/1
TABLET, FILM COATED ORAL
Qty: 180 TABLET | Refills: 3 | Status: SHIPPED | OUTPATIENT
Start: 2023-03-20

## 2023-03-20 RX ORDER — LOVASTATIN 40 MG/1
TABLET ORAL
Qty: 180 TABLET | Refills: 3 | Status: SHIPPED | OUTPATIENT
Start: 2023-03-20

## 2023-03-20 RX ORDER — LISINOPRIL 40 MG/1
TABLET ORAL
Qty: 90 TABLET | Refills: 3 | Status: SHIPPED | OUTPATIENT
Start: 2023-03-20

## 2023-03-30 ENCOUNTER — TELEPHONE (OUTPATIENT)
Dept: DIABETES SERVICES | Facility: OTHER | Age: 83
End: 2023-03-30

## 2023-06-14 ENCOUNTER — RA CDI HCC (OUTPATIENT)
Dept: OTHER | Facility: HOSPITAL | Age: 83
End: 2023-06-14

## 2023-06-14 DIAGNOSIS — Z79.4 TYPE 2 DIABETES MELLITUS WITH HYPERGLYCEMIA, WITH LONG-TERM CURRENT USE OF INSULIN (HCC): ICD-10-CM

## 2023-06-14 DIAGNOSIS — E11.65 TYPE 2 DIABETES MELLITUS WITH HYPERGLYCEMIA, WITH LONG-TERM CURRENT USE OF INSULIN (HCC): ICD-10-CM

## 2023-06-14 NOTE — PROGRESS NOTES
[facility] [address]  coding opportunities       Chart reviewed, no opportunity found: CHART REVIEWED, NO OPPORTUNITY FOUND        Patients Insurance     Medicare Insurance: [employer]

## 2023-06-22 ENCOUNTER — TELEPHONE (OUTPATIENT)
Dept: INTERNAL MEDICINE CLINIC | Facility: CLINIC | Age: 83
End: 2023-06-22

## 2023-06-22 DIAGNOSIS — E11.65 TYPE 2 DIABETES MELLITUS WITH HYPERGLYCEMIA, WITH LONG-TERM CURRENT USE OF INSULIN (HCC): Primary | ICD-10-CM

## 2023-06-22 DIAGNOSIS — Z79.4 TYPE 2 DIABETES MELLITUS WITH HYPERGLYCEMIA, WITH LONG-TERM CURRENT USE OF INSULIN (HCC): Primary | ICD-10-CM

## 2023-06-22 NOTE — TELEPHONE ENCOUNTER
Patient has requested the pharmacy obtain a prescription for the following  Drug: BD ISIS GEN PEN NDL 32G 4MM  Sig: USE SEGAndreas BRIGHT

## 2023-06-23 RX ORDER — PEN NEEDLE, DIABETIC 32GX 5/32"
NEEDLE, DISPOSABLE MISCELLANEOUS 3 TIMES DAILY
Qty: 300 EACH | Refills: 3 | Status: SHIPPED | OUTPATIENT
Start: 2023-06-23

## 2023-06-29 ENCOUNTER — TELEPHONE (OUTPATIENT)
Dept: INTERNAL MEDICINE CLINIC | Facility: CLINIC | Age: 83
End: 2023-06-29

## 2023-08-15 ENCOUNTER — OFFICE VISIT (OUTPATIENT)
Dept: INTERNAL MEDICINE CLINIC | Facility: CLINIC | Age: 83
End: 2023-08-15

## 2023-08-15 ENCOUNTER — APPOINTMENT (OUTPATIENT)
Dept: LAB | Facility: CLINIC | Age: 83
End: 2023-08-15
Payer: COMMERCIAL

## 2023-08-15 ENCOUNTER — TELEPHONE (OUTPATIENT)
Dept: GASTROENTEROLOGY | Facility: CLINIC | Age: 83
End: 2023-08-15

## 2023-08-15 VITALS
HEART RATE: 79 BPM | WEIGHT: 140 LBS | SYSTOLIC BLOOD PRESSURE: 179 MMHG | DIASTOLIC BLOOD PRESSURE: 74 MMHG | BODY MASS INDEX: 28.22 KG/M2 | HEIGHT: 59 IN | OXYGEN SATURATION: 99 % | TEMPERATURE: 97.7 F

## 2023-08-15 DIAGNOSIS — N28.9 DECREASED RENAL FUNCTION: ICD-10-CM

## 2023-08-15 DIAGNOSIS — Z79.4 DIABETES MELLITUS TYPE 2, INSULIN DEPENDENT (HCC): Primary | Chronic | ICD-10-CM

## 2023-08-15 DIAGNOSIS — Z79.4 TYPE 2 DIABETES MELLITUS WITH HYPERGLYCEMIA, WITH LONG-TERM CURRENT USE OF INSULIN (HCC): ICD-10-CM

## 2023-08-15 DIAGNOSIS — E78.2 MIXED HYPERLIPIDEMIA: ICD-10-CM

## 2023-08-15 DIAGNOSIS — E11.65 TYPE 2 DIABETES MELLITUS WITH HYPERGLYCEMIA, WITH LONG-TERM CURRENT USE OF INSULIN (HCC): ICD-10-CM

## 2023-08-15 DIAGNOSIS — I10 ESSENTIAL HYPERTENSION: ICD-10-CM

## 2023-08-15 DIAGNOSIS — E61.1 IRON DEFICIENCY: ICD-10-CM

## 2023-08-15 DIAGNOSIS — E11.9 DIABETES MELLITUS TYPE 2, INSULIN DEPENDENT (HCC): Primary | Chronic | ICD-10-CM

## 2023-08-15 LAB
ANION GAP SERPL CALCULATED.3IONS-SCNC: 9 MMOL/L
BUN SERPL-MCNC: 20 MG/DL (ref 5–25)
CALCIUM SERPL-MCNC: 10 MG/DL (ref 8.3–10.1)
CHLORIDE SERPL-SCNC: 106 MMOL/L (ref 96–108)
CHOLEST SERPL-MCNC: 166 MG/DL
CO2 SERPL-SCNC: 23 MMOL/L (ref 21–32)
CREAT SERPL-MCNC: 1.16 MG/DL (ref 0.6–1.3)
CREAT UR-MCNC: 72.2 MG/DL
FERRITIN SERPL-MCNC: 5 NG/ML (ref 11–307)
GFR SERPL CREATININE-BSD FRML MDRD: 43 ML/MIN/1.73SQ M
GLUCOSE P FAST SERPL-MCNC: 175 MG/DL (ref 65–99)
HDLC SERPL-MCNC: 41 MG/DL
IRON SATN MFR SERPL: 12 % (ref 15–50)
IRON SERPL-MCNC: 58 UG/DL (ref 50–170)
LDLC SERPL DIRECT ASSAY-MCNC: 85 MG/DL (ref 0–100)
MICROALBUMIN UR-MCNC: 11.4 MG/L (ref 0–20)
MICROALBUMIN/CREAT 24H UR: 16 MG/G CREATININE (ref 0–30)
POTASSIUM SERPL-SCNC: 4.2 MMOL/L (ref 3.5–5.3)
SL AMB POCT HEMOGLOBIN AIC: 8.8 (ref ?–6.5)
SODIUM SERPL-SCNC: 138 MMOL/L (ref 135–147)
TIBC SERPL-MCNC: 467 UG/DL (ref 250–450)
TRIGL SERPL-MCNC: 444 MG/DL

## 2023-08-15 PROCEDURE — 36415 COLL VENOUS BLD VENIPUNCTURE: CPT

## 2023-08-15 PROCEDURE — 83036 HEMOGLOBIN GLYCOSYLATED A1C: CPT | Performed by: INTERNAL MEDICINE

## 2023-08-15 PROCEDURE — 83550 IRON BINDING TEST: CPT

## 2023-08-15 PROCEDURE — 99215 OFFICE O/P EST HI 40 MIN: CPT | Performed by: INTERNAL MEDICINE

## 2023-08-15 PROCEDURE — 80061 LIPID PANEL: CPT

## 2023-08-15 PROCEDURE — 83540 ASSAY OF IRON: CPT

## 2023-08-15 PROCEDURE — 82728 ASSAY OF FERRITIN: CPT

## 2023-08-15 PROCEDURE — 80048 BASIC METABOLIC PNL TOTAL CA: CPT

## 2023-08-15 PROCEDURE — 83721 ASSAY OF BLOOD LIPOPROTEIN: CPT

## 2023-08-15 RX ORDER — INSULIN GLARGINE 100 [IU]/ML
44 INJECTION, SOLUTION SUBCUTANEOUS DAILY
Qty: 45 ML | Refills: 1 | Status: SHIPPED | OUTPATIENT
Start: 2023-08-15

## 2023-08-15 RX ORDER — HYDROCHLOROTHIAZIDE 12.5 MG/1
12.5 TABLET ORAL DAILY
Qty: 30 TABLET | Refills: 5 | Status: SHIPPED | OUTPATIENT
Start: 2023-08-15 | End: 2024-02-11

## 2023-08-15 NOTE — TELEPHONE ENCOUNTER
Spoke to patient and she stated she had her procedures done in Jan and really doesn't want to go through that again, she isn't having any symptom and hasn't seen blood in her stool.  She is going to see her pcp to see what the recommend

## 2023-08-15 NOTE — PROGRESS NOTES
Name: Jocelyn Sosa      : 1940      MRN: [id number]  Encounter Provider: Floyd Sullivan DO  Encounter Date: 8/15/2023   Encounter department: [address] [place] [address]    Assessment & Plan     1. Diabetes mellitus type 2, insulin dependent ([facility])  · Patient has history of DM  · Patient is complaint with her DM medications  · Patient A1C is 8.8       CBC and differential; Future       Comprehensive metabolic panel; Future       TSH, 3rd generation with Free T4 reflex; Future        Increase Insulin Glargine Solostar (Basaglar KwikPen) 100 UNIT/ML SOPN; Inject 0.44 mL (44 Units total) under the skin in the morning instead of 40 units         Continue gardiance and metformin as prescribed          Patient was provided with glucose log to document the blood glucose reading in the next 2 weeks         Plan to readjust the dose based on the glucose readings in the next 2 weeks. 3. Essential hypertension  · Patient blood pressure today is 172/77  · Patient reported that her blood pressure is generally elevated at home  · Patient is complaint with her blood pressure medications  Continue lissinopril 40 mg daily  Continue amlodipine 10 mg once daily  Continue metoprolol 50 mg tartrate twice daily  Starting  hydrochlorothiazide (HYDRODIURIL) 12.5 mg tablet; Take 1 tablet (12.5 mg total) by mouth daily  CMP after two weeks  Patient is advised to have nursing visit after two weeks to check her blood pressure          Subjective   82 yo femlae patient with past medical history of DM, HTN, hypertryglecridemia osteoprosis and SVT s/p ablation  who presents to the clinic for follow up on her uncontrolled diabetes. Patient reported no current symptoms and she is complaint with her DM medications. Patient denied vision changes, chest pain, numbness or tinglining. Patient A1c today is 8.8. patient bp in the clinic was 172/77 however patient is complain with her bp mediations.   HPI  Review of Systems Constitutional: Negative for activity change, appetite change, chills, diaphoresis, fatigue, fever and unexpected weight change. HENT: Negative for congestion, ear discharge, hearing loss, nosebleeds, postnasal drip, rhinorrhea, sinus pain, sore throat, tinnitus, trouble swallowing and voice change. Eyes: Negative for pain, discharge, redness, itching and visual disturbance. Respiratory: Negative for apnea, cough, chest tightness, shortness of breath, wheezing and stridor. Cardiovascular: Negative for chest pain, palpitations and leg swelling. Gastrointestinal: Negative for abdominal distention, anal bleeding, constipation, diarrhea, nausea, rectal pain and vomiting. Endocrine: Negative for heat intolerance, polydipsia, polyphagia and polyuria. Genitourinary: Negative for decreased urine volume, dyspareunia, frequency, genital sores, menstrual problem, urgency, vaginal bleeding, vaginal discharge and vaginal pain. Musculoskeletal: Negative for arthralgias, gait problem, joint swelling and neck stiffness. Skin: Negative for color change, rash and wound. Neurological: Negative for dizziness, tremors, facial asymmetry, speech difficulty, light-headedness, numbness and headaches. Psychiatric/Behavioral: Negative for agitation, confusion, decreased concentration, dysphoric mood, hallucinations, self-injury, sleep disturbance and suicidal ideas. The patient is not nervous/anxious and is not hyperactive.         Past Medical History:   Diagnosis Date   • Anemia    • Chronic idiopathic constipation    • Colon polyp    • Diabetes mellitus ([address])    • Diverticulitis, colon    • Esophageal reflux    • Eustachian tube dysfunction    • Gastrointestinal hemorrhage    • Hypertension    • Myocardial infarction [facility])    • Non-healing skin lesion    • Palpitations    • Rectal bleeding    • Skin lesion of chest wall      Past Surgical History:   Procedure Laterality Date   •  SECTION     • CHOLECYSTECTOMY     • COLONOSCOPY     • ECTOPIC PREGNANCY SURGERY     • WA COLONOSCOPY FLX DX W/COLLJ SPEC WHEN PFRMD N/A 4/25/2016    Procedure: COLONOSCOPY;  Surgeon: Val Longo MD;  Location: [facility]; Service: Gastroenterology   • WA NEUROPLASTY &/TRANSPOS MEDIAN NRV CARPAL TUNNE Left 10/8/2019    Procedure: CARPAL TUNNEL RELEASE;  Surgeon: Kajal Bermudez MD;  [facility];  Service: Orthopedics   • WA OPTX DSTL RADL I-ARTIC FX/EPIPHYSL SEP 3 FRAG Left 10/8/2019    Procedure: RADIUS/ULNA (WRIST) OPEN REDUCTION W/ INTERNAL FIXATION (ORIF);   Surgeon: Kajal Bermudez MD;  [facility];  Service: Orthopedics     Family History   Problem Relation Age of Onset   • Heart disease Mother    • Bleeding Disorder Sister    • No Known Problems Father    • No Known Problems Daughter    • No Known Problems Maternal Grandmother    • No Known Problems Maternal Grandfather    • No Known Problems Paternal Grandmother    • No Known Problems Paternal Grandfather    • No Known Problems Sister    • Breast cancer Neg Hx      Social History     Socioeconomic History   • Marital status: /Civil Union     Spouse name: None   • Number of children: None   • Years of education: None   • Highest education level: None   Occupational History   • Occupation: packaging   Tobacco Use   • Smoking status: Never   • Smokeless tobacco: Never   Vaping Use   • Vaping Use: Never used   Substance and Sexual Activity   • Alcohol use: Never   • Drug use: Never   • Sexual activity: Not Currently   Other Topics Concern   • None   Social History Narrative    Caffeine use    Denied exercising     Social Determinants of Health     Financial Resource Strain: Low Risk  (2/28/2023)    Overall Financial Resource Strain (CARDIA)    • Difficulty of Paying Living Expenses: Not very hard   Food Insecurity: No Food Insecurity (2/28/2023)    Hunger Vital Sign    • Worried About Running Out of Food in the Last Year: Never true    • Ran Out of Food in the Last Year: Never true   Transportation Needs: No Transportation Needs (2/28/2023)    PRAPARE - Transportation    • Lack of Transportation (Medical): No    • Lack of Transportation (Non-Medical): No   Physical Activity: Inactive (6/2/2020)    Exercise Vital Sign    • Days of Exercise per Week: 0 days    • Minutes of Exercise per Session: 0 min   Stress: No Stress Concern Present (6/2/2020)    [facility]    • Feeling of Stress : Not at all   Social Connections:  Moderately Isolated (6/2/2020)    Social Connection and Isolation Panel [NHANES]    • Frequency of Communication with Friends and Family: More than three times a week    • Frequency of Social Gatherings with Friends and Family: Never    • Attends Religion Services: Never    • Active Member of Clubs or Organizations: No    • Attends Club or Organization Meetings: Never    • Marital Status:    Intimate Partner Violence: Not At Risk (6/2/2020)    Humiliation, Afraid, Rape, and Kick questionnaire    • Fear of Current or Ex-Partner: No    • Emotionally Abused: No    • Physically Abused: No    • Sexually Abused: No   Housing Stability: Not on file     Current Outpatient Medications on File Prior to Visit   Medication Sig   • acetaminophen (TYLENOL) 325 mg tablet Take 2 tablets (650 mg total) by mouth every 6 (six) hours as needed for mild pain   • alendronate (Fosamax) 70 mg tablet Take 1 tablet (70 mg total) by mouth every 7 days   • amLODIPine (NORVASC) 10 mg tablet TOME DIANE TABLETA TODOS LOS HANEY   • cholecalciferol (VITAMIN D3) 1,000 units tablet Take 1 tablet (1,000 Units total) by mouth daily for 180 days   • Empagliflozin (Jardiance) 25 MG TABS Take 1 tablet (25 mg total) by mouth every morning   • glucose blood (FREESTYLE LITE) test strip USE AS INSTRUCTED TO CHECK SUGAR UP TO 3 TIMES DAILY   • Icosapent Ethyl 1 g CAPS Take 1 capsule (1 g total) by mouth in the morning   • Insulin Pen Needle (B-D UF III MINI PEN NEEDLES) 31G X 5 MM MISC Inject 30 units SC twice daily and Trulicity once weekly   • Insulin Pen Needle (BD Pen Needle Marichuy 2nd Gen) 32G X 4 MM MISC Use 3 (three) times a day Use to check blood sugar 3 times a day. • Lancets (FREESTYLE) lancets Use as instructed to check sugar up to 3 times daily   • lisinopril (ZESTRIL) 40 mg tablet TOME DIANE TABLETA TODOS LOS HANEY   • lovastatin (MEVACOR) 40 MG tablet TAKE 2 TABLETS TOGETHER EVERY NIGHT   • metFORMIN (GLUCOPHAGE) 1000 MG tablet TOME DIANE TABLETA DOS VECES AL BRENDAN CON ALIMENTO   • metoprolol tartrate (LOPRESSOR) 50 mg tablet TAKE 1 TABLET BY MOUTH EVERY 12 HOURS HOLD FOR HEART RATE LESS THAN 50 BEATS PER MINUTE.    • multivitamin-minerals (CENTRUM ADULTS) tablet Take 1 tablet by mouth daily   • [DISCONTINUED] Insulin Glargine Solostar (Basaglar KwikPen) 100 UNIT/ML SOPN Inject 0.5 mL (50 Units total) under the skin in the morning   • Continuous Blood Gluc Sensor (FreeStyle Juani 14 Day Sensor) MISC Use 1 Device every 14 (fourteen) days (Patient not taking: Reported on 8/15/2023)   • polyethylene glycol (GLYCOLAX) 17 GM/SCOOP powder Take as directed as per written office instructions (Patient not taking: Reported on 2/28/2023)     No Known Allergies  Immunization History   Administered Date(s) Administered   • COVID-19 PFIZER VACCINE 0.3 ML IM 03/30/2021, 04/23/2021   • INFLUENZA 11/24/2004, 11/01/2005, 12/18/2006, 10/30/2007, 10/22/2010, 02/27/2015, 11/06/2015, 09/22/2016, 10/09/2017   • Influenza Quadrivalent Preservative Free 3 years and older IM 09/22/2016   • Influenza Quadrivalent, 6-35 Months IM 10/09/2017   • Influenza Split High Dose Preservative Free IM 09/17/2012, 10/23/2013   • Influenza, high dose seasonal 0.7 mL 11/20/2018, 09/23/2019, 10/20/2021   • Influenza, injectable, quadrivalent, preservative free 0.5 mL 10/21/2020   • Influenza, seasonal, injectable 12/13/2011, 11/06/2015   • Pneumococcal Conjugate 13-Valent 11/20/2018   • Pneumococcal Polysaccharide PPV23 08/20/2004, 12/13/2011   • Td (adult), Unspecified 08/20/2003   • Tdap 05/09/2016       Objective     BP (!) 179/74   Pulse 79   Temp 97.7 °F (36.5 °C) (Temporal)   Ht 4' 11" (1.499 m)   Wt 63.5 kg (140 lb)   SpO2 99%   BMI 28.28 kg/m²     Physical Exam  Constitutional:       General: She is not in acute distress. Appearance: Normal appearance. She is not ill-appearing, toxic-appearing or diaphoretic. HENT:      Head: Normocephalic and atraumatic. Right Ear: Tympanic membrane, ear canal and external ear normal. There is no impacted cerumen. Left Ear: Tympanic membrane, ear canal and external ear normal. There is no impacted cerumen. Nose: Nose normal. No congestion or rhinorrhea. Mouth/Throat:      Mouth: Mucous membranes are moist.      Pharynx: No oropharyngeal exudate or posterior oropharyngeal erythema. Eyes:      General: No scleral icterus. Right eye: No discharge. Left eye: No discharge. Pupils: Pupils are equal, round, and reactive to light. Neck:      Vascular: No carotid bruit. Cardiovascular:      Rate and Rhythm: Normal rate and regular rhythm. Pulses: Normal pulses. Heart sounds: Normal heart sounds. No murmur heard. No friction rub. No gallop. Pulmonary:      Effort: Pulmonary effort is normal. No respiratory distress. Breath sounds: Normal breath sounds. No stridor. No wheezing, rhonchi or rales. Chest:      Chest wall: No tenderness. Abdominal:      General: Abdomen is flat. Bowel sounds are normal. There is no distension. Palpations: Abdomen is soft. There is no mass. Tenderness: There is no abdominal tenderness. There is no right CVA tenderness, left CVA tenderness, guarding or rebound. Hernia: No hernia is present. Musculoskeletal:         General: No swelling, tenderness, deformity or signs of injury. Normal range of motion. Cervical back: Normal range of motion. No rigidity or tenderness. Right lower leg: No edema. Left lower leg: No edema. Lymphadenopathy:      Cervical: No cervical adenopathy. Skin:     General: Skin is warm and dry. Coloration: Skin is not jaundiced or pale. Findings: No bruising, erythema, lesion or rash. Neurological:      General: No focal deficit present. Mental Status: She is alert and oriented to person, place, and time. Cranial Nerves: No cranial nerve deficit. Sensory: No sensory deficit. Motor: No weakness. Psychiatric:         Mood and Affect: Mood normal.         Behavior: Behavior normal.         Thought Content:  Thought content normal.         Judgment: Judgment normal.       Vikas Nair,

## 2023-08-15 NOTE — TELEPHONE ENCOUNTER
----- Message from Nereida Hooker PA-C sent at 8/15/2023  2:00 PM EDT -----  Please is Uzbek speaking, please let patient know she has continued iron def anemia so we will proceed with EGD and colonoscopy.

## 2023-08-29 ENCOUNTER — CLINICAL SUPPORT (OUTPATIENT)
Dept: INTERNAL MEDICINE CLINIC | Facility: CLINIC | Age: 83
End: 2023-08-29

## 2023-08-29 VITALS — HEART RATE: 76 BPM | SYSTOLIC BLOOD PRESSURE: 149 MMHG | DIASTOLIC BLOOD PRESSURE: 70 MMHG

## 2023-08-29 DIAGNOSIS — I10 ESSENTIAL HYPERTENSION: Primary | ICD-10-CM

## 2023-08-29 NOTE — PROGRESS NOTES
This is an FYI as you are covering Dr. Ginny Canada this week. I will also send to him to review when he returns.

## 2023-08-29 NOTE — PROGRESS NOTES
Patient came into the office for a nurse visit for BP check. Patient rested in room for 15 minutes and BP was checked in right arm with a reading of 149/70 and pulse of 76. She advised that she takes all of her medication but did not take them yet today. She also gave me a copy of her blood sugar readings that will be scanned in her chart under media.

## 2023-09-05 NOTE — PROGRESS NOTES
Pt  CAME INTO THE OFFICE TODAY FOR A NURSE VISIT FOR A BP CHECK ORDERED BY PRERNA GUILLERMO  Pt  DENIES ANY SYMPTOMS AT THIS TIME Pt   STATED SHE IS TAKING THE FOLLOWING MEDS EVERDAY:    AMLODIPINE 5 MG  LISINOPRIL 40  METOPROLOL 25  LOVASTATIN 40  LEVIMIR 30 UNITS Carac Counseling:  I discussed with the patient the risks of Carac including but not limited to erythema, scaling, itching, weeping, crusting, and pain.

## 2023-09-06 DIAGNOSIS — I10 ESSENTIAL HYPERTENSION: ICD-10-CM

## 2023-09-06 RX ORDER — HYDROCHLOROTHIAZIDE 12.5 MG/1
TABLET ORAL
Qty: 90 TABLET | Refills: 2 | Status: SHIPPED | OUTPATIENT
Start: 2023-09-06

## 2023-10-05 ENCOUNTER — RA CDI HCC (OUTPATIENT)
Dept: OTHER | Facility: HOSPITAL | Age: 83
End: 2023-10-05

## 2023-10-05 NOTE — PROGRESS NOTES
E11.65, Z79.4    [facility] coding opportunities          Chart Reviewed number of suggestions sent to Provider: 1     Patients Insurance     Medicare Insurance: [employer]

## 2023-10-09 ENCOUNTER — TELEPHONE (OUTPATIENT)
Dept: INTERNAL MEDICINE CLINIC | Facility: CLINIC | Age: 83
End: 2023-10-09

## 2023-11-14 ENCOUNTER — APPOINTMENT (OUTPATIENT)
Dept: LAB | Facility: CLINIC | Age: 83
End: 2023-11-14
Payer: COMMERCIAL

## 2023-11-14 ENCOUNTER — OFFICE VISIT (OUTPATIENT)
Dept: INTERNAL MEDICINE CLINIC | Facility: CLINIC | Age: 83
End: 2023-11-14

## 2023-11-14 VITALS
WEIGHT: 137 LBS | TEMPERATURE: 97.3 F | HEART RATE: 71 BPM | HEIGHT: 59 IN | DIASTOLIC BLOOD PRESSURE: 75 MMHG | SYSTOLIC BLOOD PRESSURE: 135 MMHG | BODY MASS INDEX: 27.62 KG/M2

## 2023-11-14 DIAGNOSIS — E11.65 TYPE 2 DIABETES MELLITUS WITH HYPERGLYCEMIA, WITH LONG-TERM CURRENT USE OF INSULIN (HCC): Primary | ICD-10-CM

## 2023-11-14 DIAGNOSIS — I10 BENIGN ESSENTIAL HYPERTENSION: ICD-10-CM

## 2023-11-14 DIAGNOSIS — E11.3293 MILD NONPROLIFERATIVE DIABETIC RETINOPATHY OF BOTH EYES WITHOUT MACULAR EDEMA ASSOCIATED WITH TYPE 2 DIABETES MELLITUS (HCC): ICD-10-CM

## 2023-11-14 DIAGNOSIS — Z79.4 DIABETES MELLITUS TYPE 2, INSULIN DEPENDENT (HCC): ICD-10-CM

## 2023-11-14 DIAGNOSIS — Z79.4 TYPE 2 DIABETES MELLITUS WITH HYPERGLYCEMIA, WITH LONG-TERM CURRENT USE OF INSULIN (HCC): Primary | ICD-10-CM

## 2023-11-14 DIAGNOSIS — E11.9 DIABETES MELLITUS TYPE 2, INSULIN DEPENDENT (HCC): ICD-10-CM

## 2023-11-14 DIAGNOSIS — Z23 ENCOUNTER FOR IMMUNIZATION: ICD-10-CM

## 2023-11-14 LAB
ALBUMIN SERPL BCP-MCNC: 4.8 G/DL (ref 3.5–5)
ALP SERPL-CCNC: 46 U/L (ref 34–104)
ALT SERPL W P-5'-P-CCNC: 24 U/L (ref 7–52)
ANION GAP SERPL CALCULATED.3IONS-SCNC: 12 MMOL/L
AST SERPL W P-5'-P-CCNC: 26 U/L (ref 13–39)
BASOPHILS # BLD AUTO: 0.08 THOUSANDS/ÂΜL (ref 0–0.1)
BASOPHILS NFR BLD AUTO: 1 % (ref 0–1)
BILIRUB SERPL-MCNC: 0.45 MG/DL (ref 0.2–1)
BUN SERPL-MCNC: 21 MG/DL (ref 5–25)
CALCIUM SERPL-MCNC: 10.1 MG/DL (ref 8.4–10.2)
CHLORIDE SERPL-SCNC: 99 MMOL/L (ref 96–108)
CO2 SERPL-SCNC: 30 MMOL/L (ref 21–32)
CREAT SERPL-MCNC: 1 MG/DL (ref 0.6–1.3)
EOSINOPHIL # BLD AUTO: 0.2 THOUSAND/ÂΜL (ref 0–0.61)
EOSINOPHIL NFR BLD AUTO: 3 % (ref 0–6)
ERYTHROCYTE [DISTWIDTH] IN BLOOD BY AUTOMATED COUNT: 14.3 % (ref 11.6–15.1)
GFR SERPL CREATININE-BSD FRML MDRD: 52 ML/MIN/1.73SQ M
GLUCOSE P FAST SERPL-MCNC: 119 MG/DL (ref 65–99)
HCT VFR BLD AUTO: 41.8 % (ref 34.8–46.1)
HGB BLD-MCNC: 13 G/DL (ref 11.5–15.4)
IMM GRANULOCYTES # BLD AUTO: 0.02 THOUSAND/UL (ref 0–0.2)
IMM GRANULOCYTES NFR BLD AUTO: 0 % (ref 0–2)
LYMPHOCYTES # BLD AUTO: 3.14 THOUSANDS/ÂΜL (ref 0.6–4.47)
LYMPHOCYTES NFR BLD AUTO: 40 % (ref 14–44)
MCH RBC QN AUTO: 26.9 PG (ref 26.8–34.3)
MCHC RBC AUTO-ENTMCNC: 31.1 G/DL (ref 31.4–37.4)
MCV RBC AUTO: 86 FL (ref 82–98)
MONOCYTES # BLD AUTO: 0.71 THOUSAND/ÂΜL (ref 0.17–1.22)
MONOCYTES NFR BLD AUTO: 9 % (ref 4–12)
NEUTROPHILS # BLD AUTO: 3.65 THOUSANDS/ÂΜL (ref 1.85–7.62)
NEUTS SEG NFR BLD AUTO: 47 % (ref 43–75)
NRBC BLD AUTO-RTO: 0 /100 WBCS
PLATELET # BLD AUTO: 304 THOUSANDS/UL (ref 149–390)
PMV BLD AUTO: 10.9 FL (ref 8.9–12.7)
POTASSIUM SERPL-SCNC: 4.4 MMOL/L (ref 3.5–5.3)
PROT SERPL-MCNC: 8 G/DL (ref 6.4–8.4)
RBC # BLD AUTO: 4.84 MILLION/UL (ref 3.81–5.12)
SL AMB POCT HEMOGLOBIN AIC: 7.3 (ref ?–6.5)
SODIUM SERPL-SCNC: 141 MMOL/L (ref 135–147)
T4 FREE SERPL-MCNC: 0.73 NG/DL (ref 0.61–1.12)
TSH SERPL DL<=0.05 MIU/L-ACNC: 6.01 UIU/ML (ref 0.45–4.5)
WBC # BLD AUTO: 7.8 THOUSAND/UL (ref 4.31–10.16)

## 2023-11-14 PROCEDURE — 84443 ASSAY THYROID STIM HORMONE: CPT

## 2023-11-14 PROCEDURE — 36415 COLL VENOUS BLD VENIPUNCTURE: CPT

## 2023-11-14 PROCEDURE — 99214 OFFICE O/P EST MOD 30 MIN: CPT | Performed by: STUDENT IN AN ORGANIZED HEALTH CARE EDUCATION/TRAINING PROGRAM

## 2023-11-14 PROCEDURE — G0008 ADMIN INFLUENZA VIRUS VAC: HCPCS | Performed by: STUDENT IN AN ORGANIZED HEALTH CARE EDUCATION/TRAINING PROGRAM

## 2023-11-14 PROCEDURE — 85025 COMPLETE CBC W/AUTO DIFF WBC: CPT

## 2023-11-14 PROCEDURE — 80053 COMPREHEN METABOLIC PANEL: CPT

## 2023-11-14 PROCEDURE — 83036 HEMOGLOBIN GLYCOSYLATED A1C: CPT | Performed by: STUDENT IN AN ORGANIZED HEALTH CARE EDUCATION/TRAINING PROGRAM

## 2023-11-14 PROCEDURE — 90662 IIV NO PRSV INCREASED AG IM: CPT | Performed by: STUDENT IN AN ORGANIZED HEALTH CARE EDUCATION/TRAINING PROGRAM

## 2023-11-14 PROCEDURE — 84439 ASSAY OF FREE THYROXINE: CPT

## 2023-11-14 RX ORDER — LANCETS 28 GAUGE
EACH MISCELLANEOUS
Qty: 300 EACH | Refills: 1 | Status: SHIPPED | OUTPATIENT
Start: 2023-11-14

## 2023-11-14 NOTE — ASSESSMENT & PLAN NOTE
essential hypertension  continue current regimen: metoprolol and lisinopril   Consider amlodipine for goal <130 SBP  continue smoking cessation/abstinence  reccommended low salt diet (<2g per day)  counseled on continuing healthy lifestyle modifications

## 2023-11-14 NOTE — ASSESSMENT & PLAN NOTE
Lab Results   Component Value Date    HGBA1C 7.3 (A) 11/14/2023     DM control as outlined below  Does see optho, continue  Glasses UTD

## 2023-11-14 NOTE — ASSESSMENT & PLAN NOTE
Lab Results   Component Value Date    HGBA1C 7.3 (A) 11/14/2023       Currently at goal for A1c (<7%, <8% if >80)  Continue current regimen of glargine 50 U, jardiance 25, and metformin 1000 BID   Consider decreasing long acting is any hypoglycemic symptoms; discussed hypoglycemia signs; consider once weekly injectable as goal <8% considering age?   On metformin and not demonstrating signs of hypoglycemia  Continue Ace-i/arb  Is on statin, continue/consider adding            repeat lipids q3yrs, due 2025  is following optho, next eye exam due 2024  is following podiatry, next foot exam due 2024, performed in office etoday  yearly micro albumin creatinine ratio is UTD, due 2024  Carb controlled diet, discussed healthy lifestyle modifications  consider DM nutrition referral

## 2023-11-14 NOTE — PROGRESS NOTES
[facility]  INTERNAL MEDICINE OFFICE VISIT     PATIENT INFORMATION     Ann Marie Brush   81 y.o. female   MRN: [id number]    ASSESSMENT/PLAN     1. Type 2 diabetes mellitus with hyperglycemia, with long-term current use of insulin ([facility])  Assessment & Plan:    Lab Results   Component Value Date    HGBA1C 7.3 (A) 11/14/2023       Currently at goal for A1c (<7%, <8% if >80)  Continue current regimen of glargine 50 U, jardiance 25, and metformin 1000 BID   Consider decreasing long acting is any hypoglycemic symptoms; discussed hypoglycemia signs; consider once weekly injectable as goal <8% considering age? On metformin and not demonstrating signs of hypoglycemia  Continue Ace-i/arb  Is on statin, continue/consider adding            repeat lipids q3yrs, due 2025  is following optho, next eye exam due 2024  is following podiatry, next foot exam due 2024, performed in office etoday  yearly micro albumin creatinine ratio is UTD, due 2024  Carb controlled diet, discussed healthy lifestyle modifications  consider DM nutrition referral      Orders:  -     Lancets (freestyle) lancets; Use as instructed to check sugar up to 3 times daily  -     POCT hemoglobin A1c    2. Encounter for immunization  -     influenza vaccine, high-dose, PF 0.7 mL (FLUZONE HIGH-DOSE)    3. Mild nonproliferative diabetic retinopathy of both eyes without macular edema associated with type 2 diabetes mellitus ([address])  Assessment & Plan:    Lab Results   Component Value Date    HGBA1C 7.3 (A) 11/14/2023     DM control as outlined below  Does see optho, continue  Glasses UTD      4. Benign essential hypertension  Assessment & Plan:  essential hypertension  continue current regimen: metoprolol and lisinopril   Consider amlodipine for goal <130 SBP  continue smoking cessation/abstinence  reccommended low salt diet (<2g per day)  counseled on continuing healthy lifestyle modifications          5.  Diabetes mellitus type 2, insulin dependent ([address])  -     Ambulatory Referral to Ophthalmology; Future          Schedule a follow-up appointment in 3 months with me for annual physical/DM recheck. HEALTH MAINTENANCE     Immunization History   Administered Date(s) Administered    COVID-19 PFIZER VACCINE 0.3 ML IM 03/30/2021, 04/23/2021    INFLUENZA 11/24/2004, 11/01/2005, 12/18/2006, 10/30/2007, 10/22/2010, 02/27/2015, 11/06/2015, 09/22/2016, 10/09/2017    Influenza Quadrivalent Preservative Free 3 years and older IM 09/22/2016    Influenza Quadrivalent, 6-35 Months IM 10/09/2017    Influenza Split High Dose Preservative Free IM 09/17/2012, 10/23/2013    Influenza, high dose seasonal 0.7 mL 11/20/2018, 09/23/2019, 10/20/2021, 11/14/2023    Influenza, injectable, quadrivalent, preservative free 0.5 mL 10/21/2020    Influenza, seasonal, injectable 12/13/2011, 11/06/2015    Pneumococcal Conjugate 13-Valent 11/20/2018    Pneumococcal Polysaccharide PPV23 08/20/2004, 12/13/2011    Td (adult), Unspecified 08/20/2003    Tdap 05/09/2016     Immunizations:  Flu vaccine given  UTD  Screening:  UTD  BMI Counseling: Body mass index is 27.67 kg/m². The BMI is above normal. Nutrition recommendations include decreasing portion sizes, encouraging healthy choices of fruits and vegetables, limiting drinks that contain sugar, moderation in carbohydrate intake and increasing intake of lean protein. Exercise recommendations include exercising 3-5 times per week. Rationale for BMI follow-up plan is due to patient being overweight or obese. CHIEF COMPLAINT     Chief Complaint   Patient presents with    Follow-up     Diabetes, HTN      HISTORY OF PRESENT ILLNESS     Patient is a 81year-old female with PMHx of DM2, HTN, CKD, SVT s/p ablation 2019 who presents today f/u HTN, DM. Last visit patient was HCTZ 12.5 mg daily, increased basaglar to 44 U qhs, and continued jardiance and metformin. Was ordered labs however, did not complete them.  She generally has negative ROS    history of type 2 diabetes with long term use of insulin . Diabetes course has been stable. Reports no complications. Denies recent illness or hospitalizations. Denies recent severe hypoglycemic or severe hyperglycemic episodes. Denies any issues with her current regimen. home glucose monitoring: are performed regularly    Home blood glucose readings:   Before breakfast: 100-115  Before dinner: 120's  Bedtime: doesn't check    Current regimen: glargine 50 U Qhs, metformin 1000BID, and jardiance daily  compliant all of the timedenies any side effects from medications. Injects in: stomach Rotates sites: Yes  Hypoglycemic episodes: No rare  H/o of hypoglycemia causing hospitalization or Intervention such as glucagon injection  or ambulance call :  No  Hypoglycemia symptoms:  has never experienced  Treatment of hypoglycemia:      Medic alert tag: recommended: No    Diabetes education: Yes Date - recently  Diet: 2 meals per day, 0 snacks per day. Timing of meals is predictable. Yes  diabetic diet compliance:  compliant most of the time  Activity: Daily activity is predictable Yes  not regular exercise, but chases baby in house/caring for them. Adjustments for activity include: none                further diabetic ROS: no polyuria or polydipsia, no chest pain, dyspnea or TIAs, no numbness, tingling or pain in extremities        Opthamology: sees regularly and glasses UTD; no retinopathy, no macular edema  Podiatry: does have referral  Infuenza vaccine: given this visit    Has hypertension:  on ACE inhibitor/ARB, compliant all of the time  Has hyperlipidemia: on statin - tolerating well, no myalgias. compliant all of the time  denies any side effects from medications. Thyroid disorders: not presently  History of pancreatitis: none     REVIEW OF SYSTEMS     Review of Systems   Constitutional:  Negative for fatigue and fever. Eyes:  Negative for visual disturbance.    Respiratory:  Negative for shortness of breath. Cardiovascular:  Negative for chest pain and palpitations. Gastrointestinal:  Negative for abdominal pain, diarrhea, nausea and vomiting. Endocrine: Negative for polydipsia, polyphagia and polyuria. Musculoskeletal:  Negative for gait problem. Neurological:  Negative for dizziness, tremors, syncope, light-headedness and headaches. Psychiatric/Behavioral:  Negative for sleep disturbance. OBJECTIVE     Vitals:    11/14/23 0927   BP: 135/75   BP Location: Left arm   Patient Position: Sitting   Cuff Size: Large   Pulse: 71   Temp: (!) 97.3 °F (36.3 °C)   TempSrc: Temporal   Weight: 62.1 kg (137 lb)   Height: 4' 11" (1.499 m)     Physical Exam  Constitutional:       Appearance: Normal appearance. She is not ill-appearing. Eyes:      Conjunctiva/sclera: Conjunctivae normal.   Cardiovascular:      Rate and Rhythm: Normal rate and regular rhythm. Pulses: no weak pulses          Dorsalis pedis pulses are 2+ on the right side and 2+ on the left side. Heart sounds: No murmur heard. Pulmonary:      Effort: Pulmonary effort is normal.      Breath sounds: No wheezing or rales. Abdominal:      General: Bowel sounds are normal.      Tenderness: There is no abdominal tenderness. There is no guarding. Musculoskeletal:      Right lower leg: No edema. Left lower leg: Edema (mild/trace) present. Feet:      Right foot:      Skin integrity: No ulcer, skin breakdown, erythema, warmth, callus or dry skin. Left foot:      Skin integrity: No ulcer, skin breakdown, erythema, warmth, callus or dry skin. Skin:     General: Skin is warm. Neurological:      Mental Status: She is alert. Psychiatric:         Mood and Affect: Mood normal.         Behavior: Behavior normal.         Thought Content: Thought content normal.         Judgment: Judgment normal.       Patient's shoes and socks removed.     Right Foot/Ankle   Right Foot Inspection  Skin Exam: skin normal and skin intact. No dry skin, no warmth, no callus, no erythema, no maceration, no abnormal color, no pre-ulcer, no ulcer and no callus. Sensory   Monofilament testing: intact    Vascular  Capillary refills: < 3 seconds  The right DP pulse is 2+. Left Foot/Ankle  Left Foot Inspection  Skin Exam: skin normal and skin intact. No dry skin, no warmth, no erythema, no maceration, normal color, no pre-ulcer, no ulcer and no callus. Sensory   Monofilament testing: intact    Vascular  Capillary refills: < 3 seconds  The left DP pulse is 2+.      Assign Risk Category  No deformity present  No loss of protective sensation  No weak pulses  Risk: 0       Pertinent Laboratory/Diagnostic Studies:  CBC:   Lab Results   Component Value Date/Time    WBC 7.19 01/01/2021 05:12 AM    WBC 7.05 12/22/2015 10:49 AM    RBC 4.68 01/01/2021 05:12 AM    RBC 5.03 12/22/2015 10:49 AM    HGB 12.6 01/01/2021 05:12 AM    HGB 11.4 (L) 12/22/2015 10:49 AM    HCT 39.7 01/01/2021 05:12 AM    HCT 37.8 12/22/2015 10:49 AM    MCV 85 01/01/2021 05:12 AM    MCV 75 (L) 12/22/2015 10:49 AM    MCH 26.9 01/01/2021 05:12 AM    MCH 22.7 (L) 12/22/2015 10:49 AM    MCHC 31.7 01/01/2021 05:12 AM    MCHC 30.2 (L) 12/22/2015 10:49 AM    RDW 14.9 01/01/2021 05:12 AM    RDW 17.4 (H) 12/22/2015 10:49 AM    MPV 10.8 01/01/2021 05:12 AM    MPV 11.4 12/22/2015 10:49 AM     01/01/2021 05:12 AM     12/22/2015 10:49 AM    NRBC 0 01/01/2021 05:12 AM    NEUTOPHILPCT 77 (H) 01/01/2021 05:12 AM    NEUTOPHILPCT 47 12/22/2015 10:49 AM    LYMPHOPCT 20 01/01/2021 05:12 AM    LYMPHOPCT 39 12/22/2015 10:49 AM    MONOPCT 3 (L) 01/01/2021 05:12 AM    MONOPCT 11 12/22/2015 10:49 AM    EOSPCT 0 01/01/2021 05:12 AM    EOSPCT 2 12/22/2015 10:49 AM    BASOPCT 0 01/01/2021 05:12 AM    BASOPCT 1 12/22/2015 10:49 AM    NEUTROABS 5.44 01/01/2021 05:12 AM    NEUTROABS 3.31 12/22/2015 10:49 AM    LYMPHSABS 1.46 01/01/2021 05:12 AM    LYMPHSABS 2.75 12/22/2015 10:49 AM    MONOSABS 0.24 01/01/2021 05:12 AM    MONOSABS 0.78 12/22/2015 10:49 AM    EOSABS 0.00 01/01/2021 05:12 AM    EOSABS 0.14 12/22/2015 10:49 AM     Chemistry Profile:   Lab Results   Component Value Date/Time     09/08/2015 11:09 AM    K 4.2 08/15/2023 08:32 AM    K 4.8 09/08/2015 11:09 AM     08/15/2023 08:32 AM     09/08/2015 11:09 AM    CO2 23 08/15/2023 08:32 AM    CO2 28 09/08/2015 11:09 AM    ANIONGAP 7 09/08/2015 11:09 AM    BUN 20 08/15/2023 08:32 AM    BUN 15 09/08/2015 11:09 AM    CREATININE 1.16 08/15/2023 08:32 AM    CREATININE 0.93 09/08/2015 11:09 AM    GLUC 297 (H) 01/01/2021 05:12 AM    GLUF 175 (H) 08/15/2023 08:32 AM    GLUCOSE 178 (H) 09/08/2015 11:09 AM    CALCIUM 10.0 08/15/2023 08:32 AM    CALCIUM 9.3 09/08/2015 11:09 AM    CORRECTEDCA 10.1 01/01/2021 05:12 AM    MG 1.8 05/03/2019 07:12 AM    MG 1.7 02/24/2015 06:54 AM    PHOS 3.4 02/08/2019 04:41 AM    AST 35 05/09/2022 08:47 AM    AST 27 09/08/2015 11:09 AM    ALT 47 05/09/2022 08:47 AM    ALT 35 09/08/2015 11:09 AM    ALKPHOS 85 05/09/2022 08:47 AM    ALKPHOS 67 09/08/2015 11:09 AM    PROT 7.7 09/08/2015 11:09 AM    BILITOT 0.26 09/08/2015 11:09 AM    EGFR 43 08/15/2023 08:32 AM     Endocrine Studies:   Lab Results   Component Value Date/Time    HGBA1C 7.3 (A) 11/14/2023 09:57 AM    HGBA1C 9.7 (H) 05/09/2022 08:47 AM    HGBA1C 7.9 12/28/2016 03:00 PM    CCP7JWMWYTXX 1.510 02/07/2019 03:03 PM    HCQ4LJXFXAEA 2.800 02/23/2015 10:46 AM    FREET4 1.3 02/23/2015 10:46 AM    TRIG 444 (H) 08/15/2023 08:32 AM    TRIG 230 09/08/2015 11:09 AM    CHOL 174 09/08/2015 11:09 AM    CHOLESTEROL 166 08/15/2023 08:32 AM    HDL 41 (L) 08/15/2023 08:32 AM    HDL 36 09/08/2015 11:09 AM    LDLCALC  08/15/2023 08:32 AM      Comment:      Calculated LDL invalid, triglycerides >400 mg/dl    LDLCALC 92 09/08/2015 11:09 AM    LDLDIRECT 85 08/15/2023 08:32 AM    NONHDLC 132 05/09/2022 08:47 AM    [id number] 29.3 (L) 12/22/2015 10:49 AM         Lab Units 08/15/23  0832   HDL mg/dL 41*               Invalid input(s): "LABALBU"      Lab Results   Component Value Date    PHOS 3.4 02/08/2019              0   Lab Value Date/Time    TROPONINI <0.02 12/31/2020 1343    TROPONINI <0.02 02/08/2019 0024    TROPONINI <0.02 02/07/2019 2049    TROPONINI <0.02 02/07/2019 1503    TROPONINI <0.02 02/07/2019 1151    TROPONINI 0.04 02/24/2015 0838    TROPONINI 0.10 (H) 02/24/2015 0224    TROPONINI 0.13 (H) 02/23/2015 1745    TROPONINI <0.04 01/30/2015 1514    TROPONINI 0.07 (H) 03/13/2014 0515    TROPONINI 0.12 (H) 03/12/2014 2347     ABG:No results found for: "PHART", "DVR5RWY", "PO2ART", "ZFG0JWT", "Q8TWNJYC", "BEART", "SOURCE"    CURRENT MEDICATIONS     Current Outpatient Medications:     Lancets (freestyle) lancets, Use as instructed to check sugar up to 3 times daily, Disp: 300 each, Rfl: 1    acetaminophen (TYLENOL) 325 mg tablet, Take 2 tablets (650 mg total) by mouth every 6 (six) hours as needed for mild pain, Disp: 1 Bottle, Rfl: 0    alendronate (Fosamax) 70 mg tablet, Take 1 tablet (70 mg total) by mouth every 7 days, Disp: 12 tablet, Rfl: 3    amLODIPine (NORVASC) 10 mg tablet, TOME DIANE TABLETA TODOS LOS HANEY, Disp: 90 tablet, Rfl: 3    cholecalciferol (VITAMIN D3) 1,000 units tablet, Take 1 tablet (1,000 Units total) by mouth daily for 180 days, Disp: 90 tablet, Rfl: 1    Continuous Blood Gluc Sensor (FreeStyle Juani 14 Day Sensor) MISC, Use 1 Device every 14 (fourteen) days (Patient not taking: Reported on 8/15/2023), Disp: 4 each, Rfl: 1    Empagliflozin (Jardiance) 25 MG TABS, Take 1 tablet (25 mg total) by mouth every morning, Disp: 90 tablet, Rfl: 1    glucose blood (FREESTYLE LITE) test strip, USE AS INSTRUCTED TO CHECK SUGAR UP TO 3 TIMES DAILY, Disp: 300 each, Rfl: 5    hydrochlorothiazide (HYDRODIURIL) 12.5 mg tablet, TOMDEJAH CONTRERAS TABLETA TODOS LOS HANEY, Disp: 90 tablet, Rfl: 2    Icosapent Ethyl 1 g CAPS, Take 1 capsule (1 g total) by mouth in the morning, Disp: 90 capsule, Rfl: 1    Insulin Glargine Solostar (Basaglar KwikPen) 100 UNIT/ML SOPN, Inject 0.44 mL (44 Units total) under the skin in the morning, Disp: 45 mL, Rfl: 1    Insulin Pen Needle (B-D UF III MINI PEN NEEDLES) 31G X 5 MM MISC, Inject 30 units SC twice daily and Trulicity once weekly, Disp: 200 each, Rfl: 1    Insulin Pen Needle (BD Pen Needle Marichuy 2nd Gen) 32G X 4 MM MISC, Use 3 (three) times a day Use to check blood sugar 3 times a day., Disp: 300 each, Rfl: 3    lisinopril (ZESTRIL) 40 mg tablet, TOME DIANE TABLETA TODOS LOS HANEY, Disp: 90 tablet, Rfl: 3    lovastatin (MEVACOR) 40 MG tablet, TAKE 2 TABLETS TOGETHER EVERY NIGHT, Disp: 180 tablet, Rfl: 3    metFORMIN (GLUCOPHAGE) 1000 MG tablet, TOME DIANE TABLETA DOS VECES AL BRENDAN CON ALIMENTO, Disp: 180 tablet, Rfl: 3    metoprolol tartrate (LOPRESSOR) 50 mg tablet, TAKE 1 TABLET BY MOUTH EVERY 12 HOURS HOLD FOR HEART RATE LESS THAN 50 BEATS PER MINUTE., Disp: 180 tablet, Rfl: 3    multivitamin-minerals (CENTRUM ADULTS) tablet, Take 1 tablet by mouth daily, Disp: 30 tablet, Rfl: 1    polyethylene glycol (GLYCOLAX) 17 GM/SCOOP powder, Take as directed as per written office instructions (Patient not taking: Reported on 2023), Disp: 238 g, Rfl: 0    PAST MEDICAL & SURGICAL HISTORY     Past Medical History:   Diagnosis Date    Anemia     Chronic idiopathic constipation     Colon polyp     Diabetes mellitus (HCC)     Diverticulitis, colon     Esophageal reflux     Eustachian tube dysfunction     Gastrointestinal hemorrhage     Hypertension     Myocardial infarction (HCC)     Non-healing skin lesion     Palpitations     Rectal bleeding     Skin lesion of chest wall      Past Surgical History:   Procedure Laterality Date     SECTION      CHOLECYSTECTOMY      COLONOSCOPY      ECTOPIC PREGNANCY SURGERY      MO COLONOSCOPY FLX DX W/COLLJ SPEC WHEN PFRMD N/A 2016    Procedure: COLONOSCOPY;  Surgeon: Cornelia Tineo MD;  Location: [facility];   Service: Gastroenterology    DE NEUROPLASTY &/TRANSPOS MEDIAN NRV CARPAL TUNNE Left 10/8/2019    Procedure: CARPAL TUNNEL RELEASE;  Surgeon: Kj Herr MD;  Location: [facility];  Service: Orthopedics    DE OPTX DSTL RADL I-ARTIC FX/EPIPHYSL SEP 3 FRAG Left 10/8/2019    Procedure: RADIUS/ULNA (WRIST) OPEN REDUCTION W/ INTERNAL FIXATION (ORIF); Surgeon: Kj Herr MD;  Location: [facility];  Service: Orthopedics     SOCIAL & FAMILY HISTORY     Social History     Socioeconomic History    Marital status: /Civil Union     Spouse name: Not on file    Number of children: Not on file    Years of education: Not on file    Highest education level: Not on file   Occupational History    Occupation: packaging   Tobacco Use    Smoking status: Never    Smokeless tobacco: Never   Vaping Use    Vaping Use: Never used   Substance and Sexual Activity    Alcohol use: Never    Drug use: Never    Sexual activity: Not Currently   Other Topics Concern    Not on file   Social History Narrative    Caffeine use    Denied exercising     Social Determinants of Health     Financial Resource Strain: Low Risk  (2/28/2023)    Overall Financial Resource Strain (CARDIA)     Difficulty of Paying Living Expenses: Not very hard   Food Insecurity: No Food Insecurity (2/28/2023)    Hunger Vital Sign     Worried About Running Out of Food in the Last Year: Never true     Ran Out of Food in the Last Year: Never true   Transportation Needs: No Transportation Needs (2/28/2023)    PRAPARE - Transportation     Lack of Transportation (Medical): No     Lack of Transportation (Non-Medical): No   Physical Activity: Inactive (6/2/2020)    Exercise Vital Sign     Days of Exercise per Week: 0 days     Minutes of Exercise per Session: 0 min   Stress: No Stress Concern Present (6/2/2020)    [address] [place] [address]     Feeling of Stress : Not at all   Social Connections:  Moderately Isolated (6/2/2020)    Social Connection and Isolation Panel [NHANES]     Frequency of Communication with Friends and Family: More than three times a week     Frequency of Social Gatherings with Friends and Family: Never     Attends [employer] Services: Never     Active Member of Clubs or Organizations: No     Attends Club or Organization Meetings: Never     Marital Status:    Intimate Partner Violence: Not At Risk (6/2/2020)    Humiliation, Afraid, Rape, and Kick questionnaire     Fear of Current or Ex-Partner: No     Emotionally Abused: No     Physically Abused: No     Sexually Abused: No   Housing Stability: Not on file     Social History     Substance and Sexual Activity   Alcohol Use Never       Social History     Substance and Sexual Activity   Drug Use Never     Social History     Tobacco Use   Smoking Status Never   Smokeless Tobacco Never     Family History   Problem Relation Age of Onset    Heart disease Mother     Bleeding Disorder Sister     No Known Problems Father     No Known Problems Daughter     No Known Problems Maternal Grandmother     No Known Problems Maternal Grandfather     No Known Problems Paternal Grandmother     No Known Problems Paternal Grandfather     No Known Problems Sister     Breast cancer Neg Hx      ==  Jitendra Salas DO    [facility] [address] [place] [address], [address], [address] [place] [address] Road  Office: [phone]  Fax: [phone]

## 2023-12-22 ENCOUNTER — HOSPITAL ENCOUNTER (OUTPATIENT)
Dept: RADIOLOGY | Age: 83
Discharge: HOME | End: 2023-12-22
Payer: COMMERCIAL

## 2023-12-22 VITALS — WEIGHT: 137 LBS | HEIGHT: 59 IN | BODY MASS INDEX: 27.62 KG/M2

## 2023-12-22 DIAGNOSIS — Z12.31 VISIT FOR SCREENING MAMMOGRAM: ICD-10-CM

## 2023-12-22 PROCEDURE — 77067 SCR MAMMO BI INCL CAD: CPT

## 2023-12-22 PROCEDURE — 77063 BREAST TOMOSYNTHESIS BI: CPT

## 2024-01-07 DIAGNOSIS — I10 BENIGN ESSENTIAL HYPERTENSION: ICD-10-CM

## 2024-01-08 RX ORDER — AMLODIPINE BESYLATE 10 MG/1
TABLET ORAL
Qty: 90 TABLET | Refills: 3 | Status: SHIPPED | OUTPATIENT
Start: 2024-01-08

## 2024-02-27 DIAGNOSIS — I10 BENIGN ESSENTIAL HYPERTENSION: ICD-10-CM

## 2024-02-29 DIAGNOSIS — I10 BENIGN ESSENTIAL HYPERTENSION: ICD-10-CM

## 2024-02-29 RX ORDER — LISINOPRIL 40 MG/1
TABLET ORAL
Qty: 90 TABLET | Refills: 3 | Status: SHIPPED | OUTPATIENT
Start: 2024-02-29

## 2024-02-29 RX ORDER — LISINOPRIL 40 MG/1
TABLET ORAL
Qty: 90 TABLET | Refills: 3 | Status: SHIPPED | OUTPATIENT
Start: 2024-02-29 | End: 2024-02-29

## 2024-03-04 DIAGNOSIS — E11.65 TYPE 2 DIABETES MELLITUS WITH HYPERGLYCEMIA, WITH LONG-TERM CURRENT USE OF INSULIN (HCC): ICD-10-CM

## 2024-03-04 DIAGNOSIS — Z79.4 TYPE 2 DIABETES MELLITUS WITH HYPERGLYCEMIA, WITH LONG-TERM CURRENT USE OF INSULIN (HCC): ICD-10-CM

## 2024-03-06 DIAGNOSIS — E11.9 DIABETES MELLITUS TYPE 2, UNCOMPLICATED (HCC): Primary | ICD-10-CM

## 2024-03-06 RX ORDER — INSULIN GLARGINE 100 [IU]/ML
44 INJECTION, SOLUTION SUBCUTANEOUS DAILY
Qty: 39.6 ML | Refills: 0 | Status: SHIPPED | OUTPATIENT
Start: 2024-03-06 | End: 2024-03-06

## 2024-03-06 RX ORDER — INSULIN GLARGINE 100 [IU]/ML
44 INJECTION, SOLUTION SUBCUTANEOUS DAILY
Qty: 39.6 ML | Refills: 0 | Status: SHIPPED | OUTPATIENT
Start: 2024-03-06 | End: 2024-06-04

## 2024-03-21 DIAGNOSIS — E78.5 DYSLIPIDEMIA: ICD-10-CM

## 2024-03-21 DIAGNOSIS — I47.19 AVNRT (AV NODAL RE-ENTRY TACHYCARDIA): ICD-10-CM

## 2024-03-22 RX ORDER — METOPROLOL TARTRATE 50 MG/1
TABLET, FILM COATED ORAL
Qty: 180 TABLET | Refills: 3 | Status: SHIPPED | OUTPATIENT
Start: 2024-03-22

## 2024-03-22 RX ORDER — LOVASTATIN 40 MG/1
TABLET ORAL
Qty: 180 TABLET | Refills: 3 | Status: SHIPPED | OUTPATIENT
Start: 2024-03-22

## 2024-05-06 ENCOUNTER — RA CDI HCC (OUTPATIENT)
Dept: OTHER | Facility: HOSPITAL | Age: 84
End: 2024-05-06

## 2024-05-14 ENCOUNTER — TELEPHONE (OUTPATIENT)
Dept: INTERNAL MEDICINE CLINIC | Facility: CLINIC | Age: 84
End: 2024-05-14

## 2024-06-23 DIAGNOSIS — Z79.4 TYPE 2 DIABETES MELLITUS WITH HYPERGLYCEMIA, WITH LONG-TERM CURRENT USE OF INSULIN (HCC): ICD-10-CM

## 2024-06-23 DIAGNOSIS — E11.65 TYPE 2 DIABETES MELLITUS WITH HYPERGLYCEMIA, WITH LONG-TERM CURRENT USE OF INSULIN (HCC): ICD-10-CM

## 2024-06-23 DIAGNOSIS — I10 ESSENTIAL HYPERTENSION: ICD-10-CM

## 2024-06-24 RX ORDER — HYDROCHLOROTHIAZIDE 12.5 MG/1
TABLET ORAL
Qty: 90 TABLET | Refills: 2 | Status: SHIPPED | OUTPATIENT
Start: 2024-06-24

## 2024-07-13 DIAGNOSIS — E11.9 DIABETES MELLITUS TYPE 2, UNCOMPLICATED (HCC): ICD-10-CM

## 2024-07-15 RX ORDER — INSULIN GLARGINE 100 [IU]/ML
INJECTION, SOLUTION SUBCUTANEOUS
Qty: 45 ML | Refills: 3 | Status: SHIPPED | OUTPATIENT
Start: 2024-07-15

## 2024-07-17 ENCOUNTER — OFFICE VISIT (OUTPATIENT)
Dept: INTERNAL MEDICINE CLINIC | Facility: CLINIC | Age: 84
End: 2024-07-17

## 2024-07-17 ENCOUNTER — APPOINTMENT (OUTPATIENT)
Dept: LAB | Facility: CLINIC | Age: 84
End: 2024-07-17
Payer: COMMERCIAL

## 2024-07-17 VITALS
HEART RATE: 62 BPM | TEMPERATURE: 97.8 F | DIASTOLIC BLOOD PRESSURE: 77 MMHG | OXYGEN SATURATION: 98 % | SYSTOLIC BLOOD PRESSURE: 158 MMHG | BODY MASS INDEX: 26.98 KG/M2 | WEIGHT: 133.6 LBS

## 2024-07-17 DIAGNOSIS — E55.9 VITAMIN D DEFICIENCY: ICD-10-CM

## 2024-07-17 DIAGNOSIS — E11.9 DIABETES MELLITUS TYPE 2, INSULIN DEPENDENT (HCC): ICD-10-CM

## 2024-07-17 DIAGNOSIS — Z79.4 TYPE 2 DIABETES MELLITUS WITH HYPERGLYCEMIA, WITH LONG-TERM CURRENT USE OF INSULIN (HCC): ICD-10-CM

## 2024-07-17 DIAGNOSIS — Z79.4 DIABETES MELLITUS TYPE 2, INSULIN DEPENDENT (HCC): ICD-10-CM

## 2024-07-17 DIAGNOSIS — M81.0 POSTMENOPAUSAL OSTEOPOROSIS: ICD-10-CM

## 2024-07-17 DIAGNOSIS — E11.65 UNCONTROLLED DIABETES MELLITUS WITH HYPERGLYCEMIA, WITH LONG-TERM CURRENT USE OF INSULIN (HCC): ICD-10-CM

## 2024-07-17 DIAGNOSIS — Z79.4 UNCONTROLLED DIABETES MELLITUS WITH HYPERGLYCEMIA, WITH LONG-TERM CURRENT USE OF INSULIN (HCC): ICD-10-CM

## 2024-07-17 DIAGNOSIS — E11.65 TYPE 2 DIABETES MELLITUS WITH HYPERGLYCEMIA, WITH LONG-TERM CURRENT USE OF INSULIN (HCC): ICD-10-CM

## 2024-07-17 DIAGNOSIS — E78.2 MIXED HYPERLIPIDEMIA: ICD-10-CM

## 2024-07-17 DIAGNOSIS — M75.01 ADHESIVE CAPSULITIS OF RIGHT SHOULDER: Primary | ICD-10-CM

## 2024-07-17 LAB
ALBUMIN SERPL BCG-MCNC: 4.5 G/DL (ref 3.5–5)
ALP SERPL-CCNC: 48 U/L (ref 34–104)
ALT SERPL W P-5'-P-CCNC: 23 U/L (ref 7–52)
ANION GAP SERPL CALCULATED.3IONS-SCNC: 14 MMOL/L (ref 4–13)
AST SERPL W P-5'-P-CCNC: 28 U/L (ref 13–39)
BASOPHILS # BLD AUTO: 0.08 THOUSANDS/ÂΜL (ref 0–0.1)
BASOPHILS NFR BLD AUTO: 1 % (ref 0–1)
BILIRUB SERPL-MCNC: 0.5 MG/DL (ref 0.2–1)
BUN SERPL-MCNC: 15 MG/DL (ref 5–25)
CALCIUM SERPL-MCNC: 10.1 MG/DL (ref 8.4–10.2)
CHLORIDE SERPL-SCNC: 97 MMOL/L (ref 96–108)
CHOLEST SERPL-MCNC: 192 MG/DL
CO2 SERPL-SCNC: 27 MMOL/L (ref 21–32)
CREAT SERPL-MCNC: 1 MG/DL (ref 0.6–1.3)
CREAT UR-MCNC: 117.9 MG/DL
EOSINOPHIL # BLD AUTO: 0.17 THOUSAND/ÂΜL (ref 0–0.61)
EOSINOPHIL NFR BLD AUTO: 2 % (ref 0–6)
ERYTHROCYTE [DISTWIDTH] IN BLOOD BY AUTOMATED COUNT: 14.4 % (ref 11.6–15.1)
GFR SERPL CREATININE-BSD FRML MDRD: 52 ML/MIN/1.73SQ M
GLUCOSE P FAST SERPL-MCNC: 116 MG/DL (ref 65–99)
HCT VFR BLD AUTO: 43.2 % (ref 34.8–46.1)
HDLC SERPL-MCNC: 46 MG/DL
HGB BLD-MCNC: 13.7 G/DL (ref 11.5–15.4)
IMM GRANULOCYTES # BLD AUTO: 0.01 THOUSAND/UL (ref 0–0.2)
IMM GRANULOCYTES NFR BLD AUTO: 0 % (ref 0–2)
LYMPHOCYTES # BLD AUTO: 3.68 THOUSANDS/ÂΜL (ref 0.6–4.47)
LYMPHOCYTES NFR BLD AUTO: 41 % (ref 14–44)
MCH RBC QN AUTO: 28.1 PG (ref 26.8–34.3)
MCHC RBC AUTO-ENTMCNC: 31.7 G/DL (ref 31.4–37.4)
MCV RBC AUTO: 89 FL (ref 82–98)
MICROALBUMIN UR-MCNC: 10.5 MG/L
MICROALBUMIN/CREAT 24H UR: 9 MG/G CREATININE (ref 0–30)
MONOCYTES # BLD AUTO: 0.81 THOUSAND/ÂΜL (ref 0.17–1.22)
MONOCYTES NFR BLD AUTO: 9 % (ref 4–12)
NEUTROPHILS # BLD AUTO: 4.16 THOUSANDS/ÂΜL (ref 1.85–7.62)
NEUTS SEG NFR BLD AUTO: 47 % (ref 43–75)
NONHDLC SERPL-MCNC: 146 MG/DL
NRBC BLD AUTO-RTO: 0 /100 WBCS
PLATELET # BLD AUTO: 331 THOUSANDS/UL (ref 149–390)
PMV BLD AUTO: 11 FL (ref 8.9–12.7)
POTASSIUM SERPL-SCNC: 4.1 MMOL/L (ref 3.5–5.3)
PROT SERPL-MCNC: 8 G/DL (ref 6.4–8.4)
RBC # BLD AUTO: 4.88 MILLION/UL (ref 3.81–5.12)
SL AMB POCT HEMOGLOBIN AIC: 8.7 (ref ?–6.5)
SODIUM SERPL-SCNC: 138 MMOL/L (ref 135–147)
T4 FREE SERPL-MCNC: 0.92 NG/DL (ref 0.61–1.12)
TRIGL SERPL-MCNC: 559 MG/DL
TSH SERPL DL<=0.05 MIU/L-ACNC: 4.74 UIU/ML (ref 0.45–4.5)
WBC # BLD AUTO: 8.91 THOUSAND/UL (ref 4.31–10.16)

## 2024-07-17 PROCEDURE — 84443 ASSAY THYROID STIM HORMONE: CPT

## 2024-07-17 PROCEDURE — 85025 COMPLETE CBC W/AUTO DIFF WBC: CPT

## 2024-07-17 PROCEDURE — 80061 LIPID PANEL: CPT

## 2024-07-17 PROCEDURE — 82043 UR ALBUMIN QUANTITATIVE: CPT

## 2024-07-17 PROCEDURE — 80053 COMPREHEN METABOLIC PANEL: CPT

## 2024-07-17 PROCEDURE — 82570 ASSAY OF URINE CREATININE: CPT

## 2024-07-17 PROCEDURE — 83036 HEMOGLOBIN GLYCOSYLATED A1C: CPT | Performed by: INTERNAL MEDICINE

## 2024-07-17 PROCEDURE — 99215 OFFICE O/P EST HI 40 MIN: CPT | Performed by: INTERNAL MEDICINE

## 2024-07-17 PROCEDURE — 36415 COLL VENOUS BLD VENIPUNCTURE: CPT

## 2024-07-17 PROCEDURE — 84439 ASSAY OF FREE THYROXINE: CPT

## 2024-07-17 RX ORDER — ATORVASTATIN CALCIUM 40 MG/1
40 TABLET, FILM COATED ORAL DAILY
Qty: 30 TABLET | Refills: 3 | Status: SHIPPED | OUTPATIENT
Start: 2024-07-17 | End: 2024-11-14

## 2024-07-17 RX ORDER — BLOOD-GLUCOSE METER
KIT MISCELLANEOUS
Qty: 300 EACH | Refills: 5 | Status: SHIPPED | OUTPATIENT
Start: 2024-07-17 | End: 2024-07-17

## 2024-07-17 RX ORDER — BLOOD-GLUCOSE METER
KIT MISCELLANEOUS
Qty: 300 STRIP | Refills: 5 | Status: SHIPPED | OUTPATIENT
Start: 2024-07-17 | End: 2024-07-19

## 2024-07-17 RX ORDER — IBUPROFEN 200 MG
1 CAPSULE ORAL DAILY
Qty: 30 TABLET | Refills: 3 | Status: SHIPPED | OUTPATIENT
Start: 2024-07-17 | End: 2024-11-14

## 2024-07-17 RX ORDER — ALENDRONATE SODIUM 70 MG/1
70 TABLET ORAL
Qty: 12 TABLET | Refills: 3 | Status: SHIPPED | OUTPATIENT
Start: 2024-07-17

## 2024-07-17 RX ORDER — FLASH GLUCOSE SENSOR
1 KIT MISCELLANEOUS
Qty: 4 EACH | Refills: 1 | Status: SHIPPED | OUTPATIENT
Start: 2024-07-17

## 2024-07-17 RX ORDER — CYANOCOBALAMIN (VITAMIN B-12) 2000 MCG
2000 TABLET ORAL DAILY
Qty: 30 TABLET | Refills: 2 | Status: SHIPPED | OUTPATIENT
Start: 2024-07-17 | End: 2024-10-15

## 2024-07-17 NOTE — PROGRESS NOTES
Ambulatory Visit  Name: Conchis Velasco      : 1940      MRN: [id number]  Encounter Provider: Srinivas Rockwell DO  Encounter Date: 2024   Encounter department: [facility]    Assessment & Plan   1. Adhesive capsulitis of right shoulder  Patient reported right shoulder pain started 1 week ago  Pain is more with exertion  Patient has limited range of motion especially with active motion  Patient has weakness of abduction  -     XR shoulder 2+ vw right; Future; Expected date: 2024  -     Ambulatory Referral to Physical Therapy; Future    2. Type 2 diabetes mellitus with hyperglycemia, with long-term current use of insulin (HCC)  Patient has long history of type 2 diabetes on insulin  Patient is on 46 units of glargine at night  Patient does not check her blood glucose daily due to being ran out of her glucose strips  Patient was advised to check her blood glucose daily and bring glucose log after 2 weeks  Consider adjustment of medication if needed  Patient A1c today is 8.7  Patient goal of A1c is 8  -     TSH, 3rd generation with Free T4 reflex; Future  -     Albumin / creatinine urine ratio; Future  -     Comprehensive metabolic panel; Future  -     glucose blood (FREESTYLE LITE) test strip; USE AS INSTRUCTED TO CHECK SUGAR UP TO 3 TIMES DAILY  -     Continuous Glucose Sensor (FreeStyle Juani 14 Day Sensor) MISC; Use 1 Device every 14 (fourteen) days  Patient was on alendronate 70 weekly  -     Continuous Glucose Sensor (FreeStyle Juani 14 Day Sensor) MISC; Use 1 Device every 14 (fourteen) days    3. Postmenopausal osteoporosis  Patient was on 70 weekly however was stopped  Patient had a DEXA scan ordered however was not done  Patient was advised to do the DEXA scan as soon as possible  Restarting alendronate 70 weekly  Patient has a history of left wrist fracture  -     DXA bone density spine hip and pelvis; Future; Expected date: 2024  -     calcium citrate  (CALCITRATE) 950 (200 Ca) MG tablet; Take 1 tablet (950 mg total) by mouth daily  -     alendronate (Fosamax) 70 mg tablet; Take 1 tablet (70 mg total) by mouth every 7 days  -     vitamin B-12 (CYANOCOBALAMIN) 2000 MCG TABS; Take 1 tablet (2,000 mcg total) by mouth daily  4. Vitamin D deficiency  Increase vitamin D to 2000 daily  Calcium was ordered    7. Mixed hyperlipidemia  Patient triglyceride was 400  Patient was on lovastatin and will switch to high intensity statin to better control the triglycerides and LDLs  -     Lipid panel; Future  -     atorvastatin (LIPITOR) 40 mg tablet; Take 1 tablet (40 mg total) by mouth daily         History of Present Illness     HPI  84-year-old female patient with past medical history of type 2 diabetes, osteoporosis, hypertension, CKD, SVT status post ablation 2019 who presented to clinic complaining of right shoulder pain.  Patient reported that shoulder pain started 1 week ago.  Patient reported that pain radiates to the right elbow.  Pain is more with physical exertion.  Patient reported that she sleeps on her right side.  Patient was on alendronate long time ago however was stopped.  Patient does not check her blood glucose level at home question of test strips.  Review of Systems   Constitutional:  Negative for activity change, appetite change, diaphoresis, fatigue and unexpected weight change.   HENT:  Negative for congestion, ear discharge, facial swelling, mouth sores, postnasal drip, sinus pressure, sinus pain, sore throat and voice change.    Eyes:  Negative for pain, discharge, redness, itching and visual disturbance.   Respiratory:  Negative for apnea, cough, chest tightness, wheezing and stridor.    Cardiovascular:  Negative for chest pain, palpitations and leg swelling.   Gastrointestinal:  Negative for abdominal distention, abdominal pain, anal bleeding, diarrhea and rectal pain.   Endocrine: Negative for cold intolerance, heat intolerance, polydipsia,  polyphagia and polyuria.   Genitourinary:  Negative for decreased urine volume, dyspareunia, dysuria, flank pain, menstrual problem, pelvic pain and vaginal bleeding.   Musculoskeletal:  Positive for arthralgias. Negative for back pain, myalgias, neck pain and neck stiffness.   Skin:  Negative for color change, pallor and rash.   Neurological:  Negative for tremors, seizures and syncope.   Psychiatric/Behavioral:  Negative for confusion and sleep disturbance. The patient is not nervous/anxious and is not hyperactive.      Past Medical History:   Diagnosis Date    Anemia     Chronic idiopathic constipation     Colon polyp     Diabetes mellitus (HCC)     Diverticulitis, colon     Esophageal reflux     Eustachian tube dysfunction     Gastrointestinal hemorrhage     Hypertension     Myocardial infarction (HCC)     Non-healing skin lesion     Palpitations     Rectal bleeding     Skin lesion of chest wall      Past Surgical History:   Procedure Laterality Date     SECTION      CHOLECYSTECTOMY      COLONOSCOPY      ECTOPIC PREGNANCY SURGERY      NY COLONOSCOPY FLX DX W/COLLJ SPEC WHEN PFRMD N/A 2016    Procedure: COLONOSCOPY;  Surgeon: Brooks Contreras MD;  Location: [facility];  Service: Gastroenterology    NY NEUROPLASTY &/TRANSPOS MEDIAN NRV CARPAL TUNNE Left 10/8/2019    Procedure: CARPAL TUNNEL RELEASE;  Surgeon: Yamilex Hatch MD;  Location: [facility];  Service: Orthopedics    NY OPTX DSTL RADL I-ARTIC FX/EPIPHYSL SEP 3 FRAG Left 10/8/2019    Procedure: RADIUS/ULNA (WRIST) OPEN REDUCTION W/ INTERNAL FIXATION (ORIF);  Surgeon: Yamilex Hatch MD;  Location: [facility];  Service: Orthopedics     Family History   Problem Relation Age of Onset    Heart disease Mother     No Known Problems Father     Bleeding Disorder Sister     No Known Problems Sister     No Known Problems Daughter     No Known Problems Maternal Grandmother     No Known Problems Maternal Grandfather     No Known Problems  Paternal Grandmother     No Known Problems Paternal Grandfather     Breast cancer Neg Hx      Social History     Tobacco Use    Smoking status: Never    Smokeless tobacco: Never   Vaping Use    Vaping status: Never Used   Substance and Sexual Activity    Alcohol use: Never    Drug use: Never    Sexual activity: Not Currently     Current Outpatient Medications on File Prior to Visit   Medication Sig    acetaminophen (TYLENOL) 325 mg tablet Take 2 tablets (650 mg total) by mouth every 6 (six) hours as needed for mild pain    amLODIPine (NORVASC) 10 mg tablet TOME DIANE TABLETA TODOS LOS HANEY    cholecalciferol (VITAMIN D3) 1,000 units tablet Take 1 tablet (1,000 Units total) by mouth daily for 180 days    Empagliflozin (Jardiance) 25 MG TABS Take 1 tablet (25 mg total) by mouth every morning    hydroCHLOROthiazide 12.5 mg tablet TOME DIANE TABLETA TODOS LOS DIAS    Icosapent Ethyl 1 g CAPS Take 1 capsule (1 g total) by mouth in the morning    Insulin Glargine Solostar (Lantus SoloStar) 100 UNIT/ML SOPN INJECT 44 UNITS UNDER THE SKIN DAILY    Insulin Pen Needle (B-D UF III MINI PEN NEEDLES) 31G X 5 MM MISC Inject 30 units SC twice daily and Trulicity once weekly    Insulin Pen Needle (BD Pen Needle Marichuy 2nd Gen) 32G X 4 MM MISC Use 3 (three) times a day Use to check blood sugar 3 times a day.    Lancets (freestyle) lancets Use as instructed to check sugar up to 3 times daily    lisinopril (ZESTRIL) 40 mg tablet TOME DIANE TABLETA TODOS LOS HANEY    metFORMIN (GLUCOPHAGE) 1000 MG tablet TOME DIANE TABLETA 2 VECES AL BRENDAN CON LAS COMIDAS    metoprolol tartrate (LOPRESSOR) 50 mg tablet TAKE 1 TABLET BY MOUTH EVERY 12 HOURS HOLD FOR HEART RATE LESS THAN 50 BEATS PER MINUTE.    multivitamin-minerals (CENTRUM ADULTS) tablet Take 1 tablet by mouth daily    [DISCONTINUED] alendronate (Fosamax) 70 mg tablet Take 1 tablet (70 mg total) by mouth every 7 days    [DISCONTINUED] Continuous Blood Gluc Sensor (FreeStyle Juani 14 Day Sensor)  MISC Use 1 Device every 14 (fourteen) days (Patient not taking: Reported on 8/15/2023)    [DISCONTINUED] glucose blood (FREESTYLE LITE) test strip USE AS INSTRUCTED TO CHECK SUGAR UP TO 3 TIMES DAILY    [DISCONTINUED] lovastatin (MEVACOR) 40 MG tablet TAKE 2 TABLETS TOGETHER EVERY NIGHT     No Known Allergies  Immunization History   Administered Date(s) Administered    COVID-19 PFIZER VACCINE 0.3 ML IM 03/30/2021, 04/23/2021, 11/15/2021    INFLUENZA 11/24/2004, 11/01/2005, 12/18/2006, 10/30/2007, 10/22/2010, 02/27/2015, 11/06/2015, 09/22/2016, 10/09/2017    Influenza Quadrivalent Preservative Free 3 years and older IM 09/22/2016    Influenza Quadrivalent, 6-35 Months IM 10/09/2017    Influenza Split High Dose Preservative Free IM 09/17/2012, 10/23/2013    Influenza, high dose seasonal 0.7 mL 11/20/2018, 09/23/2019, 10/20/2021, 11/14/2023    Influenza, injectable, quadrivalent, preservative free 0.5 mL 10/21/2020    Influenza, seasonal, injectable 12/13/2011, 11/06/2015    Pneumococcal Conjugate 13-Valent 11/20/2018    Pneumococcal Polysaccharide PPV23 08/20/2004, 12/13/2011    Td (adult), Unspecified 08/20/2003    Tdap 05/09/2016     Objective     /77 (BP Location: Left arm, Patient Position: Sitting, Cuff Size: Standard) Comment: patient did not take BP med today  Pulse 62   Temp 97.8 °F (36.6 °C) (Temporal)   Wt 60.6 kg (133 lb 9.6 oz)   SpO2 98%   BMI 26.98 kg/m²     Physical Exam  Constitutional:       General: She is not in acute distress.     Appearance: She is not ill-appearing, toxic-appearing or diaphoretic.   HENT:      Right Ear: Tympanic membrane, ear canal and external ear normal. There is no impacted cerumen.      Left Ear: Tympanic membrane, ear canal and external ear normal. There is no impacted cerumen.      Mouth/Throat:      Mouth: Mucous membranes are moist.      Pharynx: No oropharyngeal exudate or posterior oropharyngeal erythema.   Eyes:      General: No scleral icterus.         Right eye: No discharge.         Left eye: No discharge.      Pupils: Pupils are equal, round, and reactive to light.   Neck:      Vascular: No carotid bruit.   Cardiovascular:      Rate and Rhythm: Normal rate and regular rhythm.      Pulses: Normal pulses.      Heart sounds: Normal heart sounds. No murmur heard.     No friction rub. No gallop.   Pulmonary:      Effort: Pulmonary effort is normal. No respiratory distress.      Breath sounds: No stridor. No wheezing, rhonchi or rales.   Chest:      Chest wall: No tenderness.   Abdominal:      General: Abdomen is flat. There is no distension.      Palpations: There is no mass.      Tenderness: There is no abdominal tenderness. There is no guarding or rebound.      Hernia: No hernia is present.   Musculoskeletal:         General: Tenderness present. No swelling, deformity or signs of injury.      Cervical back: Normal range of motion. No rigidity or tenderness.      Right lower leg: No edema.      Left lower leg: No edema.   Lymphadenopathy:      Cervical: No cervical adenopathy.   Skin:     General: Skin is warm.      Coloration: Skin is not jaundiced or pale.      Findings: No bruising or erythema.   Neurological:      General: No focal deficit present.      Mental Status: She is alert and oriented to person, place, and time.      Cranial Nerves: No cranial nerve deficit.      Sensory: No sensory deficit.   Psychiatric:         Mood and Affect: Mood normal.         Behavior: Behavior normal.         Thought Content: Thought content normal.         Judgment: Judgment normal.

## 2024-07-18 DIAGNOSIS — Z79.4 TYPE 2 DIABETES MELLITUS WITH HYPERGLYCEMIA, WITH LONG-TERM CURRENT USE OF INSULIN (HCC): ICD-10-CM

## 2024-07-18 DIAGNOSIS — E11.65 TYPE 2 DIABETES MELLITUS WITH HYPERGLYCEMIA, WITH LONG-TERM CURRENT USE OF INSULIN (HCC): ICD-10-CM

## 2024-07-19 RX ORDER — BLOOD-GLUCOSE METER
KIT MISCELLANEOUS
Qty: 300 STRIP | Refills: 5 | Status: SHIPPED | OUTPATIENT
Start: 2024-07-19

## 2024-08-09 ENCOUNTER — HOSPITAL ENCOUNTER (OUTPATIENT)
Dept: RADIOLOGY | Age: 84
Discharge: HOME | End: 2024-08-09
Payer: COMMERCIAL

## 2024-08-09 VITALS — HEIGHT: 59 IN | WEIGHT: 132 LBS | BODY MASS INDEX: 26.61 KG/M2

## 2024-08-09 DIAGNOSIS — M81.0 POSTMENOPAUSAL OSTEOPOROSIS: ICD-10-CM

## 2024-08-09 PROCEDURE — 77080 DXA BONE DENSITY AXIAL: CPT

## 2024-08-13 DIAGNOSIS — M81.0 POSTMENOPAUSAL OSTEOPOROSIS: ICD-10-CM

## 2024-08-13 DIAGNOSIS — E78.2 MIXED HYPERLIPIDEMIA: ICD-10-CM

## 2024-08-13 RX ORDER — LANOLIN ALCOHOL/MO/W.PET/CERES
CREAM (GRAM) TOPICAL
Qty: 180 TABLET | Refills: 1 | Status: SHIPPED | OUTPATIENT
Start: 2024-08-13

## 2024-08-13 RX ORDER — ATORVASTATIN CALCIUM 40 MG/1
TABLET, FILM COATED ORAL
Qty: 90 TABLET | Refills: 2 | Status: SHIPPED | OUTPATIENT
Start: 2024-08-13

## 2024-08-13 RX ORDER — IBUPROFEN 200 MG
1 CAPSULE ORAL DAILY
Qty: 90 TABLET | Refills: 2 | Status: SHIPPED | OUTPATIENT
Start: 2024-08-13 | End: 2025-05-10

## 2024-09-11 ENCOUNTER — OFFICE VISIT (OUTPATIENT)
Dept: INTERNAL MEDICINE CLINIC | Facility: CLINIC | Age: 84
End: 2024-09-11

## 2024-09-11 VITALS
SYSTOLIC BLOOD PRESSURE: 176 MMHG | OXYGEN SATURATION: 98 % | BODY MASS INDEX: 27.06 KG/M2 | WEIGHT: 134.25 LBS | HEART RATE: 75 BPM | TEMPERATURE: 97.6 F | DIASTOLIC BLOOD PRESSURE: 72 MMHG | HEIGHT: 59 IN

## 2024-09-11 DIAGNOSIS — I10 ESSENTIAL HYPERTENSION: ICD-10-CM

## 2024-09-11 DIAGNOSIS — Z59.9 FINANCIAL DIFFICULTIES: Primary | ICD-10-CM

## 2024-09-11 DIAGNOSIS — E78.5 DYSLIPIDEMIA: ICD-10-CM

## 2024-09-11 DIAGNOSIS — Z79.4 DIABETES MELLITUS TYPE 2, INSULIN DEPENDENT (HCC): Chronic | ICD-10-CM

## 2024-09-11 DIAGNOSIS — Z59.82 INABILITY TO ACQUIRE TRANSPORTATION: ICD-10-CM

## 2024-09-11 DIAGNOSIS — M81.0 POSTMENOPAUSAL OSTEOPOROSIS: ICD-10-CM

## 2024-09-11 DIAGNOSIS — E11.9 DIABETES MELLITUS TYPE 2, INSULIN DEPENDENT (HCC): Chronic | ICD-10-CM

## 2024-09-11 PROCEDURE — 99213 OFFICE O/P EST LOW 20 MIN: CPT | Performed by: INTERNAL MEDICINE

## 2024-09-11 PROCEDURE — G2211 COMPLEX E/M VISIT ADD ON: HCPCS | Performed by: INTERNAL MEDICINE

## 2024-09-11 RX ORDER — BLOOD SUGAR DIAGNOSTIC
STRIP MISCELLANEOUS
Qty: 100 EACH | Refills: 3 | Status: SHIPPED | OUTPATIENT
Start: 2024-09-11

## 2024-09-11 RX ORDER — LANCETS 33 GAUGE
EACH MISCELLANEOUS
Qty: 100 EACH | Refills: 3 | Status: SHIPPED | OUTPATIENT
Start: 2024-09-11

## 2024-09-11 RX ORDER — BLOOD-GLUCOSE METER
KIT MISCELLANEOUS
Qty: 1 KIT | Refills: 0 | Status: SHIPPED | OUTPATIENT
Start: 2024-09-11

## 2024-09-11 RX ORDER — HYDROCHLOROTHIAZIDE 12.5 MG/1
12.5 TABLET ORAL DAILY
Qty: 90 TABLET | Refills: 2 | Status: SHIPPED | OUTPATIENT
Start: 2024-09-11

## 2024-09-11 SDOH — ECONOMIC STABILITY - TRANSPORTATION SECURITY: TRANSPORTATION INSECURITY: Z59.82

## 2024-09-11 SDOH — ECONOMIC STABILITY - INCOME SECURITY: PROBLEM RELATED TO HOUSING AND ECONOMIC CIRCUMSTANCES, UNSPECIFIED: Z59.9

## 2024-09-11 NOTE — ASSESSMENT & PLAN NOTE
Patient with past medical history of osteoporosis  Recent DEXA scan showed lumbar spine T-score -3.4  Plan  Alendronate was restarted 70 mg once weekly

## 2024-09-11 NOTE — PROGRESS NOTES
Ambulatory Visit  Name: Conchis Velasco      : 1940      MRN: [id number]  Encounter Provider: Srinivas Rockwell DO  Encounter Date: 2024   Encounter department: [facility]    Assessment & Plan  Essential hypertension  Patient with past med history of hypertension  Patient is on amlodipine 10 and lisinopril 40  Patient was supposed to be on hydrochlorothiazide 12.5 however she does not know if she is taking or not  Patient is on metoprolol 50 mg twice daily  Patient compliance with medications on clear  Plan  Will see patient after 1 week  Patient was advised to bring all of her meds with her  Will adjust as needed    Blood Pressure after the second check (SBP/DBP) 176/72  New diagnosis of hypertensionNo  Patient is already on antihypertensive medicationsYes  Patient is complaint with the antihypertensive medicationsNo (provide explanation): Patient might not be taking her medications as prescribed  Adjusting doses of antihypertensive medicationsYes  Adding new antihypertensive medicationsNo  Number of antihypertensive medications before the visit3  Number of antihypertensive medications after the visit3   Orders:    hydroCHLOROthiazide 12.5 mg tablet; Take 1 tablet (12.5 mg total) by mouth daily    Financial difficulties    Orders:    Ambulatory referral to social work care management program; Future    Inability to acquire transportation    Orders:    Ambulatory referral to social work care management program; Future    Diabetes mellitus type 2, insulin dependent (HCC)    Lab Results   Component Value Date    HGBA1C 8.7 (A) 2024   Patient with past medical history of diabetes  Last A1c was 8.7  Patient is on Lantus glargine 46 units every morning  Patient takes metformin daily  Patient stopped taking Jardiance with no known reason  Patient blood glucose random today in the clinic was 200  Patient does not have blood glucose log because she does not have glucose  testing strips at home so she does not check her glucose at home  Unclear if the patient taking her medication as prescribed  Plan  Continue current regimen including Lantus 46 units, metformin and reorder Jardiance  Recheck in 1 week with the patient when she brings all of her meds with her  Will check A1c in 1 month    Orders:    Empagliflozin (Jardiance) 25 MG TABS; Take 1 tablet (25 mg total) by mouth every morning    Blood Glucose Monitoring Suppl (OneTouch Verio Reflect) w/Device KIT; Check blood sugars once daily. Please substitute with appropriate alternative as covered by patient's insurance. Dx: E11.65    glucose blood (OneTouch Verio) test strip; Check blood sugars once daily. Please substitute with appropriate alternative as covered by patient's insurance. Dx: E11.65    OneTouch Delica Lancets 33G MISC; Check blood sugars once daily. Please substitute with appropriate alternative as covered by patient's insurance. Dx: E11.65    Dyslipidemia  Patient with past medical history of hypertriglyceridemia and hyperlipidemia  Patient is on home atorvastatin 40 mg however she does not take her meds as she does not know that she was supposed to be taking her meds  Plan  Patient was advised about taking her meds  Will check lipid in 3 months       Postmenopausal osteoporosis  Patient with past medical history of osteoporosis  Recent DEXA scan showed lumbar spine T-score -3.4  Plan  Alendronate was restarted 70 mg once weekly          History of Present Illness     HPI  84-year-old female patient with past medical history of type 2 diabetes, osteoporosis, hypertension, CKD, SVT status post ablation 2019 who presented clinic for follow-up on her hypertension and diabetes.  Patient did not bring her glucose log as she does not have glucose testing strips at home so she does not check her blood glucose daily.  Patient blood pressure today in the clinic was 157/72.  It is unclear if the patient is taking her medication  "as prescribed.  Patient does not have her meds with her.  Patient does not take her cholesterol medication because she does not know that she was supposed to be on it.  Patient was A1c was 8.7.  Patient stopped taking Jardiance with no known reason.  History obtained from : patient  Review of Systems   Constitutional:  Negative for activity change, chills, diaphoresis, fatigue and unexpected weight change.   HENT:  Negative for congestion, ear discharge, ear pain, hearing loss, nosebleeds, postnasal drip, sinus pressure, sinus pain, sore throat, tinnitus and trouble swallowing.    Eyes:  Negative for photophobia, pain, discharge, redness and visual disturbance.   Respiratory:  Negative for apnea, cough, choking, wheezing and stridor.    Cardiovascular:  Negative for chest pain, palpitations and leg swelling.   Gastrointestinal:  Negative for abdominal distention, anal bleeding, constipation, diarrhea, rectal pain and vomiting.   Endocrine: Negative for polydipsia, polyphagia and polyuria.   Genitourinary:  Negative for decreased urine volume, dyspareunia, dysuria, flank pain, genital sores, menstrual problem, pelvic pain, vaginal bleeding and vaginal pain.   Musculoskeletal:  Negative for arthralgias, back pain, joint swelling, myalgias and neck pain.   Skin:  Negative for color change, rash and wound.   Neurological:  Negative for dizziness, syncope, facial asymmetry, light-headedness and headaches.   Psychiatric/Behavioral:  Negative for agitation, confusion, decreased concentration, dysphoric mood, self-injury, sleep disturbance and suicidal ideas. The patient is not hyperactive.            Objective     BP (!) 176/72   Pulse 75   Temp 97.6 °F (36.4 °C) (Temporal)   Ht 4' 11\" (1.499 m)   Wt 60.9 kg (134 lb 4 oz)   SpO2 98%   BMI 27.12 kg/m²     Physical Exam  Constitutional:       General: She is not in acute distress.     Appearance: She is not ill-appearing, toxic-appearing or diaphoretic.   HENT:      " Head: Normocephalic and atraumatic.      Nose: Nose normal. No congestion or rhinorrhea.      Mouth/Throat:      Mouth: Mucous membranes are moist.      Pharynx: No oropharyngeal exudate or posterior oropharyngeal erythema.   Eyes:      General: No scleral icterus.        Right eye: No discharge.         Left eye: No discharge.      Pupils: Pupils are equal, round, and reactive to light.   Neck:      Vascular: No carotid bruit.   Cardiovascular:      Rate and Rhythm: Normal rate and regular rhythm.      Pulses: Normal pulses.      Heart sounds: Normal heart sounds. No murmur heard.     No friction rub. No gallop.   Pulmonary:      Effort: Pulmonary effort is normal. No respiratory distress.      Breath sounds: No stridor. No wheezing, rhonchi or rales.   Chest:      Chest wall: No tenderness.   Abdominal:      General: Abdomen is flat. There is no distension.      Palpations: There is no mass.      Tenderness: There is no abdominal tenderness. There is no guarding or rebound.      Hernia: No hernia is present.   Musculoskeletal:         General: No swelling, tenderness, deformity or signs of injury. Normal range of motion.      Cervical back: Normal range of motion. No rigidity or tenderness.      Right lower leg: No edema.      Left lower leg: No edema.   Lymphadenopathy:      Cervical: No cervical adenopathy.   Skin:     General: Skin is warm.      Coloration: Skin is not jaundiced or pale.      Findings: No bruising, erythema, lesion or rash.   Neurological:      General: No focal deficit present.      Mental Status: She is alert.      Cranial Nerves: No cranial nerve deficit.      Sensory: No sensory deficit.      Motor: No weakness.      Coordination: Coordination normal.   Psychiatric:         Mood and Affect: Mood normal.         Behavior: Behavior normal.         Thought Content: Thought content normal.         Judgment: Judgment normal.       Administrative Statements   I have spent a total time of 30  minutes in caring for this patient on the day of the visit/encounter including Diagnostic results, Prognosis, and Risks and benefits of tx options.    Srinivas Rockwell DO PGY3  Internal Medicine Resident   [facility]

## 2024-09-19 DIAGNOSIS — Z79.4 DIABETES MELLITUS TYPE 2, INSULIN DEPENDENT (HCC): Primary | Chronic | ICD-10-CM

## 2024-09-19 DIAGNOSIS — E11.9 DIABETES MELLITUS TYPE 2, INSULIN DEPENDENT (HCC): Primary | Chronic | ICD-10-CM

## 2024-09-20 PROBLEM — E11.9 DIABETES MELLITUS TYPE 2, INSULIN DEPENDENT (HCC): Status: ACTIVE | Noted: 2024-09-20

## 2024-09-20 PROBLEM — Z79.4 DIABETES MELLITUS TYPE 2, INSULIN DEPENDENT (HCC): Status: ACTIVE | Noted: 2024-09-20

## 2024-09-20 RX ORDER — BLOOD-GLUCOSE METER
KIT MISCELLANEOUS
OUTPATIENT
Start: 2024-09-20

## 2024-09-23 ENCOUNTER — PATIENT OUTREACH (OUTPATIENT)
Dept: INTERNAL MEDICINE CLINIC | Facility: CLINIC | Age: 84
End: 2024-09-23

## 2024-09-23 NOTE — PROGRESS NOTES
SW has reached out to pt via  ID # [id number] to assist with PCP request to assist pt with referral for SDOH/ Transportation assistance.    Pt shares that since her  can no longer drive family must take off of work to get them to appointments.  This can be difficult at time.    SW has reviewed that she may have free rides available through her [employer].    Pt instructed to call the Customer Service # and ask.  Sw has aslo reviewed the [employer] Program.    Pt was interestd but wants to review it with her Granddgt.    She does not have MA and is concerned she could not afford the rides.    She shared they were over income for MA.  SW reviewed [employer] basic cost.    Pt was receptive to SW mailing her an application to review and submit herself.    She share that she resides with her disabled  . He uses a cane and is no longer able to drive.  SW offered to send an additional application for him.    SW has also mailed the Customer Service # for her/ dgt to call to check on possible free rides.      She is able to manage her own ADLS.     Patient does not have any further questions, concerns, or other needs at this time.  Patient has my contact # and PCP office # if needed.  Social Work to remain available to assist as indicated.    Please re-consult Social Work if needed.

## 2024-09-28 ENCOUNTER — TELEPHONE (OUTPATIENT)
Dept: INTERNAL MEDICINE CLINIC | Facility: CLINIC | Age: 84
End: 2024-09-28

## 2024-09-28 NOTE — TELEPHONE ENCOUNTER
This patient has an appointment scheduled on 10/12/2024-please make sure she brings all of her meds including over-the-counter meds and her insulin in a bag to that visit-thank you- Dr. Avila

## 2024-10-09 ENCOUNTER — OFFICE VISIT (OUTPATIENT)
Dept: INTERNAL MEDICINE CLINIC | Facility: CLINIC | Age: 84
End: 2024-10-09

## 2024-10-09 VITALS
HEART RATE: 80 BPM | BODY MASS INDEX: 26.66 KG/M2 | SYSTOLIC BLOOD PRESSURE: 168 MMHG | DIASTOLIC BLOOD PRESSURE: 62 MMHG | TEMPERATURE: 97.8 F | WEIGHT: 132 LBS

## 2024-10-09 DIAGNOSIS — E78.1 HYPERTRIGLYCERIDEMIA: ICD-10-CM

## 2024-10-09 DIAGNOSIS — K31.A19 INTESTINAL METAPLASIA OF STOMACH WITHOUT DYSPLASIA: ICD-10-CM

## 2024-10-09 DIAGNOSIS — M81.0 POSTMENOPAUSAL OSTEOPOROSIS: ICD-10-CM

## 2024-10-09 DIAGNOSIS — Z23 ENCOUNTER FOR IMMUNIZATION: Primary | ICD-10-CM

## 2024-10-09 DIAGNOSIS — I10 ESSENTIAL HYPERTENSION: ICD-10-CM

## 2024-10-09 DIAGNOSIS — E11.9 DIABETES MELLITUS TYPE 2, INSULIN DEPENDENT (HCC): ICD-10-CM

## 2024-10-09 DIAGNOSIS — E11.9 DIABETES MELLITUS TYPE 2, UNCOMPLICATED (HCC): ICD-10-CM

## 2024-10-09 DIAGNOSIS — Z79.4 DIABETES MELLITUS TYPE 2, INSULIN DEPENDENT (HCC): ICD-10-CM

## 2024-10-09 DIAGNOSIS — M75.01 ADHESIVE CAPSULITIS OF RIGHT SHOULDER: ICD-10-CM

## 2024-10-09 RX ORDER — BLOOD SUGAR DIAGNOSTIC
STRIP MISCELLANEOUS
Qty: 100 EACH | Refills: 3 | Status: SHIPPED | OUTPATIENT
Start: 2024-10-09

## 2024-10-09 RX ORDER — INSULIN GLARGINE 100 [IU]/ML
45 INJECTION, SOLUTION SUBCUTANEOUS DAILY
Qty: 45 ML | Refills: 3 | Status: SHIPPED | OUTPATIENT
Start: 2024-10-09

## 2024-10-09 NOTE — PROGRESS NOTES
[facility]  INTERNAL MEDICINE OFFICE VISIT     PATIENT INFORMATION     Conchis Velasco   84 y.o. female   MRN: [id number]    ASSESSMENT/PLAN     This is an 85 y/o F with poorly-controlled DM and HTN, likely owed to medication non-compliance/confusion, who comes here for re-check of these cx conditions. Her BG control appears to have improved greatly following the addition (or more-so, restarting) of Januvia. We are unable to check A1c today for billing reasons (ie, last A1c < 3 mo/a), however she does have a significant barrier to A1c improvement I/c/o hypoglycemia. Pt is thankfully aware of hypoglycemia and does become symptomatic with sugars in the 60s-70s, but otherwise he AM BG are frequently below 100. For this reason we have decided to decrease her Lantus from 50 to 45U qd and will re-assess BG logs next visit.    In regards to HTN, her BP again is elevated at this visit (n.b.,, even more-so than last visit) however we did learn that she had not yet taken antiHTNives at the time of this appt. We will therefore need to re-visit BP at next visit prior to making any decisions re: modifications to antiHTNives. She is aware that she should take Norvasc/Lisinopril before her next visit on day of scheduled appt.    We have decided that she will no longer be taking Alendronate given the significant severity of her Osteoporosis with evidence of fx, and for this reason we have ordered Prolia and will wait for insurance approval/PA. Antilipid medication choice is still being held as we do not have a rpt Lipid Profile. Last Lipid tests showed significant TG causing inability to calculate LDL. We have ordered both rpt Lipid Profile & Direct LDL measurement. Should she have subclinical LDL elevation (ie, masked by hyperTG), will need to start fibrate + statin.     Diagnoses and all orders for this visit:    Encounter for immunization  -     influenza vaccine, high-dose, PF 0.5 mL  (Fluzone High Dose)  -     Respiratory Syncytial Virus (RSV) vaccine (recombinant) (Abrysvo)    Diabetes mellitus type 2, insulin dependent (HCC)  -     Comprehensive metabolic panel; Future  -     Lipid Panel with Direct LDL reflex; Future    Essential hypertension    Postmenopausal osteoporosis  -     denosumab (PROLIA) subcutaneous injection 60 mg    Adhesive capsulitis of right shoulder    Hypertriglyceridemia  Unable to calculate LDL on last Lipid Panel b/c/o significant TG presence  Pt would otherwise benefit from statin/anti-lipid tx considering her age and risk factors (DM, HTN)  ?Subclinical hyper-LDL, must get direct LDL on next labs (which has been ordered) to determine need for statin  If TG remain elevated --> would also add Fibrate    -     Comprehensive metabolic panel; Future  -     LDL cholesterol, direct; Future  -     Lipid Panel with Direct LDL reflex; Future    Diabetes mellitus type 2, uncomplicated (HCC)  -     Insulin Glargine Solostar (Lantus SoloStar) 100 UNIT/ML SOPN; Inject 0.45 mL (45 Units total) under the skin in the morning    Intestinal metaplasia of stomach without dysplasia  -     Ambulatory Referral to Gastroenterology; Future    Schedule a follow-up appointment in 2-4 weeks with Dr Avila to f/u BP.      HEALTH MAINTENANCE     Immunization History   Administered Date(s) Administered    COVID-19 PFIZER VACCINE 0.3 ML IM 03/30/2021, 04/23/2021, 11/15/2021    INFLUENZA 11/24/2004, 11/01/2005, 12/18/2006, 10/30/2007, 10/22/2010, 02/27/2015, 11/06/2015, 09/22/2016, 10/09/2017    Influenza Quadrivalent Preservative Free 3 years and older IM 09/22/2016    Influenza Quadrivalent, 6-35 Months IM 10/09/2017    Influenza Split High Dose Preservative Free IM 09/17/2012, 10/23/2013    Influenza, high dose seasonal 0.7 mL 11/20/2018, 09/23/2019, 10/20/2021, 11/14/2023    Influenza, injectable, quadrivalent, preservative free 0.5 mL 10/21/2020    Influenza, seasonal, injectable 12/13/2011,  "11/06/2015    Pneumococcal Conjugate 13-Valent 11/20/2018    Pneumococcal Polysaccharide PPV23 08/20/2004, 12/13/2011    Td (adult), Unspecified 08/20/2003    Tdap 05/09/2016       CHIEF COMPLAINT     Chief Complaint   Patient presents with    Follow-up     Follow up DM and HTN        HISTORY OF PRESENT ILLNESS     Interval Hx:    Miss Velasco is an 83 y/o woman with h/o IDDM (last A1c = 8..7% in Sept ’24), HTN, SVT s/p Ablation (2019), HLD, Osteoporosis on alendronate, CKD3a and financial difficulties who comes here for f/u of HTN and DM.     Was last seen in clinic early Sept ’24 for f/u of cx conditions. At this time her A1c was 8.7%, it was learned that pt is no longer taking her Jardiance and is otherwise not entirely compliant on antiDM medications. Additionally BP was elevated to high 150s systolic, where it was discovered pt was unclear in regards to medication compliance. She was told to return to clinic within 2-4 weeks with medications/BP and BG logs.      HPI:    Today Miss Velasco is seen in clinic resting comfortably on exam room table. She states that she is doing well and has no new complaints. When asked about her DM medications, she does endorse compliance and particularly with Jardiance which was recently re-prescribed to her.     She does have BG logs with her, and states she was not able to do this last visit because she did not have a glucometer at home or testing strips. She notes that she is frequently having low blood sugars in the morning, \"69\" is what she says was her reading yesterday AM. Patient is thankfully aware of this hypoglycemia, noting that she felt a bit dizzy and realized she needed to check her blood sugar. When she say this number she resolved said hypoglycemia with a glass of orange juice. Per pt logs, her range of AM BG is  and HS BG ranges are 132-215.    Unfortunately her BP is noticeably elevated today, 168/62 per intake vitals (compared to 150s systolic last " visit). She does endorse compliance with her antiHTNives, however she did NOT take her medications prior to this visit bc she had to fast for labs and therefore is waiting to have a meal prior to taking these pills. She denies any new HA, ocular changes, CP, SOB, diaphoresis, or any syncope/near-syncope.     Otherwise, she notes R shoulder pain for the past 3 months which she believes is 'arthritis'. In particular she endorses reduced RoM with the R arm/shoulder, particularly on emiliano-duction and internal rotation. She has pains that 'come and go', and typically appear when she wakes up in the AM. She will take 1-2 tylenol pills for this which typically relieves the pain 'a little'. 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 It does appear she mentioned this pain at a prior office visit in July '24. She has a diagnosis of R adhesive capsulitis in her EMR, and was given an order for PT/OT at this time but per pt she did not complete this referral. They had also ordered XR of this same shoulder which has yet to be completed.     REVIEW OF SYSTEMS     Review of Systems   Constitutional:  Negative for chills and fever.   HENT:  Negative for ear pain and sore throat.    Eyes:  Negative for pain and visual disturbance.   Respiratory:  Negative for cough and shortness of breath.    Cardiovascular:  Negative for chest pain and palpitations.   Gastrointestinal:  Negative for abdominal pain and vomiting.   Genitourinary:  Negative for dysuria and hematuria.   Musculoskeletal:  Negative for arthralgias and back pain.   Skin:  Negative for color change and rash.   Neurological:  Negative for seizures and syncope.   All other systems reviewed and are negative.      OBJECTIVE     Vitals:    10/09/24 0910   BP: 168/62   BP Location: Left arm   Patient Position: Sitting   Cuff Size: Standard   Pulse: 80   Temp: 97.8 °F (36.6 °C)   TempSrc: Temporal   Weight:  59.9 kg (132 lb)     Physical Exam  Vitals and nursing note reviewed.   Constitutional:       General: She is not in acute distress.     Appearance: She is well-developed.   HENT:      Head: Normocephalic and atraumatic.   Eyes:      Conjunctiva/sclera: Conjunctivae normal.   Cardiovascular:      Rate and Rhythm: Normal rate and regular rhythm.      Heart sounds: No murmur heard.  Pulmonary:      Effort: Pulmonary effort is normal. No respiratory distress.      Breath sounds: Normal breath sounds.   Abdominal:      Palpations: Abdomen is soft.      Tenderness: There is no abdominal tenderness.   Musculoskeletal:         General: Tenderness present. No swelling.      Cervical back: Neck supple.      Comments: Unable to emiliano-duct R shoulder above 90 degrees  Unable to resist/weakness on R shoulder emiliano-duction  Unable to internally/externally rotate R arm   Skin:     General: Skin is warm and dry.      Capillary Refill: Capillary refill takes less than 2 seconds.   Neurological:      Mental Status: She is alert.   Psychiatric:         Mood and Affect: Mood normal.         CURRENT MEDICATIONS     Current Outpatient Medications:     acetaminophen (TYLENOL) 325 mg tablet, Take 2 tablets (650 mg total) by mouth every 6 (six) hours as needed for mild pain, Disp: 1 Bottle, Rfl: 0    alendronate (Fosamax) 70 mg tablet, Take 1 tablet (70 mg total) by mouth every 7 days, Disp: 12 tablet, Rfl: 3    amLODIPine (NORVASC) 10 mg tablet, TOME DIANE TABLETA TODOS LOS HANEY, Disp: 90 tablet, Rfl: 3    atorvastatin (LIPITOR) 40 mg tablet, TOME 1 TABLETA POR VIA ORAL TODOS LOS HANEY, Disp: 90 tablet, Rfl: 2    Blood Glucose Monitoring Suppl (OneTouch Verio Reflect) w/Device KIT, Check blood sugars once daily. Please substitute with appropriate alternative as covered by patient's insurance. Dx: E11.65, Disp: 1 kit, Rfl: 0    calcium citrate (CALCITRATE) 950 (200 Ca) MG tablet, TAKE 1 TABLET (950 MG TOTAL) BY MOUTH DAILY, Disp: 90 tablet, Rfl:  2    cholecalciferol (VITAMIN D3) 1,000 units tablet, Take 1 tablet (1,000 Units total) by mouth daily for 180 days, Disp: 90 tablet, Rfl: 1    Continuous Glucose Sensor (FreeStyle Juani 14 Day Sensor) MISC, Use 1 Device every 14 (fourteen) days, Disp: 4 each, Rfl: 1    Empagliflozin (Jardiance) 25 MG TABS, Take 1 tablet (25 mg total) by mouth every morning, Disp: 90 tablet, Rfl: 1    glucose blood (OneTouch Verio) test strip, Check blood sugars once daily. Please substitute with appropriate alternative as covered by patient's insurance. Dx: E11.65, Disp: 100 each, Rfl: 3    hydroCHLOROthiazide 12.5 mg tablet, Take 1 tablet (12.5 mg total) by mouth daily, Disp: 90 tablet, Rfl: 2    Icosapent Ethyl 1 g CAPS, Take 1 capsule (1 g total) by mouth in the morning, Disp: 90 capsule, Rfl: 1    Insulin Glargine Solostar (Lantus SoloStar) 100 UNIT/ML SOPN, INJECT 44 UNITS UNDER THE SKIN DAILY, Disp: 45 mL, Rfl: 3    Insulin Pen Needle (B-D UF III MINI PEN NEEDLES) 31G X 5 MM MISC, Inject 30 units SC twice daily and Trulicity once weekly, Disp: 200 each, Rfl: 1    Insulin Pen Needle (BD Pen Needle Marichuy 2nd Gen) 32G X 4 MM MISC, Use 3 (three) times a day Use to check blood sugar 3 times a day., Disp: 300 each, Rfl: 3    lisinopril (ZESTRIL) 40 mg tablet, TOME DIANE TABLETA TODOS LOS HANEY, Disp: 90 tablet, Rfl: 3    metFORMIN (GLUCOPHAGE) 1000 MG tablet, TOME DIANE TABLETA 2 VECES AL BRENDAN CON LAS COMIDAS, Disp: 180 tablet, Rfl: 3    metoprolol tartrate (LOPRESSOR) 50 mg tablet, TAKE 1 TABLET BY MOUTH EVERY 12 HOURS HOLD FOR HEART RATE LESS THAN 50 BEATS PER MINUTE., Disp: 180 tablet, Rfl: 3    multivitamin-minerals (CENTRUM ADULTS) tablet, Take 1 tablet by mouth daily, Disp: 30 tablet, Rfl: 1    OneTouch Delica Lancets 33G MISC, Check blood sugars once daily. Please substitute with appropriate alternative as covered by patient's insurance. Dx: E11.65, Disp: 100 each, Rfl: 3    vitamin B-12 (VITAMIN B-12) 1,000 mcg tablet, TAKE 2  TABLETS (2,000 MCG TOTAL) BY MOUTH DAILY., Disp: 180 tablet, Rfl: 1    PAST MEDICAL & SURGICAL HISTORY     Past Medical History:   Diagnosis Date    Anemia     Chronic idiopathic constipation     Colon polyp     Diabetes mellitus (HCC)     Diverticulitis, colon     Esophageal reflux     Eustachian tube dysfunction     Gastrointestinal hemorrhage     Hypertension     Myocardial infarction (HCC)     Non-healing skin lesion     Palpitations     Rectal bleeding     Skin lesion of chest wall      Past Surgical History:   Procedure Laterality Date     SECTION      CHOLECYSTECTOMY      COLONOSCOPY      ECTOPIC PREGNANCY SURGERY      VA COLONOSCOPY FLX DX W/COLLJ SPEC WHEN PFRMD N/A 2016    Procedure: COLONOSCOPY;  Surgeon: Brooks Contreras MD;  Location: [facility];  Service: Gastroenterology    VA NEUROPLASTY &/TRANSPOS MEDIAN NRV CARPAL TUNNE Left 10/8/2019    Procedure: CARPAL TUNNEL RELEASE;  Surgeon: Yamilex Hatch MD;  Location: [facility];  Service: Orthopedics    VA OPTX DSTL RADL I-ARTIC FX/EPIPHYSL SEP 3 FRAG Left 10/8/2019    Procedure: RADIUS/ULNA (WRIST) OPEN REDUCTION W/ INTERNAL FIXATION (ORIF);  Surgeon: Yamilex Hatch MD;  Location: [facility];  Service: Orthopedics       SOCIAL & FAMILY HISTORY     Social History     Socioeconomic History    Marital status: /Civil Union     Spouse name: Not on file    Number of children: Not on file    Years of education: Not on file    Highest education level: Not on file   Occupational History    Occupation: packaging   Tobacco Use    Smoking status: Never    Smokeless tobacco: Never   Vaping Use    Vaping status: Never Used   Substance and Sexual Activity    Alcohol use: Never    Drug use: Never    Sexual activity: Not Currently   Other Topics Concern    Not on file   Social History Narrative    Caffeine use    Denied exercising     Social Determinants of Health     Financial Resource Strain: High Risk (2024)    Overall Financial  Resource Strain (CARDIA)     Difficulty of Paying Living Expenses: Very hard   Food Insecurity: No Food Insecurity (9/11/2024)    Hunger Vital Sign     Worried About Running Out of Food in the Last Year: Never true     Ran Out of Food in the Last Year: Never true   Transportation Needs: Unmet Transportation Needs (9/11/2024)    PRAPARE - Transportation     Lack of Transportation (Medical): Yes     Lack of Transportation (Non-Medical): Yes   Physical Activity: Inactive (6/2/2020)    Exercise Vital Sign     Days of Exercise per Week: 0 days     Minutes of Exercise per Session: 0 min   Stress: No Stress Concern Present (6/2/2020)    [employer] [place] [employer] - Occupational Stress Questionnaire     Feeling of Stress : Not at all   Social Connections: Moderately Isolated (6/2/2020)    Social Connection and Isolation Panel [NHANES]     Frequency of Communication with Friends and Family: More than three times a week     Frequency of Social Gatherings with Friends and Family: Never     Attends Anabaptism Services: Never     Active Member of Clubs or Organizations: No     Attends Club or Organization Meetings: Never     Marital Status:    Intimate Partner Violence: Not At Risk (6/2/2020)    Humiliation, Afraid, Rape, and Kick questionnaire     Fear of Current or Ex-Partner: No     Emotionally Abused: No     Physically Abused: No     Sexually Abused: No   Housing Stability: Low Risk  (9/11/2024)    Housing Stability Vital Sign     Unable to Pay for Housing in the Last Year: No     Number of Times Moved in the Last Year: 1     Homeless in the Last Year: No     Social History     Substance and Sexual Activity   Alcohol Use Never       Social History     Substance and Sexual Activity   Drug Use Never     Social History     Tobacco Use   Smoking Status Never   Smokeless Tobacco Never     Family History   Problem Relation Age of Onset    Heart disease Mother     No Known Problems Father     Bleeding Disorder  Sister     No Known Problems Sister     No Known Problems Daughter     No Known Problems Maternal Grandmother     No Known Problems Maternal Grandfather     No Known Problems Paternal Grandmother     No Known Problems Paternal Grandfather     Breast cancer Neg Hx        = == == == == == == == == == == == == == == == == == == == == == == == == == == == == == ==    Aneesh Dacosta MD  Internal Medicine Resident, PGY-2  [facility], [address], [address]  [place] [address]  Office: [phone]  Fax: [phone]

## 2024-10-14 ENCOUNTER — APPOINTMENT (OUTPATIENT)
Dept: LAB | Facility: CLINIC | Age: 84
End: 2024-10-14
Payer: COMMERCIAL

## 2024-10-14 DIAGNOSIS — E78.1 HYPERTRIGLYCERIDEMIA: ICD-10-CM

## 2024-10-14 DIAGNOSIS — Z79.4 DIABETES MELLITUS TYPE 2, INSULIN DEPENDENT (HCC): ICD-10-CM

## 2024-10-14 DIAGNOSIS — E11.9 DIABETES MELLITUS TYPE 2, INSULIN DEPENDENT (HCC): ICD-10-CM

## 2024-10-14 LAB
ALBUMIN SERPL BCG-MCNC: 4.6 G/DL (ref 3.5–5)
ALP SERPL-CCNC: 38 U/L (ref 34–104)
ALT SERPL W P-5'-P-CCNC: 17 U/L (ref 7–52)
ANION GAP SERPL CALCULATED.3IONS-SCNC: 14 MMOL/L (ref 4–13)
AST SERPL W P-5'-P-CCNC: 22 U/L (ref 13–39)
BILIRUB SERPL-MCNC: 0.6 MG/DL (ref 0.2–1)
BUN SERPL-MCNC: 17 MG/DL (ref 5–25)
CALCIUM SERPL-MCNC: 9.6 MG/DL (ref 8.4–10.2)
CHLORIDE SERPL-SCNC: 99 MMOL/L (ref 96–108)
CHOLEST SERPL-MCNC: 124 MG/DL
CO2 SERPL-SCNC: 26 MMOL/L (ref 21–32)
CREAT SERPL-MCNC: 0.86 MG/DL (ref 0.6–1.3)
GFR SERPL CREATININE-BSD FRML MDRD: 62 ML/MIN/1.73SQ M
GLUCOSE P FAST SERPL-MCNC: 129 MG/DL (ref 65–99)
HDLC SERPL-MCNC: 35 MG/DL
LDLC SERPL CALC-MCNC: 30 MG/DL (ref 0–100)
LDLC SERPL DIRECT ASSAY-MCNC: 58 MG/DL (ref 0–100)
POTASSIUM SERPL-SCNC: 4.1 MMOL/L (ref 3.5–5.3)
PROT SERPL-MCNC: 7.7 G/DL (ref 6.4–8.4)
SODIUM SERPL-SCNC: 139 MMOL/L (ref 135–147)
TRIGL SERPL-MCNC: 294 MG/DL

## 2024-10-14 PROCEDURE — 80053 COMPREHEN METABOLIC PANEL: CPT

## 2024-10-14 PROCEDURE — 83721 ASSAY OF BLOOD LIPOPROTEIN: CPT

## 2024-10-14 PROCEDURE — 36415 COLL VENOUS BLD VENIPUNCTURE: CPT

## 2024-10-14 PROCEDURE — 80061 LIPID PANEL: CPT

## 2024-10-30 ENCOUNTER — OFFICE VISIT (OUTPATIENT)
Dept: INTERNAL MEDICINE CLINIC | Facility: CLINIC | Age: 84
End: 2024-10-30

## 2024-10-30 VITALS
OXYGEN SATURATION: 98 % | TEMPERATURE: 97.7 F | BODY MASS INDEX: 26.38 KG/M2 | WEIGHT: 130.6 LBS | HEART RATE: 67 BPM | DIASTOLIC BLOOD PRESSURE: 76 MMHG | SYSTOLIC BLOOD PRESSURE: 157 MMHG

## 2024-10-30 DIAGNOSIS — Z79.4 DIABETES MELLITUS TYPE 2, INSULIN DEPENDENT (HCC): Primary | ICD-10-CM

## 2024-10-30 DIAGNOSIS — M80.00XD AGE-RELATED OSTEOPOROSIS WITH CURRENT PATHOLOGICAL FRACTURE WITH ROUTINE HEALING, SUBSEQUENT ENCOUNTER: ICD-10-CM

## 2024-10-30 DIAGNOSIS — I10 BENIGN ESSENTIAL HYPERTENSION: ICD-10-CM

## 2024-10-30 DIAGNOSIS — E11.9 DIABETES MELLITUS TYPE 2, INSULIN DEPENDENT (HCC): Primary | ICD-10-CM

## 2024-10-30 DIAGNOSIS — E11.9 DIABETES MELLITUS TYPE 2, UNCOMPLICATED (HCC): ICD-10-CM

## 2024-10-30 LAB — SL AMB POCT HEMOGLOBIN AIC: 7.3 (ref ?–6.5)

## 2024-10-30 PROCEDURE — 83036 HEMOGLOBIN GLYCOSYLATED A1C: CPT | Performed by: INTERNAL MEDICINE

## 2024-10-30 PROCEDURE — 99214 OFFICE O/P EST MOD 30 MIN: CPT | Performed by: INTERNAL MEDICINE

## 2024-10-30 RX ORDER — INSULIN GLARGINE 100 [IU]/ML
35 INJECTION, SOLUTION SUBCUTANEOUS DAILY
Qty: 45 ML | Refills: 0 | Status: SHIPPED | OUTPATIENT
Start: 2024-10-30 | End: 2024-11-29

## 2024-10-30 NOTE — PROGRESS NOTES
[facility]  INTERNAL MEDICINE        NAME: Conchis Velasco  AGE: 84 y.o. SEX: female    DATE OF ENCOUNTER: 10/30/2024    ASSESSMENT AND PLAN     1. Diabetes mellitus type 2, insulin dependent (HCC)  Currently on metformin 1000 mg twice daily, Jardiance 25 mg daily and Lantus 45 units.  Patient reports compliance with her medication but is currently taking Lantus 40 units only and is unsure if taking Jardiance.  She has not had any hypoglycemic episodes since last visit.  She brought in a BG log with fasting glucose ranging 75 - 144 with average in low 100s and night time glucose (after meal) ranging 120-220 with average in 150s. She is not consistent with her portion size or number of meals per day.  No formal exercise routine.  Today POC A1c 7.3 from 8.7 on 10/30/2024.  Will decrease Lantus to 35 units nightly.  Instructed patient to keep blood glucose log and bring it in next visit.  Follow-up in 4 weeks  - POCT hemoglobin A1c    2. Benign essential hypertension  Currently on amlodipine 10 mg daily, lisinopril 40 mg daily, Lopressor 50 mg every 12 hours and hydrochlorothiazide 12.5 mg daily.  Patient reports compliance with her medication.  Does not measure blood pressure at home.  Denies any symptoms including chest pain, shortness of breath, headache, lightheadedness/dizziness  Today, /73 with repeat 157/76  Patient reports she did not take blood pressure medication this morning and drank coffee prior to appointment.  Discussed with patient to buy blood pressure cuff, measure blood pressure twice daily once in the morning and at night before bed, keep log and bring log in next visit.  Patient understands and agrees.  Follow-up in 4 weeks.  Instructed patient to bring all her medications in her next visit.    Orders Placed This Encounter   Procedures    POCT hemoglobin A1c         CHIEF COMPLAINT     Chief Complaint   Patient presents with    Follow-up    Diabetes     Arm Pain     Right arm pain, ongoing for 2 months       HISTORY OF PRESENT ILLNESS     84-year-old female with past medical history of diabetes, hypertension, SVT s/p ablation, osteoporosis on Prolia, CKD 3 and hyperlipidemia who presents for follow-up visit for blood pressure and diabetes.    She was last seen in clinic on 10/9/2024.  For diabetes, her Lantus was decreased to 45 units from 50 units reported hypoglycemic episodes.  Her other regimens are metformin 1000 mg twice daily and Jardiance 25 mg daily. Patient reports taking Lantus 40 units only.  Denies any hypoglycemic episodes since last visit.  She reports compliance with her blood pressure medications but seems to be unsure of her blood pressure medications. Denies any fever, chills, chest pain, SOB, palpitation, abdominal pain, n/v, lightheadedness/dizziness.     The following portions of the patient's history were reviewed and updated as appropriate: allergies, current medications, past family history, past medical history, past social history, past surgical history and problem list.    REVIEW OF SYSTEMS     Review of Systems   Constitutional: Negative for chills and fever.   HENT:  Negative for congestion and sore throat.    Eyes:  Negative for pain.   Cardiovascular:  Negative for chest pain and dyspnea on exertion.   Respiratory:  Negative for cough and shortness of breath.    Gastrointestinal:  Negative for abdominal pain, nausea and vomiting.   Neurological:  Negative for dizziness and light-headedness.   All other systems reviewed and are negative.        All other ROS negative except per HPI    ACTIVE PROBLEM LIST     Patient Active Problem List   Diagnosis    Benign essential hypertension    Colon polyp    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)    Postmenopausal osteoporosis    Type 2 diabetes mellitus with hyperglycemia, with long-term current use of insulin  (HCC)    Overweight (BMI 25.0-29.9)    Closed fracture of left distal radius    History of COVID-19    Hypertriglyceridemia    Decreased renal function    Stage 3a chronic kidney disease (CKD) (HCC)    Encounter for immunization    Diabetes mellitus type 2, insulin dependent (HCC)       Past Medical History:   Diagnosis Date    Anemia     Chronic idiopathic constipation     Colon polyp     Diabetes mellitus (HCC)     Diverticulitis, colon     Esophageal reflux     Eustachian tube dysfunction     Gastrointestinal hemorrhage     Hypertension     Myocardial infarction (HCC)     Non-healing skin lesion     Palpitations     Rectal bleeding     Skin lesion of chest wall        CURRENT MEDICATIONS       Current Outpatient Medications:     acetaminophen (TYLENOL) 325 mg tablet, Take 2 tablets (650 mg total) by mouth every 6 (six) hours as needed for mild pain, Disp: 1 Bottle, Rfl: 0    amLODIPine (NORVASC) 10 mg tablet, TOME DIANE TABLETA TODOS LOS HANEY, Disp: 90 tablet, Rfl: 3    atorvastatin (LIPITOR) 40 mg tablet, TOME 1 TABLETA POR VIA ORAL TODOS LOS HANEY, Disp: 90 tablet, Rfl: 2    Blood Glucose Monitoring Suppl (OneTouch Verio Reflect) w/Device KIT, Check blood sugars once daily. Please substitute with appropriate alternative as covered by patient's insurance. Dx: E11.65, Disp: 1 kit, Rfl: 0    calcium citrate (CALCITRATE) 950 (200 Ca) MG tablet, TAKE 1 TABLET (950 MG TOTAL) BY MOUTH DAILY, Disp: 90 tablet, Rfl: 2    cholecalciferol (VITAMIN D3) 1,000 units tablet, Take 1 tablet (1,000 Units total) by mouth daily for 180 days, Disp: 90 tablet, Rfl: 1    Continuous Glucose Sensor (FreeStyle Juani 14 Day Sensor) MISC, Use 1 Device every 14 (fourteen) days, Disp: 4 each, Rfl: 1    Empagliflozin (Jardiance) 25 MG TABS, Take 1 tablet (25 mg total) by mouth every morning, Disp: 90 tablet, Rfl: 1    glucose blood (OneTouch Verio) test strip, Check blood sugars once daily. Please substitute with appropriate alternative as  covered by patient's insurance. Dx: E11.65, Disp: 100 each, Rfl: 3    hydroCHLOROthiazide 12.5 mg tablet, Take 1 tablet (12.5 mg total) by mouth daily, Disp: 90 tablet, Rfl: 2    Icosapent Ethyl 1 g CAPS, Take 1 capsule (1 g total) by mouth in the morning, Disp: 90 capsule, Rfl: 1    Insulin Glargine Solostar (Lantus SoloStar) 100 UNIT/ML SOPN, Inject 0.45 mL (45 Units total) under the skin in the morning, Disp: 45 mL, Rfl: 3    Insulin Pen Needle (B-D UF III MINI PEN NEEDLES) 31G X 5 MM MISC, Inject 30 units SC twice daily and Trulicity once weekly, Disp: 200 each, Rfl: 1    Insulin Pen Needle (BD Pen Needle Marichuy 2nd Gen) 32G X 4 MM MISC, Use 3 (three) times a day Use to check blood sugar 3 times a day., Disp: 300 each, Rfl: 3    lisinopril (ZESTRIL) 40 mg tablet, TOME DIANE TABLETA TODOS LOS HANEY, Disp: 90 tablet, Rfl: 3    metFORMIN (GLUCOPHAGE) 1000 MG tablet, TOME DIANE TABLETA 2 VECES AL BRENDAN CON LAS COMIDAS, Disp: 180 tablet, Rfl: 3    metoprolol tartrate (LOPRESSOR) 50 mg tablet, TAKE 1 TABLET BY MOUTH EVERY 12 HOURS HOLD FOR HEART RATE LESS THAN 50 BEATS PER MINUTE., Disp: 180 tablet, Rfl: 3    multivitamin-minerals (CENTRUM ADULTS) tablet, Take 1 tablet by mouth daily, Disp: 30 tablet, Rfl: 1    OneTouch Delica Lancets 33G MISC, Check blood sugars once daily. Please substitute with appropriate alternative as covered by patient's insurance. Dx: E11.65, Disp: 100 each, Rfl: 3    vitamin B-12 (VITAMIN B-12) 1,000 mcg tablet, TAKE 2 TABLETS (2,000 MCG TOTAL) BY MOUTH DAILY., Disp: 180 tablet, Rfl: 1    Current Facility-Administered Medications:     denosumab (PROLIA) subcutaneous injection 60 mg, 60 mg, Subcutaneous, Q6 Months,     No Known Allergies    Social History   Past Surgical History:   Procedure Laterality Date     SECTION      CHOLECYSTECTOMY      COLONOSCOPY      ECTOPIC PREGNANCY SURGERY      WV COLONOSCOPY FLX DX W/COLLJ SPEC WHEN PFRMD N/A 2016    Procedure: COLONOSCOPY;  Surgeon: Brooks  MD Diane;  Location: [facility];  Service: Gastroenterology    NM NEUROPLASTY &/TRANSPOS MEDIAN NRV CARPAL TUNNE Left 10/8/2019    Procedure: CARPAL TUNNEL RELEASE;  Surgeon: Yamilex Hatch MD;  Location: [facility];  Service: Orthopedics    NM OPTX DSTL RADL I-ARTIC FX/EPIPHYSL SEP 3 FRAG Left 10/8/2019    Procedure: RADIUS/ULNA (WRIST) OPEN REDUCTION W/ INTERNAL FIXATION (ORIF);  Surgeon: Yamilex Hatch MD;  Location: [facility];  Service: Orthopedics     Family History   Problem Relation Age of Onset    Heart disease Mother     No Known Problems Father     Bleeding Disorder Sister     No Known Problems Sister     No Known Problems Daughter     No Known Problems Maternal Grandmother     No Known Problems Maternal Grandfather     No Known Problems Paternal Grandmother     No Known Problems Paternal Grandfather     Breast cancer Neg Hx        OBJECTIVE     /73 (BP Location: Left arm, Patient Position: Standing, Cuff Size: Standard) Comment: patient did not take BP medication  Pulse 67   Temp 97.7 °F (36.5 °C) (Temporal)   Wt 59.2 kg (130 lb 9.6 oz)   SpO2 98%   BMI 26.38 kg/m²     Physical Exam  Vitals reviewed.   Constitutional:       Appearance: Normal appearance. She is normal weight.   Cardiovascular:      Rate and Rhythm: Normal rate and regular rhythm.      Pulses: Normal pulses.      Heart sounds: Normal heart sounds.   Pulmonary:      Effort: Pulmonary effort is normal.      Breath sounds: Normal breath sounds.   Abdominal:      General: Bowel sounds are normal.      Palpations: Abdomen is soft.   Neurological:      Mental Status: She is alert and oriented to person, place, and time. Mental status is at baseline.           Pertinent Laboratory/Diagnostic Studies:     DXA bone density spine hip and pelvis    Result Date: 8/12/2024  Impression: 1. Osteoporosis. Based on the lumbar spine 2.  Since a DXA study from 4/10/2019, there has been: No statistically significant change in bone  mineral density at any of the evaluated skeletal sites. 3.  The 10 year risk of hip fracture is 3.7% with the 10 year risk of major osteoporotic fracture being 11% as calculated by the [employer] fracture risk assessment tool (FRAX, which is based on data generated by the WHO Collaborating [place]  for Metabolic Bone Diseases). 4.  The current NOF guidelines recommend treating patients with a T-score of -2.5 or less in the lumbar spine or hips, or in post-menopausal women and men over the age of 50 with low bone mass (osteopenia) and a FRAX 10 year risk score of >3% for hip fracture and/or >20% for major osteoporotic fracture. 5.  The NOF recommends follow-up DXA in 1-2 years after initiating therapy for osteoporosis and every 2 years thereafter. More frequent evaluation is appropriate for patients with conditions associated with rapid bone loss, such as glucocorticoid therapy. The interval between DXA screenings may be longer for individuals without major risk factors and initial T-score in the normal or upper low bone mass range. The FRAX algorithm has certain limitations: -FRAX has not been validated in patients currently or previously treated with pharmacotherapy for osteoporosis.  In such patients, clinical judgment must be exercised in interpreting FRAX scores. -Prior hip, vertebral and humeral fragility fractures appear to confer greater risk of subsequent fracture than fractures at other sites (this is especially true for individuals with severe vertebral fractures), but quantification of this incremental risk is not possible with FRAX. -FRAX underestimates fracture risk in patients with history of multiple fragility fractures. -FRAX may underestimate fracture risk in patients with history of frequent falls. -It is not appropriate to use FRAX to monitor treatment response. WHO CLASSIFICATION: Normal (a T-score of -1.0 or higher) Low bone mineral density (a T-score of less than -1.0 but higher  than -2.5) Osteoporosis (a T-score of -2.5 or less) Severe osteoporosis (a T-score of -2.5 or less with a fragility fracture) LEAST SIGNIFICANT CHANGE (AT 95% C.I): Lumbar spine 0.036 g/cm2; 2.2% Total hip: 0.022 g/cm2; 2.6% Forearm: 0.017 g/cm2; 3.0%. Workstation performed: [id number]       Images and diagnostics reviewed     HEALTH MAINTENANCE     Health Maintenance   Topic Date Due    OT PLAN OF CARE  03/26/2020    Falls: Plan of Care  06/02/2021    DM Eye Exam  10/20/2022    Medicare Annual Wellness Visit (AWV)  02/28/2024    BMI: Followup Plan  11/14/2024    Diabetic Foot Exam  11/14/2024    COVID-19 Vaccine (4 - 2023-24 season) 01/30/2025 (Originally 9/1/2024)    Zoster Vaccine (1 of 2) 07/17/2025 (Originally [birth year]1991)    HEMOGLOBIN A1C  04/30/2025    Depression Screening  07/17/2025    Kidney Health Evaluation: Albumin/Creatinine Ratio  07/17/2025    Fall Risk  09/11/2025    Urinary Incontinence Screening  09/11/2025    Kidney Health Evaluation: GFR  10/14/2025    BMI: Adult  10/30/2025    Hepatitis C Screening  Completed    Osteoporosis Screening  Completed    RSV Vaccine Age 60+ Years  Completed    Pneumococcal Vaccine: 65+ Years  Completed    Influenza Vaccine  Completed    RSV Vaccine age 0-20 Months  Aged Out    HIB Vaccine  Aged Out    IPV Vaccine  Aged Out    Hepatitis A Vaccine  Aged Out    Meningococcal ACWY Vaccine  Aged Out    HPV Vaccine  Aged Out    Colorectal Cancer Screening  Discontinued     Immunization History   Administered Date(s) Administered    COVID-19 PFIZER VACCINE 0.3 ML IM 03/30/2021, 04/23/2021, 11/15/2021    INFLUENZA 11/24/2004, 11/01/2005, 12/18/2006, 10/30/2007, 10/22/2010, 02/27/2015, 11/06/2015, 09/22/2016, 10/09/2017    Influenza Quadrivalent Preservative Free 3 years and older IM 09/22/2016    Influenza Quadrivalent, 6-35 Months IM 10/09/2017    Influenza Split High Dose Preservative Free IM 09/17/2012, 10/23/2013, 10/09/2024    Influenza, high dose seasonal 0.7 mL  11/20/2018, 09/23/2019, 10/20/2021, 11/14/2023    Influenza, injectable, quadrivalent, preservative free 0.5 mL 10/21/2020    Influenza, seasonal, injectable 12/13/2011, 11/06/2015    Pneumococcal Conjugate 13-Valent 11/20/2018    Pneumococcal Polysaccharide PPV23 08/20/2004, 12/13/2011    Respiratory Syncytial Virus Vaccine (Recombinant) 10/09/2024    Td (adult), Unspecified 08/20/2003    Tdap 05/09/2016       I globally spent 35 minutes face-to-face with the patient and with chart review.     Adonis Bai, DO  Internal Medicine PGY-3

## 2024-11-06 ENCOUNTER — TELEPHONE (OUTPATIENT)
Dept: INTERNAL MEDICINE CLINIC | Facility: CLINIC | Age: 84
End: 2024-11-06

## 2024-11-06 NOTE — TELEPHONE ENCOUNTER
The pharmacy called and said that the Prolia had to be filled through the insurance speciality pharmacy

## 2024-11-07 NOTE — TELEPHONE ENCOUNTER
Contacted patient's [facility] pharmacy at . Spoke with pharmacist. Pharmacy forwarded script to [facility] to be processed. [facility] will reach out to office if needing prior authorization.

## 2024-11-10 DIAGNOSIS — E11.9 DIABETES MELLITUS TYPE 2, INSULIN DEPENDENT (HCC): Chronic | ICD-10-CM

## 2024-11-10 DIAGNOSIS — Z79.4 DIABETES MELLITUS TYPE 2, INSULIN DEPENDENT (HCC): Chronic | ICD-10-CM

## 2024-11-11 RX ORDER — BLOOD-GLUCOSE METER
KIT MISCELLANEOUS
Qty: 1 KIT | Refills: 1 | Status: SHIPPED | OUTPATIENT
Start: 2024-11-11

## 2024-11-25 ENCOUNTER — TELEPHONE (OUTPATIENT)
Dept: INTERNAL MEDICINE CLINIC | Facility: CLINIC | Age: 84
End: 2024-11-25

## 2024-11-25 NOTE — TELEPHONE ENCOUNTER
Ginette- can you please tell me the status of obtaining Prolia for this patient-     ?  We are hoping to provide injection in clinic in the near future-  Thank you-Dr. Avila

## 2024-12-02 ENCOUNTER — RA CDI HCC (OUTPATIENT)
Dept: OTHER | Facility: HOSPITAL | Age: 84
End: 2024-12-02

## 2024-12-02 NOTE — PROGRESS NOTES
DM & CKD     E11.22: Type 2 diabetes mellitus with diabetic chronic kidney disease (HCC) - please review if this is correct      N18. -- : Chronic kidney disease, stage - ; Please pick to the highest level of specificity as per current stage of CKD from code range N18.1 - N18.6 as appropriate     HCC coding opportunities          Chart Reviewed number of suggestions sent to Provider: 2     Patients Insurance     Medicare Insurance: [employer]vantage

## 2024-12-04 NOTE — TELEPHONE ENCOUNTER
Contacted [facility] at . Spoke with Loretta. Per Loretta, patient has to contact [facility] pharmacy to make a profile first before script is able to be processed. Loretta provided contact number .     Nurse contacted  and spoke with Billy. Patient's intake/profile completed on the phone. Nurse transferred to [facility] with pharmacy services. Completed a verbal order with [facility] for patient's Prolia order.     Per Shaka, next step is patient contacting them directly to place an order for medication. Once patient completes request, script will be processed and does require a prior authorization. Prior authorization team will reach out to office directly. Number for patient to contact provided by [facility] is .     Spoke with patient via Interpretor ID [id number]. Introduced self and role. Patient updated on Prolia process and aware that she needs to contact them directly at provided number. Patient expressed understanding and plans to contact them today. Patient aware to reach out to office with any questions/concerns.

## 2024-12-11 ENCOUNTER — OFFICE VISIT (OUTPATIENT)
Dept: INTERNAL MEDICINE CLINIC | Facility: CLINIC | Age: 84
End: 2024-12-11

## 2024-12-11 VITALS
BODY MASS INDEX: 26.01 KG/M2 | DIASTOLIC BLOOD PRESSURE: 63 MMHG | WEIGHT: 128.8 LBS | OXYGEN SATURATION: 98 % | SYSTOLIC BLOOD PRESSURE: 130 MMHG | HEART RATE: 64 BPM | TEMPERATURE: 97.8 F

## 2024-12-11 DIAGNOSIS — E11.9 DIABETES MELLITUS TYPE 2, INSULIN DEPENDENT (HCC): Primary | ICD-10-CM

## 2024-12-11 DIAGNOSIS — E78.1 HYPERTRIGLYCERIDEMIA: ICD-10-CM

## 2024-12-11 DIAGNOSIS — Z79.4 DIABETES MELLITUS TYPE 2, INSULIN DEPENDENT (HCC): Primary | ICD-10-CM

## 2024-12-11 DIAGNOSIS — I10 BENIGN ESSENTIAL HYPERTENSION: ICD-10-CM

## 2024-12-11 DIAGNOSIS — K31.A19 INTESTINAL METAPLASIA OF STOMACH WITHOUT DYSPLASIA: ICD-10-CM

## 2024-12-11 DIAGNOSIS — R06.83 SNORING: ICD-10-CM

## 2024-12-11 PROCEDURE — G2211 COMPLEX E/M VISIT ADD ON: HCPCS | Performed by: INTERNAL MEDICINE

## 2024-12-11 PROCEDURE — 99215 OFFICE O/P EST HI 40 MIN: CPT | Performed by: INTERNAL MEDICINE

## 2024-12-11 RX ORDER — LISINOPRIL 40 MG/1
40 TABLET ORAL DAILY
Qty: 90 TABLET | Refills: 3 | Status: SHIPPED | OUTPATIENT
Start: 2024-12-11

## 2024-12-11 RX ORDER — ICOSAPENT ETHYL 1 G/1
2 CAPSULE ORAL 2 TIMES DAILY
Qty: 120 CAPSULE | Refills: 2 | Status: SHIPPED | OUTPATIENT
Start: 2024-12-11 | End: 2025-03-11

## 2024-12-11 NOTE — PROGRESS NOTES
[facility]  INTERNAL MEDICINE OFFICE VISIT     PATIENT INFORMATION     Conchis Velasco   84 y.o. female   MRN: [id number]    ASSESSMENT/PLAN     Diagnoses and all orders for this visit:    Diabetes mellitus type 2, insulin dependent (HCC)  CKD3a  Last A1C 7.3  ACR normal, GFR 52-62    PLAN:   -Repeat A1C next visit  -     Glucometer test strips  -     IRIS Diabetic eye exam, not working, will need on next visit  -Diabetic foot exam unremarkable  -Continue Jardiance 25 mg, metformin 1000 mg BID, lantus 35u    3. Benign essential hypertension    Requires sitting and standing BP check manually  Evaluation for secondary causes  Pt admits to snoring and daytime tiredness  TSH unremarkable    PLAN:  -     lisinopril (ZESTRIL) 40 mg tablet; Take 1 tablet (40 mg total) by mouth daily  -Continue amlodipine 10 mg, HCTZ 12.5 mg, lopressor 50 mg BID  -     Aldosterone/Renin Ratio; Future  -     VAS renal artery complete; Future  -Sleep study ordered     4. Intestinal metaplasia of stomach without dysplasia  -     Ambulatory Referral to Gastroenterology; Future  -Likely need repeat EGD for goblet cells with risk for metaplasia    5 . Hypertriglyceridemia  Test skewed by TG, direct LDL 58  Goal LDL <70, last , HDL 35    -     Icosapent Ethyl 1 g CAPS; Take 2 capsules (2 g total) by mouth 2 (two) times a day  -Lifestyle modifications      Schedule a follow-up appointment in 1 month for annual, diabetes and HTN follow up.  Needs sitting and standing BP       HEALTH MAINTENANCE     Immunization History   Administered Date(s) Administered    COVID-19 PFIZER VACCINE 0.3 ML IM 03/30/2021, 04/23/2021, 11/15/2021    INFLUENZA 11/24/2004, 11/01/2005, 12/18/2006, 10/30/2007, 10/22/2010, 02/27/2015, 11/06/2015, 09/22/2016, 10/09/2017    Influenza Quadrivalent Preservative Free 3 years and older IM 09/22/2016    Influenza Quadrivalent, 6-35 Months IM 10/09/2017    Influenza Split High Dose  Preservative Free IM 09/17/2012, 10/23/2013, 10/09/2024    Influenza, high dose seasonal 0.7 mL 11/20/2018, 09/23/2019, 10/20/2021, 11/14/2023    Influenza, injectable, quadrivalent, preservative free 0.5 mL 10/21/2020    Influenza, seasonal, injectable 12/13/2011, 11/06/2015    Pneumococcal Conjugate 13-Valent 11/20/2018    Pneumococcal Polysaccharide PPV23 08/20/2004, 12/13/2011    Respiratory Syncytial Virus Vaccine (Recombinant) 10/09/2024    Td (adult), Unspecified 08/20/2003    Tdap 05/09/2016     CHIEF COMPLAINT     Chief Complaint   Patient presents with    Follow-up    Diabetes    Hypertension      HISTORY OF PRESENT ILLNESS     84 yr old F with PMH of SVT s/p ablation 2019, , HLD on atorvastatin and vascepa, B12 def and hypothyroidism, HTN, osteoporosis with L radial fracture now on Prolia (pending insurance), iron def anemia, CKD3a, T2DM on insulin.    55% with wall thickness mildly increased, mild concentric hypertrophy and grade 1 diastolic dysfunction  EGD- goblet cells present-questionably from intestinal metaplasia versus biopsy of the duodenum  Amlo 10, lisinopril 40 mg, HCTZ 12.5 mg, lopressor 50 mg BID  Diabetic eye, foot  Oct A1C 7.3, jardiance 25 mg, metformin 1000 mg BID, lantus 35u     Feeling better after reduction. Last hypoglycemia episode was 11/29 with glucose 61, felt cold/dizzy and took orange juice.  Reports AM glucose .      REVIEW OF SYSTEMS     Review of Systems   All other systems reviewed and are negative.    OBJECTIVE     Vitals:    12/11/24 0920 12/11/24 1000   BP: 134/73 130/63   BP Location: Left arm Right arm   Patient Position: Sitting    Cuff Size: Standard    Pulse: 63 64   Temp: 97.8 °F (36.6 °C)    TempSrc: Temporal    SpO2: 98%    Weight: 58.4 kg (128 lb 12.8 oz)        Sitting 140s  Standing 128    Physical Exam  Vitals reviewed.   Constitutional:       General: She is not in acute distress.  HENT:      Head: Normocephalic and atraumatic.   Eyes:       Conjunctiva/sclera: Conjunctivae normal.   Cardiovascular:      Rate and Rhythm: Normal rate and regular rhythm.      Pulses: Normal pulses. no weak pulses.           Dorsalis pedis pulses are 2+ on the right side and 2+ on the left side.        Posterior tibial pulses are 2+ on the right side.      Heart sounds: Normal heart sounds.   Pulmonary:      Effort: Pulmonary effort is normal.      Breath sounds: Normal breath sounds.   Abdominal:      Palpations: Abdomen is soft.      Tenderness: There is no abdominal tenderness.   Musculoskeletal:         General: Normal range of motion.      Right lower leg: No edema.      Left lower leg: No edema.   Feet:      Right foot:      Skin integrity: No ulcer, skin breakdown, erythema, warmth, callus or dry skin.      Left foot:      Skin integrity: No ulcer, skin breakdown, erythema, warmth, callus or dry skin.   Neurological:      Mental Status: She is alert and oriented to person, place, and time.     Patient's shoes and socks removed.    Right Foot/Ankle   Right Foot Inspection  Skin Exam: skin normal and skin intact. No dry skin, no warmth, no callus, no erythema, no maceration, no abnormal color, no pre-ulcer, no ulcer and no callus.     Toe Exam: ROM and strength within normal limits.     Sensory   Vibration: intact  Proprioception: intact  Monofilament testing: intact    Vascular  Capillary refills: < 3 seconds  The right DP pulse is 2+. The right PT pulse is 2+.     Left Foot/Ankle  Left Foot Inspection  Skin Exam: skin normal and skin intact. No dry skin, no warmth, no erythema, no maceration, normal color, no pre-ulcer, no ulcer and no callus.     Toe Exam: ROM and strength within normal limits.     Sensory   Vibration: intact  Proprioception: intact  Monofilament testing: intact    Vascular  Capillary refills: < 3 seconds  The left DP pulse is 2+.     Assign Risk Category  No deformity present  No loss of protective sensation  No weak pulses  Risk: 0     CURRENT  MEDICATIONS     Current Outpatient Medications:     Icosapent Ethyl 1 g CAPS, Take 2 capsules (2 g total) by mouth 2 (two) times a day, Disp: 120 capsule, Rfl: 2    lisinopril (ZESTRIL) 40 mg tablet, Take 1 tablet (40 mg total) by mouth daily, Disp: 90 tablet, Rfl: 3    acetaminophen (TYLENOL) 325 mg tablet, Take 2 tablets (650 mg total) by mouth every 6 (six) hours as needed for mild pain, Disp: 1 Bottle, Rfl: 0    amLODIPine (NORVASC) 10 mg tablet, TOME DIANE TABLETA TODOS LOS HANEY, Disp: 90 tablet, Rfl: 3    atorvastatin (LIPITOR) 40 mg tablet, TOME 1 TABLETA POR VIA ORAL TODOS LOS HANEY, Disp: 90 tablet, Rfl: 2    Blood Glucose Monitoring Suppl (OneTouch Verio Reflect) w/Device KIT, CHECK BLOOD SUGARS ONCE DAILY. PLEASE SUBSTITUTE WITH APPROPRIATE ALTERNATIVE AS COVERED BY PATIENT'S INSURANCE. DX: E11.65, Disp: 1 kit, Rfl: 1    calcium citrate (CALCITRATE) 950 (200 Ca) MG tablet, TAKE 1 TABLET (950 MG TOTAL) BY MOUTH DAILY, Disp: 90 tablet, Rfl: 2    cholecalciferol (VITAMIN D3) 1,000 units tablet, Take 1 tablet (1,000 Units total) by mouth daily for 180 days, Disp: 90 tablet, Rfl: 1    Continuous Glucose Sensor (FreeStyle Juani 14 Day Sensor) MISC, Use 1 Device every 14 (fourteen) days, Disp: 4 each, Rfl: 1    denosumab (PROLIA) 60 mg/mL, Inject 1 mL (60 mg total) under the skin once for 1 dose, Disp: 1 mL, Rfl: 0    Empagliflozin (Jardiance) 25 MG TABS, Take 1 tablet (25 mg total) by mouth every morning, Disp: 90 tablet, Rfl: 1    glucose blood (OneTouch Verio) test strip, Check blood sugars once daily. Please substitute with appropriate alternative as covered by patient's insurance. Dx: E11.65, Disp: 100 each, Rfl: 3    hydroCHLOROthiazide 12.5 mg tablet, Take 1 tablet (12.5 mg total) by mouth daily, Disp: 90 tablet, Rfl: 2    Insulin Glargine Solostar (Lantus SoloStar) 100 UNIT/ML SOPN, Inject 0.35 mL (35 Units total) under the skin in the morning, Disp: 45 mL, Rfl: 0    Insulin Pen Needle (B-D UF III MINI PEN  NEEDLES) 31G X 5 MM MISC, Inject 30 units SC twice daily and Trulicity once weekly, Disp: 200 each, Rfl: 1    Insulin Pen Needle (BD Pen Needle Marichuy 2nd Gen) 32G X 4 MM MISC, Use 3 (three) times a day Use to check blood sugar 3 times a day., Disp: 300 each, Rfl: 3    metFORMIN (GLUCOPHAGE) 1000 MG tablet, TOME DIANE TABLETA 2 VECES AL BRENDAN CON LAS COMIDAS, Disp: 180 tablet, Rfl: 3    metoprolol tartrate (LOPRESSOR) 50 mg tablet, TAKE 1 TABLET BY MOUTH EVERY 12 HOURS HOLD FOR HEART RATE LESS THAN 50 BEATS PER MINUTE., Disp: 180 tablet, Rfl: 3    multivitamin-minerals (CENTRUM ADULTS) tablet, Take 1 tablet by mouth daily, Disp: 30 tablet, Rfl: 1    OneTouch Delica Lancets 33G MISC, Check blood sugars once daily. Please substitute with appropriate alternative as covered by patient's insurance. Dx: E11.65, Disp: 100 each, Rfl: 3    vitamin B-12 (VITAMIN B-12) 1,000 mcg tablet, TAKE 2 TABLETS (2,000 MCG TOTAL) BY MOUTH DAILY., Disp: 180 tablet, Rfl: 1    PAST MEDICAL & SURGICAL HISTORY     Past Medical History:   Diagnosis Date    Anemia     Chronic idiopathic constipation     Colon polyp     Diabetes mellitus (HCC)     Diverticulitis, colon     Esophageal reflux     Eustachian tube dysfunction     Gastrointestinal hemorrhage     Hypertension     Myocardial infarction (HCC)     Non-healing skin lesion     Palpitations     Rectal bleeding     Skin lesion of chest wall      Past Surgical History:   Procedure Laterality Date     SECTION      CHOLECYSTECTOMY      COLONOSCOPY      ECTOPIC PREGNANCY SURGERY      KS COLONOSCOPY FLX DX W/COLLJ SPEC WHEN PFRMD N/A 2016    Procedure: COLONOSCOPY;  Surgeon: Brooks Contreras MD;  Location: [facility];  Service: Gastroenterology    KS NEUROPLASTY &/TRANSPOS MEDIAN NRV CARPAL TUNNE Left 10/8/2019    Procedure: CARPAL TUNNEL RELEASE;  Surgeon: Yamilex Hatch MD;  Location: [facility];  Service: Orthopedics    KS OPTX DSTL RADL I-ARTIC FX/EPIPHYSL SEP 3 FRAG Left 10/8/2019     Procedure: RADIUS/ULNA (WRIST) OPEN REDUCTION W/ INTERNAL FIXATION (ORIF);  Surgeon: Yamilex Hatch MD;  Location: [facility];  Service: Orthopedics     SOCIAL & FAMILY HISTORY     Social History     Socioeconomic History    Marital status: /Civil Union     Spouse name: Not on file    Number of children: Not on file    Years of education: Not on file    Highest education level: Not on file   Occupational History    Occupation: packaging   Tobacco Use    Smoking status: Never    Smokeless tobacco: Never   Vaping Use    Vaping status: Never Used   Substance and Sexual Activity    Alcohol use: Never    Drug use: Never    Sexual activity: Not Currently   Other Topics Concern    Not on file   Social History Narrative    Caffeine use    Denied exercising     Social Drivers of Health     Financial Resource Strain: High Risk (9/11/2024)    Overall Financial Resource Strain (CARDIA)     Difficulty of Paying Living Expenses: Very hard   Food Insecurity: No Food Insecurity (9/11/2024)    Nursing - Inadequate Food Risk Classification     Worried About Running Out of Food in the Last Year: Never true     Ran Out of Food in the Last Year: Never true     Ran Out of Food in the Last Year: Not on file   Transportation Needs: Unmet Transportation Needs (9/11/2024)    PRAPARE - Transportation     Lack of Transportation (Medical): Yes     Lack of Transportation (Non-Medical): Yes   Physical Activity: Inactive (6/2/2020)    Exercise Vital Sign     Days of Exercise per Week: 0 days     Minutes of Exercise per Session: 0 min   Stress: No Stress Concern Present (6/2/2020)    [employer] - Occupational Stress Questionnaire     Feeling of Stress : Not at all   Social Connections: Moderately Isolated (6/2/2020)    Social Connection and Isolation Panel [NHANES]     Frequency of Communication with Friends and Family: More than three times a week     Frequency of Social Gatherings with Friends and  Family: Never     Attends [employer] Services: Never     Active Member of Clubs or Organizations: No     Attends Club or Organization Meetings: Never     Marital Status:    Intimate Partner Violence: Not At Risk (6/2/2020)    Humiliation, Afraid, Rape, and Kick questionnaire     Fear of Current or Ex-Partner: No     Emotionally Abused: No     Physically Abused: No     Sexually Abused: No   Housing Stability: Low Risk  (9/11/2024)    Housing Stability Vital Sign     Unable to Pay for Housing in the Last Year: No     Number of Times Moved in the Last Year: 1     Homeless in the Last Year: No     Social History     Substance and Sexual Activity   Alcohol Use Never       Social History     Substance and Sexual Activity   Drug Use Never     Social History     Tobacco Use   Smoking Status Never   Smokeless Tobacco Never     Family History   Problem Relation Age of Onset    Heart disease Mother     No Known Problems Father     Bleeding Disorder Sister     No Known Problems Sister     No Known Problems Daughter     No Known Problems Maternal Grandmother     No Known Problems Maternal Grandfather     No Known Problems Paternal Grandmother     No Known Problems Paternal Grandfather     Breast cancer Neg Hx          BMI Counseling: Body mass index is 26.01 kg/m². The BMI is above normal. Nutrition recommendations include decreasing portion sizes, encouraging healthy choices of fruits and vegetables and reducing intake of cholesterol. Exercise recommendations include moderate physical activity 150 minutes/week. No pharmacotherapy was ordered. Rationale for BMI follow-up plan is due to patient being overweight or obese.     Falls Plan of Care: balance, strength, and gait training instructions were provided and referral to physical therapy.          ==  Elmer Martino MD  PGY-3  [facility]'s Internal Medicine Residency    [place] [address], [address]  [place]  [id number]  Office: [phone]  Fax: [phone]

## 2024-12-12 ENCOUNTER — TRANSCRIBE ORDERS (OUTPATIENT)
Dept: SLEEP CENTER | Facility: CLINIC | Age: 84
End: 2024-12-12

## 2024-12-12 DIAGNOSIS — I10 BENIGN ESSENTIAL HYPERTENSION: ICD-10-CM

## 2024-12-12 DIAGNOSIS — G47.8 OTHER SLEEP DISORDERS: Primary | ICD-10-CM

## 2024-12-20 DIAGNOSIS — I47.19 AVNRT (AV NODAL RE-ENTRY TACHYCARDIA) (HCC): ICD-10-CM

## 2024-12-23 LAB
LEFT EYE DIABETIC RETINOPATHY: ABNORMAL
LEFT EYE IMAGE QUALITY: ABNORMAL
LEFT EYE MACULAR EDEMA: ABNORMAL
LEFT EYE OTHER RETINOPATHY: ABNORMAL
RIGHT EYE DIABETIC RETINOPATHY: ABNORMAL
RIGHT EYE IMAGE QUALITY: ABNORMAL
RIGHT EYE MACULAR EDEMA: ABNORMAL
RIGHT EYE OTHER RETINOPATHY: ABNORMAL
SEVERITY (EYE EXAM): ABNORMAL

## 2024-12-24 RX ORDER — METOPROLOL TARTRATE 50 MG
TABLET ORAL
Qty: 180 TABLET | Refills: 3 | Status: SHIPPED | OUTPATIENT
Start: 2024-12-24

## 2025-01-05 DIAGNOSIS — I10 BENIGN ESSENTIAL HYPERTENSION: ICD-10-CM

## 2025-01-06 RX ORDER — AMLODIPINE BESYLATE 10 MG/1
TABLET ORAL
Qty: 90 TABLET | Refills: 3 | Status: SHIPPED | OUTPATIENT
Start: 2025-01-06

## 2025-01-15 ENCOUNTER — TELEPHONE (OUTPATIENT)
Dept: INTERNAL MEDICINE CLINIC | Facility: CLINIC | Age: 85
End: 2025-01-15

## 2025-01-17 ENCOUNTER — HOSPITAL ENCOUNTER (OUTPATIENT)
Dept: RADIOLOGY | Age: 85
Discharge: HOME | End: 2025-01-17
Payer: COMMERCIAL

## 2025-01-17 VITALS — BODY MASS INDEX: 25.8 KG/M2 | HEIGHT: 59 IN | WEIGHT: 128 LBS

## 2025-01-17 DIAGNOSIS — Z12.31 VISIT FOR SCREENING MAMMOGRAM: ICD-10-CM

## 2025-01-17 PROCEDURE — 77067 SCR MAMMO BI INCL CAD: CPT

## 2025-01-17 PROCEDURE — 77063 BREAST TOMOSYNTHESIS BI: CPT

## 2025-01-20 ENCOUNTER — RESULTS FOLLOW-UP (OUTPATIENT)
Dept: INTERNAL MEDICINE CLINIC | Facility: CLINIC | Age: 85
End: 2025-01-20

## 2025-02-25 ENCOUNTER — TELEPHONE (OUTPATIENT)
Dept: INTERNAL MEDICINE CLINIC | Facility: CLINIC | Age: 85
End: 2025-02-25

## 2025-04-21 ENCOUNTER — CONSULT (OUTPATIENT)
Dept: GASTROENTEROLOGY | Facility: CLINIC | Age: 85
End: 2025-04-21
Payer: COMMERCIAL

## 2025-04-21 VITALS
BODY MASS INDEX: 25.8 KG/M2 | SYSTOLIC BLOOD PRESSURE: 120 MMHG | TEMPERATURE: 97.1 F | DIASTOLIC BLOOD PRESSURE: 72 MMHG | WEIGHT: 128 LBS | HEIGHT: 59 IN

## 2025-04-21 DIAGNOSIS — K31.A19 INTESTINAL METAPLASIA OF STOMACH WITHOUT DYSPLASIA: ICD-10-CM

## 2025-04-21 PROCEDURE — 99203 OFFICE O/P NEW LOW 30 MIN: CPT

## 2025-04-21 RX ORDER — SODIUM CHLORIDE, SODIUM LACTATE, POTASSIUM CHLORIDE, CALCIUM CHLORIDE 600; 310; 30; 20 MG/100ML; MG/100ML; MG/100ML; MG/100ML
125 INJECTION, SOLUTION INTRAVENOUS CONTINUOUS
OUTPATIENT
Start: 2025-04-21

## 2025-04-21 NOTE — PATIENT INSTRUCTIONS
Scheduled date of EGD (as of today): 6/30/2025  Physician performing EGD: Dr. Patel  Location of EGD: [facility]  Desired bowel prep reviewed with patient: Proc prep in [place]an given to pt at 4/21/25 ov  Instructions reviewed with patient by: Mary  Clearances: N/A    Follow-up is as needed

## 2025-04-21 NOTE — PROGRESS NOTES
Name: Conchis Velasco      : 1940      MRN: [id number]  Encounter Provider: Darleen Sosa PA-C  Encounter Date: 2025   Encounter department: [facility] [place]  :  Assessment & Plan  Intestinal metaplasia of stomach without dysplasia  Patient had EGD 2023 for iron deficiency anemia that showed mild patchy erythematous mucosa in the stomach.  Stomach biopsies with chronic active gastritis and goblet cells present which may represent intestinal metaplasia.  Patient is feeling well, no GI complaints at this time.  We discussed consideration of repeat EGD with gastric mapping biopsies.  Reviewed risks versus benefits of procedure, patient would like to undergo repeat EGD at this time.  Orders:    Ambulatory Referral to Gastroenterology    EGD; Future        History of Present Illness   HPI  Conchis Velasco is a 85 y.o. female with PMH of HTN, type 2 diabetes, CKD 3 who presents to discuss EGD.  Patient is here with her grandson today who is helping translate for her.  She states she is feeling well, no GI complaints this time.  Having regular formed bowel movements.  No NSAID use.  Denies unintentional weight loss, nausea, vomiting, dysphagia, odynophagia, abdominal pain, change in bowel habits, melena, hematochezia.  No family history of GI cancers.    Prior imaging/procedures:  EGD 2023: Mild patchy erythematous mucosa in the stomach.  Normal esophagus and duodenum (stomach biopsy with chronic active gastritis, negative for H. pylori, goblet cells present may either represent intestinal metaplasia or biopsy of duodenal mucosa)  Colonoscopy 2023: Normal colon, tattoo from previous polypectomy noted without residual polyp    Review of Systems   Constitutional:  Negative for appetite change, chills and fever.   Respiratory:  Negative for shortness of breath.    Gastrointestinal:  Negative for abdominal pain, blood in stool, constipation, diarrhea, nausea and  vomiting.     Medical History Reviewed by provider this encounter:     .  Current Outpatient Medications on File Prior to Visit   Medication Sig Dispense Refill    acetaminophen (TYLENOL) 325 mg tablet Take 2 tablets (650 mg total) by mouth every 6 (six) hours as needed for mild pain 1 Bottle 0    amLODIPine (NORVASC) 10 mg tablet TOME DIANE TABLETA TODOS LOS HANEY 90 tablet 3    atorvastatin (LIPITOR) 40 mg tablet TOME 1 TABLETA POR VIA ORAL TODOS LOS HANEY 90 tablet 2    Blood Glucose Monitoring Suppl (OneTouch Verio Reflect) w/Device KIT CHECK BLOOD SUGARS ONCE DAILY. PLEASE SUBSTITUTE WITH APPROPRIATE ALTERNATIVE AS COVERED BY PATIENT'S INSURANCE. DX: E11.65 1 kit 1    calcium citrate (CALCITRATE) 950 (200 Ca) MG tablet TAKE 1 TABLET (950 MG TOTAL) BY MOUTH DAILY 90 tablet 2    cholecalciferol (VITAMIN D3) 1,000 units tablet Take 1 tablet (1,000 Units total) by mouth daily for 180 days 90 tablet 1    Continuous Glucose Sensor (FreeStyle Juani 14 Day Sensor) MISC Use 1 Device every 14 (fourteen) days 4 each 1    glucose blood (OneTouch Verio) test strip Check blood sugars once daily. Please substitute with appropriate alternative as covered by patient's insurance. Dx: E11.65 100 each 3    hydroCHLOROthiazide 12.5 mg tablet Take 1 tablet (12.5 mg total) by mouth daily 90 tablet 2    Insulin Pen Needle (B-D UF III MINI PEN NEEDLES) 31G X 5 MM MISC Inject 30 units SC twice daily and Trulicity once weekly 200 each 1    Insulin Pen Needle (BD Pen Needle Marichuy 2nd Gen) 32G X 4 MM MISC Use 3 (three) times a day Use to check blood sugar 3 times a day. 300 each 3    lisinopril (ZESTRIL) 40 mg tablet Take 1 tablet (40 mg total) by mouth daily 90 tablet 3    metFORMIN (GLUCOPHAGE) 1000 MG tablet TOME DIANE TABLETA 2 VECES AL BRENDAN CON LAS COMIDAS 180 tablet 3    metoprolol tartrate (LOPRESSOR) 50 mg tablet TAKE 1 TABLET BY MOUTH EVERY 12 HOURS HOLD FOR HEART RATE LESS THAN 50 BEATS PER MINUTE. 180 tablet 3    multivitamin-minerals  "(CENTRUM ADULTS) tablet Take 1 tablet by mouth daily 30 tablet 1    OneTouch Delica Lancets 33G MISC Check blood sugars once daily. Please substitute with appropriate alternative as covered by patient's insurance. Dx: E11.65 100 each 3    vitamin B-12 (VITAMIN B-12) 1,000 mcg tablet TAKE 2 TABLETS (2,000 MCG TOTAL) BY MOUTH DAILY. 180 tablet 1    denosumab (PROLIA) 60 mg/mL Inject 1 mL (60 mg total) under the skin once for 1 dose 1 mL 0    Empagliflozin (Jardiance) 25 MG TABS Take 1 tablet (25 mg total) by mouth every morning 90 tablet 1    Icosapent Ethyl 1 g CAPS Take 2 capsules (2 g total) by mouth 2 (two) times a day 120 capsule 2    Insulin Glargine Solostar (Lantus SoloStar) 100 UNIT/ML SOPN Inject 0.35 mL (35 Units total) under the skin in the morning 45 mL 0     No current facility-administered medications on file prior to visit.      Social History     Tobacco Use    Smoking status: Never    Smokeless tobacco: Never   Vaping Use    Vaping status: Never Used   Substance and Sexual Activity    Alcohol use: Never    Drug use: Never    Sexual activity: Not Currently        Objective   /72 (BP Location: Left arm, Patient Position: Sitting, Cuff Size: Adult)   Temp (!) 97.1 °F (36.2 °C) (Tympanic)   Ht 4' 11\" (1.499 m)   Wt 58.1 kg (128 lb)   BMI 25.85 kg/m²      Physical Exam  Vitals reviewed.   Constitutional:       General: She is not in acute distress.     Appearance: She is not ill-appearing.   HENT:      Head: Normocephalic and atraumatic.   Cardiovascular:      Rate and Rhythm: Normal rate and regular rhythm.   Pulmonary:      Effort: Pulmonary effort is normal.      Breath sounds: Normal breath sounds.   Abdominal:      General: Abdomen is flat. Bowel sounds are normal. There is no distension.      Palpations: Abdomen is soft.      Tenderness: There is no abdominal tenderness.   Skin:     General: Skin is warm and dry.   Neurological:      Mental Status: She is alert. Mental status is at " baseline.

## 2025-05-18 DIAGNOSIS — E78.2 MIXED HYPERLIPIDEMIA: ICD-10-CM

## 2025-05-19 RX ORDER — ATORVASTATIN CALCIUM 40 MG/1
TABLET, FILM COATED ORAL
Qty: 90 TABLET | Refills: 2 | Status: SHIPPED | OUTPATIENT
Start: 2025-05-19

## 2025-06-08 DIAGNOSIS — I10 ESSENTIAL HYPERTENSION: ICD-10-CM

## 2025-06-09 RX ORDER — HYDROCHLOROTHIAZIDE 12.5 MG/1
TABLET ORAL
Qty: 90 TABLET | Refills: 0 | Status: SHIPPED | OUTPATIENT
Start: 2025-06-09

## 2025-06-24 DIAGNOSIS — Z79.4 TYPE 2 DIABETES MELLITUS WITH HYPERGLYCEMIA, WITH LONG-TERM CURRENT USE OF INSULIN (HCC): ICD-10-CM

## 2025-06-24 DIAGNOSIS — E11.65 TYPE 2 DIABETES MELLITUS WITH HYPERGLYCEMIA, WITH LONG-TERM CURRENT USE OF INSULIN (HCC): ICD-10-CM

## 2025-08-13 ENCOUNTER — APPOINTMENT (OUTPATIENT)
Dept: LAB | Facility: CLINIC | Age: 85
End: 2025-08-13
Payer: COMMERCIAL

## 2025-08-13 ENCOUNTER — OFFICE VISIT (OUTPATIENT)
Dept: INTERNAL MEDICINE CLINIC | Facility: CLINIC | Age: 85
End: 2025-08-13

## 2025-08-13 DIAGNOSIS — I10 BENIGN ESSENTIAL HYPERTENSION: ICD-10-CM

## 2025-08-13 DIAGNOSIS — E11.9 DIABETES MELLITUS TYPE 2, UNCOMPLICATED (HCC): ICD-10-CM

## 2025-08-13 DIAGNOSIS — E11.9 TYPE 2 DIABETES MELLITUS WITHOUT COMPLICATION, UNSPECIFIED WHETHER LONG TERM INSULIN USE (HCC): ICD-10-CM

## 2025-08-13 LAB
25(OH)D3 SERPL-MCNC: 63.4 NG/ML (ref 30–100)
ALBUMIN SERPL BCG-MCNC: 4.7 G/DL (ref 3.5–5)
ALP SERPL-CCNC: 49 U/L (ref 34–104)
ALT SERPL W P-5'-P-CCNC: 18 U/L (ref 7–52)
ANION GAP SERPL CALCULATED.3IONS-SCNC: 14 MMOL/L (ref 4–13)
AST SERPL W P-5'-P-CCNC: 22 U/L (ref 13–39)
BASOPHILS # BLD AUTO: 0.08 THOUSANDS/ÂΜL (ref 0–0.1)
BASOPHILS NFR BLD AUTO: 1 % (ref 0–1)
BILIRUB SERPL-MCNC: 0.69 MG/DL (ref 0.2–1)
BUN SERPL-MCNC: 21 MG/DL (ref 5–25)
CALCIUM SERPL-MCNC: 9.9 MG/DL (ref 8.4–10.2)
CHLORIDE SERPL-SCNC: 98 MMOL/L (ref 96–108)
CHOLEST SERPL-MCNC: 151 MG/DL (ref ?–200)
CO2 SERPL-SCNC: 25 MMOL/L (ref 21–32)
CREAT SERPL-MCNC: 0.87 MG/DL (ref 0.6–1.3)
CREAT UR-MCNC: 119.9 MG/DL
EOSINOPHIL # BLD AUTO: 0.11 THOUSAND/ÂΜL (ref 0–0.61)
EOSINOPHIL NFR BLD AUTO: 1 % (ref 0–6)
ERYTHROCYTE [DISTWIDTH] IN BLOOD BY AUTOMATED COUNT: 14.2 % (ref 11.6–15.1)
FOLATE SERPL-MCNC: >22.3 NG/ML
GFR SERPL CREATININE-BSD FRML MDRD: 61 ML/MIN/1.73SQ M
GLUCOSE P FAST SERPL-MCNC: 184 MG/DL (ref 65–99)
HCT VFR BLD AUTO: 39.4 % (ref 34.8–46.1)
HDLC SERPL-MCNC: 42 MG/DL
HGB BLD-MCNC: 12.6 G/DL (ref 11.5–15.4)
IMM GRANULOCYTES # BLD AUTO: 0.02 THOUSAND/UL (ref 0–0.2)
IMM GRANULOCYTES NFR BLD AUTO: 0 % (ref 0–2)
LDLC SERPL DIRECT ASSAY-MCNC: 62 MG/DL (ref 0–100)
LYMPHOCYTES # BLD AUTO: 3.11 THOUSANDS/ÂΜL (ref 0.6–4.47)
LYMPHOCYTES NFR BLD AUTO: 36 % (ref 14–44)
MCH RBC QN AUTO: 27.8 PG (ref 26.8–34.3)
MCHC RBC AUTO-ENTMCNC: 32 G/DL (ref 31.4–37.4)
MCV RBC AUTO: 87 FL (ref 82–98)
MICROALBUMIN UR-MCNC: 9.3 MG/L
MICROALBUMIN/CREAT 24H UR: 8 MG/G CREATININE (ref 0–30)
MONOCYTES # BLD AUTO: 0.76 THOUSAND/ÂΜL (ref 0.17–1.22)
MONOCYTES NFR BLD AUTO: 9 % (ref 4–12)
NEUTROPHILS # BLD AUTO: 4.58 THOUSANDS/ÂΜL (ref 1.85–7.62)
NEUTS SEG NFR BLD AUTO: 53 % (ref 43–75)
NRBC BLD AUTO-RTO: 0 /100 WBCS
PLATELET # BLD AUTO: 347 THOUSANDS/UL (ref 149–390)
PMV BLD AUTO: 10.9 FL (ref 8.9–12.7)
POTASSIUM SERPL-SCNC: 4.2 MMOL/L (ref 3.5–5.3)
PROT SERPL-MCNC: 7.9 G/DL (ref 6.4–8.4)
RBC # BLD AUTO: 4.53 MILLION/UL (ref 3.81–5.12)
SODIUM SERPL-SCNC: 137 MMOL/L (ref 135–147)
TRIGL SERPL-MCNC: 421 MG/DL (ref ?–150)
VIT B12 SERPL-MCNC: 163 PG/ML (ref 180–914)
WBC # BLD AUTO: 8.66 THOUSAND/UL (ref 4.31–10.16)

## 2025-08-13 PROCEDURE — 83721 ASSAY OF BLOOD LIPOPROTEIN: CPT

## 2025-08-13 PROCEDURE — 80053 COMPREHEN METABOLIC PANEL: CPT

## 2025-08-13 PROCEDURE — 85025 COMPLETE CBC W/AUTO DIFF WBC: CPT

## 2025-08-13 PROCEDURE — 80061 LIPID PANEL: CPT

## 2025-08-13 PROCEDURE — 82088 ASSAY OF ALDOSTERONE: CPT

## 2025-08-13 PROCEDURE — 82746 ASSAY OF FOLIC ACID SERUM: CPT

## 2025-08-13 PROCEDURE — 84244 ASSAY OF RENIN: CPT

## 2025-08-13 PROCEDURE — 82043 UR ALBUMIN QUANTITATIVE: CPT

## 2025-08-13 PROCEDURE — 82607 VITAMIN B-12: CPT

## 2025-08-13 PROCEDURE — 82306 VITAMIN D 25 HYDROXY: CPT

## 2025-08-13 PROCEDURE — 82570 ASSAY OF URINE CREATININE: CPT

## 2025-08-13 PROCEDURE — 36415 COLL VENOUS BLD VENIPUNCTURE: CPT

## 2025-08-19 LAB
ALDOST SERPL-MCNC: 2.4 NG/DL (ref 0–30)
ALDOST/RENIN PLAS-RTO: 0.1 {RATIO} (ref 0–30)
RENIN PLAS-CCNC: 26.57 NG/ML/HR (ref 0.17–5.38)

## 2025-08-21 DIAGNOSIS — M81.0 POSTMENOPAUSAL OSTEOPOROSIS: ICD-10-CM

## (undated) DEVICE — STERILE BETHLEHEM PLASTIC HAND: Brand: CARDINAL HEALTH

## (undated) DEVICE — KNIFE LIGHT 10,PK: Brand: KNIFELIGHT

## (undated) DEVICE — GLOVE SRG BIOGEL 7

## (undated) DEVICE — CHLORAPREP HI-LITE 26ML ORANGE

## (undated) DEVICE — ACE WRAP 3 IN STERILE

## (undated) DEVICE — 3M™ STERI-STRIP™ BLEND TONE SKIN CLOSURES, B1557, TAN, 1/2 IN X 4 IN (12MM X 100MM), 6 STRIPS/ENVELOPE: Brand: 3M™ STERI-STRIP™

## (undated) DEVICE — MINI BLADE ROUND TIP SHARP ON ONE SIDE

## (undated) DEVICE — SUT MONOCRYL 3-0 SH 27 IN Y416H

## (undated) DEVICE — DISPOSABLE EQUIPMENT COVER: Brand: SMALL TOWEL DRAPE

## (undated) DEVICE — GLOVE INDICATOR PI UNDERGLOVE SZ 6.5 BLUE

## (undated) DEVICE — ADHESIVE SKIN HIGH VISCOSITY EXOFIN 1ML

## (undated) DEVICE — INTENDED FOR TISSUE SEPARATION, AND OTHER PROCEDURES THAT REQUIRE A SHARP SURGICAL BLADE TO PUNCTURE OR CUT.: Brand: BARD-PARKER ® CARBON RIB-BACK BLADES

## (undated) DEVICE — DRILL TWIST 2.3MM

## (undated) DEVICE — DRILL TWIST 2.0 X 95MM

## (undated) DEVICE — SPONGE PVP SCRUB WING STERILE

## (undated) DEVICE — OCCLUSIVE GAUZE STRIP,3% BISMUTH TRIBROMOPHENATE IN PETROLATUM BLEND: Brand: XEROFORM

## (undated) DEVICE — STRETCH BANDAGE: Brand: CURITY

## (undated) DEVICE — PADDING CAST 4 IN  COTTON STRL

## (undated) DEVICE — SUT PROLENE 5-0 P-3 18 IN 8698G

## (undated) DEVICE — GLOVE INDICATOR PI UNDERGLOVE SZ 7 BLUE

## (undated) DEVICE — CUFF TOURNIQUET 18 X 4 IN QUICK CONNECT DISP 1 BLADDER

## (undated) DEVICE — TUBING SUCTION 5MM X 12 FT

## (undated) DEVICE — SUT MONOCRYL 4-0 PS-2 18 IN Y496G

## (undated) DEVICE — GAUZE SPONGES,16 PLY: Brand: CURITY

## (undated) DEVICE — GLOVE SRG BIOGEL 6.5

## (undated) DEVICE — IMPERVIOUS STOCKINETTE: Brand: DEROYAL

## (undated) DEVICE — DRILL 1.8 X 90MM AKA

## (undated) DEVICE — LIGHT HANDLE COVER SLEEVE DISP BLUE STELLAR

## (undated) DEVICE — DRAPE C-ARM X-RAY